# Patient Record
Sex: FEMALE | Race: ASIAN | NOT HISPANIC OR LATINO | Employment: UNEMPLOYED | URBAN - METROPOLITAN AREA
[De-identification: names, ages, dates, MRNs, and addresses within clinical notes are randomized per-mention and may not be internally consistent; named-entity substitution may affect disease eponyms.]

---

## 2018-03-04 ENCOUNTER — TRANSFERRED RECORDS (OUTPATIENT)
Dept: HEALTH INFORMATION MANAGEMENT | Facility: CLINIC | Age: 53
End: 2018-03-04

## 2021-11-28 ENCOUNTER — TRANSFERRED RECORDS (OUTPATIENT)
Dept: HEALTH INFORMATION MANAGEMENT | Facility: CLINIC | Age: 56
End: 2021-11-28

## 2021-11-28 LAB — HEP C HIM: NORMAL

## 2021-12-06 ENCOUNTER — TRANSFERRED RECORDS (OUTPATIENT)
Dept: HEALTH INFORMATION MANAGEMENT | Facility: CLINIC | Age: 56
End: 2021-12-06

## 2022-07-01 ENCOUNTER — TRANSFERRED RECORDS (OUTPATIENT)
Dept: MULTI SPECIALTY CLINIC | Facility: CLINIC | Age: 57
End: 2022-07-01

## 2022-07-01 LAB — PAP SMEAR - HIM PATIENT REPORTED: NORMAL

## 2022-12-02 ENCOUNTER — TRANSFERRED RECORDS (OUTPATIENT)
Dept: HEALTH INFORMATION MANAGEMENT | Facility: CLINIC | Age: 57
End: 2022-12-02

## 2023-01-19 ENCOUNTER — TRANSFERRED RECORDS (OUTPATIENT)
Dept: HEALTH INFORMATION MANAGEMENT | Facility: CLINIC | Age: 58
End: 2023-01-19

## 2023-02-03 ENCOUNTER — TRANSFERRED RECORDS (OUTPATIENT)
Dept: HEALTH INFORMATION MANAGEMENT | Facility: CLINIC | Age: 58
End: 2023-02-03

## 2023-02-09 ENCOUNTER — TRANSFERRED RECORDS (OUTPATIENT)
Dept: HEALTH INFORMATION MANAGEMENT | Facility: CLINIC | Age: 58
End: 2023-02-09

## 2023-02-09 ENCOUNTER — MEDICAL CORRESPONDENCE (OUTPATIENT)
Dept: HEALTH INFORMATION MANAGEMENT | Facility: CLINIC | Age: 58
End: 2023-02-09

## 2023-02-10 ENCOUNTER — TRANSFERRED RECORDS (OUTPATIENT)
Dept: HEALTH INFORMATION MANAGEMENT | Facility: CLINIC | Age: 58
End: 2023-02-10

## 2023-02-24 ENCOUNTER — MEDICAL CORRESPONDENCE (OUTPATIENT)
Dept: HEALTH INFORMATION MANAGEMENT | Facility: CLINIC | Age: 58
End: 2023-02-24

## 2023-05-03 ENCOUNTER — REFERRAL (OUTPATIENT)
Dept: TRANSPLANT | Facility: CLINIC | Age: 58
End: 2023-05-03

## 2023-05-03 DIAGNOSIS — N18.6 END STAGE RENAL DISEASE (H): ICD-10-CM

## 2023-05-03 DIAGNOSIS — Z76.82 ORGAN TRANSPLANT CANDIDATE: ICD-10-CM

## 2023-05-03 DIAGNOSIS — N18.6 ESRD (END STAGE RENAL DISEASE) (H): Primary | ICD-10-CM

## 2023-05-03 DIAGNOSIS — Z01.818 PRE-TRANSPLANT EVALUATION FOR KIDNEY TRANSPLANT: ICD-10-CM

## 2023-05-03 DIAGNOSIS — I10 ESSENTIAL HYPERTENSION: ICD-10-CM

## 2023-05-03 NOTE — LETTER
[facility]  [address]  [place] [address]                May 3, 2023                                                                                        MEDICAL RECORDS REQUEST    [facility]                      [facility]     Thank you for referring your patient to the [facility] Kidney, Kidney Pancreas Program, in order to process the referral we will need the following information;    Demographics  2728 form   Immunizations records  Providers Progress notes, (last 3 note)  Up to date lab work if available       Please call our office at [phone] if you have any questions or concerns.                Please fax all paper records to [phone] within 1-3 business days.      Thank you,   The SOT Referral Intake Team     [facility]  Solid Organ Transplant Office  [address]  [place] [address]

## 2023-05-03 NOTE — LETTER
Lyndsey [facility]  [address]  [place] [address]                July 6, 2023      Gregorio Lyndsey,     It was a pleasure to speak with your daughter, Getachew, over the phone today in preparation of your pre-kidney transplant evaluation. I am sending this letter to review the pre-transplant information we covered. I will also send this same content in an email for your convenience.     Your schedule will be available in your My Chart for your pre-kidney transplant evaluation on 09/14/2023 starting at 7:30 AM. All your appointments will be at the:     [facility]  [address] [place] [address]    For parking options, please park in the open lot across the street from the front door of our Clinic.  Otherwise, enter the [facility] / john phan from [facility] and attendants can assist you based on your needs.  parking is available for those with limited mobility M-F from 7:00 am to 5:00 pm.     Please bring your designated care person(s) to your appointment day to help listen to the patient education and ask questions that are important to you. You can eat/drink normally on this day. Please do not fast, as the appointment day will most likely go until 3 PM. There is a coffee shop on street level for you to purchase food and you can also bring food from home. Please be aware there are no microwaves or refrigerators for patient use at the clinic. Also, take all your prescribed medications as ordered on this day. There are no medication lab tests ordered during your appointments.    Upon completion of your appointments, I will compile the outcomes and have your results reviewed at the Transplant Team Selection Committee on Wednesday 09/20/2023 of the following week. This is a medical review meeting only, you will not be asked to attend. I will call you within 10 business  days after this meeting to inform you of the outcomes and to assist in planning  for the completion of your evaluation. You will also receive a Transplant Evaluation Summary Letter after my phone call.     You will receive an email from Ashleigh in our Transplant Office approximately 10 days prior to your evaluation which contains a Receipt of Information consent and patient educational materials. Please read and electronically sign the Receipt of Information consent as well as read the educational materials prior to your evaluation appointments on 09/14/2023.     Please complete your pre-kidney transplant education on [facility]plant Place. This is an online website that our Transplant Program uses for patient education, specifically for our Program. To find [facility] Transplant Place you can google search for it or click on this link: [email or web address].  Your first step is to register as a new user, then pick your settings: adult/kidney/ English. Next notice the education is divided into 3 sections: pre-transplant/ transplant/ post-transplant. In the pre-transplant sections please view pre-kidney evaluation parts 1 and 2 video sets. Please complete viewing of these videos prior to your evaluation appointments.  This will give you a good knowledge base to then to ask questions with the providers.     Additional transplant resources are as follows:   www.unos.org. UNOS, or United Network of Organ Sharing, is the national organization in our country that maintains all the organ wait lists and is responsible for the rules and regulations for organ allocation. I recommend looking at the Transplant Living section as this area has content created for patients.   www.srtr.org SRTR, or the Scientific Registry for Transplant Recipients is a national data base that all Transplant Centers report their success and failure rate for all organ types twice per year. The results are public knowledge and do provide a good perspective of organ transplant.     If the transplant providers tell you at your  appointments that you should start to have live donors register with our Program to initiate their evaluations, please provide this registration website: [email or web address]   The donor will receive a detailed email response back with information and next steps specific to their situation. It is important that the donor responds to the email in order to  forward with their evaluation. Donors can also call our Office and ask to speak with a live donor coordinator in the event of questions at [phone].     Please let me know of any questions or concerns.     Thank you,  Dolly Garcia, RN, BSN  Pre Kidney Pancreas Transplant Coordinator   [facility]  Solid Organ Transplant Care   [address] [place] [address] [address]  [place] [address]  [email or web address]   Office Number: [phone] Direct Number: [phone]   Fax Number: [phone]  Employed by [facility] [employer] [facility]     CC's: Dr. Faviola Jang, [facility]

## 2023-05-03 NOTE — LETTER
[facility]  [address]  [place] [address]          Dear Lyndsey,    Thank you for your interest in the Transplant Center at [facility]. We look forward to being a part of your care team and assisting you through the transplant process.    As we discussed, your transplant coordinator is Dolly Garcia (Kidney).  You may call your coordinator at any time with questions or concerns.  Your first scheduled call will be on 5/26/2023 between 8am and 12pm.  If this needs to change, call [phone].    Please complete the following.    Fill out and return the enclosed forms  Authorization for Electronic Communication  Authorization to Discuss Protected Health Information  Authorization for Release of Protected Health Information    Sign up for:  [employer], access to your electronic medical record (see enclosed pamphlet)  [email or web address], a transplant education website    You can use these tools to learn more about your transplant, communicate with your care team, and track your medical details      Sincerely,      Solid Organ Transplant  [facility]    cc: Referring Physician

## 2023-05-05 ENCOUNTER — TRANSFERRED RECORDS (OUTPATIENT)
Dept: HEALTH INFORMATION MANAGEMENT | Facility: CLINIC | Age: 58
End: 2023-05-05
Payer: COMMERCIAL

## 2023-05-08 ENCOUNTER — TELEPHONE (OUTPATIENT)
Dept: TRANSPLANT | Facility: CLINIC | Age: 58
End: 2023-05-08

## 2023-05-08 NOTE — TELEPHONE ENCOUNTER
Patient Call: General  Route to LPN    Reason for call: called in regards of returing missed call from last Friday. Call back number [phone].    Call back needed? Yes    Return Call Needed  Same as documented in contacts section  When to return call?: Same day: Route High Priority

## 2023-05-09 ENCOUNTER — OFFICE VISIT (OUTPATIENT)
Dept: FAMILY MEDICINE | Facility: CLINIC | Age: 58
End: 2023-05-09
Payer: COMMERCIAL

## 2023-05-09 VITALS
BODY MASS INDEX: 19.25 KG/M2 | DIASTOLIC BLOOD PRESSURE: 65 MMHG | OXYGEN SATURATION: 100 % | HEART RATE: 68 BPM | RESPIRATION RATE: 16 BRPM | WEIGHT: 89.25 LBS | SYSTOLIC BLOOD PRESSURE: 124 MMHG | HEIGHT: 57 IN | TEMPERATURE: 98.4 F

## 2023-05-09 DIAGNOSIS — Z11.59 NEED FOR HEPATITIS C SCREENING TEST: ICD-10-CM

## 2023-05-09 DIAGNOSIS — Z11.4 SCREENING FOR HIV (HUMAN IMMUNODEFICIENCY VIRUS): ICD-10-CM

## 2023-05-09 DIAGNOSIS — Z99.2 ESRD (END STAGE RENAL DISEASE) ON DIALYSIS (H): Primary | ICD-10-CM

## 2023-05-09 DIAGNOSIS — Z01.84 IMMUNITY STATUS TESTING: ICD-10-CM

## 2023-05-09 DIAGNOSIS — Z12.4 CERVICAL CANCER SCREENING: ICD-10-CM

## 2023-05-09 DIAGNOSIS — N18.6 ESRD (END STAGE RENAL DISEASE) ON DIALYSIS (H): Primary | ICD-10-CM

## 2023-05-09 DIAGNOSIS — Z12.31 VISIT FOR SCREENING MAMMOGRAM: ICD-10-CM

## 2023-05-09 DIAGNOSIS — I10 HYPERTENSION, UNSPECIFIED TYPE: ICD-10-CM

## 2023-05-09 DIAGNOSIS — Z12.11 SCREEN FOR COLON CANCER: ICD-10-CM

## 2023-05-09 LAB
ALBUMIN SERPL BCG-MCNC: 4.8 G/DL (ref 3.5–5.2)
ALP SERPL-CCNC: 116 U/L (ref 35–104)
ALT SERPL W P-5'-P-CCNC: 24 U/L (ref 10–35)
ANION GAP SERPL CALCULATED.3IONS-SCNC: 16 MMOL/L (ref 7–15)
AST SERPL W P-5'-P-CCNC: 23 U/L (ref 10–35)
BASOPHILS # BLD AUTO: 0 10E3/UL (ref 0–0.2)
BASOPHILS NFR BLD AUTO: 0 %
BILIRUB DIRECT SERPL-MCNC: <0.2 MG/DL (ref 0–0.3)
BILIRUB SERPL-MCNC: 0.5 MG/DL
BUN SERPL-MCNC: 20.6 MG/DL (ref 6–20)
CALCIUM SERPL-MCNC: 9.3 MG/DL (ref 8.6–10)
CHLORIDE SERPL-SCNC: 94 MMOL/L (ref 98–107)
CHOLEST SERPL-MCNC: 146 MG/DL
CREAT SERPL-MCNC: 6.64 MG/DL (ref 0.51–0.95)
DEPRECATED HCO3 PLAS-SCNC: 24 MMOL/L (ref 22–29)
EOSINOPHIL # BLD AUTO: 0.2 10E3/UL (ref 0–0.7)
EOSINOPHIL NFR BLD AUTO: 3 %
ERYTHROCYTE [DISTWIDTH] IN BLOOD BY AUTOMATED COUNT: 14.7 % (ref 10–15)
GFR SERPL CREATININE-BSD FRML MDRD: 7 ML/MIN/1.73M2
GLUCOSE SERPL-MCNC: 89 MG/DL (ref 70–99)
HBV SURFACE AB SERPL IA-ACNC: 888.53 M[IU]/ML
HBV SURFACE AB SERPL IA-ACNC: REACTIVE M[IU]/ML
HCT VFR BLD AUTO: 38.6 % (ref 35–47)
HCV AB SERPL QL IA: NONREACTIVE
HDLC SERPL-MCNC: 36 MG/DL
HGB BLD-MCNC: 12.1 G/DL (ref 11.7–15.7)
HIV 1+2 AB+HIV1 P24 AG SERPL QL IA: NONREACTIVE
IMM GRANULOCYTES # BLD: 0 10E3/UL
IMM GRANULOCYTES NFR BLD: 0 %
LDLC SERPL CALC-MCNC: 79 MG/DL
LYMPHOCYTES # BLD AUTO: 1.9 10E3/UL (ref 0.8–5.3)
LYMPHOCYTES NFR BLD AUTO: 28 %
MCH RBC QN AUTO: 27.9 PG (ref 26.5–33)
MCHC RBC AUTO-ENTMCNC: 31.3 G/DL (ref 31.5–36.5)
MCV RBC AUTO: 89 FL (ref 78–100)
MONOCYTES # BLD AUTO: 0.5 10E3/UL (ref 0–1.3)
MONOCYTES NFR BLD AUTO: 8 %
NEUTROPHILS # BLD AUTO: 4.2 10E3/UL (ref 1.6–8.3)
NEUTROPHILS NFR BLD AUTO: 61 %
NONHDLC SERPL-MCNC: 110 MG/DL
PLATELET # BLD AUTO: 311 10E3/UL (ref 150–450)
POTASSIUM SERPL-SCNC: 5.7 MMOL/L (ref 3.4–5.3)
PROT SERPL-MCNC: 8.4 G/DL (ref 6.4–8.3)
RBC # BLD AUTO: 4.33 10E6/UL (ref 3.8–5.2)
SODIUM SERPL-SCNC: 134 MMOL/L (ref 136–145)
TRIGL SERPL-MCNC: 155 MG/DL
TSH SERPL DL<=0.005 MIU/L-ACNC: 0.36 UIU/ML (ref 0.3–4.2)
WBC # BLD AUTO: 6.8 10E3/UL (ref 4–11)

## 2023-05-09 PROCEDURE — 80061 LIPID PANEL: CPT | Performed by: FAMILY MEDICINE

## 2023-05-09 PROCEDURE — 87389 HIV-1 AG W/HIV-1&-2 AB AG IA: CPT | Performed by: FAMILY MEDICINE

## 2023-05-09 PROCEDURE — 36415 COLL VENOUS BLD VENIPUNCTURE: CPT | Performed by: FAMILY MEDICINE

## 2023-05-09 PROCEDURE — 86803 HEPATITIS C AB TEST: CPT | Performed by: FAMILY MEDICINE

## 2023-05-09 PROCEDURE — 99204 OFFICE O/P NEW MOD 45 MIN: CPT | Performed by: FAMILY MEDICINE

## 2023-05-09 PROCEDURE — 86706 HEP B SURFACE ANTIBODY: CPT | Performed by: FAMILY MEDICINE

## 2023-05-09 PROCEDURE — 80050 GENERAL HEALTH PANEL: CPT | Performed by: FAMILY MEDICINE

## 2023-05-09 PROCEDURE — 82248 BILIRUBIN DIRECT: CPT | Performed by: FAMILY MEDICINE

## 2023-05-09 RX ORDER — ATORVASTATIN CALCIUM 40 MG/1
40 TABLET, FILM COATED ORAL DAILY
COMMUNITY
Start: 2023-03-27 | End: 2023-12-07

## 2023-05-09 RX ORDER — FAMOTIDINE 20 MG/1
1 TABLET, FILM COATED ORAL
COMMUNITY
Start: 2023-04-16 | End: 2023-12-07

## 2023-05-09 RX ORDER — METOPROLOL TARTRATE 50 MG
TABLET ORAL
COMMUNITY
Start: 2023-03-29 | End: 2023-11-09

## 2023-05-09 RX ORDER — FERROUS SULFATE 325(65) MG
TABLET ORAL
COMMUNITY
Start: 2023-04-21 | End: 2023-12-07

## 2023-05-09 RX ORDER — AMLODIPINE BESYLATE 10 MG/1
1 TABLET ORAL
COMMUNITY
Start: 2023-04-16 | End: 2023-06-09

## 2023-05-09 RX ORDER — CALCIUM ACETATE 667 MG/1
CAPSULE ORAL 2 TIMES DAILY
COMMUNITY
Start: 2023-03-25 | End: 2023-12-07

## 2023-05-09 NOTE — PROGRESS NOTES
OFFICE VISIT    Assessment/Plan:     Patient Instructions:    -Continue to follow with the kidney team.  -Continue to do dialysis.  -We will complete vaccinations at the next visit.    Please seek immediate medical attention (go to the emergency room or urgent care) for the following reasons: worsening symptoms, or any concerning changes.    Please return to clinic in 1-2 months for annual physical with a female doctor, or sooner as needed.      Lyndsey was seen today for establish care.  Diagnoses and all orders for this visit:    ESRD (end stage renal disease) on dialysis (H)  Hypertension, unspecified type: Patient connected to nephrology already and is undergoing dialysis.  She was recommended to continue to follow with them.  Plan to check labs as below.  No previous records available otherwise from patient's clinic from [place].  KEILY signed.  -     Lipid panel reflex to direct LDL Fasting; Future  -     Basic metabolic panel; Future  -     CBC with Platelets & Differential; Future  -     Hepatic function panel; Future  -     Lipid panel reflex to direct LDL Fasting; Future  -     TSH with free T4 reflex; Future    Visit for screening mammogram  -     *MA Screening Digital Bilateral; Future    Screen for colon cancer  -     Colonoscopy Screening  Referral; Future    Screening for HIV (human immunodeficiency virus)  -     HIV Antigen Antibody Combo; Future    Need for hepatitis C screening test  -     Hepatitis C Screen Reflex to HCV RNA Quant and Genotype; Future    Cervical cancer screening: Patient declined today.  She will schedule an appointment with a female provider when ready.    Immunity status testing  -     Hepatitis B Surface Antibody; Future          The diagnoses, treatment options, risk, benefits, and recommendations were reviewed with patient/guardian.  Questions were answered to patient's/guardian satisfaction.  Red flag signs were reviewed.  Patient/guardian is in agreement with above  plan.      Subjective: 58 year old female with history of end-stage renal disease on dialysis, HTN, GERD who presents to clinic for the following complaints:   Patient presents with:  Establish Care    Answers for HPI/ROS submitted by the patient on 5/9/2023  What is the reason for your visit today? : primary care  How many servings of fruits and vegetables do you eat daily?: 2-3  On average, how many sweetened beverages do you drink each day (Examples: soda, juice, sweet tea, etc.  Do NOT count diet or artificially sweetened beverages)?: 1  How many minutes a day do you exercise enough to make your heart beat faster?: 9 or less  How many days a week do you exercise enough to make your heart beat faster?: 3 or less  How many days per week do you miss taking your medication?: 0    No records available from outside facility.  Patient has some encounters from [facility] regarding end-stage renal disease on dialysis and some labs.  No other history noted.  KEILY signed.     End-stage renal disease on dialysis: Patient follows with nephrology.  She has been seen by Dr. Faviola Jang (part of transplant team) with [facility].  Reviewed recommendations to be due to go to dialysis.    Epigastric pain and lump: noted yesterday. Comes and goes. Noted just once in a while.  Patient does not sure if food intake affects her symptoms. This has happened four times since Feb 2023. Patient is on famotidine 20mg and has been taking it once daily. The dose was changed to 20mg BID on 04/16/2023.  Patient was not aware of this.  Reviewed recommendations and patient will complete a trial on the higher dose of the famotidine.    HTN: on amlodipine and metoprolol.  Blood pressures well controlled.      Patient denies any issues or side effects on any of her medications.    Family history:  CAD, hypertension, diabetes, stroke: None  Breast cancer: cousin    HM due was reviewed with patient/parent.  Recommendations, risk, benefits  "were reviewed.  Accepted recommendations were ordered.  Otherwise, patient/parent declined.    Health Maintenance Due   Topic Date Due     YEARLY PREVENTIVE VISIT  Never done     ANNUAL REVIEW OF HM ORDERS  Never done     ADVANCE CARE PLANNING  Never done     MAMMO SCREENING  Never done     HEPATITIS B IMMUNIZATION (1 of 3 - 3-dose series) Never done     COVID-19 Vaccine (1) Never done     COLORECTAL CANCER SCREENING  Never done     HIV SCREENING  Never done     HEPATITIS C SCREENING  Never done     PAP  Never done     DTAP/TDAP/TD IMMUNIZATION (1 - Tdap) Never done     LIPID  Never done     ZOSTER IMMUNIZATION (1 of 2) Never done     INFLUENZA VACCINE (1) Never done     Never had a colonoscopy or pap smear in the past.   Discussed pap smear and showed diagram. Patient would prefer a female provider for this.   Plan to hold off on vaccinations at this time due to no records being available.      Patient presents for her significant other and daugther.  Unable to obtain a EndoInSight , so daughter was willing and comfortable to provide interpretive support.    The 10 point review of system is negative except as stated in the HPI.    Allergies were reviewed and updated.    Objective:   /65   Pulse 68   Temp 98.4  F (36.9  C) (Oral)   Resp 16   Ht 1.448 m (4' 9\")   Wt 40.5 kg (89 lb 4 oz)   LMP  (LMP Unknown)   SpO2 100%   BMI 19.31 kg/m    General: Active, alert, nontoxic-appearing.  No acute distress.  HEENT: Normocephalic, atraumatic.  Pupils are equal and round.  Sclera is clear.  Normal external ears. Nares patent.  Moist mucous membranes.    Cardiac: RRR.  S1, S2 present.  No murmurs, rubs, or gallops.  Respiratory/chest: Clear to auscultation bilaterally.  No wheezes, rales, rhonchi.  Breathing is not labored.  No accessory muscle usage.  Abdomen: Patient endorsing mild epigastric discomfort to palpation.  Soft, nondistended, nontender.  No masses or organomegaly noted.  No guarding or " rebound tenderness appreciated.  Extremities: Left upper extremity: A fistula is noted on the medial upper arm.  Thrill present.  Nontender.  Left upper extremity is otherwise within normal limits.  Voluntary movements intact.  Integumentary: No concerning rash or skin changes appreciated.        Ren Kearney MD  [facility] [place] [address]  Phone: [phone]  Fax: [phone]    5/9/2023  5:49 PM            Current Outpatient Medications   Medication     amLODIPine (NORVASC) 10 MG tablet     atorvastatin (LIPITOR) 40 MG tablet     calcium acetate (PHOSLO) 667 MG CAPS capsule     famotidine (PEPCID) 20 MG tablet     FEROSUL 325 (65 Fe) MG tablet     metoprolol tartrate (LOPRESSOR) 50 MG tablet     No current facility-administered medications for this visit.       No Known Allergies    There are no problems to display for this patient.      No family history on file.    No past surgical history on file.     Social History     Socioeconomic History     Marital status: Single     Spouse name: Not on file     Number of children: Not on file     Years of education: Not on file     Highest education level: Not on file   Occupational History     Not on file   Tobacco Use     Smoking status: Never     Passive exposure: Never     Smokeless tobacco: Former     Types: Chew   Vaping Use     Vaping status: Never Used   Substance and Sexual Activity     Alcohol use: Not on file     Drug use: Not on file     Sexual activity: Not on file   Other Topics Concern     Not on file   Social History Narrative     Not on file     Social Determinants of Health     Financial Resource Strain: Not on file   Food Insecurity: Not on file   Transportation Needs: Not on file   Physical Activity: Not on file   Stress: Not on file   Social Connections: Not on file   Intimate Partner Violence: Not on file   Housing Stability: Not on file

## 2023-05-09 NOTE — PATIENT INSTRUCTIONS
-Thank you for choosing the [facility].  -It was a pleasure to see you today.  -Please take a look at the information below for more specific details regarding the treatment plan and recommendations.  -In this after visit summary is a list of your medications and specific instructions.  Please review this carefully as there may be changes made to your medication list.  -If there are any particular questions or concerns, please feel free to reach out to Dr. Kearney.  -If any labs have been completed, we will reach out to you about results.  If the results are normal or not concerning, a letter or Gekkohart message will be sent to you.  If any follow-up is needed, either Dr. Kearney or the nurse will give you a call.  If you have not heard regarding results after 2 weeks, please reach out to the clinic.    Patient Instructions:    -Continue to follow with the kidney team.  -Continue to do dialysis.  -We will complete vaccinations at the next visit.    Please seek immediate medical attention (go to the emergency room or urgent care) for the following reasons: worsening symptoms, or any concerning changes.    Please return to clinic in 1-2 months for annual physical with a female doctor, or sooner as needed.      --------------------------------------------------------------------------------------------------------------------    -We are always looking for ways to improve.  You may be selected to receive a survey regarding your visit today.  We encourage you to complete the survey and provide specific, constructive feedback to help us improve our processes.  Thank you for your time!  -Please review the contact information listed on the after visit summary and in the electronic chart.  Below is the phone number that we have on file.  If there are any changes that are needed to be made, please reach out to the clinic.  [phone] (home)

## 2023-05-11 ENCOUNTER — TELEPHONE (OUTPATIENT)
Dept: FAMILY MEDICINE | Facility: CLINIC | Age: 58
End: 2023-05-11
Payer: COMMERCIAL

## 2023-05-11 NOTE — TELEPHONE ENCOUNTER
----- Message from Ren Kearney MD sent at 5/11/2023  1:38 PM CDT -----  Team - please call patient with results.    Gregorio Solorio Melo,    I hope you have been well since our last visit. Below are the results from the testing completed at the visit.     The kidney function test and electrolytes are abnormal, though expected as you are on dialysis.  Please continue to see the kidney specialist and complete the dialysis as recommended.    The rest of your blood tests are normal or not concerning.    Dr. Kearney recommends that you continue on the plan as discussed in clinic.    If there are any questions or concerns, please call the clinic or schedule an appointment for follow up.     Best wishes,           Ren Kearney MD  [facility]  5/11/2023  1:37 PM    The 10-year ASCVD risk score (Ana Luisa PRYOR, et al., 2019) is: 3%    Values used to calculate the score:      Age: 58 years      Sex: Female      Is Non- : No      Diabetic: No      Tobacco smoker: No      Systolic Blood Pressure: 124 mmHg      Is BP treated: Yes      HDL Cholesterol: 36 mg/dL      Total Cholesterol: 146 mg/dL

## 2023-05-11 NOTE — LETTER
May 17, 2023      [facility]  [address]  [place] [address]        Dear ,    We are writing to inform you of your test results.    The kidney function test and electrolytes are abnormal, though expected as you are on dialysis.  Please continue to see the kidney specialist and complete the dialysis as recommended.     The rest of your blood tests are normal or not concerning.    Resulted Orders   HIV Antigen Antibody Combo   Result Value Ref Range    HIV Antigen Antibody Combo Nonreactive Nonreactive      Comment:      HIV-1 p24 Ag & HIV-1/HIV-2 Ab Not Detected   Hepatitis C Screen Reflex to HCV RNA Quant and Genotype   Result Value Ref Range    Hepatitis C Antibody Nonreactive Nonreactive    Narrative    Assay performance characteristics have not been established for newborns, infants, and children.   Lipid panel reflex to direct LDL Fasting   Result Value Ref Range    Cholesterol 146 <200 mg/dL    Triglycerides 155 (H) <150 mg/dL    Direct Measure HDL 36 (L) >=50 mg/dL    LDL Cholesterol Calculated 79 <=100 mg/dL    Non HDL Cholesterol 110 <130 mg/dL    Narrative    Cholesterol  Desirable:  <200 mg/dL    Triglycerides  Normal:  Less than 150 mg/dL  Borderline High:  150-199 mg/dL  High:  200-499 mg/dL  Very High:  Greater than or equal to 500 mg/dL    Direct Measure HDL  Female:  Greater than or equal to 50 mg/dL   Male:  Greater than or equal to 40 mg/dL    LDL Cholesterol  Desirable:  <100mg/dL  Above Desirable:  100-129 mg/dL   Borderline High:  130-159 mg/dL   High:  160-189 mg/dL   Very High:  >= 190 mg/dL    Non HDL Cholesterol  Desirable:  130 mg/dL  Above Desirable:  130-159 mg/dL  Borderline High:  160-189 mg/dL  High:  190-219 mg/dL  Very High:  Greater than or equal to 220 mg/dL   Basic metabolic panel   Result Value Ref Range    Sodium 134 (L) 136 - 145 mmol/L    Potassium 5.7 (H) 3.4 - 5.3 mmol/L    Chloride 94 (L) 98 - 107 mmol/L    Carbon Dioxide (CO2) 24 22 - 29 mmol/L    Anion  Gap 16 (H) 7 - 15 mmol/L    Urea Nitrogen 20.6 (H) 6.0 - 20.0 mg/dL    Creatinine 6.64 (H) 0.51 - 0.95 mg/dL    Calcium 9.3 8.6 - 10.0 mg/dL    Glucose 89 70 - 99 mg/dL    GFR Estimate 7 (L) >60 mL/min/1.73m2      Comment:      eGFR calculated using 2021 CKD-EPI equation.   Hepatic function panel   Result Value Ref Range    Protein Total 8.4 (H) 6.4 - 8.3 g/dL    Albumin 4.8 3.5 - 5.2 g/dL    Bilirubin Total 0.5 <=1.2 mg/dL    Alkaline Phosphatase 116 (H) 35 - 104 U/L    AST 23 10 - 35 U/L    ALT 24 10 - 35 U/L    Bilirubin Direct <0.20 0.00 - 0.30 mg/dL   TSH with free T4 reflex   Result Value Ref Range    TSH 0.36 0.30 - 4.20 uIU/mL   Hepatitis B Surface Antibody   Result Value Ref Range    Hepatitis B Surface Antibody Instrument Value 888.53 <8.00 m[IU]/mL    Hepatitis B Surface Antibody Reactive       Comment:      Patient is considered to be immune to infection with hepatitis B when the value is greater than or equal to 12.00 mIU/mL.   CBC with platelets and differential   Result Value Ref Range    WBC Count 6.8 4.0 - 11.0 10e3/uL    RBC Count 4.33 3.80 - 5.20 10e6/uL    Hemoglobin 12.1 11.7 - 15.7 g/dL    Hematocrit 38.6 35.0 - 47.0 %    MCV 89 78 - 100 fL    MCH 27.9 26.5 - 33.0 pg    MCHC 31.3 (L) 31.5 - 36.5 g/dL    RDW 14.7 10.0 - 15.0 %    Platelet Count 311 150 - 450 10e3/uL    % Neutrophils 61 %    % Lymphocytes 28 %    % Monocytes 8 %    % Eosinophils 3 %    % Basophils 0 %    % Immature Granulocytes 0 %    Absolute Neutrophils 4.2 1.6 - 8.3 10e3/uL    Absolute Lymphocytes 1.9 0.8 - 5.3 10e3/uL    Absolute Monocytes 0.5 0.0 - 1.3 10e3/uL    Absolute Eosinophils 0.2 0.0 - 0.7 10e3/uL    Absolute Basophils 0.0 0.0 - 0.2 10e3/uL    Absolute Immature Granulocytes 0.0 <=0.4 10e3/uL       If you have any questions or concerns, please call the clinic at the number listed above.       Sincerely,

## 2023-05-15 ENCOUNTER — NURSE TRIAGE (OUTPATIENT)
Dept: NURSING | Facility: CLINIC | Age: 58
End: 2023-05-15
Payer: COMMERCIAL

## 2023-05-15 VITALS — BODY MASS INDEX: 19.2 KG/M2 | HEIGHT: 57 IN | WEIGHT: 89 LBS

## 2023-05-15 DIAGNOSIS — Z12.31 SCREENING MAMMOGRAM FOR BREAST CANCER: ICD-10-CM

## 2023-05-15 DIAGNOSIS — N64.4 NIPPLE PAIN: Primary | ICD-10-CM

## 2023-05-15 NOTE — TELEPHONE ENCOUNTER
Writer will route Nurse Triage note below to Dr. Kearney to review and provide guidance.    EKATERINA Arana, RN   [facility]

## 2023-05-15 NOTE — TELEPHONE ENCOUNTER
CMT left message 2 1. Please review message thread below and advise the patient as indicated. Please schedule if necessary or indicated in message thread.

## 2023-05-15 NOTE — TELEPHONE ENCOUNTER
Writer called patient with the help of a [facility]  regarding provider's message below. Provider message relayed to patient's daughter, Getachew Alejo (C2C on file).    Imaging scheduling number given to Getachew Alejo: [phone]    Getachew Alejo verbalizes understanding, agrees with plan and has no further questions.    Closing encounter.    PHIL AranaN, RN   [facility]

## 2023-05-15 NOTE — TELEPHONE ENCOUNTER
PCP:  Dr Ren Kearney MD  Referring Provider: Dr Faviola Jang MD  Referring Diagnosis: ESRD    GFR/Date: 7 (5/9/2023)    Is patient under the age of 65? Yes  Is patient diabetic? No  Is patient on insulin? No  Was patient offered a pancreas transplant referral? No    Is patient in a group home/assisted living? No  Does patient have a guardian? No    Referral intake process completed.  Patient is aware that after financial approval is received, medical records will be requested.   Patient confirmed for a callback from transplant coordinator on 5/26/2023. (within 2 weeks)  Tentative evaluation date 7/6/2023, Slot 5 (within 4 weeks) if appointment is virtual, does patient have capabilities of setting this up? Patient request for in person appointment with transplant team.    Confirmed coordinator will discuss evaluation process in more detail at the time of their call.   Patient is aware of the need to arrange age appropriate cancer screening, vaccinations, and dental care.  Reminded patient to complete questionnaire, complete medical records release, and review packet prior to evaluation visit .    Assessed patient for special needs (ie-wheelchair, assistance, guardian, and ):    Patient speaks CAROL, [facility] or Getachew interpret.  Patient having first cataract surgery on 5/19/2023  Patient uses a cane    Patient instructed to call [phone] with questions.     Patient gave verbal consent during intake call to obtain medical records and documents outside of [facility]:  Yes

## 2023-05-15 NOTE — TELEPHONE ENCOUNTER
Orders placed as requested.  Please assist patient in scheduling an appointment if able to complete the mammogram and ultrasound at same time.  If symptoms worsening, patient is to be seen in urgent care/ER or follow-up in clinic.    Ren Kearney MD  [facility]  5/15/2023  2:01 PM    Bu was seen today for breast pain.    Diagnoses and all orders for this visit:    Nipple pain  -     MA Diagnostic Digital Bilateral; Future  -     US Breast Right Complete 4 Quadrants; Future    Screening mammogram for breast cancer  -     MA Diagnostic Digital Bilateral; Future  -     US Breast Right Complete 4 Quadrants; Future

## 2023-05-15 NOTE — TELEPHONE ENCOUNTER
"Nurse Triage SBAR    Situation:   mammogram follow up    Background:   -Patient calling, It is okay to leave a detailed message at this number.   -consent to communicate on file    Assessment:   -they were calling to schedule the ordered breast mammogram  -when asked by scheduling they said that she dose have pain on the tip of her right nipple  -scheduling recommended them reach out to PCP before scheduling  -she has had the pain for years, but has not been evaluated for it  -nipple sensitive to touch   -no fever  -no redness    -no swelling     FYI - they would also like to update Dr. Kearney that at dialysis ([facility]) she dos get \"Mircera\" injection    Recommendation:   See in Office Within 2 Weeks    Care team: being that she was just seen on 5/9/23 dose she need to be seen again or can the more forward with mammogram scheduling?     CHRIS ODELL RN on 5/15/2023 at 10:28 AM      Reason for Disposition    Breast pain and cause is not known    Additional Information    Negative: Chest pain    Negative: Breastfeeding questions about baby    Negative: Breastfeeding questions about mother (breast symptoms or feeling sick)    Negative: Breastfeeding questions about mother's medicines and diet    Negative: Postpartum breast pain and swelling, not breastfeeding    Negative: Small spot, skin growth or mole    Negative: SEVERE breast pain and fever > 103 F  (39.4 C)    Negative: Patient sounds very sick or weak to the triager    Negative: Breast looks infected (spreading redness, feels hot or painful to touch) and fever    Negative: Breast looks infected (spreading redness, feels hot or painful to touch) and no fever    Negative: Painful rash and multiple small blisters grouped together (i.e., dermatomal distribution or \"band\" or \"stripe\")    Negative: Cuts, burns, or bruises of breasts and suspicious history for the injury    Negative: Breast lump    Negative: Nipple discharge and bloody    Negative: Patient wants to " be seen    Negative: Nipple is inverted (i.e., points inward)  (Exception: long-term physical characteristic, present for many years)    Negative: Dimpling of skin of breast (i.e., skin looks like the outside of an orange peel)    Negative: Nipple discharge and not bloody (e.g., clear, white, yellow, brown, green)    Negative: Breast tenderness and fullness and pregnant    Negative: Change in shape or appearance of breast    Negative: Dry flaking-peeling skin of nipple    Negative: Breast pain or tenderness that occurs monthly before menstrual period, and has NOT been evaluated by a doctor (or NP/PA)    Protocols used: BREAST SYMPTOMS-A-OH

## 2023-05-17 NOTE — TELEPHONE ENCOUNTER
CMT left message x 3. Please review message thread below and advise the patient as indicated. Please schedule if necessary or indicated in message thread.

## 2023-05-18 NOTE — TELEPHONE ENCOUNTER
Patient daughter Getachew Alejo returns call. Writer relay RN/provider's message below. Caller verbalizes understanding and has no further questions.     Love Steele,   [facility]  May 18, 2023 9:02 AM

## 2023-05-26 NOTE — TELEPHONE ENCOUNTER
Called pt today with the assistance of a [facility] , [place] ID # [id number]. Reached  stating I will call her again next week.

## 2023-06-01 ENCOUNTER — TRANSFERRED RECORDS (OUTPATIENT)
Dept: HEALTH INFORMATION MANAGEMENT | Facility: CLINIC | Age: 58
End: 2023-06-01
Payer: COMMERCIAL

## 2023-06-03 ENCOUNTER — TRANSFERRED RECORDS (OUTPATIENT)
Dept: HEALTH INFORMATION MANAGEMENT | Facility: CLINIC | Age: 58
End: 2023-06-03

## 2023-06-08 ENCOUNTER — ANCILLARY PROCEDURE (OUTPATIENT)
Dept: MAMMOGRAPHY | Facility: CLINIC | Age: 58
End: 2023-06-08
Attending: FAMILY MEDICINE
Payer: COMMERCIAL

## 2023-06-08 DIAGNOSIS — N64.4 NIPPLE PAIN: ICD-10-CM

## 2023-06-08 DIAGNOSIS — Z12.31 SCREENING MAMMOGRAM FOR BREAST CANCER: ICD-10-CM

## 2023-06-08 PROCEDURE — 76642 ULTRASOUND BREAST LIMITED: CPT | Mod: RT

## 2023-06-08 PROCEDURE — 77062 BREAST TOMOSYNTHESIS BI: CPT

## 2023-06-09 DIAGNOSIS — I10 BENIGN ESSENTIAL HYPERTENSION: Primary | ICD-10-CM

## 2023-06-09 DIAGNOSIS — Z76.0 ENCOUNTER FOR MEDICATION REFILL: ICD-10-CM

## 2023-06-09 NOTE — TELEPHONE ENCOUNTER
"Routing refill request to provider for review/approval because:  Medication is reported/historical    Last office visit provider:  5/9/23    Requested Prescriptions   Pending Prescriptions Disp Refills     amLODIPine (NORVASC) 10 MG tablet 90 tablet 3     Sig: Take 1 tablet (10 mg) by mouth daily at 2 pm       Calcium Channel Blockers Protocol  Failed - 6/9/2023 12:02 PM        Failed - Normal serum creatinine on file in past 12 months     Recent Labs   Lab Test 05/09/23  1107   CR 6.64*       Ok to refill medication if creatinine is low          Passed - Blood pressure under 140/90 in past 12 months     BP Readings from Last 3 Encounters:   05/09/23 124/65                 Passed - Recent (12 mo) or future (30 days) visit within the authorizing provider's specialty     Patient has had an office visit with the authorizing provider or a provider within the authorizing providers department within the previous 12 mos or has a future within next 30 days. See \"Patient Info\" tab in inbasket, or \"Choose Columns\" in Meds & Orders section of the refill encounter.              Passed - Medication is active on med list        Passed - Patient is age 18 or older        Passed - No active pregnancy on record        Passed - No positive pregnancy test in past 12 months             CHRIS ODELL RN 06/09/23 12:02 PM  "

## 2023-06-09 NOTE — TELEPHONE ENCOUNTER
Medication Question or Refill    Contacts       Type Contact Phone/Fax    06/09/2023 11:54 AM CDT Phone (Incoming) Getachew Alejo (Emergency Contact) [phone]          What medication are you calling about (include dose and sig)?: amilodipine     Preferred Pharmacy:   [id number]   [address] [place] [address]  Phone: [phone] Fax: [phone]    Who prescribed the medication?: historical provider    Do you need a refill? Yes    When did you use the medication last? unknown    Patient offered an appointment? No just seen 5/9/23    Do you have any questions or concerns?  No    Okay to leave a detailed message?: Yes at Home number on file [phone] (home)    Call taken on 6/9/23 at 1158 am by CLARI Emery

## 2023-06-12 RX ORDER — AMLODIPINE BESYLATE 10 MG/1
10 TABLET ORAL
Qty: 90 TABLET | Refills: 3 | Status: SHIPPED | OUTPATIENT
Start: 2023-06-12 | End: 2023-12-07

## 2023-06-12 NOTE — TELEPHONE ENCOUNTER
Orders placed as requested.  Please call patient/emergency contact inform.    Ren Kearney MD  [facility]  6/12/2023  9:20 AM    Bu was seen today for refill request.    Diagnoses and all orders for this visit:    Benign essential hypertension  -     amLODIPine (NORVASC) 10 MG tablet; Take 1 tablet (10 mg) by mouth daily at 2 pm    Encounter for medication refill  -     amLODIPine (NORVASC) 10 MG tablet; Take 1 tablet (10 mg) by mouth daily at 2 pm

## 2023-06-21 ENCOUNTER — TRANSFERRED RECORDS (OUTPATIENT)
Dept: HEALTH INFORMATION MANAGEMENT | Facility: CLINIC | Age: 58
End: 2023-06-21

## 2023-07-03 ENCOUNTER — TELEPHONE (OUTPATIENT)
Dept: TRANSPLANT | Facility: CLINIC | Age: 58
End: 2023-07-03

## 2023-07-03 NOTE — TELEPHONE ENCOUNTER
Have not heard back from pt. Called pt again today with the assistance of a [employer] , [facility] ID # [id number].

## 2023-07-05 ENCOUNTER — DOCUMENTATION ONLY (OUTPATIENT)
Dept: TRANSPLANT | Facility: CLINIC | Age: 58
End: 2023-07-05

## 2023-07-25 ENCOUNTER — TELEPHONE (OUTPATIENT)
Dept: SCHEDULING | Facility: CLINIC | Age: 58
End: 2023-07-25
Payer: COMMERCIAL

## 2023-07-25 NOTE — TELEPHONE ENCOUNTER
Reason for Call:  Appointment Request    Patient requesting this type of appt:  Preventive     Requested provider:  Any FEMALE provider please    Reason patient unable to be scheduled:  No females available     When does patient want to be seen/preferred time: 1-2 weeks    Comments: n/a    Could we send this information to you in [facility] or would you prefer to receive a phone call?:   Patient would prefer a phone call   Okay to leave a detailed message?: Yes at Cell number on file:    Telephone Information:   Mobile [phone]       Call taken on 7/25/2023 at 8:04 AM by Micheline Lea

## 2023-08-13 ENCOUNTER — HEALTH MAINTENANCE LETTER (OUTPATIENT)
Age: 58
End: 2023-08-13

## 2023-09-11 ENCOUNTER — TELEPHONE (OUTPATIENT)
Dept: TRANSPLANT | Facility: CLINIC | Age: 58
End: 2023-09-11
Payer: COMMERCIAL

## 2023-09-11 NOTE — TELEPHONE ENCOUNTER
Spoke with patient's daughter Confirmed upcoming PKE appointments at [facility] [place] on 9/14/23 starting at 0730. Instructed patient may eat breakfast and take regularly scheduled medications.

## 2023-09-12 NOTE — PROGRESS NOTES
"[facility] Solid Organ Transplant  Outpatient MNT: Kidney Transplant Evaluation    Current BMI: 19.8 (HT 57.5 in, WT 93 lbs/42 kg)  BMI guideline for kidney transplant up to a BMI of 40 / per surgeon discretion     Frailty Assessment -- Frail (3/5 points)- reported exhaustion, reduced , low activity level    *Diet improvements may help with some frailty components     Time Spent: 30 minutes  Visit Type: Initial   Referring Physician: Mable   Pt accompanied by: Scott , her dtr, and     History of previous txp: none   Dialysis: yes    Dialysis Info: HD 11/2021 6 am   Protein supplement: no    Nutrition Assessment  - Appetite: good  - Food allergies/intolerances: no  - Meal prep & grocery shopping: pt does currently (previously someone else would cook for her d/t low vision)- pt reports she feels safe cooking now w/ impaired vision   - Previous RD education: yes   - Issues chewing or swallowing: no  - N/V/D/C: some constipation  - Food access concerns: not asked     Vitamins, Supplements, Pertinent Meds: iron, Phoslo  Herbal Medicines/Supplements: none    Edema: + PARAS    Weight hx: wt stable for many years   Wt Readings from Last 10 Encounters:   09/13/23 41.2 kg (90 lb 12 oz)   05/09/23 40.5 kg (89 lb 4 oz)   05/15/23 40.4 kg (89 lb)     Diet Recall  Protein- chicken, fish (not daily)  Breakfast 10 am- only on non HD days- white rice + veggies    Lunch    Dinner 6 pm- rice    Snacks HS- rice    Beverages Coffee (Pre-made coffee mix- not sweet), very minimal water   Alcohol None    Dining out None      Physical Activity  \"Not too often\"- walking - 1x/week -unsure how to tell time so is unsure how long she walks for   Can walk ok as long as there are no hills  ADLs- reports taking a sponge bath and not showering; needs help washing hair- since dialysis  Gets tired out sometime- with dialysis    Falls- no    Labs  No recent K/Phos on file    Nutrition Diagnosis  No nutrition " diagnosis identified at this time     Nutrition Intervention  Nutrition education provided:  Reviewed significant lack of protein in her diet. Explained rationale for why she needs more protein than the average person. Suggested she aim for protein at least daily (ideally at all meals), as she is currently not eating protein every day.     Reviewed post txp diet guidelines in brief (will review in further detail post txp):  (1) Review of proper food safety measures d/t immunosuppressant therapy post-op and increased risk for food-borne illness    (2) Avoid the following post txp d/t risk for rejection, unknown effects on the organs, and/or potential interactions with immunosuppressants:  - Herbal, Chinese, holistic, chiropractic, natural, alternative medicines and supplements  - Detoxes and cleanses  - Weight loss pills  - Protein powders or other products with extracts or herbs (ie green tea extract)    (3) Med regimen and possible side effects    Patient Understanding: Pt verbalized understanding of education provided.  Expected Engagement: Fair   Follow-Up Plans: PRN     Nutrition Goals  No nutrition goals identified at this time     Kenyetta Jacobs, RD, LD, CCTD

## 2023-09-13 ENCOUNTER — OFFICE VISIT (OUTPATIENT)
Dept: FAMILY MEDICINE | Facility: CLINIC | Age: 58
End: 2023-09-13
Payer: COMMERCIAL

## 2023-09-13 ENCOUNTER — DOCUMENTATION ONLY (OUTPATIENT)
Dept: TRANSPLANT | Facility: CLINIC | Age: 58
End: 2023-09-13

## 2023-09-13 VITALS
OXYGEN SATURATION: 99 % | HEART RATE: 71 BPM | BODY MASS INDEX: 19.58 KG/M2 | WEIGHT: 90.75 LBS | SYSTOLIC BLOOD PRESSURE: 119 MMHG | HEIGHT: 57 IN | RESPIRATION RATE: 16 BRPM | DIASTOLIC BLOOD PRESSURE: 71 MMHG | TEMPERATURE: 98.3 F

## 2023-09-13 DIAGNOSIS — Z12.4 CERVICAL CANCER SCREENING: ICD-10-CM

## 2023-09-13 DIAGNOSIS — Z12.11 SCREEN FOR COLON CANCER: ICD-10-CM

## 2023-09-13 DIAGNOSIS — N18.6 ESRD (END STAGE RENAL DISEASE) ON DIALYSIS (H): ICD-10-CM

## 2023-09-13 DIAGNOSIS — Z00.00 ROUTINE GENERAL MEDICAL EXAMINATION AT A HEALTH CARE FACILITY: Primary | ICD-10-CM

## 2023-09-13 DIAGNOSIS — Z99.2 ESRD (END STAGE RENAL DISEASE) ON DIALYSIS (H): ICD-10-CM

## 2023-09-13 DIAGNOSIS — Z23 NEED FOR IMMUNIZATION AGAINST INFLUENZA: ICD-10-CM

## 2023-09-13 DIAGNOSIS — I10 BENIGN ESSENTIAL HYPERTENSION: ICD-10-CM

## 2023-09-13 DIAGNOSIS — N25.81 SECONDARY RENAL HYPERPARATHYROIDISM (H): ICD-10-CM

## 2023-09-13 DIAGNOSIS — Z76.89 ENCOUNTER TO ESTABLISH CARE: ICD-10-CM

## 2023-09-13 LAB
ABO/RH(D): NORMAL
ANTIBODY SCREEN: NEGATIVE
SPECIMEN EXPIRATION DATE: NORMAL

## 2023-09-13 PROCEDURE — 99214 OFFICE O/P EST MOD 30 MIN: CPT | Mod: 25 | Performed by: FAMILY MEDICINE

## 2023-09-13 PROCEDURE — 99396 PREV VISIT EST AGE 40-64: CPT | Mod: 25 | Performed by: FAMILY MEDICINE

## 2023-09-13 PROCEDURE — 90682 RIV4 VACC RECOMBINANT DNA IM: CPT | Performed by: FAMILY MEDICINE

## 2023-09-13 PROCEDURE — G0145 SCR C/V CYTO,THINLAYER,RESCR: HCPCS | Performed by: FAMILY MEDICINE

## 2023-09-13 PROCEDURE — 90471 IMMUNIZATION ADMIN: CPT | Performed by: FAMILY MEDICINE

## 2023-09-13 PROCEDURE — 87624 HPV HI-RISK TYP POOLED RSLT: CPT | Performed by: FAMILY MEDICINE

## 2023-09-13 ASSESSMENT — ENCOUNTER SYMPTOMS
PALPITATIONS: 0
HEARTBURN: 0
DYSURIA: 0
DIARRHEA: 0
JOINT SWELLING: 0
SHORTNESS OF BREATH: 0
NERVOUS/ANXIOUS: 0
HEMATOCHEZIA: 0
COUGH: 0
FEVER: 0
DIZZINESS: 0
BREAST MASS: 0
SORE THROAT: 0
NAUSEA: 0
HEADACHES: 0
MYALGIAS: 0
CONSTIPATION: 0
CHILLS: 0
FREQUENCY: 0
ARTHRALGIAS: 0
WEAKNESS: 0
PARESTHESIAS: 0
ABDOMINAL PAIN: 1
HEMATURIA: 0
EYE PAIN: 0

## 2023-09-13 NOTE — PROGRESS NOTES
SUBJECTIVE:   CC: Lyndsey is an 58 year old who presents for preventive health visit.       Healthy Habits:     Getting at least 3 servings of Calcium per day:  Yes    Bi-annual eye exam:  Yes    Dental care twice a year:  NO    Sleep apnea or symptoms of sleep apnea:  Excessive snoring    Diet:  Low salt    Frequency of exercise:  1 day/week    Duration of exercise:  Less than 15 minutes    Taking medications regularly:  Yes    Medication side effects:  None    Additional concerns today:  No    Post menopausal. .     Patient has ESRD on HD.   She was in [place] prior to this. Arrived in [place] 2023  She has her dialysis done at [facility] [address].   They are not sure who her nephrologist is.   She has HP listed in her chart but she is     KEILY for [facility]  [address]  [place] [address]   [phone]  Fax [phone]    She does not think she has ever had a pap smear before.   She has had 4 babies, born outside of the .     She does not work, she lives with her family.   No source of income.   Waiting for her cataract results.     Daughter has been helping.  has been helping. But they actually aren't sure if it's a .   She wants to apply for citizenship. She had someone helping her already.   Dad already had it.       Have you ever done Advance Care Planning? (For example, a Health Directive, POLST, or a discussion with a medical provider or your loved ones about your wishes): No    Social History     Tobacco Use    Smoking status: Never     Passive exposure: Never    Smokeless tobacco: Former     Types: Chew     Quit date: 2023    Tobacco comments:     Chewing tobacco    Substance Use Topics    Alcohol use: Never             2023     2:40 PM   Alcohol Use   Prescreen: >3 drinks/day or >7 drinks/week? Not Applicable     Reviewed orders with patient.  Reviewed health maintenance and updated orders accordingly - Yes  Lab work is  in process  Labs reviewed in EPIC    Breast Cancer Screening:    FHS-7:       9/13/2023     2:42 PM   Breast CA Risk Assessment (FHS-7)   Did any of your first-degree relatives have breast or ovarian cancer? Unknown   Did any of your relatives have bilateral breast cancer? Unknown   Did any man in your family have breast cancer? No   Did any woman in your family have breast and ovarian cancer? Unknown   Did any woman in your family have breast cancer before age 50 y? Unknown   Do you have 2 or more relatives with breast and/or ovarian cancer? Unknown   Do you have 2 or more relatives with breast and/or bowel cancer? Unknown         Pertinent mammograms are reviewed under the imaging tab.    History of abnormal Pap smear: NO - age 30-65 PAP every 5 years with negative HPV co-testing recommended      Latest Ref Rng & Units 9/13/2023     3:12 PM   PAP / HPV   PAP  Negative for Intraepithelial Lesion or Malignancy (NILM)    HPV 16 DNA Negative Negative    HPV 18 DNA Negative Negative    Other HR HPV Negative Negative      Reviewed and updated as needed this visit by clinical staff   Tobacco  Allergies  Meds  Problems  Med Hx  Surg Hx  Fam Hx          Reviewed and updated as needed this visit by Provider   Tobacco  Allergies  Meds  Problems  Med Hx  Surg Hx  Fam Hx             Review of Systems   Constitutional:  Negative for chills and fever.   HENT:  Negative for congestion, ear pain, hearing loss and sore throat.    Eyes:  Negative for pain and visual disturbance.   Respiratory:  Negative for cough and shortness of breath.    Cardiovascular:  Negative for chest pain, palpitations and peripheral edema.   Gastrointestinal:  Positive for abdominal pain. Negative for constipation, diarrhea, heartburn, hematochezia and nausea.   Breasts:  Positive for tenderness. Negative for breast mass and discharge.   Genitourinary:  Negative for dysuria, frequency, genital sores, hematuria, pelvic pain, urgency, vaginal  "bleeding and vaginal discharge.   Musculoskeletal:  Negative for arthralgias, joint swelling and myalgias.   Skin:  Negative for rash.   Neurological:  Negative for dizziness, weakness, headaches and paresthesias.   Psychiatric/Behavioral:  Negative for mood changes. The patient is not nervous/anxious.           OBJECTIVE:   /71   Pulse 71   Temp 98.3  F (36.8  C) (Oral)   Resp 16   Ht 1.448 m (4' 9\")   Wt 41.2 kg (90 lb 12 oz)   LMP  (LMP Unknown)   SpO2 99%   Breastfeeding No   BMI 19.64 kg/m    Physical Exam  GENERAL: healthy, alert and no distress  NECK: no adenopathy, no asymmetry, masses, or scars and thyroid normal to palpation  RESP: lungs clear to auscultation - no rales, rhonchi or wheezes  CV: regular rate and rhythm, normal S1 S2, no S3 or S4, no murmur, click or rub, no peripheral edema and peripheral pulses strong  ABDOMEN: soft, nontender, no hepatosplenomegaly, no masses and bowel sounds normal  MS: fistula in left arm, palpable thrill    Diagnostic Test Results:  Labs reviewed in Epic  No results found for this or any previous visit (from the past 24 hour(s)).  Results for orders placed or performed in visit on 09/13/23   HPV High Risk Types DNA Cervical     Status: None   Result Value Ref Range    Other HR HPV Negative Negative    HPV16 DNA Negative Negative    HPV18 DNA Negative Negative    FINAL DIAGNOSIS       This patient's sample is negative for HPV DNA.        This test was developed and its performance characteristics determined by the [facility]. It has not been cleared or approved by the FDA. The laboratory is regulated under CLIA as qualified to perform high-complexity testing. This test is used for clinical purposes. It should not be regarded as investigational or for research.    METHODOLOGY: The Roche Amada 4800 system uses automated extraction, simultaneous amplification of HPV (L1 region) and beta-globin, " followed by real time detection of fluorescent labeled HPV and beta globin using specific oligonucleotide probes. The test specifically identifies types HPV 16 DNA and HPV 18 DNA while concurrently detecting the rest of the high risk types (31, 33, 35, 39, 45, 51, 52, 56, 58, 59, 66 or 68).    COMMENTS: This test is not intended for use as a screening device for woman under age 30 with normal cervical cytology. Results should be correlated with cytologic and histologic findings. Close clinical followup is recommended.       Pap Screen with HPV - recommended age 30 - 65 years     Status: None   Result Value Ref Range    Interpretation        Negative for Intraepithelial Lesion or Malignancy (NILM)    Comment         Papanicolaou Test Limitations:  Cervical cytology is a screening test with limited sensitivity, and regular screening is critical for cancer prevention.  Pap tests are primarily effective for the diagnosis/prevention of squamous cell carcinoma, not adenocarcinoma or other cancers.        Specimen Adequacy       Satisfactory for evaluation, endocervical/transformation zone component absent    Clinical Information       none      Reflex Testing Yes regardless of result     Previous Abnormal?       No      Performing Labs       The technical component of this testing was completed at [facility]         ASSESSMENT/PLAN:   Bu was seen today for physical.    Diagnoses and all orders for this visit:    Routine general medical examination at a health care facility    Encounter to establish care  Moved to [place] from [place] about 6 months ago. Daughter is helping her with everything, she might have an [employer] worker?  got citizenship already, she has not. Someone is helping - maybe the [employer] worker? 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 Daughter speaks english and her number is listed in the chart. She is 22 years old  and seems overwhelmed by all of this. Transplant clinic did excellent chart review. Still waiting for records. History of back surgery, history of DM but not on meds with good A1C. Needs to see dental. Multiple imaging orders in for transplant eval. KEILY requested for previous clinics and her current nephrology team.   - would benefit from care coordination - extremely complex and multiple moving pieces.   - will send referral, phone in chart is daughter's and she dose speak english.     Benign essential hypertension  Secondary renal hyperparathyroidism (H)  ESRD (end stage renal disease) on dialysis (H)  Well controlled. Continue amlodipine 10mg, metoprolol 50mg BID (consider changing to succinate?)    Screen for colon cancer  Deferred. Waiting for notes from previous clinic.     Cervical cancer screening  If she gets a transplant, will need to continue cotest every 3 years.   -     Pap Screen with HPV - recommended age 30 - 65 years  -     HPV Hold (Lab Only)  -     HPV High Risk Types DNA Cervical      Need for immunization against influenza  -     INFLUENZA VACCINE 18-64Y (FLUBLOK)      Other orders  -     REVIEW OF HEALTH MAINTENANCE PROTOCOL ORDERS  -     PRIMARY CARE FOLLOW-UP SCHEDULING; Future      Chart reviewed.   Notes from transplant phone encounter - notes that patient had a hysterectomy, but on exam today she does have a cervix.  KEILY signed for all her previous clinics to determine more over her medical history.     COUNSELING:  Reviewed preventive health counseling, as reflected in patient instructions        She reports that she has never smoked. She has never been exposed to tobacco smoke. She quit smokeless tobacco use about 5 months ago.  Her smokeless tobacco use included chew.          Abhilash Cobb MD  [facility]

## 2023-09-14 ENCOUNTER — OFFICE VISIT (OUTPATIENT)
Dept: TRANSPLANT | Facility: CLINIC | Age: 58
End: 2023-09-14
Attending: PHYSICIAN ASSISTANT
Payer: COMMERCIAL

## 2023-09-14 ENCOUNTER — ANCILLARY PROCEDURE (OUTPATIENT)
Dept: CARDIOLOGY | Facility: CLINIC | Age: 58
End: 2023-09-14
Attending: PHYSICIAN ASSISTANT
Payer: COMMERCIAL

## 2023-09-14 ENCOUNTER — ANCILLARY PROCEDURE (OUTPATIENT)
Dept: GENERAL RADIOLOGY | Facility: CLINIC | Age: 58
End: 2023-09-14
Attending: PHYSICIAN ASSISTANT
Payer: COMMERCIAL

## 2023-09-14 ENCOUNTER — LAB (OUTPATIENT)
Dept: LAB | Facility: CLINIC | Age: 58
End: 2023-09-14
Attending: PHYSICIAN ASSISTANT
Payer: COMMERCIAL

## 2023-09-14 ENCOUNTER — DOCUMENTATION ONLY (OUTPATIENT)
Dept: TRANSPLANT | Facility: CLINIC | Age: 58
End: 2023-09-14

## 2023-09-14 VITALS
SYSTOLIC BLOOD PRESSURE: 125 MMHG | HEART RATE: 67 BPM | OXYGEN SATURATION: 98 % | WEIGHT: 93.1 LBS | DIASTOLIC BLOOD PRESSURE: 72 MMHG | BODY MASS INDEX: 20.09 KG/M2 | HEIGHT: 57 IN

## 2023-09-14 DIAGNOSIS — N18.6 END STAGE RENAL DISEASE (H): ICD-10-CM

## 2023-09-14 DIAGNOSIS — Z01.818 PRE-TRANSPLANT EVALUATION FOR KIDNEY TRANSPLANT: Primary | ICD-10-CM

## 2023-09-14 DIAGNOSIS — I10 ESSENTIAL HYPERTENSION: ICD-10-CM

## 2023-09-14 DIAGNOSIS — N18.6 ESRD (END STAGE RENAL DISEASE) (H): ICD-10-CM

## 2023-09-14 DIAGNOSIS — Z76.82 ORGAN TRANSPLANT CANDIDATE: ICD-10-CM

## 2023-09-14 DIAGNOSIS — K08.9 POOR DENTITION: ICD-10-CM

## 2023-09-14 DIAGNOSIS — E11.69 TYPE 2 DIABETES MELLITUS WITH OTHER SPECIFIED COMPLICATION, WITHOUT LONG-TERM CURRENT USE OF INSULIN (H): ICD-10-CM

## 2023-09-14 DIAGNOSIS — Z01.818 PRE-TRANSPLANT EVALUATION FOR KIDNEY TRANSPLANT: ICD-10-CM

## 2023-09-14 DIAGNOSIS — N25.81 SECONDARY RENAL HYPERPARATHYROIDISM (H): ICD-10-CM

## 2023-09-14 DIAGNOSIS — E11.69 TYPE 2 DIABETES MELLITUS WITH OTHER SPECIFIED COMPLICATION, WITHOUT LONG-TERM CURRENT USE OF INSULIN (H): Primary | ICD-10-CM

## 2023-09-14 DIAGNOSIS — Z72.0 CHEWING TOBACCO USE: ICD-10-CM

## 2023-09-14 LAB
A1 AG RBC QL: NEGATIVE
ABO/RH(D): NORMAL
ALBUMIN SERPL BCG-MCNC: 4.9 G/DL (ref 3.5–5.2)
ALP SERPL-CCNC: 160 U/L (ref 35–104)
ALT SERPL W P-5'-P-CCNC: 7 U/L (ref 0–50)
ANION GAP SERPL CALCULATED.3IONS-SCNC: 15 MMOL/L (ref 7–15)
ANTIBODY TITER IGM SCREEN: NEGATIVE
APTT PPP: 33 SECONDS (ref 22–38)
AST SERPL W P-5'-P-CCNC: 14 U/L (ref 0–45)
B IGG TITR SERPL: 64 {TITER}
B IGM TITR SERPL: 32 {TITER}
BASOPHILS # BLD AUTO: 0 10E3/UL (ref 0–0.2)
BASOPHILS NFR BLD AUTO: 0 %
BILIRUB SERPL-MCNC: 0.6 MG/DL
BUN SERPL-MCNC: 33.2 MG/DL (ref 6–20)
CALCIUM SERPL-MCNC: 9.6 MG/DL (ref 8.6–10)
CHLORIDE SERPL-SCNC: 92 MMOL/L (ref 98–107)
CREAT SERPL-MCNC: 8.47 MG/DL (ref 0.51–0.95)
DEPRECATED HCO3 PLAS-SCNC: 25 MMOL/L (ref 22–29)
EGFRCR SERPLBLD CKD-EPI 2021: 5 ML/MIN/1.73M2
EOSINOPHIL # BLD AUTO: 0.2 10E3/UL (ref 0–0.7)
EOSINOPHIL NFR BLD AUTO: 3 %
ERYTHROCYTE [DISTWIDTH] IN BLOOD BY AUTOMATED COUNT: 16.2 % (ref 10–15)
FACTOR 2 INTERPRETATION: NORMAL
FACTOR V INTERPRETATION: NORMAL
GLUCOSE SERPL-MCNC: 104 MG/DL (ref 70–99)
HBA1C MFR BLD: 5.4 %
HBV SURFACE AB SERPL IA-ACNC: 947.57 M[IU]/ML
HBV SURFACE AB SERPL IA-ACNC: REACTIVE M[IU]/ML
HBV SURFACE AG SERPL QL IA: NONREACTIVE
HCT VFR BLD AUTO: 32.1 % (ref 35–47)
HCV AB SERPL QL IA: NONREACTIVE
HGB BLD-MCNC: 10.8 G/DL (ref 11.7–15.7)
IMM GRANULOCYTES # BLD: 0 10E3/UL
IMM GRANULOCYTES NFR BLD: 0 %
INR PPP: 1 (ref 0.85–1.15)
LAB DIRECTOR COMMENTS: NORMAL
LAB DIRECTOR DISCLAIMER: NORMAL
LAB DIRECTOR INTERPRETATION: NORMAL
LAB DIRECTOR METHODOLOGY: NORMAL
LAB DIRECTOR RESULTS: NORMAL
LVEF ECHO: NORMAL
LYMPHOCYTES # BLD AUTO: 1.7 10E3/UL (ref 0.8–5.3)
LYMPHOCYTES NFR BLD AUTO: 24 %
MCH RBC QN AUTO: 28.8 PG (ref 26.5–33)
MCHC RBC AUTO-ENTMCNC: 33.6 G/DL (ref 31.5–36.5)
MCV RBC AUTO: 86 FL (ref 78–100)
MONOCYTES # BLD AUTO: 0.5 10E3/UL (ref 0–1.3)
MONOCYTES NFR BLD AUTO: 7 %
NEUTROPHILS # BLD AUTO: 4.7 10E3/UL (ref 1.6–8.3)
NEUTROPHILS NFR BLD AUTO: 66 %
NRBC # BLD AUTO: 0 10E3/UL
NRBC BLD AUTO-RTO: 0 /100
PLATELET # BLD AUTO: 266 10E3/UL (ref 150–450)
POTASSIUM SERPL-SCNC: 4.6 MMOL/L (ref 3.4–5.3)
PROT SERPL-MCNC: 8.6 G/DL (ref 6.4–8.3)
RBC # BLD AUTO: 3.75 10E6/UL (ref 3.8–5.2)
SODIUM SERPL-SCNC: 132 MMOL/L (ref 136–145)
SPECIMEN DESCRIPTION: NORMAL
SPECIMEN EXPIRATION DATE: NORMAL
T PALLIDUM AB SER QL: NONREACTIVE
WBC # BLD AUTO: 7.2 10E3/UL (ref 4–11)

## 2023-09-14 PROCEDURE — 80053 COMPREHEN METABOLIC PANEL: CPT | Performed by: PATHOLOGY

## 2023-09-14 PROCEDURE — 86147 CARDIOLIPIN ANTIBODY EA IG: CPT | Performed by: PHYSICIAN ASSISTANT

## 2023-09-14 PROCEDURE — 86803 HEPATITIS C AB TEST: CPT | Performed by: PHYSICIAN ASSISTANT

## 2023-09-14 PROCEDURE — 86886 COOMBS TEST INDIRECT TITER: CPT | Performed by: PHYSICIAN ASSISTANT

## 2023-09-14 PROCEDURE — 99207 PR NO CHARGE COORDINATED CARE PS: CPT

## 2023-09-14 PROCEDURE — G0463 HOSPITAL OUTPT CLINIC VISIT: HCPCS | Performed by: PHYSICIAN ASSISTANT

## 2023-09-14 PROCEDURE — 86905 BLOOD TYPING RBC ANTIGENS: CPT | Performed by: PHYSICIAN ASSISTANT

## 2023-09-14 PROCEDURE — 36415 COLL VENOUS BLD VENIPUNCTURE: CPT | Performed by: PATHOLOGY

## 2023-09-14 PROCEDURE — 86481 TB AG RESPONSE T-CELL SUSP: CPT | Performed by: PHYSICIAN ASSISTANT

## 2023-09-14 PROCEDURE — 86780 TREPONEMA PALLIDUM: CPT | Performed by: PHYSICIAN ASSISTANT

## 2023-09-14 PROCEDURE — 99417 PROLNG OP E/M EACH 15 MIN: CPT | Performed by: PHYSICIAN ASSISTANT

## 2023-09-14 PROCEDURE — 83036 HEMOGLOBIN GLYCOSYLATED A1C: CPT | Performed by: PHYSICIAN ASSISTANT

## 2023-09-14 PROCEDURE — 86850 RBC ANTIBODY SCREEN: CPT | Performed by: PHYSICIAN ASSISTANT

## 2023-09-14 PROCEDURE — 85730 THROMBOPLASTIN TIME PARTIAL: CPT | Performed by: PATHOLOGY

## 2023-09-14 PROCEDURE — 86704 HEP B CORE ANTIBODY TOTAL: CPT | Performed by: PHYSICIAN ASSISTANT

## 2023-09-14 PROCEDURE — 99204 OFFICE O/P NEW MOD 45 MIN: CPT | Performed by: SURGERY

## 2023-09-14 PROCEDURE — 86706 HEP B SURFACE ANTIBODY: CPT | Performed by: PHYSICIAN ASSISTANT

## 2023-09-14 PROCEDURE — 85730 THROMBOPLASTIN TIME PARTIAL: CPT | Performed by: PHYSICIAN ASSISTANT

## 2023-09-14 PROCEDURE — 85025 COMPLETE CBC W/AUTO DIFF WBC: CPT | Performed by: PATHOLOGY

## 2023-09-14 PROCEDURE — 86787 VARICELLA-ZOSTER ANTIBODY: CPT | Performed by: PHYSICIAN ASSISTANT

## 2023-09-14 PROCEDURE — 99205 OFFICE O/P NEW HI 60 MIN: CPT | Performed by: PHYSICIAN ASSISTANT

## 2023-09-14 PROCEDURE — 85390 FIBRINOLYSINS SCREEN I&R: CPT | Mod: 26 | Performed by: PATHOLOGY

## 2023-09-14 PROCEDURE — 86644 CMV ANTIBODY: CPT | Performed by: PHYSICIAN ASSISTANT

## 2023-09-14 PROCEDURE — 93306 TTE W/DOPPLER COMPLETE: CPT | Performed by: INTERNAL MEDICINE

## 2023-09-14 PROCEDURE — 86833 HLA CLASS II HIGH DEFIN QUAL: CPT | Performed by: PHYSICIAN ASSISTANT

## 2023-09-14 PROCEDURE — 85613 RUSSELL VIPER VENOM DILUTED: CPT | Performed by: PHYSICIAN ASSISTANT

## 2023-09-14 PROCEDURE — 86832 HLA CLASS I HIGH DEFIN QUAL: CPT | Performed by: PHYSICIAN ASSISTANT

## 2023-09-14 PROCEDURE — 81382 HLA II TYPING 1 LOC HR: CPT | Mod: XU | Performed by: PHYSICIAN ASSISTANT

## 2023-09-14 PROCEDURE — 86665 EPSTEIN-BARR CAPSID VCA: CPT | Performed by: PHYSICIAN ASSISTANT

## 2023-09-14 PROCEDURE — 93000 ELECTROCARDIOGRAM COMPLETE: CPT | Performed by: INTERNAL MEDICINE

## 2023-09-14 PROCEDURE — 85610 PROTHROMBIN TIME: CPT | Performed by: PATHOLOGY

## 2023-09-14 PROCEDURE — G0452 MOLECULAR PATHOLOGY INTERPR: HCPCS | Mod: 26 | Performed by: PATHOLOGY

## 2023-09-14 PROCEDURE — 81240 F2 GENE: CPT | Performed by: PHYSICIAN ASSISTANT

## 2023-09-14 PROCEDURE — 84681 ASSAY OF C-PEPTIDE: CPT | Performed by: PHYSICIAN ASSISTANT

## 2023-09-14 PROCEDURE — 86901 BLOOD TYPING SEROLOGIC RH(D): CPT | Performed by: PHYSICIAN ASSISTANT

## 2023-09-14 PROCEDURE — 71046 X-RAY EXAM CHEST 2 VIEWS: CPT | Mod: GC | Performed by: RADIOLOGY

## 2023-09-14 PROCEDURE — 99000 SPECIMEN HANDLING OFFICE-LAB: CPT | Performed by: PATHOLOGY

## 2023-09-14 PROCEDURE — 87340 HEPATITIS B SURFACE AG IA: CPT | Performed by: PHYSICIAN ASSISTANT

## 2023-09-14 PROCEDURE — 85670 THROMBIN TIME PLASMA: CPT | Performed by: PHYSICIAN ASSISTANT

## 2023-09-14 NOTE — LETTER
9/14/2023         RE: Lyndsey Alejo  [address]  [place] [address]        Dear Colleague,    Thank you for referring your patient, Lyndsey Alejo, to the [facility]. Please see a copy of my visit note below.    Transplant Surgery Consult Note     Medical record number: [id number]  YOB: 1965,   Consult requested  for evaluation of kidney transplant candidacy.    Assessment and Recommendations:Ms. Alejo appears to be a good candidate for kidney transplantation and has a good understanding of the risks and benefits of this approach to the management of renal failure. The following issues should be addressed prior to finalizing her transplant candidacy:     Transplant order: Ms. Alejo has End stage renal failure due to unknown etiology (no kidney biopsy) whose condition is not expected to resolve, is expected to progress, and is expected to continue to develop related comorbid conditions.  Recommend he be considered as a candidate for kidney transplant.  Cardiology consult for cardiac risk stratification to be ordered: Yes  CT abdomen and pelvis without contrast to be ordered for assessment of vascular targets: Yes  Transplant listing labs ordered to include HLA, ABOx2, Cr, etc.  Dietician consult ordered: Yes  Social work consult ordered: Yes  Imaging reports reviewed:  none available  Radiology images reviewed: none available  Recipient suitable to move forward with work up of living donors:  Yes  Dental update. Poor dentition  Cardiology risk stratification likely to include at least a stress test  CT scan for vascular targets  Health maintenance: colonoscopy needed. Pap done yesterday. Results pending  Surgically look suitable so far    The majority of our visit was spent in counselling, discussing the medical and surgical risks of kidney transplantation. We discussed approximate wait time and how that is influenced by issues such as blood type and sensitization (PRA) and  access to a living donor. I contrasted potential waiting time for living vs  donor kidneys from  normal (0-85%) or higher (%) kidney donor profile index (KDPI) donors and their associated outcomes. I would recommend this individual to consider kidneys from high KDPI donors. The reason for this decision is best summarized as: decreased dialysis related morbidity/mortality, accepting lower kidney graft survival rates. Potential surgical complications of kidney transplantation include bleeding, superficial or deep wound complications (infection, hernia, lymphocele), ureteral anastomotic failure (leak or stenosis), graft thrombosis, need for reoperation and other issues such as cardiac complications, pneumonia, deep venous thrombosis, pulmonary embolism, post transplant diabetes and death. The potential for recurrent disease or need for retransplantation was also addressed. We discussed the possible need for ureteral stent (and subsequent removal), and the utility of protocol biopsy and laboratory studies to evaluate for rejection or recurrent disease. We discussed the risk of graft rejection, our center's average graft and patient survival rates, immunosuppression protocols, as well as the potential opportunity to participate in clinical trials.  We also discussed the average length of stay, recovery process, and posttransplant lab and monitoring protocol.  I emphasized the need for strict immunosuppression medication adherence and the potential for complications of immunosuppression such as skin cancer or lymphoma, as well as a very low but not zero risk of donor-derived disease transmission risks (infection, cancer). Ms. Alejo asked good questions and her candidacy will be reviewed at our Multidisciplinary Selection Committee. Thank you for the opportunity to participate in Ms. Alejo's care.      Total time: 60 minutes        Aylin Akins MD FACS  Associate Professor of Surgery  Director, Living  Kidney Donor Program.    ---------------------------------------------------------------------------------------------------    HPI: Ms. Alejo has End stage renal failure due to unknown etiology (no kidney biopsy). The patient is non-diabetic.       The patient is on dialysis.    Has potential kidney donors:  No.  Interested in participation in paired exchange if a donor is willing: Doesn't know     The patient has the following pertinent history:       No    Yes  Dialysis:    []      [x] via:       Blood Transfusion                  [x]      []  Number of units:   Most recently:  Pregnancy:    []      [x] Number:     5  Previous Transplant:  [x]      [] Details:    Cancer    [x]      [] Comment:   Kidney stones   [x]      [] Comment:      Recurrent infections  [x]      []  Type:                  Bladder dysfunction  [x]      [] Cause:    Claudication   [x]      [] Distance:    Previous Amputation  [x]      [] Cause:     Chronic anticoagulation  [x]      [] Indication:   Restorationist  [x]      []      Past Medical History:   Diagnosis Date    ESRD (end stage renal disease) on dialysis (H)     dialysis start date 11/28/2021 per 2728 form    Hypertension      Past Surgical History:   Procedure Laterality Date    BACK SURGERY       Family History   Problem Relation Age of Onset    Breast Cancer Cousin     Coronary Artery Disease No family hx of     Diabetes No family hx of     Hypertension No family hx of      Social History     Socioeconomic History    Marital status: Single     Spouse name: Not on file    Number of children: Not on file    Years of education: Not on file    Highest education level: Not on file   Occupational History    Not on file   Tobacco Use    Smoking status: Never     Passive exposure: Never    Smokeless tobacco: Former     Types: Chew     Quit date: 04/2023    Tobacco comments:     Chewing tobacco    Vaping Use    Vaping Use: Never used   Substance and Sexual Activity    Alcohol use: Never     Drug use: Never    Sexual activity: Not on file   Other Topics Concern    Not on file   Social History Narrative    Not on file     Social Determinants of Health     Financial Resource Strain: Not on file   Food Insecurity: Not on file   Transportation Needs: Not on file   Physical Activity: Not on file   Stress: Not on file   Social Connections: Not on file   Intimate Partner Violence: Not on file   Housing Stability: Not on file       ROS:   CONSTITUTIONAL:  No fevers or chills  EYES: negative for icterus  ENT:  negative for hearing loss, tinnitus and sore throat  RESPIRATORY:  negative for cough, sputum, dyspnea  CARDIOVASCULAR:  negative for chest pain Fatigue  GASTROINTESTINAL:  negative for nausea, vomiting, diarrhea or constipation  GENITOURINARY:  negative for incontinence, dysuria, bladder emptying problems  HEME:  No easy bruising  INTEGUMENT:  negative for rash and pruritus  NEURO:  Negative for headache, seizure disorder  Allergies:   No Known Allergies  Medications:  Prescription Medications as of 9/14/2023         Rx Number Disp Refills Start End Last Dispensed Date Next Fill Date Owning Pharmacy    amLODIPine (NORVASC) 10 MG tablet  90 tablet 3 6/12/2023    [id number] - [place], [place] - [address] AT [facility] [address]    Sig: Take 1 tablet (10 mg) by mouth daily at 2 pm    Class: E-Prescribe    Route: Oral    atorvastatin (LIPITOR) 40 MG tablet    3/27/2023 3/26/2024       Sig: Take 40 mg by mouth daily    Class: Historical    Route: Oral    calcium acetate (PHOSLO) 667 MG CAPS capsule    3/25/2023        Sig: TAKE 1 CAPSULE BY MOUTH THREE TIMES DAILY WITH FOOD    Class: Historical    famotidine (PEPCID) 20 MG tablet    4/16/2023        Sig: Take 1 tablet by mouth 2 times daily    Class: Historical    Route: Oral    FEROSUL 325 (65 Fe) MG tablet    4/21/2023        Sig: TAKE 1 TABLET BY MOUTH ONE TIME EACH DAY WITH BREAKFAST    Class: Historical    metoprolol tartrate  (LOPRESSOR) 50 MG tablet    3/29/2023        Class: Historical          Exam:     LMP  (LMP Unknown)   Appearance: in no apparent distress.   Skin: normal  Eyes:  no redness or discharge.  Sclera anicteric  Head and Neck: Normal, no rashes or jaundice  Respiratory: easy respirations, no audible wheezing.  Abdomen: flat, No distention and No surgical scars   Extremities: femoral 4+/4+, Edema, none  Neuro: without deficit   Psychiatric: Normal mood and affect    Diagnostics:   No results found for this or any previous visit (from the past 672 hour(s)).  No results found for: CPRA       Again, thank you for allowing me to participate in the care of your patient.        Sincerely,        Aylin Akins MD, MD

## 2023-09-14 NOTE — PROGRESS NOTES
Kidney Transplant Evaluation - 9/14/2023  Patient attended appointments accompanied by daughter and .  Patient signed  Waiver and requested daughter interpret today  Patient completed AM appointments with all [facility] providers.  Time and location of PM appointments reviewed with patient.  Patient instructed next contact from Transplant Coordinator will be following Selection Committee  Patient stated understandin  Daughter states she received email with Educational materials.  Receipt of Information for Organ Transplant Recipient form signed via [place] form signed and faxed to HIM  Patient has not watched [facility] Transplant videos, but daughter stated she has and will interpret for mother.       Summary    Team s concerns/comments:   1) Cardiac risk assessment  2) PAD assessment  3) Chewing tobacco use  4) Health literacy/Support system  5) Frailty  6) Meningioma back  7) Poor Dentition  8) Health maintenance    Candidacy category: Yellow    Action/Plan:   1) EKG, Echocardiogram today  2) A/P CT without contrast, Iliac US  3) Cessation of tobacco use. Dental check for oral CA  4) Daughter is PCA. Daughter needs to be at all appointments.  5) Tested not FRAIL  6) Transplant Coordinator will request records  7) Dental clearance as above  8) Colonoscopy due, Mammogram 6/8/23, PAP 9/13/23, Dental due as above      Expected Selection Meeting Discussion: 9/20/13

## 2023-09-14 NOTE — LETTER
9/14/2023         RE: Lyndsey Alejo  [address]  [place] [address]        Dear Colleague,    Thank you for referring your patient, Lyndsey Alejo, to the [facility]. Please see a copy of my visit note below.    TRANSPLANT NEPHROLOGY RECIPIENT EVALUATION NOTE    Assessment and Plan:  # Kidney Transplant Evaluation: Patient is a good candidate overall. Benefits of a living donor transplant were discussed.    # ESKD from Unknown Etiology: doing OK on dialysis since November 2021, but may benefit from a kidney transplant.    # Type 2 Diabetes: [place], previously on oral agents, but none now. A1c < 6%. We discussed potential need for increased insulin requirements post-kidney transplant.     # Cardiac Risk: no known history of cardiac disease or events, but no recent testing. Patient reports SOB with exertion and had an episode of chest pain recently, although is asymptomatic today. EKG and ECHO pending. Needs risk assessment.     # History of Back Surgery: with patient's daughter reporting history of spinal tumor, records unavailable, but they believe it was benign. Working on obtaining records.     # Limited Physical Function: limited to walking 5 minutes due to fatigue and SOB. If cardiac work up negative, recommend PT to improve physical function.     # Poor Dentition: multiple missing and/or loose teeth. Will need to see a dentist (has never been).     # Elevated Total Protein: should have paraproteinemia work up if not previously done.     # Chewing Tobacco Use: needs dental as above, to assess for oral cancers.     # PAD Screening: CT abd/pelv and iliac US.     # Health Maintenance: Colonoscopy: due, Mammogram: Up to date, PAP: done yesterday and awaiting results, and Dental: due.    Recommendations:  - Cardiac risk assessment  - CT abd/pelv and iliac US  - RN coordinator is working on getting nephrology and back surgery records from [place]  - Consider PT  if cardiac work up neg/symptoms do not improve with any intervention  - Needs dentist due to very poor dentition and chewing tobacco use  - Paraproteinemia work up if not previously done  - Update health maintenance     - Discussed the risks and benefits of a transplant, including the risk of surgery and immunosuppression medications.  Patient's overall evaluation will be discussed in the Transplant Program's regular meeting with a final recommendation on the patients suitability for transplant to be made at that time.    Evaluation:  Lyndsey Alejo was seen in consultation at the request of Dr. Aylin Akins for evaluation as a potential kidney transplant recipient.    Reason for Visit:  Lyndsey Alejo is a 58 year old female with ESKD from unknown etiology, who presents for kidney transplant evaluation.    History of Present Illness:  [employer]  present over the phone. Patient is originally from [place] [facility]. Her  and daughter accompany her today.         Kidney Disease Hx:        Previously lived in [place], but moved to [place] in Feb 2023. Started dialysis November 2021. Pre-HD records are not yet currently available but being obtained. Not yet evaluated for transplant. Patient reports presenting to the clinic with SOB, admitted to the hospital and initiated HD via chest catheter, but had seen a nephrologist prior. Does recall a previous diagnosis of diabetes, unknown duration, but was treated with oral agents after [place] the [place]. Also reports being diagnosed with HTN while in refugee [place] in [place] prior to [place] in [place], but was not prescribed medications until coming here.     Had back surgery ~2016, no medical care prior, then started following with providers for DM and HTN. Unsure if she ever had a native kidney biopsy. LUE AVF. Rare urine output. No family history of CKD.          Primary Nephrologist: Dr. Jang       H/o Kidney Stones: No       H/o Recurrent/Frequent UTI: No         Diabetic Hx:  Type 2        Diagnosis Date: 2009       Medication History: oral agents only. No insulin.       Diabetic Control: Controlled (HbA1c <7%)   Last HbA1c: 5.4%       Hypoglycemic Unawareness: No       End-Organ Damage due to DM: unknown           Cardiac/Vascular Disease Risk Factors:        Cardiac Risk Factors: Diabetes, Hypertension, and CKD       Patient denies history of cardiac disease, thinks her heart is ok, but no available records to review. Does not recall studies such as ECHO or stress testing.          Viral Serology Status       CMV IgG Antibody: Positive       EBV IgG Antibody: Positive         Volume Status/Weight:        Volume status: Euvolemic       Weight:  Acceptable BMI       BMI: Body mass index is 19.81 kg/m .         Functional Capacity/Frailty:        Can walk for about 5 minutes before needing to rest due to SOB and fatigue. Does report history of chest pain with exertion x 1 episode, none today, experienced it August 18, substernal/epigastric, non-radiating, no associated symptoms, lasted 30 minutes, and resolved with rest. Denies nausea or vomiting.     Fatigue/Decreased Energy: [] No [x] Yes    Chest Pain or SOB with Exertion: [] No [x] Yes    Significant Weight Change: [x] No [] Yes    Nausea, Vomiting or Diarrhea: [x] No [] Yes    Fever, Sweats or Chills:  [x] No [] Yes    Leg Swelling [x] No [] Yes      Allergy Testing Questions:   Medication that caused a reaction Patient doesn't remember   Antibiotics used that didn't give an allergic reaction?  Patient doesn't know    COVID Vaccination Up To Date: Not asked    Potential Living Kidney Donors: Not sure    Review of Systems:  A comprehensive review of systems was obtained and negative, except as noted in the HPI or PMH.    Past Medical History:   Medical record was reviewed and PMH was discussed with patient and noted below.  Past Medical History:   Diagnosis Date     Anemia in chronic kidney disease      Chewing tobacco use      ESRD  (end stage renal disease) on dialysis (H)     dialysis start date 11/28/2021 per 2728 form     Hypertension      Poor dentition      Secondary renal hyperparathyroidism (H)      Type 2 diabetes mellitus (H)        Past Social History:   Past Surgical History:   Procedure Laterality Date     BACK SURGERY       Personal history of bleeding or anesthesia problems: No    Family History:  Family History   Problem Relation Age of Onset     Breast Cancer Cousin      Coronary Artery Disease No family hx of      Diabetes No family hx of      Hypertension No family hx of        Personal History:   Social History     Socioeconomic History     Marital status: Single     Spouse name: Not on file     Number of children: Not on file     Years of education: Not on file     Highest education level: Not on file   Occupational History     Not on file   Tobacco Use     Smoking status: Never     Passive exposure: Never     Smokeless tobacco: Former     Types: Chew     Quit date: 04/2023     Tobacco comments:     Chewing tobacco    Vaping Use     Vaping Use: Never used   Substance and Sexual Activity     Alcohol use: Never     Drug use: Never     Sexual activity: Not on file   Other Topics Concern     Not on file   Social History Narrative     Not on file     Social Determinants of Health     Financial Resource Strain: Not on file   Food Insecurity: Not on file   Transportation Needs: Not on file   Physical Activity: Not on file   Stress: Not on file   Social Connections: Not on file   Intimate Partner Violence: Not on file   Housing Stability: Not on file       Allergies:  No Known Allergies    Medications:  Current Outpatient Medications   Medication Sig     amLODIPine (NORVASC) 10 MG tablet Take 1 tablet (10 mg) by mouth daily at 2 pm     atorvastatin (LIPITOR) 40 MG tablet Take 40 mg by mouth daily     calcium acetate (PHOSLO) 667 MG CAPS capsule TAKE 1 CAPSULE BY MOUTH THREE TIMES DAILY WITH FOOD     famotidine (PEPCID) 20 MG tablet  "Take 1 tablet by mouth 2 times daily     FEROSUL 325 (65 Fe) MG tablet TAKE 1 TABLET BY MOUTH ONE TIME EACH DAY WITH BREAKFAST     metoprolol tartrate (LOPRESSOR) 50 MG tablet      No current facility-administered medications for this visit.       Vitals:  /72   Pulse 67   Ht 1.46 m (4' 9.48\")   Wt 42.2 kg (93 lb 1.6 oz)   LMP  (LMP Unknown)   SpO2 98%   BMI 19.81 kg/m      Exam:  GENERAL APPEARANCE: alert and no distress  HENT: mouth without ulcers or lesions, poor dentition  RESP: lungs clear to auscultation - no rales, rhonchi or wheezes  CV: regular rhythm, normal rate, no rub, no murmur  EDEMA: no LE edema bilaterally  ABDOMEN: soft, nondistended, nontender  MS: extremities normal - no gross deformities noted, no evidence of inflammation in joints, no muscle tenderness  SKIN: no rash    Results:   Recent Results (from the past 336 hour(s))   Pap Screen with HPV - recommended age 30 - 65 years    Collection Time: 09/13/23  3:12 PM   Result Value Ref Range    Interpretation        Negative for Intraepithelial Lesion or Malignancy (NILM)    Comment         Papanicolaou Test Limitations:  Cervical cytology is a screening test with limited sensitivity, and regular screening is critical for cancer prevention.  Pap tests are primarily effective for the diagnosis/prevention of squamous cell carcinoma, not adenocarcinoma or other cancers.        Specimen Adequacy       Satisfactory for evaluation, endocervical/transformation zone component absent    Clinical Information       none      Reflex Testing Yes regardless of result     Previous Abnormal?       No      Performing Labs       The technical component of this testing was completed at [facility]     EKG 12-lead, tracing only [EKG1]    Collection Time: 09/14/23 11:56 AM   Result Value Ref Range    Systolic Blood Pressure  mmHg    Diastolic Blood Pressure  mmHg    Ventricular Rate 67 BPM    " Atrial Rate 67 BPM    NY Interval 158 ms    QRS Duration 82 ms     ms    QTc 464 ms    P Axis 51 degrees    R AXIS 72 degrees    T Axis 69 degrees    Interpretation ECG       Sinus rhythm with Premature atrial complexes  Minimal voltage criteria for LVH, may be normal variant ( Sokolow-Arnold )  Nonspecific ST abnormality  Abnormal ECG  No previous ECGs available  Confirmed by MD EISENBERG DAVID ([id number]) on 9/15/2023 4:40:34 PM     Antibody titer red cell [IOW9564]    Collection Time: 09/14/23 12:25 PM   Result Value Ref Range    Anti-B IgG Titer 64     Anti-B IgM Titer 32     SPECIMEN EXPIRATION DATE [id number]     ANTIBODY TITER IGM SCREEN Negative    Blood Group A Subtype    Collection Time: 09/14/23 12:25 PM   Result Value Ref Range    A1 Antigen Type Negative     SPECIMEN EXPIRATION DATE [id number]    Comprehensive metabolic panel [LAB17]    Collection Time: 09/14/23 12:25 PM   Result Value Ref Range    Sodium 132 (L) 136 - 145 mmol/L    Potassium 4.6 3.4 - 5.3 mmol/L    Chloride 92 (L) 98 - 107 mmol/L    Carbon Dioxide (CO2) 25 22 - 29 mmol/L    Anion Gap 15 7 - 15 mmol/L    Urea Nitrogen 33.2 (H) 6.0 - 20.0 mg/dL    Creatinine 8.47 (H) 0.51 - 0.95 mg/dL    Calcium 9.6 8.6 - 10.0 mg/dL    Glucose 104 (H) 70 - 99 mg/dL    Alkaline Phosphatase 160 (H) 35 - 104 U/L    AST 14 0 - 45 U/L    ALT 7 0 - 50 U/L    Protein Total 8.6 (H) 6.4 - 8.3 g/dL    Albumin 4.9 3.5 - 5.2 g/dL    Bilirubin Total 0.6 <=1.2 mg/dL    GFR Estimate 5 (L) >60 mL/min/1.73m2   Cardiolipin Maria R IgG and IgM [LAB 6836]    Collection Time: 09/14/23 12:25 PM   Result Value Ref Range    Cardiolipin Maria R IgG Instrument Value <2.0 <10.0 GPL-U/mL    Cardiolipin Antibody IgG Negative Negative    Cardiolipin Maria R IgM Instrument Value <2.0 <10.0 MPL-U/mL    Cardiolipin Antibody IgM Negative Negative   Factor 2 and 5 mutation analysis    Collection Time: 09/14/23 12:25 PM   Result Value Ref Range    METHODOLOGY       The regions of genomic DNA  containing the F5 gene mutation R506Q(1691G>A) and the Factor 2 (Prothrombin U13562V) gene mutation were simultaneously amplified using the polymerase chain reaction. The amplified products were digested with restriction endonuclease TaqI and products were analyzed by gel electrophoresis.        RESULTS         Factor V 1691G>A (Leiden)  RESULTS:  Mutation analyzed: 1691G>A  Factor V 1691G>A (Leiden)  Interpretation:  ABSENT  Factor V 1691G>A (Leiden) mutation  genotype:  G/G    FACTOR 2/PROTHROMBIN RESULTS:  Mutation analyzed: 53638Q>A  Factor 2 Mutation Interpretation:  ABSENT  Factor 2 Mutation genotype:  G/G        INTERPRETATION       The patient is negative for the Factor V 1691G>A (Leiden) and negative for the Factor 2 mutation.  (Electronically signed by: Elton Harris MD September 18, 2023 7:31 PM)      COMMENTS       If a patient is the recipient of an allogeneic bone marrow transplant, this test must be done on a pre-transplant sample or buccal swab.  A previous allogeneic bone marrow transplant will interfere with test results.  Call the [facility] ([phone]) for instructions on sample collection for these patients.      DISCLAIMER       This test was developed and its performance characteristics determined by [facility]. It has not been cleared or approved by the FDA. The laboratory is regulated under CLIA as qualified to perform high-complexity testing. This test is used for clinical purposes. It should not be regarded as investigational or for research.    A resident/fellow in an accredited training program was involved in the selection of testing, review of laboratory data, and/or interpretation of this case.  I, as the senior physician, attest that I: (i) confirmed appropriate testing, (ii) examined the relevant raw data for the specimen(s); and (iii) rendered or confirmed the interpretation(s).        FACTOR 2 INTERPRETATION         Factor 2  Mutation Interpretation:  ABSENT      FACTOR V INTERPRETATION       Factor V 1691G>A (Leiden)  Interpretation:  ABSENT      Specimen Description       Blood: ACD     INR [[id number]]    Collection Time: 09/14/23 12:25 PM   Result Value Ref Range    INR 1.00 0.85 - 1.15   Lupus Anticoagulant Panel [LQY1104]    Collection Time: 09/14/23 12:25 PM   Result Value Ref Range    PTT Ratio 1.18 <1.21    DRVVT Screen Ratio 1.11 (H) <1.08    DRVVT Confirm Normalized Ratio 1.17 <1.21    DRVVT Screen Mix Ratio 0.99 <1.08    Lupus Result Negative Negative    Lupus Interpretation       INR is normal.  APTT ratio is normal.    DRVVT Screen ratio is elevated.  DRVVT Confirm Normalized ratio is normal.  DRVVT Screen Mix ratio is normal.  Thrombin time is normal.  NEGATIVE TEST; A LUPUS ANTICOAGULANT WAS NOT DETECTED IN THIS SPECIMEN WITHIN THE LIMITS OF THE TESTING REPERTOIRE.  If the clinical picture is strongly suggestive of an antiphospholipid syndrome, recommend anticardiolipin and beta-2-glycoprotein (IgG and IgM) antibody tests.  DRVVT Confirm Normalized ratio and DRVVT Screen Mix ratio  are suggestive of factor deficiency  Recommend factors 2, 5 and 10 (factors II, V, and X) levels if clinically indicated.    Oneyda Velez MD, PhD  [facility]           Partial thromboplastin time [LAB56]    Collection Time: 09/14/23 12:25 PM   Result Value Ref Range    aPTT 33 22 - 38 Seconds   Thrombin time [GOD633]    Collection Time: 09/14/23 12:25 PM   Result Value Ref Range    Thrombin Time 18.3 13.0 - 19.0 Seconds   CMV Antibody IgG [[id number]]    Collection Time: 09/14/23 12:25 PM   Result Value Ref Range    CMV Maria R IgG Instrument Value >10.00 (H) <0.60 U/mL    CMV Antibody IgG Positive, suggests recent or past exposure. (A) No detectable antibody.    EBV Capsid Antibody IgG [[id number]]    Collection Time: 09/14/23 12:25 PM   Result Value Ref Range    EBV Capsid Maria R IgG Instrument Value 127.0 (H) <18.0 U/mL    EBV Capsid Antibody IgG  Positive (A) No detectable antibody.   Hepatitis B core antibody [[id number]]    Collection Time: 09/14/23 12:25 PM   Result Value Ref Range    Hepatitis B Core Antibody Total Reactive (A) Nonreactive   Hepatitis B Surface Antibody [[id number]]    Collection Time: 09/14/23 12:25 PM   Result Value Ref Range    Hepatitis B Surface Antibody Instrument Value 947.57 <8.00 m[IU]/mL    Hepatitis B Surface Antibody Reactive    Hepatitis B surface antigen [CVF499]    Collection Time: 09/14/23 12:25 PM   Result Value Ref Range    Hepatitis B Surface Antigen Nonreactive Nonreactive   Hepatitis C antibody [ONU808]    Collection Time: 09/14/23 12:25 PM   Result Value Ref Range    Hepatitis C Antibody Nonreactive Nonreactive   HIV Antigen Antibody Combo Pretransplant Cascade    Collection Time: 09/14/23 12:25 PM   Result Value Ref Range    HIV Antigen Antibody Combo Pretransplant Nonreactive Nonreactive   Treponema Abs w Reflex to RPR and Titer [[id number]]    Collection Time: 09/14/23 12:25 PM   Result Value Ref Range    Treponema Antibody Total Nonreactive Nonreactive   Varicella Zoster Virus Antibody IgG [[id number]]    Collection Time: 09/14/23 12:25 PM   Result Value Ref Range    VZV Maria R IgG Instrument Value 2,426.0 <135.0 Index    Varicella Zoster Antibody IgG Positive    C-peptide [YEA266]    Collection Time: 09/14/23 12:25 PM   Result Value Ref Range    C Peptide 6.2 0.9 - 6.9 ng/mL   Hemoglobin A1c [LAB90]    Collection Time: 09/14/23 12:25 PM   Result Value Ref Range    Hemoglobin A1C 5.4 <5.7 %   Adult Type and Screen    Collection Time: 09/14/23 12:25 PM   Result Value Ref Range    ABO/RH(D) A POS     Antibody Screen Negative Negative    SPECIMEN EXPIRATION DATE [id number]    CBC with platelets and differential    Collection Time: 09/14/23 12:25 PM   Result Value Ref Range    WBC Count 7.2 4.0 - 11.0 10e3/uL    RBC Count 3.75 (L) 3.80 - 5.20 10e6/uL    Hemoglobin 10.8 (L) 11.7 - 15.7 g/dL    Hematocrit 32.1 (L) 35.0 - 47.0 %     MCV 86 78 - 100 fL    MCH 28.8 26.5 - 33.0 pg    MCHC 33.6 31.5 - 36.5 g/dL    RDW 16.2 (H) 10.0 - 15.0 %    Platelet Count 266 150 - 450 10e3/uL    % Neutrophils 66 %    % Lymphocytes 24 %    % Monocytes 7 %    % Eosinophils 3 %    % Basophils 0 %    % Immature Granulocytes 0 %    NRBCs per 100 WBC 0 <1 /100    Absolute Neutrophils 4.7 1.6 - 8.3 10e3/uL    Absolute Lymphocytes 1.7 0.8 - 5.3 10e3/uL    Absolute Monocytes 0.5 0.0 - 1.3 10e3/uL    Absolute Eosinophils 0.2 0.0 - 0.7 10e3/uL    Absolute Basophils 0.0 0.0 - 0.2 10e3/uL    Absolute Immature Granulocytes 0.0 <=0.4 10e3/uL    Absolute NRBCs 0.0 10e3/uL   Quantiferon TB Gold Plus Grey Tube    Collection Time: 09/14/23 12:25 PM    Specimen: Arm, Right; Blood   Result Value Ref Range    Quantiferon Nil Tube 0.24 IU/mL   Quantiferon TB Gold Plus Green Tube    Collection Time: 09/14/23 12:25 PM    Specimen: Arm, Right; Blood   Result Value Ref Range    Quantiferon TB1 Tube 0.19 IU/mL   Quantiferon TB Gold Plus Yellow Tube    Collection Time: 09/14/23 12:25 PM    Specimen: Arm, Right; Blood   Result Value Ref Range    Quantiferon TB2 Tube 0.15    Quantiferon TB Gold Plus Purple Tube    Collection Time: 09/14/23 12:25 PM    Specimen: Arm, Right; Blood   Result Value Ref Range    Quantiferon Mitogen 10.00 IU/mL   Quantiferon TB Gold Plus    Collection Time: 09/14/23 12:25 PM    Specimen: Arm, Right; Blood   Result Value Ref Range    Quantiferon-TB Gold Plus Negative Negative    TB1 Ag minus Nil Value -0.05 IU/mL    TB2 Ag minus Nil Value -0.09 IU/mL    Mitogen minus Nil Result 9.76 IU/mL    Nil Result 0.24 IU/mL   ABO and Rh    Collection Time: 09/14/23 12:28 PM   Result Value Ref Range    ABO/RH(D) A POS     SPECIMEN EXPIRATION DATE [id number]    Echocardiogram Complete    Collection Time: 09/14/23  1:17 PM   Result Value Ref Range    LVEF  60-65%        Review of prior external note(s) from - [facility]Everywhere information from [facility]  reviewed  I  spent a total of 91 minutes on the day of the visit.   Time spent by me doing chart review, history and exam, documentation and further activities per the note            Again, thank you for allowing me to participate in the care of your patient.        Sincerely,        Saira Delaney PA-C

## 2023-09-14 NOTE — PROGRESS NOTES
Transplant Surgery Consult Note     Medical record number: [id number]  YOB: 1965,   Consult requested  for evaluation of kidney transplant candidacy.    Assessment and Recommendations:Ms. Alejo appears to be a good candidate for kidney transplantation and has a good understanding of the risks and benefits of this approach to the management of renal failure. The following issues should be addressed prior to finalizing her transplant candidacy:     Transplant order: Ms. Alejo has End stage renal failure due to unknown etiology (no kidney biopsy) whose condition is not expected to resolve, is expected to progress, and is expected to continue to develop related comorbid conditions.  Recommend he be considered as a candidate for kidney transplant.  Cardiology consult for cardiac risk stratification to be ordered: Yes  CT abdomen and pelvis without contrast to be ordered for assessment of vascular targets: Yes  Transplant listing labs ordered to include HLA, ABOx2, Cr, etc.  Dietician consult ordered: Yes  Social work consult ordered: Yes  Imaging reports reviewed:  none available  Radiology images reviewed: none available  Recipient suitable to move forward with work up of living donors:  Yes  Dental update. Poor dentition  Cardiology risk stratification likely to include at least a stress test  CT scan for vascular targets  Health maintenance: colonoscopy needed. Pap done yesterday. Results pending  Surgically look suitable so far    The majority of our visit was spent in counselling, discussing the medical and surgical risks of kidney transplantation. We discussed approximate wait time and how that is influenced by issues such as blood type and sensitization (PRA) and access to a living donor. I contrasted potential waiting time for living vs  donor kidneys from  normal (0-85%) or higher (%) kidney donor profile index (KDPI) donors and their associated outcomes. I would recommend this individual  to consider kidneys from high KDPI donors. The reason for this decision is best summarized as: decreased dialysis related morbidity/mortality, accepting lower kidney graft survival rates. Potential surgical complications of kidney transplantation include bleeding, superficial or deep wound complications (infection, hernia, lymphocele), ureteral anastomotic failure (leak or stenosis), graft thrombosis, need for reoperation and other issues such as cardiac complications, pneumonia, deep venous thrombosis, pulmonary embolism, post transplant diabetes and death. The potential for recurrent disease or need for retransplantation was also addressed. We discussed the possible need for ureteral stent (and subsequent removal), and the utility of protocol biopsy and laboratory studies to evaluate for rejection or recurrent disease. We discussed the risk of graft rejection, our center's average graft and patient survival rates, immunosuppression protocols, as well as the potential opportunity to participate in clinical trials.  We also discussed the average length of stay, recovery process, and posttransplant lab and monitoring protocol.  I emphasized the need for strict immunosuppression medication adherence and the potential for complications of immunosuppression such as skin cancer or lymphoma, as well as a very low but not zero risk of donor-derived disease transmission risks (infection, cancer). Ms. Alejo asked good questions and her candidacy will be reviewed at our Multidisciplinary Selection Committee. Thank you for the opportunity to participate in Ms. Alejo's care.      Total time: 60 minutes        Aylin Akins MD FACS  Associate Professor of Surgery  Director, Living Kidney Donor Program.    ---------------------------------------------------------------------------------------------------    HPI: Ms. Alejo has End stage renal failure due to unknown etiology (no kidney biopsy). The patient is non-diabetic.        The patient is on dialysis.    Has potential kidney donors:  No.  Interested in participation in paired exchange if a donor is willing: Doesn't know     The patient has the following pertinent history:       No    Yes  Dialysis:    []      [x] via:       Blood Transfusion                  [x]      []  Number of units:   Most recently:  Pregnancy:    []      [x] Number:     5  Previous Transplant:  [x]      [] Details:    Cancer    [x]      [] Comment:   Kidney stones   [x]      [] Comment:      Recurrent infections  [x]      []  Type:                  Bladder dysfunction  [x]      [] Cause:    Claudication   [x]      [] Distance:    Previous Amputation  [x]      [] Cause:     Chronic anticoagulation  [x]      [] Indication:   Temple  [x]      []      Past Medical History:   Diagnosis Date    ESRD (end stage renal disease) on dialysis (H)     dialysis start date 11/28/2021 per 2728 form    Hypertension      Past Surgical History:   Procedure Laterality Date    BACK SURGERY       Family History   Problem Relation Age of Onset    Breast Cancer Cousin     Coronary Artery Disease No family hx of     Diabetes No family hx of     Hypertension No family hx of      Social History     Socioeconomic History    Marital status: Single     Spouse name: Not on file    Number of children: Not on file    Years of education: Not on file    Highest education level: Not on file   Occupational History    Not on file   Tobacco Use    Smoking status: Never     Passive exposure: Never    Smokeless tobacco: Former     Types: Chew     Quit date: 04/2023    Tobacco comments:     Chewing tobacco    Vaping Use    Vaping Use: Never used   Substance and Sexual Activity    Alcohol use: Never    Drug use: Never    Sexual activity: Not on file   Other Topics Concern    Not on file   Social History Narrative    Not on file     Social Determinants of Health     Financial Resource Strain: Not on file   Food Insecurity: Not on file    Transportation Needs: Not on file   Physical Activity: Not on file   Stress: Not on file   Social Connections: Not on file   Intimate Partner Violence: Not on file   Housing Stability: Not on file       ROS:   CONSTITUTIONAL:  No fevers or chills  EYES: negative for icterus  ENT:  negative for hearing loss, tinnitus and sore throat  RESPIRATORY:  negative for cough, sputum, dyspnea  CARDIOVASCULAR:  negative for chest pain Fatigue  GASTROINTESTINAL:  negative for nausea, vomiting, diarrhea or constipation  GENITOURINARY:  negative for incontinence, dysuria, bladder emptying problems  HEME:  No easy bruising  INTEGUMENT:  negative for rash and pruritus  NEURO:  Negative for headache, seizure disorder  Allergies:   No Known Allergies  Medications:  Prescription Medications as of 9/14/2023         Rx Number Disp Refills Start End Last Dispensed Date Next Fill Date Owning Pharmacy    amLODIPine (NORVASC) 10 MG tablet  90 tablet 3 6/12/2023    [id number] - [place], [place] - [address] AT [facility] [address]    Sig: Take 1 tablet (10 mg) by mouth daily at 2 pm    Class: E-Prescribe    Route: Oral    atorvastatin (LIPITOR) 40 MG tablet    3/27/2023 3/26/2024       Sig: Take 40 mg by mouth daily    Class: Historical    Route: Oral    calcium acetate (PHOSLO) 667 MG CAPS capsule    3/25/2023        Sig: TAKE 1 CAPSULE BY MOUTH THREE TIMES DAILY WITH FOOD    Class: Historical    famotidine (PEPCID) 20 MG tablet    4/16/2023        Sig: Take 1 tablet by mouth 2 times daily    Class: Historical    Route: Oral    FEROSUL 325 (65 Fe) MG tablet    4/21/2023        Sig: TAKE 1 TABLET BY MOUTH ONE TIME EACH DAY WITH BREAKFAST    Class: Historical    metoprolol tartrate (LOPRESSOR) 50 MG tablet    3/29/2023        Class: Historical          Exam:     LMP  (LMP Unknown)   Appearance: in no apparent distress.   Skin: normal  Eyes:  no redness or discharge.  Sclera anicteric  Head and Neck: Normal, no rashes or  jaundice  Respiratory: easy respirations, no audible wheezing.  Abdomen: flat, No distention and No surgical scars   Extremities: femoral 4+/4+, Edema, none  Neuro: without deficit   Psychiatric: Normal mood and affect    Diagnostics:   No results found for this or any previous visit (from the past 672 hour(s)).  No results found for: CPRA

## 2023-09-14 NOTE — PROGRESS NOTES
Psychosocial Assessment For Kidney Transplantation  Patient Name/ Age: Lyndsey Alejo 58 year old   Medical Record #: [id number]  Duration of Interview:     30 min  Process:   Face-to-Face Interview                (counseling < 50%)   Present at Appointment: Lyndsey, Angelica (spouse) and daughter Mellissa Alejo.     *Karenni speaking patient         : ANGELLA De Anda  Date:  2023        Type of transplant: Kidney     Donor type:      Cadaver   Prior Transplants:    No Status of Transplant:           Current Living Situation    Location:   [address]  [place] [address]  With Whom: lives with their family       Family/ Social Support:    Lyndsey is originally from [place] ([place]5 years ago. Lyndsey originally lived in [place] for 12 years. She moved to [place] with her family about six months ago.     Lyndsey resides with her spouse, Angelica, daughter Mellissa Alejo, her partner Sirr Tyesha and their infant daughter Marely. Lyndsey's parents are . She has siblings, all whom are out of state. She only has one adult child.     Her daughter Angelica is her primary support system. She will assist her post transplant. available, helpful   Committed Relationship:     to Angelica    Stable/Supportive   Other Supports:     Lyndsey has very limited supports outside her immediate family network.    available with reservation       Activities/ Functional Ability    Current Level:    Lyndsey does not have any formal education. Her baseline remains unclear. She relies on her daughter to assist with several basic ADLS and transportation. Her daughter is her full time PCA through [facility].     Lyndsey obtains about 10 hours/day of PCA services. Her daughter works with her about 8 hours/day with her daughter's partner assisting with 2 hours/daily.    cane/walker, dependent with ADL's, and independent with ADL's     Transportation other:daughter        Vocational/Employment/Financial     Employment    Disabled  *has applied and appealed. Waiting on a determination.      Job Description    Lyndsey has never had any formal employment in the [place]. It is unclear if she ever had a job in her home country of [place].    Her spouse works full time for [employer]. Her daughter and daughter's partner also have employment to assist with bills.      Income   Savings and Spouse's Income   Insurance    *Likely will not obtain ESRD Medicare due to lack of work quarters.       At this time, patient can afford medication costs:  Yes  MA       Medical Status    Current Mode of Treatment for ESRD Dialysis   Complications Neuropathy       Behavioral    Tobacco Use Yes  Chemical Dependency no    Lyndsey notes that she does utilize chewing tobacco. She uses about 2 pouches a day.    No current use or history of concerns    Psychiatric Impairment No    Denied mental health concerns    Reading Ability: Poor  Education Level: no formal education  Recent Legal History No      Coping Style/Strategies: talks to family        Ability to Adhere to Complex Medical Regime: Yes     Adherence History:  Lyndsey reports that she is compliant with cares. It is unclear what her cognitive baseline is. She relies on her daughter for full support.           Education  _X_ Medicare  _X_ Rehabilitation  _X_ Donor issues  _X_ Community resources  _X_ Post discharge housing  _X_ Financial resources  _X_ Medical insurance options  _X_ Psych adjustment  _X_ Family adjustment  _X_ Health Care Directive Provided Education   Psychosocial Risks of Transplant Reviewed and Discussed:  _X_ Increased stress related to emotional,            family, social, employment or financial           situation  _X_ Effect on work and/or disability benefits  _X_ Effect on future health and life           insurance  _X_ Transplant outcome expectations may           not be met  _X_ Mental Health Risks: anxiety,           depression, PTSD, guilt, grief and           chronic  fatigue     Notable Items:   None noted.       Final Evaluation/Assessment   Patient seemed to process information well. Appeared well informed, motivated and able to follow post transplant requirements. Behavior was appropriate during interview. Has adequate income and insurance coverage. Adequate social support. No major contraindications noted for transplant.  At this time patient appears to understand the risks and benefits of transplant.      Recommendation  Acceptable     *Bu is appropriate for transplant with the support of her daughter. Her daughter is english speaking, full time PCA and assists with all cares. Her daughter will be post transplant support. Her daughter is aware of the expectation to be at all visits and provide needed cares given Bu's needed level of assistance.      Selection Criteria Met:  Plan for support Yes   Chemical Dependence Yes   Smoking Yes   Mental Health Yes   Adequate Finances Yes    Signature: ANGELLA De AndaSW   Title: Clinical

## 2023-09-14 NOTE — PROGRESS NOTES
TRANSPLANT NEPHROLOGY RECIPIENT EVALUATION NOTE    Assessment and Plan:  # Kidney Transplant Evaluation: Patient is a good candidate overall. Benefits of a living donor transplant were discussed.    # ESKD from Unknown Etiology: doing OK on dialysis since November 2021, but may benefit from a kidney transplant.    # Type 2 Diabetes: [place], previously on oral agents, but none now. A1c < 6%. We discussed potential need for increased insulin requirements post-kidney transplant.     # Cardiac Risk: no known history of cardiac disease or events, but no recent testing. Patient reports SOB with exertion and had an episode of chest pain recently, although is asymptomatic today. EKG and ECHO pending. Needs risk assessment.     # History of Back Surgery: with patient's daughter reporting history of spinal tumor, records unavailable, but they believe it was benign. Working on obtaining records.     # Limited Physical Function: limited to walking 5 minutes due to fatigue and SOB. If cardiac work up negative, recommend PT to improve physical function.     # Poor Dentition: multiple missing and/or loose teeth. Will need to see a dentist (has never been).     # Elevated Total Protein: should have paraproteinemia work up if not previously done.     # Chewing Tobacco Use: needs dental as above, to assess for oral cancers.     # PAD Screening: CT abd/pelv and iliac US.     # Health Maintenance: Colonoscopy: due, Mammogram: Up to date, PAP: done yesterday and awaiting results, and Dental: due.    Recommendations:  - Cardiac risk assessment  - CT abd/pelv and iliac US  - RN coordinator is working on getting nephrology and back surgery records from [place]  - Consider PT if cardiac work up neg/symptoms do not improve with any intervention  - Needs dentist due to very poor dentition and chewing tobacco use  - Paraproteinemia work up if not previously done  - Update health maintenance     - Discussed the risks  and benefits of a transplant, including the risk of surgery and immunosuppression medications.  Patient's overall evaluation will be discussed in the Transplant Program's regular meeting with a final recommendation on the patients suitability for transplant to be made at that time.    Evaluation:  Lyndsey Alejo was seen in consultation at the request of Dr. Aylin Akins for evaluation as a potential kidney transplant recipient.    Reason for Visit:  Lyndsey Alejo is a 58 year old female with ESKD from unknown etiology, who presents for kidney transplant evaluation.    History of Present Illness[facility]. Patient is originally from [facility]. Her  and daughter accompany her today.         Kidney Disease Hx:        Previously lived in [place], but moved to [place] in Feb 2023. Started dialysis November 2021. Pre-HD records are not yet currently available but being obtained. Not yet evaluated for transplant. Patient reports presenting to the clinic with SOB, admitted to the hospital and initiated HD via chest catheter, but had seen a nephrologist prior. Does recall a previous diagnosis of diabetes, unknown duration, but was treated with oral agents [place]. Also reports being diagnosed with HTN while in refugee camp in [place] prior to coming to  in 2009, but was not prescribed medications until coming here.     Had back surgery ~2016, no medical care prior, then started following with providers for DM and HTN. Unsure if she ever had a native kidney biopsy. LUE AVF. Rare urine output. No family history of CKD.          Primary Nephrologist: Dr. Jang       H/o Kidney Stones: No       H/o Recurrent/Frequent UTI: No         Diabetic Hx: Type 2        Diagnosis Date: 2009       Medication History: oral agents only. No insulin.       Diabetic Control: Controlled (HbA1c <7%)   Last HbA1c: 5.4%       Hypoglycemic Unawareness: No       End-Organ Damage due to DM: unknown            Cardiac/Vascular Disease Risk Factors:        Cardiac Risk Factors: Diabetes, Hypertension, and CKD       Patient denies history of cardiac disease, thinks her heart is ok, but no available records to review. Does not recall studies such as ECHO or stress testing.          Viral Serology Status       CMV IgG Antibody: Positive       EBV IgG Antibody: Positive         Volume Status/Weight:        Volume status: Euvolemic       Weight:  Acceptable BMI       BMI: Body mass index is 19.81 kg/m .         Functional Capacity/Frailty:        Can walk for about 5 minutes before needing to rest due to SOB and fatigue. Does report history of chest pain with exertion x 1 episode, none today, experienced it August 18, substernal/epigastric, non-radiating, no associated symptoms, lasted 30 minutes, and resolved with rest. Denies nausea or vomiting.     Fatigue/Decreased Energy: [] No [x] Yes    Chest Pain or SOB with Exertion: [] No [x] Yes    Significant Weight Change: [x] No [] Yes    Nausea, Vomiting or Diarrhea: [x] No [] Yes    Fever, Sweats or Chills:  [x] No [] Yes    Leg Swelling [x] No [] Yes      Allergy Testing Questions:   Medication that caused a reaction Patient doesn't remember   Antibiotics used that didn't give an allergic reaction?  Patient doesn't know    COVID Vaccination Up To Date: Not asked    Potential Living Kidney Donors: Not sure    Review of Systems:  A comprehensive review of systems was obtained and negative, except as noted in the HPI or PMH.    Past Medical History:   Medical record was reviewed and PMH was discussed with patient and noted below.  Past Medical History:   Diagnosis Date    Anemia in chronic kidney disease     Chewing tobacco use     ESRD (end stage renal disease) on dialysis (H)     dialysis start date 11/28/2021 per 2728 form    Hypertension     Poor dentition     Secondary renal hyperparathyroidism (H)     Type 2 diabetes mellitus (H)        Past Social History:   Past Surgical  History:   Procedure Laterality Date    BACK SURGERY       Personal history of bleeding or anesthesia problems: No    Family History:  Family History   Problem Relation Age of Onset    Breast Cancer Cousin     Coronary Artery Disease No family hx of     Diabetes No family hx of     Hypertension No family hx of        Personal History:   Social History     Socioeconomic History    Marital status: Single     Spouse name: Not on file    Number of children: Not on file    Years of education: Not on file    Highest education level: Not on file   Occupational History    Not on file   Tobacco Use    Smoking status: Never     Passive exposure: Never    Smokeless tobacco: Former     Types: Chew     Quit date: 04/2023    Tobacco comments:     Chewing tobacco    Vaping Use    Vaping Use: Never used   Substance and Sexual Activity    Alcohol use: Never    Drug use: Never    Sexual activity: Not on file   Other Topics Concern    Not on file   Social History Narrative    Not on file     Social Determinants of Health     Financial Resource Strain: Not on file   Food Insecurity: Not on file   Transportation Needs: Not on file   Physical Activity: Not on file   Stress: Not on file   Social Connections: Not on file   Intimate Partner Violence: Not on file   Housing Stability: Not on file       Allergies:  No Known Allergies    Medications:  Current Outpatient Medications   Medication Sig    amLODIPine (NORVASC) 10 MG tablet Take 1 tablet (10 mg) by mouth daily at 2 pm    atorvastatin (LIPITOR) 40 MG tablet Take 40 mg by mouth daily    calcium acetate (PHOSLO) 667 MG CAPS capsule TAKE 1 CAPSULE BY MOUTH THREE TIMES DAILY WITH FOOD    famotidine (PEPCID) 20 MG tablet Take 1 tablet by mouth 2 times daily    FEROSUL 325 (65 Fe) MG tablet TAKE 1 TABLET BY MOUTH ONE TIME EACH DAY WITH BREAKFAST    metoprolol tartrate (LOPRESSOR) 50 MG tablet      No current facility-administered medications for this visit.       Vitals:  /72   Pulse  "67   Ht 1.46 m (4' 9.48\")   Wt 42.2 kg (93 lb 1.6 oz)   LMP  (LMP Unknown)   SpO2 98%   BMI 19.81 kg/m      Exam:  GENERAL APPEARANCE: alert and no distress  HENT: mouth without ulcers or lesions, poor dentition  RESP: lungs clear to auscultation - no rales, rhonchi or wheezes  CV: regular rhythm, normal rate, no rub, no murmur  EDEMA: no LE edema bilaterally  ABDOMEN: soft, nondistended, nontender  MS: extremities normal - no gross deformities noted, no evidence of inflammation in joints, no muscle tenderness  SKIN: no rash    Results:   Recent Results (from the past 336 hour(s))   Pap Screen with HPV - recommended age 30 - 65 years    Collection Time: 09/13/23  3:12 PM   Result Value Ref Range    Interpretation        Negative for Intraepithelial Lesion or Malignancy (NILM)    Comment         Papanicolaou Test Limitations:  Cervical cytology is a screening test with limited sensitivity, and regular screening is critical for cancer prevention.  Pap tests are primarily effective for the diagnosis/prevention of squamous cell carcinoma, not adenocarcinoma or other cancers.        Specimen Adequacy       Satisfactory for evaluation, endocervical/transformation zone component absent    Clinical Information       none      Reflex Testing Yes regardless of result     Previous Abnormal?       No      Performing Labs       The technical component of this testing was completed at [facility]     EKG 12-lead, tracing only [EKG1]    Collection Time: 09/14/23 11:56 AM   Result Value Ref Range    Systolic Blood Pressure  mmHg    Diastolic Blood Pressure  mmHg    Ventricular Rate 67 BPM    Atrial Rate 67 BPM    ID Interval 158 ms    QRS Duration 82 ms     ms    QTc 464 ms    P Axis 51 degrees    R AXIS 72 degrees    T Axis 69 degrees    Interpretation ECG       Sinus rhythm with Premature atrial complexes  Minimal voltage criteria for LVH, may be normal " variant ( Sokolow-Arnold )  Nonspecific ST abnormality  Abnormal ECG  No previous ECGs available  Confirmed by MD EISENBERG DAVID ([id number]) on 9/15/2023 4:40:34 PM     Antibody titer red cell [[id number]]    Collection Time: 09/14/23 12:25 PM   Result Value Ref Range    Anti-B IgG Titer 64     Anti-B IgM Titer 32     SPECIMEN EXPIRATION DATE [id number]     ANTIBODY TITER IGM SCREEN Negative    Blood Group A Subtype    Collection Time: 09/14/23 12:25 PM   Result Value Ref Range    A1 Antigen Type Negative     SPECIMEN EXPIRATION DATE [id number]    Comprehensive metabolic panel [LAB17]    Collection Time: 09/14/23 12:25 PM   Result Value Ref Range    Sodium 132 (L) 136 - 145 mmol/L    Potassium 4.6 3.4 - 5.3 mmol/L    Chloride 92 (L) 98 - 107 mmol/L    Carbon Dioxide (CO2) 25 22 - 29 mmol/L    Anion Gap 15 7 - 15 mmol/L    Urea Nitrogen 33.2 (H) 6.0 - 20.0 mg/dL    Creatinine 8.47 (H) 0.51 - 0.95 mg/dL    Calcium 9.6 8.6 - 10.0 mg/dL    Glucose 104 (H) 70 - 99 mg/dL    Alkaline Phosphatase 160 (H) 35 - 104 U/L    AST 14 0 - 45 U/L    ALT 7 0 - 50 U/L    Protein Total 8.6 (H) 6.4 - 8.3 g/dL    Albumin 4.9 3.5 - 5.2 g/dL    Bilirubin Total 0.6 <=1.2 mg/dL    GFR Estimate 5 (L) >60 mL/min/1.73m2   Cardiolipin Maria R IgG and IgM [LAB 6836]    Collection Time: 09/14/23 12:25 PM   Result Value Ref Range    Cardiolipin Maria R IgG Instrument Value <2.0 <10.0 GPL-U/mL    Cardiolipin Antibody IgG Negative Negative    Cardiolipin Maria R IgM Instrument Value <2.0 <10.0 MPL-U/mL    Cardiolipin Antibody IgM Negative Negative   Factor 2 and 5 mutation analysis    Collection Time: 09/14/23 12:25 PM   Result Value Ref Range    METHODOLOGY       The regions of genomic DNA containing the F5 gene mutation R506Q(1691G>A) and the Factor 2 (Prothrombin [id number]) gene mutation were simultaneously amplified using the polymerase chain reaction. The amplified products were digested with restriction endonuclease TaqI and products were analyzed by gel  electrophoresis.        RESULTS         Factor V 1691G>A (Leiden)  RESULTS:  Mutation analyzed: 1691G>A  Factor V 1691G>A (Leiden)  Interpretation:  ABSENT  Factor V 1691G>A (Leiden) mutation  genotype:  G/G    FACTOR 2/PROTHROMBIN RESULTS:  Mutation analyzed: 02543P>A  Factor 2 Mutation Interpretation:  ABSENT  Factor 2 Mutation genotype:  G/G        INTERPRETATION       The patient is negative for the Factor V 1691G>A (Leiden) and negative for the Factor 2 mutation.  (Electronically signed by: Elton Harris MD September 18, 2023 7:31 PM)      COMMENTS       If a patient is the recipient of an allogeneic bone marrow transplant, this test must be done on a pre-transplant sample or buccal swab.  A previous allogeneic bone marrow transplant will interfere with test results.  Call the [facility] ([phone]) for instructions on sample collection for these patients.      DISCLAIMER       This test was developed and its performance characteristics determined by [facility]. It has not been cleared or approved by the FDA. The laboratory is regulated under CLIA as qualified to perform high-complexity testing. This test is used for clinical purposes. It should not be regarded as investigational or for research.    A resident/fellow in an accredited training program was involved in the selection of testing, review of laboratory data, and/or interpretation of this case.  I, as the senior physician, attest that I: (i) confirmed appropriate testing, (ii) examined the relevant raw data for the specimen(s); and (iii) rendered or confirmed the interpretation(s).        FACTOR 2 INTERPRETATION         Factor 2 Mutation Interpretation:  ABSENT      FACTOR V INTERPRETATION       Factor V 1691G>A (Leiden)  Interpretation:  ABSENT      Specimen Description       Blood: ACD     INR [TTM1405]    Collection Time: 09/14/23 12:25 PM   Result Value Ref Range    INR 1.00 0.85 - 1.15    Lupus Anticoagulant Panel [[id number]]    Collection Time: 09/14/23 12:25 PM   Result Value Ref Range    PTT Ratio 1.18 <1.21    DRVVT Screen Ratio 1.11 (H) <1.08    DRVVT Confirm Normalized Ratio 1.17 <1.21    DRVVT Screen Mix Ratio 0.99 <1.08    Lupus Result Negative Negative    Lupus Interpretation       INR is normal.  APTT ratio is normal.    DRVVT Screen ratio is elevated.  DRVVT Confirm Normalized ratio is normal.  DRVVT Screen Mix ratio is normal.  Thrombin time is normal.  NEGATIVE TEST; A LUPUS ANTICOAGULANT WAS NOT DETECTED IN THIS SPECIMEN WITHIN THE LIMITS OF THE TESTING REPERTOIRE.  If the clinical picture is strongly suggestive of an antiphospholipid syndrome, recommend anticardiolipin and beta-2-glycoprotein (IgG and IgM) antibody tests.  DRVVT Confirm Normalized ratio and DRVVT Screen Mix ratio  are suggestive of factor deficiency  Recommend factors 2, 5 and 10 (factors II, V, and X) levels if clinically indicated.    Oneyda Velez MD, PhD  [facility]           Partial thromboplastin time [LAB56]    Collection Time: 09/14/23 12:25 PM   Result Value Ref Range    aPTT 33 22 - 38 Seconds   Thrombin time [OND915]    Collection Time: 09/14/23 12:25 PM   Result Value Ref Range    Thrombin Time 18.3 13.0 - 19.0 Seconds   CMV Antibody IgG [[id number]]    Collection Time: 09/14/23 12:25 PM   Result Value Ref Range    CMV Maria R IgG Instrument Value >10.00 (H) <0.60 U/mL    CMV Antibody IgG Positive, suggests recent or past exposure. (A) No detectable antibody.    EBV Capsid Antibody IgG [[id number]]    Collection Time: 09/14/23 12:25 PM   Result Value Ref Range    EBV Capsid Maria R IgG Instrument Value 127.0 (H) <18.0 U/mL    EBV Capsid Antibody IgG Positive (A) No detectable antibody.   Hepatitis B core antibody [[id number]]    Collection Time: 09/14/23 12:25 PM   Result Value Ref Range    Hepatitis B Core Antibody Total Reactive (A) Nonreactive   Hepatitis B Surface Antibody [[id number]]    Collection Time: 09/14/23  12:25 PM   Result Value Ref Range    Hepatitis B Surface Antibody Instrument Value 947.57 <8.00 m[IU]/mL    Hepatitis B Surface Antibody Reactive    Hepatitis B surface antigen [VEU476]    Collection Time: 09/14/23 12:25 PM   Result Value Ref Range    Hepatitis B Surface Antigen Nonreactive Nonreactive   Hepatitis C antibody [[id number]]    Collection Time: 09/14/23 12:25 PM   Result Value Ref Range    Hepatitis C Antibody Nonreactive Nonreactive   HIV Antigen Antibody Combo Pretransplant Cascade    Collection Time: 09/14/23 12:25 PM   Result Value Ref Range    HIV Antigen Antibody Combo Pretransplant Nonreactive Nonreactive   Treponema Abs w Reflex to RPR and Titer [[id number]]    Collection Time: 09/14/23 12:25 PM   Result Value Ref Range    Treponema Antibody Total Nonreactive Nonreactive   Varicella Zoster Virus Antibody IgG [[id number]]    Collection Time: 09/14/23 12:25 PM   Result Value Ref Range    VZV Maria R IgG Instrument Value 2,426.0 <135.0 Index    Varicella Zoster Antibody IgG Positive    C-peptide [HLH007]    Collection Time: 09/14/23 12:25 PM   Result Value Ref Range    C Peptide 6.2 0.9 - 6.9 ng/mL   Hemoglobin A1c [LAB90]    Collection Time: 09/14/23 12:25 PM   Result Value Ref Range    Hemoglobin A1C 5.4 <5.7 %   Adult Type and Screen    Collection Time: 09/14/23 12:25 PM   Result Value Ref Range    ABO/RH(D) A POS     Antibody Screen Negative Negative    SPECIMEN EXPIRATION DATE [id number]    CBC with platelets and differential    Collection Time: 09/14/23 12:25 PM   Result Value Ref Range    WBC Count 7.2 4.0 - 11.0 10e3/uL    RBC Count 3.75 (L) 3.80 - 5.20 10e6/uL    Hemoglobin 10.8 (L) 11.7 - 15.7 g/dL    Hematocrit 32.1 (L) 35.0 - 47.0 %    MCV 86 78 - 100 fL    MCH 28.8 26.5 - 33.0 pg    MCHC 33.6 31.5 - 36.5 g/dL    RDW 16.2 (H) 10.0 - 15.0 %    Platelet Count 266 150 - 450 10e3/uL    % Neutrophils 66 %    % Lymphocytes 24 %    % Monocytes 7 %    % Eosinophils 3 %    % Basophils 0 %    % Immature  Granulocytes 0 %    NRBCs per 100 WBC 0 <1 /100    Absolute Neutrophils 4.7 1.6 - 8.3 10e3/uL    Absolute Lymphocytes 1.7 0.8 - 5.3 10e3/uL    Absolute Monocytes 0.5 0.0 - 1.3 10e3/uL    Absolute Eosinophils 0.2 0.0 - 0.7 10e3/uL    Absolute Basophils 0.0 0.0 - 0.2 10e3/uL    Absolute Immature Granulocytes 0.0 <=0.4 10e3/uL    Absolute NRBCs 0.0 10e3/uL   Quantiferon TB Gold Plus Grey Tube    Collection Time: 09/14/23 12:25 PM    Specimen: Arm, Right; Blood   Result Value Ref Range    Quantiferon Nil Tube 0.24 IU/mL   Quantiferon TB Gold Plus Green Tube    Collection Time: 09/14/23 12:25 PM    Specimen: Arm, Right; Blood   Result Value Ref Range    Quantiferon TB1 Tube 0.19 IU/mL   Quantiferon TB Gold Plus Yellow Tube    Collection Time: 09/14/23 12:25 PM    Specimen: Arm, Right; Blood   Result Value Ref Range    Quantiferon TB2 Tube 0.15    Quantiferon TB Gold Plus Purple Tube    Collection Time: 09/14/23 12:25 PM    Specimen: Arm, Right; Blood   Result Value Ref Range    Quantiferon Mitogen 10.00 IU/mL   Quantiferon TB Gold Plus    Collection Time: 09/14/23 12:25 PM    Specimen: Arm, Right; Blood   Result Value Ref Range    Quantiferon-TB Gold Plus Negative Negative    TB1 Ag minus Nil Value -0.05 IU/mL    TB2 Ag minus Nil Value -0.09 IU/mL    Mitogen minus Nil Result 9.76 IU/mL    Nil Result 0.24 IU/mL   ABO and Rh    Collection Time: 09/14/23 12:28 PM   Result Value Ref Range    ABO/RH(D) A POS     SPECIMEN EXPIRATION DATE [id number]    Echocardiogram Complete    Collection Time: 09/14/23  1:17 PM   Result Value Ref Range    LVEF  60-65%        Review of prior external note(s) from - [facility]Everywhere information from [facility]  reviewed  I spent a total of 91 minutes on the day of the visit.   Time spent by me doing chart review, history and exam, documentation and further activities per the note

## 2023-09-15 LAB
ATRIAL RATE - MUSE: 67 BPM
C PEPTIDE SERPL-MCNC: 6.2 NG/ML (ref 0.9–6.9)
CMV IGG SERPL IA-ACNC: >10 U/ML
CMV IGG SERPL IA-ACNC: ABNORMAL
DIASTOLIC BLOOD PRESSURE - MUSE: NORMAL MMHG
DRVVT CONFIRM NORMALIZED RATIO: 1.17
DRVVT SCREEN MIX RATIO: 0.99
DRVVT SCREEN RATIO: 1.11
EBV VCA IGG SER IA-ACNC: 127 U/ML
EBV VCA IGG SER IA-ACNC: POSITIVE
GAMMA INTERFERON BACKGROUND BLD IA-ACNC: 0.24 IU/ML
HBV CORE AB SERPL QL IA: REACTIVE
INTERPRETATION ECG - MUSE: NORMAL
LA PPP-IMP: NEGATIVE
LUPUS INTERPRETATION: ABNORMAL
M TB IFN-G BLD-IMP: NEGATIVE
M TB IFN-G CD4+ BCKGRND COR BLD-ACNC: 9.76 IU/ML
MITOGEN IGNF BCKGRD COR BLD-ACNC: -0.05 IU/ML
MITOGEN IGNF BCKGRD COR BLD-ACNC: -0.09 IU/ML
P AXIS - MUSE: 51 DEGREES
PR INTERVAL - MUSE: 158 MS
PTT RATIO: 1.18
QRS DURATION - MUSE: 82 MS
QT - MUSE: 440 MS
QTC - MUSE: 464 MS
QUANTIFERON MITOGEN: 10 IU/ML
QUANTIFERON NIL TUBE: 0.24 IU/ML
QUANTIFERON TB1 TUBE: 0.19 IU/ML
QUANTIFERON TB2 TUBE: 0.15
R AXIS - MUSE: 72 DEGREES
SYSTOLIC BLOOD PRESSURE - MUSE: NORMAL MMHG
T AXIS - MUSE: 69 DEGREES
THROMBIN TIME: 18.3 SECONDS (ref 13–19)
VENTRICULAR RATE- MUSE: 67 BPM
VZV IGG SER QL IA: 2426 INDEX
VZV IGG SER QL IA: POSITIVE

## 2023-09-16 LAB
CARDIOLIPIN IGG SER IA-ACNC: <2 GPL-U/ML
CARDIOLIPIN IGG SER IA-ACNC: NEGATIVE
CARDIOLIPIN IGM SER IA-ACNC: <2 MPL-U/ML
CARDIOLIPIN IGM SER IA-ACNC: NEGATIVE

## 2023-09-18 LAB — HIV 1+2 AB+HIV1 P24 AG SERPL QL IA: NONREACTIVE

## 2023-09-19 PROBLEM — D63.1 ANEMIA IN CHRONIC KIDNEY DISEASE: Status: ACTIVE | Noted: 2023-09-19

## 2023-09-19 PROBLEM — E11.9 TYPE 2 DIABETES MELLITUS (H): Status: ACTIVE | Noted: 2023-09-19

## 2023-09-19 PROBLEM — Z72.0 CHEWING TOBACCO USE: Status: ACTIVE | Noted: 2023-09-19

## 2023-09-19 PROBLEM — N25.81 SECONDARY RENAL HYPERPARATHYROIDISM (H): Status: ACTIVE | Noted: 2023-09-19

## 2023-09-19 PROBLEM — K08.9 POOR DENTITION: Status: ACTIVE | Noted: 2023-09-19

## 2023-09-19 PROBLEM — N18.9 ANEMIA IN CHRONIC KIDNEY DISEASE: Status: ACTIVE | Noted: 2023-09-19

## 2023-09-19 LAB
BKR LAB AP GYN ADEQUACY: NORMAL
BKR LAB AP GYN INTERPRETATION: NORMAL
BKR LAB AP HPV REFLEX: NORMAL
BKR LAB AP PREVIOUS ABNORMAL: NORMAL
PATH REPORT.COMMENTS IMP SPEC: NORMAL
PATH REPORT.COMMENTS IMP SPEC: NORMAL
PATH REPORT.RELEVANT HX SPEC: NORMAL

## 2023-09-20 LAB
HUMAN PAPILLOMA VIRUS 16 DNA: NEGATIVE
HUMAN PAPILLOMA VIRUS 18 DNA: NEGATIVE
HUMAN PAPILLOMA VIRUS FINAL DIAGNOSIS: NORMAL
HUMAN PAPILLOMA VIRUS OTHER HR: NEGATIVE

## 2023-09-21 PROBLEM — Z12.4 CERVICAL CANCER SCREENING: Status: ACTIVE | Noted: 2023-09-21

## 2023-09-21 LAB
A*: NORMAL
A*LOCUS SEROLOGIC EQUIVALENT: 11
A*LOCUS: NORMAL
A*SEROLOGIC EQUIVALENT: 33
ABTEST METHOD: NORMAL
B*: NORMAL
B*LOCUS SEROLOGIC EQUIVALENT: 62
B*LOCUS: NORMAL
B*SEROLOGIC EQUIVALENT: 44
BW-1: NORMAL
BW-2: NORMAL
C*: NORMAL
C*LOCUS SEROLOGIC EQUIVALENT: 4
C*LOCUS: NORMAL
C*SEROLOGIC EQUIVALENT: 7
DPA1*: NORMAL
DPB1*: NORMAL
DPB1*LOCUS NMDP: NORMAL
DPB1*LOCUS: NORMAL
DPB1*NMDP: NORMAL
DQA1*: NORMAL
DQA1*LOCUS: NORMAL
DQB1*: NORMAL
DQB1*LOCUS SEROLOGIC EQUIVALENT: 2
DQB1*LOCUS: NORMAL
DQB1*SEROLOGIC EQUIVALENT: 5
DRB1*: NORMAL
DRB1*LOCUS SEROLOGIC EQUIVALENT: 7
DRB1*LOCUS: NORMAL
DRB1*SEROLOGIC EQUIVALENT: 15
DRB4*LOCUS SEROLOGIC EQUIVALENT: 53
DRB4*LOCUS: NORMAL
DRB5*: NORMAL
DRB5*SEROLOGIC EQUIVALENT: 51
DRSSO TEST METHOD: NORMAL

## 2023-09-22 PROBLEM — R70.0 ELEVATED ERYTHROCYTE SEDIMENTATION RATE: Status: ACTIVE | Noted: 2017-12-06

## 2023-09-22 PROBLEM — G62.9 NEUROPATHY: Status: ACTIVE | Noted: 2018-08-02

## 2023-09-22 PROBLEM — I10 HYPERTENSION, UNSPECIFIED TYPE: Status: RESOLVED | Noted: 2023-05-09 | Resolved: 2023-09-22

## 2023-09-22 PROBLEM — R94.6 THYROID FUNCTION TEST ABNORMAL: Status: ACTIVE | Noted: 2017-12-06

## 2023-09-22 RX ORDER — KETOROLAC TROMETHAMINE 5 MG/ML
SOLUTION OPHTHALMIC
COMMUNITY
Start: 2023-06-01 | End: 2023-12-07

## 2023-09-22 RX ORDER — PREDNISOLONE ACETATE 10 MG/ML
SUSPENSION/ DROPS OPHTHALMIC
COMMUNITY
Start: 2023-06-13 | End: 2023-12-07

## 2023-09-22 RX ORDER — IRON/FOLIC AC/VIT BCOMP,C/MIN 106 MG-1MG
TABLET ORAL
COMMUNITY
End: 2024-05-03

## 2023-09-22 RX ORDER — OFLOXACIN 3 MG/ML
SOLUTION/ DROPS OPHTHALMIC
COMMUNITY
Start: 2023-06-01 | End: 2023-12-07

## 2023-09-22 RX ORDER — ASPIRIN 81 MG/1
1 TABLET ORAL DAILY
COMMUNITY
Start: 2023-03-27 | End: 2024-05-03

## 2023-09-22 RX ORDER — CHLORAL HYDRATE 500 MG
2000 CAPSULE ORAL
COMMUNITY
Start: 2023-03-27 | End: 2023-12-07

## 2023-09-24 LAB
PROTOCOL CUTOFF: NORMAL
SA 1 CELL: NORMAL
SA 1 TEST METHOD: NORMAL
SA 2 CELL: NORMAL
SA 2 TEST METHOD: NORMAL
SA1 HI RISK ABY: NORMAL
SA1 MOD RISK ABY: NORMAL
SA2 HI RISK ABY: NORMAL
SA2 MOD RISK ABY: NORMAL
UNACCEPTABLE ANTIGENS: NORMAL
UNOS CPRA: 0
ZZZSA 1  COMMENTS: NORMAL
ZZZSA 2 COMMENTS: NORMAL

## 2023-09-25 ENCOUNTER — PATIENT OUTREACH (OUTPATIENT)
Dept: CARE COORDINATION | Facility: CLINIC | Age: 58
End: 2023-09-25
Payer: COMMERCIAL

## 2023-09-25 NOTE — TELEPHONE ENCOUNTER
Thank you Akin.   I added patient on my schedule to follow up on a couple things from transplant evaluation to make sure daughter is aware of follow ups patient needed.

## 2023-09-25 NOTE — PROGRESS NOTES
"Clinic Care Coordination Contact  Community Health Worker Initial Outreach    CHW Initial Information Gathering:  Referral Source: PCP  Preferred Hospital: [facility]  [phone]  Preferred Urgent Care: [facility] [place], [phone]  Current living arrangement:: I live in a private home with family  Type of residence:: Apartment  Community Resources: PCA  Supplies Currently Used at Home: None  Equipment Currently Used at Home: none  Informal Support system:: Family  No PCP office visit in Past Year: No  Transportation means:: Family  CHW Additional Questions  If ED/Hospital discharge, follow-up appointment scheduled as recommended?: N/A  Medication changes made following ED/Hospital discharge?: N/A    Patient accepts CC: No, patient declined to enroll with CCC and does no need CCC services at this time. Patient will be sent Care Coordination introduction letter for future reference.     Patient was referred to CCC to assist with, \"See notes for more details. She might have an [facility] worker because someone is helping with some resources but I'm not sure? Can we sort out what resources she already has and how else we can help?   Patient moved to [place] from [place] about 6 months ago, established with nephrology, dialysis, and transplant team. Applied for citizenship but has not heard back,  already received, I'm not sure where she is in the process. Has no source of income but I think receiving PCA hours. 19 yo daughter is navigating everything and is overwhelmed, her number is in the chart, she does speak english. Still a lot of unknowns about her health - KEILY's signed for previous clinic. Transplant team did a really good note, too.\"    Patient is receiving 10 hours of PCA services per day, has good support from family members and relatives. Patient's has no financial barrier as spouse works full time and not qualify for [facility] SNAP benefits. Patient and spouse own a home in " [place] which already paid off and renting to a relative family for $650 per month, cash.     Patient missed interview with Social Security Office last Friday, 9/22/2023 for benefits determination and daughter already reached to Social Security Office and next interview is pending.     Citizenship status: Patient received letter from [facility] for citizenship interview date that patient have to go [place] [facility] [place] [place] everyone how lives in [place] has to go for citizenship interview [place] [facility] [place]. Patient's daughter already [facility] and requesting interview to be in [place] and pending.     Patient is receiving dialysis 3x per week and daughter or cousin are providing medical transportation. Per patient she's not comfortable riding medical transportation and rather to have family members or relatives to provide ride. CHW did provide daughter [facility] ride contact and walked through how to set up medical transportation if needed.     CCC will no further do outreach at this time, patient has no [facility] services, declined to be referred for [facility] services and PCP feel free to place new referral if need(s) identify.

## 2023-09-29 ENCOUNTER — TELEPHONE (OUTPATIENT)
Dept: TRANSPLANT | Facility: CLINIC | Age: 58
End: 2023-09-29
Payer: COMMERCIAL

## 2023-09-29 DIAGNOSIS — Z01.818 PRE-TRANSPLANT EVALUATION FOR KIDNEY TRANSPLANT: ICD-10-CM

## 2023-09-29 DIAGNOSIS — N18.9 ANEMIA IN CHRONIC KIDNEY DISEASE: ICD-10-CM

## 2023-09-29 DIAGNOSIS — N25.81 SECONDARY RENAL HYPERPARATHYROIDISM (H): ICD-10-CM

## 2023-09-29 DIAGNOSIS — Z76.82 ORGAN TRANSPLANT CANDIDATE: ICD-10-CM

## 2023-09-29 DIAGNOSIS — I10 ESSENTIAL HYPERTENSION: Primary | ICD-10-CM

## 2023-09-29 DIAGNOSIS — E11.69 TYPE 2 DIABETES MELLITUS WITH OTHER SPECIFIED COMPLICATION, WITHOUT LONG-TERM CURRENT USE OF INSULIN (H): ICD-10-CM

## 2023-09-29 DIAGNOSIS — D63.1 ANEMIA IN CHRONIC KIDNEY DISEASE: ICD-10-CM

## 2023-09-29 DIAGNOSIS — N18.6 END STAGE RENAL DISEASE (H): ICD-10-CM

## 2023-09-29 NOTE — LETTER
09/29/23        [facility]  [address]  [place] [address]        Dear ,    It was a pleasure to see you recently for consideration of kidney transplantation. Your pre-transplant evaluation results were reviewed at our Multidisciplinary Selection Committee on 09/20/2023. The Committee is requesting the following items are completed before determining your candidacy:    Cardiologist appointment for assessment of your heart function and for a recommendation to proceed with a kidney transplant surgery.   Abdominal pelvic CT without contrast to assess for arterial calcifications.   Aorto-iliac artery ultrasound to assess for any narrowed areas or blockages.   Physical therapy to improve your physical functioning. Please work with your primary care provider to arrange for this.   Vaccinations are recommended to be up to date for: COVID 19, hepatitis B, pneumovax and Shingrix.   Colonoscopy needs to be up to date. Please work with your primary care provider for this.   Mammogram and PAP smear need to remain up to date. Please work with your primary care provider for this.   Dental work needs to be up to date. Please make an appointment now for a check up with a dentist of your choice.   Review of pre kidney transplant patient education over the phone with myself. Please do let me know when you have about 1/2 hour to do this.     A  from our Office will call you to make appointments for items # 1, 2 and 3.     Please do call me to let me know as soon as you have completed all of the above appointments. I will then have the outcomes reviewed at the Multidisciplinary Selection Committee for determination of next steps and approval. When approved, you will then be eligible to be added onto the kidney transplant waitlist on ACTIVE status as well as to proceed with a live donor kidney transplant in the event you have an approved live donor.           Please have any potential live kidney donors register  online at [facility] to initiate their evaluations through our program's living donor screening tool at [email or web address]. Please note that potential donors will get an e-mail response once they submit their form within 1-2 business days. Potential donors may call our Main Office Number at [phone] and ask to speak with a donor coordinator if they have questions about the process. You will be notified by your transplant coordinator if a live donor has been approved for donation.     You have not been added onto the kidney waitlist at this time because you are already on dialysis. When you are added onto the waitlist, your wait time accrual start date will be the same as the start date of your chronic dialysis which is 11/28/2021.     For any questions, please contact myself at the Transplant Office Main Number at [phone] or at my Direct Number at [phone].      Sincerely,  Dolly Garcia RN, BSN  Pre Kidney Pancreas Transplant Coordinator   [facility]  Solid Organ Transplant Care   [address] [place] [address]  [email or web address]   Office Number: [phone] Direct Number: [phone]   Fax Number: [phone]  Employed by [facility] [employer]     CC's: Dr. bAhilash Cobb, Dr. Faviola Jang, [facility]

## 2023-09-29 NOTE — TELEPHONE ENCOUNTER
Contacted patient's daughter, Getachew, to review outcome of selection committee meeting (See selection committee encounter).  Patient was at Getachew's side for this entire conversation and Getachew translated to her my discussion.  Explained to patient that she needs to complete all components of the evaluation to be eligible for active status on the waiting list or to proceed with a live donor kidney transplant.   Reviewed next steps based on outcomes:  to call pt to make appts for: cardiologist, abd pelvic CT wo contrast, aorto iliac artery US. Pt to work with primary care provider to arrange for physical therapy, vaccination administration and colonoscopy. Daughter to make appt for dentist. Our Office will order back surgery records from [facility] in [facility]. Instructed to have live donors register now. Instructed to call me when all is completed so I can have results reviewed at the Selection Committee.   Patient will not be listed (patient is on dialysis and evaluation is not complete), patient will receive:    - An Evaluation Summary Letter indicating what is needed to complete evaluation-discussed with patient if they would like to have testing done with [employer] or locally  Confirmed with patient that on successful completion of outstanding components, patient is eligible for active status and they will receive a follow-up call.   Confirmed that patient has contact information for additional questions or concerns.  Getachew and patient expressed excellent understanding of all and were in good agreement with the plan.   Generated Transplant Evaluation Summary Letter today in EPIC - electronically sent to pt/ providers.

## 2023-10-04 ENCOUNTER — PATIENT OUTREACH (OUTPATIENT)
Dept: NURSING | Facility: CLINIC | Age: 58
End: 2023-10-04
Payer: COMMERCIAL

## 2023-10-04 ASSESSMENT — ACTIVITIES OF DAILY LIVING (ADL)
DEPENDENT_IADLS:: CLEANING;COOKING;LAUNDRY;SHOPPING;MEAL PREPARATION;MEDICATION MANAGEMENT;MONEY MANAGEMENT;TRANSPORTATION;INCONTINENCE

## 2023-10-04 NOTE — PROGRESS NOTES
Clinic Care Coordination Contact  Clinic Care Coordination Contact  OUTREACH    Referral Information:  Referral Source: PCP    Primary Diagnosis: CKD    Chief Complaint   Patient presents with    Clinic Care Coordination - Initial     Clinic Utilization  Difficulty keeping appointments:: No  Compliance Concerns: No  No-Show Concerns: No  No PCP office visit in Past Year: No  Utilization      Hospital Admissions  0             ED Visits  0             No Show Count (past year)  0                    Current as of: 10/3/2023 10:58 PM            Clinical Concerns:  CCRN spoke with patient and daughter today via phone. CCRN explained the reason for the call today and introduced care coordination to patient. Daughter declined care coordination stating that she's able to manage her mom's complex health issues such as assist with scheduling and follow up  appts. No further assist needed from CCC. PCP updated. Daughter speak English.     Pain  Pain (GOAL):: No  Health Maintenance Reviewed: Due/Overdue   Clinical Pathway: None    Medication Management:  Medication review status: Medications reviewed and no changes reported per patient.           Functional Status:  Dependent ADLs:: Bathing, Dressing, Grooming, Incontinence, Positioning, Transfers, Toileting  Dependent IADLs:: Cleaning, Cooking, Laundry, Shopping, Meal Preparation, Medication Management, Money Management, Transportation, Incontinence  Bed or wheelchair confined:: No  Mobility Status: Independent w/Device  Fallen 2 or more times in the past year?: No  Any fall with injury in the past year?: No    Living Situation:  Current living arrangement:: I live in a private home with family  Type of residence:: Apartment    Lifestyle & Psychosocial Needs:    Social Determinants of Health     Food Insecurity: Not on file   Depression: Not at risk (5/9/2023)    PHQ-2     PHQ-2 Score: 0   Housing Stability: Not on file   Tobacco Use: Medium Risk (9/19/2023)    Patient  History     Smoking Tobacco Use: Never     Smokeless Tobacco Use: Former     Passive Exposure: Never   Financial Resource Strain: Not on file   Alcohol Use: Not on file   Transportation Needs: Not on file   Physical Activity: Not on file   Interpersonal Safety: Not on file   Stress: Not on file   Social Connections: Not on file     Diet:: Other (DKA diet)  Inadequate nutrition (GOAL):: No  Tube Feeding: No  Inadequate activity/exercise (GOAL):: Yes  Significant changes in sleep pattern (GOAL): No  Transportation means:: Family, Regular car     Taoist or spiritual beliefs that impact treatment:: No  Mental health DX:: No  Mental health management concern (GOAL):: No  Chemical Dependency Status: No Current Concerns  Informal Support system:: Family, Children, [place] based, Friends      Resources and Interventions:  Current Resources:      Community Resources: PCA, Dialysis Services  Supplies Currently Used at Home: None  Equipment Currently Used at Home: none  Employment Status: disabled    Advance Care Plan/Directive  Advanced Care Plans/Directives on file:: No  Advanced Care Plan/Directive Status: Declined Further Information    Referrals Placed: None    Care Plan:  N/a    Outreach Frequency: 6 weeks  Future Appointments                Today PHONE, ; SPRO CCC RN [facility], JIMMY SPRO    In 1 month [facility] Imaging [facility]    In 1 month [facility]    In 1 month SYDNI Nicolas MD [facility] Heart [facility]    In 2 months Abhilash Cobb MD [facility] JIMMY SPRO            Plan:Patient will attend upcoming appts as scheduled.

## 2023-10-11 NOTE — TELEPHONE ENCOUNTER
RECORDS RECEIVED FROM:    DATE RECEIVED:    NOTES STATUS DETAILS   OFFICE NOTE from referring provider  Internal SOT   OFFICE NOTE from other cardiologists  N/A    RECORDS from hospital/ED N/A    MEDICATION LIST Internal    GENERAL CARDIO RECORDS   (ALL APPOINTMENT TYPES)     LABS (CBC,BMP,CMP, TSH) Internal    EKG (STRIPS & REPORTS) Internal 9-14-23   MONITORS (STRIPS & REPORTS) N/A    ECHOS (IMAGES AND REPORTS) Internal 9-14-23   STRESS TESTS (IMAGES AND REPORTS) N/A    cMRI (IMAGES AND REPORTS) N/A    CT/CTA (IMAGES AND REPORTS) N/A

## 2023-10-25 DIAGNOSIS — Z99.2 ESRD (END STAGE RENAL DISEASE) ON DIALYSIS (H): Primary | ICD-10-CM

## 2023-10-25 DIAGNOSIS — N18.6 ESRD (END STAGE RENAL DISEASE) ON DIALYSIS (H): Primary | ICD-10-CM

## 2023-10-25 DIAGNOSIS — R53.81 PHYSICAL DECONDITIONING: ICD-10-CM

## 2023-11-03 ENCOUNTER — THERAPY VISIT (OUTPATIENT)
Dept: PHYSICAL THERAPY | Facility: REHABILITATION | Age: 58
End: 2023-11-03
Payer: COMMERCIAL

## 2023-11-03 DIAGNOSIS — R53.81 PHYSICAL DECONDITIONING: Primary | ICD-10-CM

## 2023-11-03 PROCEDURE — 97110 THERAPEUTIC EXERCISES: CPT | Mod: GP

## 2023-11-03 PROCEDURE — 97161 PT EVAL LOW COMPLEX 20 MIN: CPT | Mod: GP

## 2023-11-03 NOTE — PROGRESS NOTES
PHYSICAL THERAPY EVALUATION  Type of Visit: Evaluation    See electronic medical record for Abuse and Falls Screening details.    Subjective         On kidney transplant list, care coordinator wanted her to go do some exercising to get stronger for transplant. Goes on walks sometimes otherwise mostly just sits during the day. Balance is pretty good, no falls. States she is dizzy (light headed) all the time- it isn't severe but it is always there. Does deal with HTN and takes medication for it. Dialysis is MWF every week.     Presenting condition or subjective complaint:  Deconditioning  Date of onset: 10/25/23 (order date)      Prior therapy history for the same diagnosis, illness or injury: No      Prior Level of Function  Transfers: Independent  Ambulation: Independent  ADL: Independent    Living Environment  Social support: With family members; with spouse, daughter, and boyfriend   Type of home: Apartment/condo   Stairs to enter the home: Yes 8 Is there a railing: Yes   Stairs inside the home: No   Is there a railing: No   Help at home: Self Cares (home health aide/personal care attendant, family, etc)  Equipment owned: Straight Cane     Employment: No    Hobbies/Interests:  gardening    Patient goals for therapy:  Get stronger for transplant     Pain assessment: Pain denied     Objective   Cognitive Status Examination  Orientation: Oriented to person, place and time   Level of Consciousness: Alert  Follows Commands and Answers Questions: 100% of the time  Personal Safety and Judgement: Intact  Memory: Intact    INTEGUMENTARY: Intact  POSTURE: WFL  RANGE OF MOTION: LE ROM WFL  STRENGTH:  Gross weakness in BLEs, hips 4-/5 B with increased strength more distally down into leg with motions 4 to 4+/5 B.     TRANSFERS: WFL    GAIT:   Level of [place]: Independent  Assistive Device(s): None  Gait Deviations: Stance time decreased  Stride length decreased  Sindy decreased  Gait Distance: 10'  Stairs: did not  assess    BALANCE: WFL  5x STS without UE use: 10.3 seconds   6mwt: 1117' with no rest breaks, signs of fatigue around 2 minutes. 5/10 fatigue at end.     Assessment & Plan   CLINICAL IMPRESSIONS  Medical Diagnosis: ESRD on dialysis, physical deconditioning    Treatment Diagnosis: Impaired activity tolerance, strength, and endurance   Impression/Assessment: Patient is a 58 year old female with physical deconditioning complaints.  The following significant findings have been identified: Decreased strength, Impaired muscle performance, and Decreased activity tolerance. These impairments interfere with their ability to perform recreational activities, household mobility, and community mobility as compared to previous level of function.     Clinical Decision Making (Complexity):  Clinical Presentation: Stable/Uncomplicated  Clinical Presentation Rationale: based on medical and personal factors listed in PT evaluation  Clinical Decision Making (Complexity): Low complexity    PLAN OF CARE  Treatment Interventions:  Interventions: Gait Training, Manual Therapy, Neuromuscular Re-education, Therapeutic Activity, Therapeutic Exercise    Long Term Goals     PT Goal 1  Goal Identifier: 6MWT  Goal Description: Bu will improve distance walked on 6MWT by 10% from initial value and reporting fatigue <5/10 following in order to improve activity tolerance and endurance.  Target Date: 01/31/24  PT Goal 2  Goal Identifier: Functional LE strength  Goal Description: Bu will decrease time taken to complete 5x STS by 2 or more seconds in order to improve functional LE strength.  Target Date: 01/31/24  PT Goal 3  Goal Identifier: HEP  Goal Description: Bu will maintain compliance with HEP in order to progress physical conditioning and strength at home,  Target Date: 01/31/24      Frequency of Treatment: 1x/week  Duration of Treatment: 90 days    Education Assessment:   Learner/Method: Patient;Family  Education Comments: results of  assessment, POC, HEP    Risks and benefits of evaluation/treatment have been explained.   Patient/Family/caregiver agrees with Plan of Care.     Evaluation Time:     PT Elias Low Complexity Minutes (53621): 25   Present: No: daughter declined  and interprets for Mom      Signing Clinician: MART MCCABE PT      [facility]                                                                                   OUTPATIENT PHYSICAL THERAPY      PLAN OF TREATMENT FOR OUTPATIENT REHABILITATION   Patient's Last Name, First Name, M.I.  Melo,Bu    YOB: 1965   Provider's Name   [facility]   Medical Record No.  [id number]     Onset Date: 10/25/23 (order date)  Start of Care Date: 11/03/23     Medical Diagnosis:  ESRD on dialysis, physical deconditioning      PT Treatment Diagnosis:  Impaired activity tolerance, strength, and endurance Plan of Treatment  Frequency/Duration: 1x/week/ 90 days    Certification date from 11/03/23 to 01/31/24         See note for plan of treatment details and functional goals     MART MCCABE PT                         I CERTIFY THE NEED FOR THESE SERVICES FURNISHED UNDER        THIS PLAN OF TREATMENT AND WHILE UNDER MY CARE     (Physician attestation of this document indicates review and certification of the therapy plan).                Referring Provider:  Abhilash Cobb      Initial Assessment  See Epic Evaluation- Start of Care Date: 11/03/23

## 2023-11-09 ENCOUNTER — NURSE TRIAGE (OUTPATIENT)
Dept: NURSING | Facility: CLINIC | Age: 58
End: 2023-11-09
Payer: COMMERCIAL

## 2023-11-09 DIAGNOSIS — Z99.2 ESRD (END STAGE RENAL DISEASE) ON DIALYSIS (H): Primary | ICD-10-CM

## 2023-11-09 DIAGNOSIS — N18.6 ESRD (END STAGE RENAL DISEASE) ON DIALYSIS (H): Primary | ICD-10-CM

## 2023-11-09 DIAGNOSIS — I10 PRIMARY HYPERTENSION: ICD-10-CM

## 2023-11-09 RX ORDER — METOPROLOL TARTRATE 50 MG
50 TABLET ORAL 2 TIMES DAILY
Qty: 180 TABLET | Refills: 2 | Status: SHIPPED | OUTPATIENT
Start: 2023-11-09 | End: 2023-12-07

## 2023-11-09 NOTE — TELEPHONE ENCOUNTER
Daughter Getachew is calling and states that Bu is needing a refill on one of her medications.  Mom is needing a refill for Metoprolol tartrate 50 mg Tablet and to take 1 tablet by mouth twice daily.  Medication is patient's chart is marked as Historical.  Last prescription was sent by [facility] on [address] in [place], [place].    Daughter states that mom is using a new pharmacy and is [facility] [address] in [place], [place].  Telephone number is [phone].  Patient is a transplant and a dialysis patient also.  Daughter is requesting a message be sent to her pcp/clinic.  Please phone cally Chilel regarding this medication [phone].        Reason for Disposition   Prescription prescribed recently is not at pharmacy and triager has access to patient's EMR and prescription is recorded in the EMR    Additional Information   Negative: Intentional drug overdose and suicidal thoughts or ideas   Negative: MORE THAN A DOUBLE DOSE of a prescription or over-the-counter (OTC) drug   Negative: DOUBLE DOSE (an extra dose or lesser amount) of prescription drug and any symptoms (e.g., dizziness, nausea, pain, sleepiness)   Negative: DOUBLE DOSE (an extra dose or lesser amount) of over-the-counter (OTC) drug and any symptoms (e.g., dizziness, nausea, pain, sleepiness)   Negative: Took another person's prescription drug   Negative: DOUBLE DOSE (an extra dose or lesser amount) of prescription drug and NO symptoms  (Exception: A double dose of antibiotics.)   Negative: Diabetes drug error or overdose (e.g., took wrong type of insulin or took extra dose)   Negative: Caller has medication question about med NOT prescribed by PCP and triager unable to answer question (e.g., compatibility with other med, storage)   Negative: Prescription not at pharmacy and was prescribed by PCP recently  (Exception: triager has access to EMR and prescription is recorded there. Go to Home Care and confirm for pharmacy.)   Negative: Pharmacy  calling with prescription question and triager unable to answer question   Negative: Caller has URGENT medicine question about med that PCP or specialist prescribed and triager unable to answer question   Negative: Caller has NON-URGENT medicine question about med that PCP or specialist prescribed and triager unable to answer question   Negative: Caller wants to use a complementary or alternative medicine   Negative: Medicine patch causing local rash or itching   Negative: Prescription request for new medicine (not a refill)    Protocols used: Medication Question Call-A-OH

## 2023-11-09 NOTE — TELEPHONE ENCOUNTER
Clinic RN place a call back to daughter Melo Chilel to relay provider message below.  Pharmacy medication sent to provided.      Abhilash Cobb MD  [place] Nurse Pool7 minutes ago (1:54 PM)     TH  Chart reviewed - med refilled. Please let patient know.       EKATERINA Mccord, RN  [facility]

## 2023-11-14 ENCOUNTER — THERAPY VISIT (OUTPATIENT)
Dept: PHYSICAL THERAPY | Facility: REHABILITATION | Age: 58
End: 2023-11-14
Attending: FAMILY MEDICINE
Payer: COMMERCIAL

## 2023-11-14 DIAGNOSIS — N18.6 ESRD (END STAGE RENAL DISEASE) ON DIALYSIS (H): ICD-10-CM

## 2023-11-14 DIAGNOSIS — R53.81 PHYSICAL DECONDITIONING: ICD-10-CM

## 2023-11-14 DIAGNOSIS — Z99.2 ESRD (END STAGE RENAL DISEASE) ON DIALYSIS (H): ICD-10-CM

## 2023-11-14 PROCEDURE — 97110 THERAPEUTIC EXERCISES: CPT | Mod: GP

## 2023-11-28 ENCOUNTER — ANCILLARY PROCEDURE (OUTPATIENT)
Dept: ULTRASOUND IMAGING | Facility: CLINIC | Age: 58
End: 2023-11-28
Attending: PHYSICIAN ASSISTANT
Payer: COMMERCIAL

## 2023-11-28 ENCOUNTER — PRE VISIT (OUTPATIENT)
Dept: CARDIOLOGY | Facility: CLINIC | Age: 58
End: 2023-11-28

## 2023-11-28 ENCOUNTER — ANCILLARY PROCEDURE (OUTPATIENT)
Dept: CT IMAGING | Facility: CLINIC | Age: 58
End: 2023-11-28
Attending: PHYSICIAN ASSISTANT
Payer: COMMERCIAL

## 2023-11-28 ENCOUNTER — OFFICE VISIT (OUTPATIENT)
Dept: CARDIOLOGY | Facility: CLINIC | Age: 58
End: 2023-11-28
Attending: PHYSICIAN ASSISTANT
Payer: COMMERCIAL

## 2023-11-28 VITALS
OXYGEN SATURATION: 99 % | SYSTOLIC BLOOD PRESSURE: 145 MMHG | WEIGHT: 95.4 LBS | BODY MASS INDEX: 20.02 KG/M2 | DIASTOLIC BLOOD PRESSURE: 85 MMHG | HEIGHT: 58 IN | HEART RATE: 74 BPM

## 2023-11-28 DIAGNOSIS — Z76.82 ORGAN TRANSPLANT CANDIDATE: ICD-10-CM

## 2023-11-28 DIAGNOSIS — N18.6 END STAGE RENAL DISEASE (H): ICD-10-CM

## 2023-11-28 DIAGNOSIS — E11.69 TYPE 2 DIABETES MELLITUS WITH OTHER SPECIFIED COMPLICATION, WITHOUT LONG-TERM CURRENT USE OF INSULIN (H): ICD-10-CM

## 2023-11-28 DIAGNOSIS — I10 ESSENTIAL HYPERTENSION: ICD-10-CM

## 2023-11-28 DIAGNOSIS — N25.81 SECONDARY RENAL HYPERPARATHYROIDISM (H): ICD-10-CM

## 2023-11-28 DIAGNOSIS — N18.9 ANEMIA IN CHRONIC KIDNEY DISEASE: ICD-10-CM

## 2023-11-28 DIAGNOSIS — Z01.818 PRE-TRANSPLANT EVALUATION FOR KIDNEY TRANSPLANT: ICD-10-CM

## 2023-11-28 DIAGNOSIS — D63.1 ANEMIA IN CHRONIC KIDNEY DISEASE: ICD-10-CM

## 2023-11-28 PROCEDURE — G0463 HOSPITAL OUTPT CLINIC VISIT: HCPCS | Performed by: INTERNAL MEDICINE

## 2023-11-28 PROCEDURE — 74176 CT ABD & PELVIS W/O CONTRAST: CPT | Performed by: RADIOLOGY

## 2023-11-28 PROCEDURE — 93978 VASCULAR STUDY: CPT | Mod: GC | Performed by: RADIOLOGY

## 2023-11-28 PROCEDURE — 99204 OFFICE O/P NEW MOD 45 MIN: CPT | Performed by: INTERNAL MEDICINE

## 2023-11-28 ASSESSMENT — PAIN SCALES - GENERAL: PAINLEVEL: NO PAIN (0)

## 2023-11-28 NOTE — PATIENT INSTRUCTIONS
Complete a Dobutamine Stress Echocardiogram (ultrasound of heart)    You are taking metoprolol twice daily.  For this test, hold the metoprolol the night before and morning of test.  Resume taking afterwards.     As soon as results are compiled and reviewed, you will be notified.    Dobutamine Stress Echocardiography (Echo)    This type of echocardiogram involves using dobutamine, a medicine that stimulates your heart similar to the way exercise does. It increases your heart rate and the squeezing function of your heart. Your healthcare provider gives you dobutamine through an IV. They then take ultrasound pictures of your heart, using sound waves through a device placed on your chest wall (echocardiography). The pictures are taken before and after you get dobutamine. This lets your healthcare provider see if blood is flowing properly through the heart and if your heart is pumping normally.    Before your test :  No alcohol, smoking, or caffeine for 12 hours before the test.   Don't eat for at least 3 hours before the test.  Make sure to wear a two-piece outfit. You may need to undress from the waist up and put on a short hospital gown.    During your test :  Your healthcare provider places small pads (electrodes) on your chest to record the electrical activity of your heart.  An intravenous (IV) line will be placed in your arm.  A painless device (transducer) coated with gel is moved firmly over your chest. This device creates ultrasonic, inaudible sound waves that make images of your heart on a screen.  You will slowly be given dobutamine through the IV, in small doses about every 3 minutes. It is normal to feel your heart pound for a few minutes, while the medicine is being given.  Images will be obtained before the infusion of the drug, during the infusion, and after your pulse returns to normal.  Your pulse and blood pressure  will be monitored during and after the test.    After your test :  When the test is  over, you may return to your normal routine.

## 2023-11-28 NOTE — NURSING NOTE
Chief Complaint   Patient presents with    New Patient     New cardiology - pre kidney transplant        Vitals were taken, medications reconciled.    Denise Alexander CMA  9:26 AM

## 2023-11-28 NOTE — PROGRESS NOTES
SUBJECTIVE:  Lyndsey Alejo is a 58 year old female who presents for renal transplant evaluation.  End-stage renal disease due to unclear etiology.  Patient is on hemodialysis since November 2021.  Currently patient is not very active but able to do household chores.  No symptoms during normal household activity.  Denied chest pain.  No prior cardiac history.  Patient's cardiac risk factors are type 2 diabetes since 2009 not on any treatment currently with a hemoglobin A1c of 5.4, hypertension and hyperlipidemia.  Current cardiac medications are Lopressor 50 mg twice a day, amlodipine 10 mg daily and atorvastatin 40 mg daily.    Patient Active Problem List    Diagnosis Date Noted    Physical deconditioning 11/14/2023     Priority: Medium    Cervical cancer screening 09/21/2023     Priority: Medium     Transplant listed for pt  09/13/23 NIL Pap, Neg HR HPV Plan cotest in 3 years per provider       Type 2 diabetes mellitus (H) 09/19/2023     Priority: Medium    Poor dentition 09/19/2023     Priority: Medium    Chewing tobacco use 09/19/2023     Priority: Medium    Anemia in chronic kidney disease 09/19/2023     Priority: Medium    Secondary renal hyperparathyroidism (H24) 09/19/2023     Priority: Medium    ESRD (end stage renal disease) on dialysis (H) 05/09/2023     Priority: Medium    Primary hypertension 05/09/2023     Priority: Medium    Neuropathy 08/02/2018     Priority: Medium    Elevated erythrocyte sedimentation rate 12/06/2017     Priority: Medium    Thyroid function test abnormal 12/06/2017     Priority: Medium    .  Current Outpatient Medications   Medication Sig    amLODIPine (NORVASC) 10 MG tablet Take 1 tablet (10 mg) by mouth daily at 2 pm    aspirin 81 MG EC tablet Take 1 tablet by mouth daily    atorvastatin (LIPITOR) 40 MG tablet Take 40 mg by mouth daily    calcium acetate (PHOSLO) 667 MG CAPS capsule 2 times daily    famotidine (PEPCID) 20 MG tablet Take 1 tablet by mouth 2 times daily    Fe Fum-FA-B  Quy-W-Jh-Mg-Mn-Cu (FERROCITE PLUS) 106-1 MG TABS Ferrocite Plus 106 mg iron-1 mg tablet    FEROSUL 325 (65 Fe) MG tablet TAKE 1 TABLET BY MOUTH ONE TIME EACH DAY WITH BREAKFAST    metoprolol tartrate (LOPRESSOR) 50 MG tablet Take 1 tablet (50 mg) by mouth 2 times daily    fish oil-omega-3 fatty acids 1000 MG capsule Take 2,000 mg by mouth (Patient not taking: Reported on 11/28/2023)    ketorolac (ACULAR) 0.5 % ophthalmic solution  (Patient not taking: Reported on 11/28/2023)    ofloxacin (OCUFLOX) 0.3 % ophthalmic solution  (Patient not taking: Reported on 11/28/2023)    prednisoLONE acetate (PRED FORTE) 1 % ophthalmic suspension  (Patient not taking: Reported on 11/28/2023)     No current facility-administered medications for this visit.     Past Medical History:   Diagnosis Date    Anemia in chronic kidney disease     Chewing tobacco use     ESRD (end stage renal disease) on dialysis (H)     dialysis start date 11/28/2021 per 2728 form    Hypertension     Poor dentition     Secondary renal hyperparathyroidism (H24)     Type 2 diabetes mellitus (H)      Past Surgical History:   Procedure Laterality Date    BACK SURGERY       No Known Allergies  Social History     Socioeconomic History    Marital status: Single     Spouse name: Not on file    Number of children: Not on file    Years of education: Not on file    Highest education level: Not on file   Occupational History    Not on file   Tobacco Use    Smoking status: Never     Passive exposure: Never    Smokeless tobacco: Current     Types: Chew     Last attempt to quit: 04/2023    Tobacco comments:     Chewing tobacco    Vaping Use    Vaping Use: Never used   Substance and Sexual Activity    Alcohol use: Never    Drug use: Never    Sexual activity: Not on file   Other Topics Concern    Not on file   Social History Narrative    Not on file     Social Determinants of Health     Financial Resource Strain: Not on file   Food Insecurity: Not on file   Transportation  "Needs: Not on file   Physical Activity: Not on file   Stress: Not on file   Social Connections: Not on file   Interpersonal Safety: Not on file   Housing Stability: Not on file     Family History   Problem Relation Age of Onset    Breast Cancer Cousin     Coronary Artery Disease No family hx of     Diabetes No family hx of     Hypertension No family hx of           REVIEW OF SYSTEMS:  General: negative, fever, chills, night sweats  Skin: negative, acne, rash, and scaling  Eyes: negative, double vision, eye pain, and photophobia  Ears/Nose/Throat: negative, nasal congestion, and purulent rhinorrhea  Respiratory: No dyspnea on exertion, No cough, No hemoptysis, and negative  Cardiovascular: negative, palpitations, tachycardia, irregular heart beat, chest pain, exertional chest pain or pressure, paroxysmal nocturnal dyspnea, dyspnea on exertion, and orthopnea       OBJECTIVE:  Blood pressure (!) 145/85, pulse 74, height 1.465 m (4' 9.68\"), weight 43.3 kg (95 lb 6.4 oz), SpO2 99%, not currently breastfeeding.  General Appearance: alert, active, and no distress  Head: Normocephalic. No masses, lesions, tenderness or abnormalities  Eyes: conjuctiva clear, PERRL, EOM intact  Ears: External ears normal. Canals clear. TM's normal.  Nose: Nares normal  Mouth: normal  Neck: Supple, no cervical adenopathy, no thyromegaly  Lungs: clear to auscultation  Cardiac: regular rate and rhythm, normal S1 and S2, murmur or fistula.     ASSESSMENT/PLAN:  Patient here for renal transplant evaluation.  End-stage renal disease of unclear etiology.  Patient is on hemodialysis since November 2021.  She do not have any potential donors at this time.  Fairly active with no cardiac symptoms.  No prior cardiac history  Cardiac risk factors type 2 diabetes since 2009, currently not on any medication and A1c of 5.4, hypertension and hyperlipidemia.  Non-smoker.  No premature coronary artery disease in family.  Patient's recent EKG reviewed normal " sinus rhythm occasional premature atrial contraction otherwise unremarkable.  Echocardiogram reviewed normal biventricular function.  No significant valvular abnormalities.  We will plan for a dobutamine stress echo.  If this is normal patient will be able to proceed with the transplant.  She will be contacted with the test result.  Total visit duration 45 minutes.  This include face-to-face interview, physical exam, chart review, review of EKG echocardiogram and documentation.  Normal dobutamine stress echo.  Patient cleared for transplant.

## 2023-11-28 NOTE — LETTER
11/28/2023      RE: Lyndsey Alejo  [address]  [place] [address]       Dear Colleague,    Thank you for the opportunity to participate in the care of your patient, Lyndsey Alejo, at the [facility]. Please see a copy of my visit note below.       SUBJECTIVE:  Lyndsey Alejo is a 58 year old female who presents for renal transplant evaluation.  End-stage renal disease due to unclear etiology.  Patient is on hemodialysis since November 2021.  Currently patient is not very active but able to do household chores.  No symptoms during normal household activity.  Denied chest pain.  No prior cardiac history.  Patient's cardiac risk factors are type 2 diabetes since 2009 not on any treatment currently with a hemoglobin A1c of 5.4, hypertension and hyperlipidemia.  Current cardiac medications are Lopressor 50 mg twice a day, amlodipine 10 mg daily and atorvastatin 40 mg daily.    Patient Active Problem List    Diagnosis Date Noted    Physical deconditioning 11/14/2023     Priority: Medium    Cervical cancer screening 09/21/2023     Priority: Medium     Transplant listed for pt  09/13/23 NIL Pap, Neg HR HPV Plan cotest in 3 years per provider       Type 2 diabetes mellitus (H) 09/19/2023     Priority: Medium    Poor dentition 09/19/2023     Priority: Medium    Chewing tobacco use 09/19/2023     Priority: Medium    Anemia in chronic kidney disease 09/19/2023     Priority: Medium    Secondary renal hyperparathyroidism (H24) 09/19/2023     Priority: Medium    ESRD (end stage renal disease) on dialysis (H) 05/09/2023     Priority: Medium    Primary hypertension 05/09/2023     Priority: Medium    Neuropathy 08/02/2018     Priority: Medium    Elevated erythrocyte sedimentation rate 12/06/2017     Priority: Medium    Thyroid function test abnormal 12/06/2017     Priority: Medium    .  Current Outpatient Medications   Medication Sig    amLODIPine  (NORVASC) 10 MG tablet Take 1 tablet (10 mg) by mouth daily at 2 pm    aspirin 81 MG EC tablet Take 1 tablet by mouth daily    atorvastatin (LIPITOR) 40 MG tablet Take 40 mg by mouth daily    calcium acetate (PHOSLO) 667 MG CAPS capsule 2 times daily    famotidine (PEPCID) 20 MG tablet Take 1 tablet by mouth 2 times daily    Fe Fum-FA-B Ydu-R-Ok-Mg-Mn-Cu (FERROCITE PLUS) 106-1 MG TABS Ferrocite Plus 106 mg iron-1 mg tablet    FEROSUL 325 (65 Fe) MG tablet TAKE 1 TABLET BY MOUTH ONE TIME EACH DAY WITH BREAKFAST    metoprolol tartrate (LOPRESSOR) 50 MG tablet Take 1 tablet (50 mg) by mouth 2 times daily    fish oil-omega-3 fatty acids 1000 MG capsule Take 2,000 mg by mouth (Patient not taking: Reported on 11/28/2023)    ketorolac (ACULAR) 0.5 % ophthalmic solution  (Patient not taking: Reported on 11/28/2023)    ofloxacin (OCUFLOX) 0.3 % ophthalmic solution  (Patient not taking: Reported on 11/28/2023)    prednisoLONE acetate (PRED FORTE) 1 % ophthalmic suspension  (Patient not taking: Reported on 11/28/2023)     No current facility-administered medications for this visit.     Past Medical History:   Diagnosis Date    Anemia in chronic kidney disease     Chewing tobacco use     ESRD (end stage renal disease) on dialysis (H)     dialysis start date 11/28/2021 per 2728 form    Hypertension     Poor dentition     Secondary renal hyperparathyroidism (H24)     Type 2 diabetes mellitus (H)      Past Surgical History:   Procedure Laterality Date    BACK SURGERY       No Known Allergies  Social History     Socioeconomic History    Marital status: Single     Spouse name: Not on file    Number of children: Not on file    Years of education: Not on file    Highest education level: Not on file   Occupational History    Not on file   Tobacco Use    Smoking status: Never     Passive exposure: Never    Smokeless tobacco: Current     Types: Chew     Last attempt to quit: 04/2023    Tobacco comments:     Chewing tobacco    Vaping Use  "   Vaping Use: Never used   Substance and Sexual Activity    Alcohol use: Never    Drug use: Never    Sexual activity: Not on file   Other Topics Concern    Not on file   Social History Narrative    Not on file     Social Determinants of Health     Financial Resource Strain: Not on file   Food Insecurity: Not on file   Transportation Needs: Not on file   Physical Activity: Not on file   Stress: Not on file   Social Connections: Not on file   Interpersonal Safety: Not on file   Housing Stability: Not on file     Family History   Problem Relation Age of Onset    Breast Cancer Cousin     Coronary Artery Disease No family hx of     Diabetes No family hx of     Hypertension No family hx of           REVIEW OF SYSTEMS:  General: negative, fever, chills, night sweats  Skin: negative, acne, rash, and scaling  Eyes: negative, double vision, eye pain, and photophobia  Ears/Nose/Throat: negative, nasal congestion, and purulent rhinorrhea  Respiratory: No dyspnea on exertion, No cough, No hemoptysis, and negative  Cardiovascular: negative, palpitations, tachycardia, irregular heart beat, chest pain, exertional chest pain or pressure, paroxysmal nocturnal dyspnea, dyspnea on exertion, and orthopnea       OBJECTIVE:  Blood pressure (!) 145/85, pulse 74, height 1.465 m (4' 9.68\"), weight 43.3 kg (95 lb 6.4 oz), SpO2 99%, not currently breastfeeding.  General Appearance: alert, active, and no distress  Head: Normocephalic. No masses, lesions, tenderness or abnormalities  Eyes: conjuctiva clear, PERRL, EOM intact  Ears: External ears normal. Canals clear. TM's normal.  Nose: Nares normal  Mouth: normal  Neck: Supple, no cervical adenopathy, no thyromegaly  Lungs: clear to auscultation  Cardiac: regular rate and rhythm, normal S1 and S2, murmur or fistula.     ASSESSMENT/PLAN:  Patient here for renal transplant evaluation.  End-stage renal disease of unclear etiology.  Patient is on hemodialysis since November 2021.  She do not have " any potential donors at this time.  Fairly active with no cardiac symptoms.  No prior cardiac history  Cardiac risk factors type 2 diabetes since 2009, currently not on any medication and A1c of 5.4, hypertension and hyperlipidemia.  Non-smoker.  No premature coronary artery disease in family.  Patient's recent EKG reviewed normal sinus rhythm occasional premature atrial contraction otherwise unremarkable.  Echocardiogram reviewed normal biventricular function.  No significant valvular abnormalities.  We will plan for a dobutamine stress echo.  If this is normal patient will be able to proceed with the transplant.  She will be contacted with the test result.  Total visit duration 45 minutes.  This include face-to-face interview, physical exam, chart review, review of EKG echocardiogram and documentation.      Please do not hesitate to contact me if you have any questions/concerns.     Sincerely,     SYDNI Nicolas MD

## 2023-12-05 ENCOUNTER — THERAPY VISIT (OUTPATIENT)
Dept: PHYSICAL THERAPY | Facility: REHABILITATION | Age: 58
End: 2023-12-05
Payer: COMMERCIAL

## 2023-12-05 DIAGNOSIS — Z99.2 ESRD (END STAGE RENAL DISEASE) ON DIALYSIS (H): Primary | ICD-10-CM

## 2023-12-05 DIAGNOSIS — R53.81 PHYSICAL DECONDITIONING: ICD-10-CM

## 2023-12-05 DIAGNOSIS — N18.6 ESRD (END STAGE RENAL DISEASE) ON DIALYSIS (H): Primary | ICD-10-CM

## 2023-12-05 PROCEDURE — 97112 NEUROMUSCULAR REEDUCATION: CPT | Mod: GP

## 2023-12-05 PROCEDURE — 97110 THERAPEUTIC EXERCISES: CPT | Mod: GP

## 2023-12-07 ENCOUNTER — OFFICE VISIT (OUTPATIENT)
Dept: FAMILY MEDICINE | Facility: CLINIC | Age: 58
End: 2023-12-07
Attending: FAMILY MEDICINE
Payer: COMMERCIAL

## 2023-12-07 ENCOUNTER — HOSPITAL ENCOUNTER (OUTPATIENT)
Dept: CARDIOLOGY | Facility: CLINIC | Age: 58
Discharge: HOME | End: 2023-12-07
Attending: INTERNAL MEDICINE | Admitting: INTERNAL MEDICINE
Payer: COMMERCIAL

## 2023-12-07 ENCOUNTER — LAB (OUTPATIENT)
Dept: FAMILY MEDICINE | Facility: CLINIC | Age: 58
End: 2023-12-07

## 2023-12-07 VITALS
OXYGEN SATURATION: 98 % | RESPIRATION RATE: 16 BRPM | SYSTOLIC BLOOD PRESSURE: 110 MMHG | HEART RATE: 68 BPM | HEIGHT: 58 IN | WEIGHT: 95 LBS | DIASTOLIC BLOOD PRESSURE: 64 MMHG | TEMPERATURE: 98.4 F | BODY MASS INDEX: 19.94 KG/M2

## 2023-12-07 DIAGNOSIS — I10 BENIGN ESSENTIAL HYPERTENSION: ICD-10-CM

## 2023-12-07 DIAGNOSIS — E78.5 HYPERLIPIDEMIA LDL GOAL <100: ICD-10-CM

## 2023-12-07 DIAGNOSIS — N18.6 END STAGE RENAL DISEASE (H): ICD-10-CM

## 2023-12-07 DIAGNOSIS — R22.30 SHOULDER MASS: ICD-10-CM

## 2023-12-07 DIAGNOSIS — N25.81 SECONDARY RENAL HYPERPARATHYROIDISM (H): ICD-10-CM

## 2023-12-07 DIAGNOSIS — N18.6 TYPE 2 DIABETES MELLITUS WITH CHRONIC KIDNEY DISEASE ON CHRONIC DIALYSIS, WITHOUT LONG-TERM CURRENT USE OF INSULIN (H): Primary | ICD-10-CM

## 2023-12-07 DIAGNOSIS — Z01.818 PRE-TRANSPLANT EVALUATION FOR KIDNEY TRANSPLANT: ICD-10-CM

## 2023-12-07 DIAGNOSIS — I10 ESSENTIAL HYPERTENSION: ICD-10-CM

## 2023-12-07 DIAGNOSIS — Z12.11 SCREEN FOR COLON CANCER: ICD-10-CM

## 2023-12-07 DIAGNOSIS — Z99.2 TYPE 2 DIABETES MELLITUS WITH CHRONIC KIDNEY DISEASE ON CHRONIC DIALYSIS, WITHOUT LONG-TERM CURRENT USE OF INSULIN (H): Primary | ICD-10-CM

## 2023-12-07 DIAGNOSIS — Z76.82 ORGAN TRANSPLANT CANDIDATE: ICD-10-CM

## 2023-12-07 DIAGNOSIS — I10 PRIMARY HYPERTENSION: ICD-10-CM

## 2023-12-07 DIAGNOSIS — N18.6 ESRD (END STAGE RENAL DISEASE) ON DIALYSIS (H): ICD-10-CM

## 2023-12-07 DIAGNOSIS — Z99.2 ESRD (END STAGE RENAL DISEASE) ON DIALYSIS (H): ICD-10-CM

## 2023-12-07 DIAGNOSIS — E11.69 TYPE 2 DIABETES MELLITUS WITH OTHER SPECIFIED COMPLICATION, WITHOUT LONG-TERM CURRENT USE OF INSULIN (H): ICD-10-CM

## 2023-12-07 DIAGNOSIS — E11.22 TYPE 2 DIABETES MELLITUS WITH CHRONIC KIDNEY DISEASE ON CHRONIC DIALYSIS, WITHOUT LONG-TERM CURRENT USE OF INSULIN (H): Primary | ICD-10-CM

## 2023-12-07 PROCEDURE — 99214 OFFICE O/P EST MOD 30 MIN: CPT | Performed by: FAMILY MEDICINE

## 2023-12-07 PROCEDURE — 999N000208 ECHO STRESS ECHOCARDIOGRAM

## 2023-12-07 PROCEDURE — 255N000002 HC RX 255 OP 636: Performed by: INTERNAL MEDICINE

## 2023-12-07 PROCEDURE — 93350 STRESS TTE ONLY: CPT | Mod: 26 | Performed by: INTERNAL MEDICINE

## 2023-12-07 PROCEDURE — 93018 CV STRESS TEST I&R ONLY: CPT | Performed by: INTERNAL MEDICINE

## 2023-12-07 PROCEDURE — 93016 CV STRESS TEST SUPVJ ONLY: CPT | Performed by: INTERNAL MEDICINE

## 2023-12-07 PROCEDURE — 250N000011 HC RX IP 250 OP 636: Mod: JZ | Performed by: INTERNAL MEDICINE

## 2023-12-07 PROCEDURE — 93321 DOPPLER ECHO F-UP/LMTD STD: CPT | Mod: 26 | Performed by: INTERNAL MEDICINE

## 2023-12-07 PROCEDURE — 250N000009 HC RX 250: Performed by: INTERNAL MEDICINE

## 2023-12-07 PROCEDURE — 258N000003 HC RX IP 258 OP 636: Performed by: INTERNAL MEDICINE

## 2023-12-07 PROCEDURE — 93325 DOPPLER ECHO COLOR FLOW MAPG: CPT | Mod: 26 | Performed by: INTERNAL MEDICINE

## 2023-12-07 PROCEDURE — C8928 TTE W OR W/O FOL W/CON,STRES: HCPCS

## 2023-12-07 PROCEDURE — 99207 PR FOOT EXAM NO CHARGE: CPT | Performed by: FAMILY MEDICINE

## 2023-12-07 RX ORDER — SODIUM CHLORIDE 9 MG/ML
INJECTION, SOLUTION INTRAVENOUS CONTINUOUS
Status: ACTIVE | OUTPATIENT
Start: 2023-12-07 | End: 2023-12-07

## 2023-12-07 RX ORDER — FERROUS SULFATE 325(65) MG
TABLET ORAL
Qty: 90 TABLET | Refills: 3 | Status: SHIPPED | OUTPATIENT
Start: 2023-12-07

## 2023-12-07 RX ORDER — AMLODIPINE BESYLATE 10 MG/1
10 TABLET ORAL
Qty: 90 TABLET | Refills: 3 | Status: SHIPPED | OUTPATIENT
Start: 2023-12-07

## 2023-12-07 RX ORDER — FAMOTIDINE 20 MG/1
20 TABLET, FILM COATED ORAL 2 TIMES DAILY
Qty: 180 TABLET | Refills: 3 | Status: ON HOLD | OUTPATIENT
Start: 2023-12-07 | End: 2024-05-06

## 2023-12-07 RX ORDER — DOBUTAMINE HYDROCHLORIDE 200 MG/100ML
10-50 INJECTION INTRAVENOUS CONTINUOUS
Status: ACTIVE | OUTPATIENT
Start: 2023-12-07 | End: 2023-12-07

## 2023-12-07 RX ORDER — ATROPINE SULFATE 0.4 MG/ML
.2-2 AMPUL (ML) INJECTION
Status: COMPLETED | OUTPATIENT
Start: 2023-12-07 | End: 2023-12-07

## 2023-12-07 RX ORDER — METOPROLOL TARTRATE 1 MG/ML
1-20 INJECTION, SOLUTION INTRAVENOUS
Status: ACTIVE | OUTPATIENT
Start: 2023-12-07 | End: 2023-12-07

## 2023-12-07 RX ORDER — ATORVASTATIN CALCIUM 40 MG/1
40 TABLET, FILM COATED ORAL DAILY
Qty: 90 TABLET | Refills: 3 | Status: SHIPPED | OUTPATIENT
Start: 2023-12-07

## 2023-12-07 RX ORDER — METOPROLOL TARTRATE 50 MG
50 TABLET ORAL 2 TIMES DAILY
Qty: 180 TABLET | Refills: 3 | Status: SHIPPED | OUTPATIENT
Start: 2023-12-07

## 2023-12-07 RX ORDER — CALCIUM ACETATE 667 MG/1
667 CAPSULE ORAL
Qty: 270 CAPSULE | Refills: 3 | Status: SHIPPED | OUTPATIENT
Start: 2023-12-07

## 2023-12-07 RX ADMIN — PERFLUTREN 5 ML: 6.52 INJECTION, SUSPENSION INTRAVENOUS at 13:51

## 2023-12-07 RX ADMIN — DOBUTAMINE 10 MCG/KG/MIN: 250 INJECTION INTRAVENOUS at 13:30

## 2023-12-07 RX ADMIN — METOPROLOL TARTRATE 10 MG: 5 INJECTION INTRAVENOUS at 13:49

## 2023-12-07 RX ADMIN — ATROPINE SULFATE 1.6 MG: 0.4 INJECTION, SOLUTION INTRAVENOUS at 13:49

## 2023-12-07 NOTE — PROGRESS NOTES
Assessment & Plan     Type 2 diabetes mellitus with chronic kidney disease on chronic dialysis, without long-term current use of insulin (H)  A1C well controlled, will place labs so they can check with other lab draws but transplant team last checked and gets drawn with dialysis.   - FOOT EXAM    Screen for colon cancer  - MELANIE([employer])    Benign essential hypertension  Well controlled, medications refilled.   - amLODIPine (NORVASC) 10 MG tablet  Dispense: 90 tablet; Refill: 3  - metoprolol tartrate (LOPRESSOR) 50 MG tablet  Dispense: 180 tablet; Refill: 3    ESRD (end stage renal disease) on dialysis (H)  Following with nephrology. Medications refilled.   - calcium acetate (PHOSLO) 667 MG CAPS capsule  Dispense: 270 capsule; Refill: 3  - famotidine (PEPCID) 20 MG tablet  Dispense: 180 tablet; Refill: 3  - FEROSUL 325 (65 Fe) MG tablet  Dispense: 90 tablet; Refill: 3    Hyperlipidemia LDL goal <100  - atorvastatin (LIPITOR) 40 MG tablet  Dispense: 90 tablet; Refill: 3    Shoulder mass  3cm soft, nontender, well circumscribed, round mass on back of right shoulder. Likely a lipoma but patient says it causes arm pain and some tingling. No issues on her right side. Will start with US and then discuss options with patient.   - US Soft Tissue Chest Wall or Upper Back    Financial concerns  No intervention requested at this time, but daughter applied for social security and yet to hear back. If nothing by January, recommend calling us and I can refer back to [facility] to help get connected to disability specialists.       Abhilash Cobb MD  [facility] LISBET Solorio is a 58 year old, presenting for the following health issues:  RECHECK (kidneys)      History of Present Illness       CKD: She is not using over the counter pain medicine.     She eats 0-1 servings of fruits and vegetables daily.She consumes 0 sweetened beverage(s) daily.She exercises with enough effort to increase her heart  rate 10 to 19 minutes per day.  She exercises with enough effort to increase her heart rate 3 or less days per week.   She is taking medications regularly.       Today's follow up is to make sure they have everything they need. A couple new concerns:   - daughter applied for social security and has not heard back in like 2 months, they provided pay stubs to someone and are still waiting. It's been lik 2 melly months  - wants to travel to [place] when her other daughter gives birth.       From cardiology:   End-stage renal disease of unclear etiology.  Patient is on hemodialysis since November 2021.  She do not have any potential donors at this time.  Fairly active with no cardiac symptoms.  No prior cardiac history  Cardiac risk factors type 2 diabetes since 2009, currently not on any medication and A1c of 5.4, hypertension and hyperlipidemia.  Non-smoker.  No premature coronary artery disease in family.  Patient's recent EKG reviewed normal sinus rhythm occasional premature atrial contraction otherwise unremarkable.  Echocardiogram reviewed normal biventricular function.  No significant valvular abnormalities.  We will plan for a dobutamine stress echo.  If this is normal patient will be able to proceed with the transplant.  She will be contacted with the test result.    From transplant team:   Recommendations:  - Cardiac risk assessment  - CT abd/pelv and iliac US  - RN coordinator is working on getting nephrology and back surgery records from [place]  - Consider PT if cardiac work up neg/symptoms do not improve with any intervention  - Needs dentist due to very poor dentition and chewing tobacco use  - Paraproteinemia work up if not previously done  - Update health maintenance      Review of Systems   Constitutional, HEENT, cardiovascular, pulmonary, gi and gu systems are negative, except as otherwise noted.      Objective    /64 (BP Location: Right arm, Patient Position: Sitting, Cuff Size: Adult Regular)    "Pulse 68   Temp 98.4  F (36.9  C) (Oral)   Resp 16   Ht 1.473 m (4' 10\")   Wt 43.1 kg (95 lb)   LMP  (LMP Unknown)   SpO2 98%   BMI 19.86 kg/m    Body mass index is 19.86 kg/m .  Physical Exam   GENERAL: healthy, alert and no distress  NECK: no adenopathy, no asymmetry, masses, or scars and thyroid normal to palpation  RESP: lungs clear to auscultation - no rales, rhonchi or wheezes  CV: regular rate and rhythm, normal S1 S2, no S3 or S4, no murmur, click or rub, no peripheral edema and peripheral pulses strong  ABDOMEN: soft, nontender, no hepatosplenomegaly, no masses and bowel sounds normal  MS: no gross musculoskeletal defects noted, no edema. 3cm soft, nontender, well circumscribed, round mass on back of right shoulder.  Diabetic foot exam: normal DP and PT pulses, no trophic changes or ulcerative lesions, and normal sensory exam      No results found for any visits on 12/07/23.  No results found for this or any previous visit (from the past 24 hour(s)).                  "

## 2023-12-07 NOTE — PROGRESS NOTES
Pt here for dobutamine stress test. Test, meds and side effects reviewed with patient and daughter Po who serves as her [email or web address]  today.   Waiver form has been signed. Achieved target HR at 50 mcg Dobutamine and a total of 1.6 mg IV atropine. Gave a total of 10 mg IV Metoprolol to bring HR back to baseline. Post monitoring complete and VSS. Pt escorted out to the gold waiting room.

## 2023-12-12 ENCOUNTER — TELEPHONE (OUTPATIENT)
Dept: TRANSPLANT | Facility: CLINIC | Age: 58
End: 2023-12-12

## 2023-12-12 NOTE — TELEPHONE ENCOUNTER
Patient Call: General  Route to LPN    Reason for call: daughter called on behalf of patient to inform of completing all required appointments and tests to be placed on transplant list.    Call back needed? Yes    Return Call Needed  Same as documented in contacts section  When to return call?: Same day: Route High Priority

## 2023-12-14 ENCOUNTER — TELEPHONE (OUTPATIENT)
Dept: TRANSPLANT | Facility: CLINIC | Age: 58
End: 2023-12-14
Payer: COMMERCIAL

## 2023-12-14 NOTE — TELEPHONE ENCOUNTER
Returned a call today to pt's daughter, Getachew. Reached a recording that mailbox was full and could not accept any messages.  Sent a My Chart message instead today.

## 2023-12-14 NOTE — TELEPHONE ENCOUNTER
Spoke to daughter Getachew and her mom saw the dentist on 10/31/2023 but needs additional work to be done February 2024. She is attending PT at [facility] but records not showing up in [phone]. She still needs colonoscopy and daughter will discuss with her PCP.  Abdominal/pelvic CT and iliacs done 11/28/2023 and will need review by surgeon.

## 2023-12-18 ENCOUNTER — TELEPHONE (OUTPATIENT)
Dept: FAMILY MEDICINE | Facility: CLINIC | Age: 58
End: 2023-12-18

## 2023-12-18 NOTE — TELEPHONE ENCOUNTER
Pt's daughter called regarding colonoscopy. She stated pt's kidney transplant coordinator terry told her that they do not take the one that the provider ordered as it is not accurate. They stated to have PCP order the original colonoscopy test.     Per chart review, Cologuard was ordered on 12/7/23. Daughter stated it is not the Cologuard. It is a colonoscopy.    Call back at [phone]. No [facility]  needed (daughter speaks English). Okay to leave a detailed message.      Baldemar Colbert Cem Say, BSN RN  [facility]

## 2023-12-19 ENCOUNTER — THERAPY VISIT (OUTPATIENT)
Dept: PHYSICAL THERAPY | Facility: REHABILITATION | Age: 58
End: 2023-12-19
Payer: COMMERCIAL

## 2023-12-19 DIAGNOSIS — R53.81 PHYSICAL DECONDITIONING: ICD-10-CM

## 2023-12-19 DIAGNOSIS — N18.6 ESRD (END STAGE RENAL DISEASE) ON DIALYSIS (H): Primary | ICD-10-CM

## 2023-12-19 DIAGNOSIS — Z99.2 ESRD (END STAGE RENAL DISEASE) ON DIALYSIS (H): Primary | ICD-10-CM

## 2023-12-19 PROCEDURE — 97110 THERAPEUTIC EXERCISES: CPT | Mod: GP

## 2023-12-29 ENCOUNTER — TELEPHONE (OUTPATIENT)
Dept: TRANSPLANT | Facility: CLINIC | Age: 58
End: 2023-12-29
Payer: COMMERCIAL

## 2023-12-29 NOTE — TELEPHONE ENCOUNTER
Returned a call today to Trish SCRUGGS at [facility] and spoke with her. Explained pt has to complete dental work in Feb 2024 and also still needs to schedule colonoscopy.  Trish expressed very good understanding of all.

## 2023-12-31 ENCOUNTER — HEALTH MAINTENANCE LETTER (OUTPATIENT)
Age: 58
End: 2023-12-31

## 2024-01-02 ENCOUNTER — THERAPY VISIT (OUTPATIENT)
Dept: PHYSICAL THERAPY | Facility: REHABILITATION | Age: 59
End: 2024-01-02
Payer: COMMERCIAL

## 2024-01-02 DIAGNOSIS — N18.6 ESRD (END STAGE RENAL DISEASE) ON DIALYSIS (H): Primary | ICD-10-CM

## 2024-01-02 DIAGNOSIS — R53.81 PHYSICAL DECONDITIONING: ICD-10-CM

## 2024-01-02 DIAGNOSIS — Z99.2 ESRD (END STAGE RENAL DISEASE) ON DIALYSIS (H): Primary | ICD-10-CM

## 2024-01-02 PROCEDURE — 97112 NEUROMUSCULAR REEDUCATION: CPT | Mod: GP

## 2024-01-02 PROCEDURE — 97110 THERAPEUTIC EXERCISES: CPT | Mod: GP

## 2024-01-04 ENCOUNTER — TELEPHONE (OUTPATIENT)
Dept: GASTROENTEROLOGY | Facility: CLINIC | Age: 59
End: 2024-01-04
Payer: COMMERCIAL

## 2024-01-04 DIAGNOSIS — Z12.11 SPECIAL SCREENING FOR MALIGNANT NEOPLASMS, COLON: Primary | ICD-10-CM

## 2024-01-04 LAB — NONINV COLON CA DNA+OCC BLD SCRN STL QL: NEGATIVE

## 2024-01-04 NOTE — TELEPHONE ENCOUNTER
"Endoscopy Scheduling Screen    Have you had a positive Covid test in the last 14 days?  No    Are you active on MyChart?   Yes    What insurance is in the chart?  Other:  [facility]    Ordering/Referring Provider:     BOGDAN VARGAS      (If ordering provider performs procedure, schedule with ordering provider unless otherwise instructed. )    BMI: Estimated body mass index is 19.86 kg/m  as calculated from the following:    Height as of 12/7/23: 1.473 m (4' 10\").    Weight as of 12/7/23: 43.1 kg (95 lb).     Sedation Ordered  moderate sedation.   If patient BMI > 50 do not schedule in [facility].    If patient BMI > 45 do not schedule at [facility].    Are you taking methadone or Suboxone?  No    Are you taking any prescription medications for pain 3 or more times per week?   NO - No RN review required.    Do you have a history of malignant hyperthermia or adverse reaction to anesthesia?  No    (Females) Are you currently pregnant?   No     Have you been diagnosed or told you have pulmonary hypertension?   No    Do you have an LVAD?  No    Have you been told you have moderate to severe sleep apnea?  No    Have you been told you have COPD, asthma, or any other lung disease?  No    Do you have any heart conditions?  No     Have you ever had an organ transplant?   No    Have you ever had or are you awaiting a heart or lung transplant?   No    Have you had a stroke or transient ischemic attack (TIA aka \"mini stroke\" in the last 6 months?   No    Have you been diagnosed with or been told you have cirrhosis of the liver?   No    Are you currently on dialysis?   Yes (Hospital Only)    Do you need assistance transferring?   No    BMI: Estimated body mass index is 19.86 kg/m  as calculated from the following:    Height as of 12/7/23: 1.473 m (4' 10\").    Weight as of 12/7/23: 43.1 kg (95 lb).     Is patients BMI > 40 and scheduling location [facility]?  No    Do you take an injectable medication for weight loss or diabetes (excluding " insulin)?  No    Do you take the medication Naltrexone?  No    Do you take blood thinners?  No       Prep   Are you currently on dialysis or do you have chronic kidney disease?  Yes (Golytely Prep)    Do you have a diagnosis of diabetes?  Yes (Golytely Prep)    Do you have a diagnosis of cystic fibrosis (CF)?  No    On a regular basis do you go 3 -5 days between bowel movements?  No    BMI > 40?  No    Preferred Pharmacy:    [facility] [address] [address]  Phone: [phone] Fax: [phone]      Final Scheduling Details   Colonoscopy prep sent?  Standard Golytely    Procedure scheduled  Colonoscopy    Surgeon:  aniya     Date of procedure:  05/09/2024     Pre-OP / PAC:   No - Not required for this site.    Location  [facility] - Per order.    Sedation   MAC/Deep Sedation - Patient preference.      Patient Reminders:   You will receive a call from a Nurse to review instructions and health history.  This assessment must be completed prior to your procedure.  Failure to complete the Nurse assessment may result in the procedure being cancelled.      On the day of your procedure, please designate an adult(s) who can drive you home stay with you for the next 24 hours. The medicines used in the exam will make you sleepy. You will not be able to drive.      You cannot take public transportation, ride share services, or non-medical taxi service without a responsible caregiver.  Medical transport services are allowed with the requirement that a responsible caregiver will receive you at your destination.  We require that drivers and caregivers are confirmed prior to your procedure.

## 2024-01-09 ENCOUNTER — OFFICE VISIT (OUTPATIENT)
Dept: FAMILY MEDICINE | Facility: CLINIC | Age: 59
End: 2024-01-09
Payer: COMMERCIAL

## 2024-01-09 VITALS
WEIGHT: 94.8 LBS | TEMPERATURE: 99.5 F | BODY MASS INDEX: 19.81 KG/M2 | HEART RATE: 89 BPM | OXYGEN SATURATION: 97 % | SYSTOLIC BLOOD PRESSURE: 160 MMHG | DIASTOLIC BLOOD PRESSURE: 85 MMHG | RESPIRATION RATE: 18 BRPM

## 2024-01-09 DIAGNOSIS — R52 BODY ACHES: ICD-10-CM

## 2024-01-09 DIAGNOSIS — R05.1 ACUTE COUGH: Primary | ICD-10-CM

## 2024-01-09 LAB
FLUAV AG SPEC QL IA: NEGATIVE
FLUBV AG SPEC QL IA: NEGATIVE

## 2024-01-09 PROCEDURE — 87804 INFLUENZA ASSAY W/OPTIC: CPT | Performed by: STUDENT IN AN ORGANIZED HEALTH CARE EDUCATION/TRAINING PROGRAM

## 2024-01-09 PROCEDURE — 99213 OFFICE O/P EST LOW 20 MIN: CPT | Performed by: STUDENT IN AN ORGANIZED HEALTH CARE EDUCATION/TRAINING PROGRAM

## 2024-01-09 PROCEDURE — 87635 SARS-COV-2 COVID-19 AMP PRB: CPT | Performed by: STUDENT IN AN ORGANIZED HEALTH CARE EDUCATION/TRAINING PROGRAM

## 2024-01-09 RX ORDER — ACETAMINOPHEN 500 MG
1000 TABLET ORAL ONCE
Status: DISCONTINUED | OUTPATIENT
Start: 2024-01-09 | End: 2024-01-09

## 2024-01-09 RX ORDER — ACETAMINOPHEN 325 MG/1
975 TABLET ORAL ONCE
Status: COMPLETED | OUTPATIENT
Start: 2024-01-09 | End: 2024-01-09

## 2024-01-09 RX ADMIN — ACETAMINOPHEN 975 MG: 325 TABLET ORAL at 14:16

## 2024-01-09 NOTE — PROGRESS NOTES
Assessment & Plan     Acute cough  Body aches  Influenza negative, COVID pending. Lungs without wheezing or consolidations, no CXR indicated. Vitally stable. Discussed symptom management, Coricidin as decongestant for patients with hypertension, no NSAIDs d/t ESRD. Tylenol given in clinic for body aches and ED precautions given. Family and patient understanding of the plan at the time of discharge  - Symptomatic COVID-19 Virus (Coronavirus) by PCR Nose  - Influenza A & B Antigen - Clinic Collect  - Chlorphen-Phenyleph-APAP (CORICIDIN D COLD/FLU/SINUS) 2-5-325 MG TABS  Dispense: 10 tablet; Refill: 0  - acetaminophen (TYLENOL) tablet 975 mg    Return for In clinic with your primary care provider.    Ness Blanc, DO  she/her  [facility]    Subjective     [facility] is a 59 year old female who presents to clinic today for the following health issues:    HPI    ESRD d/t unknown etiology, on dialysis  Undergoing workup for transplant     Sneezing started yesterday  This morning, coughing, shivering and chills  Headaches, pain in head going down the back of her neck when coughing  + runny nose, muscle aches  No chest pain, no SOB or wheezing, ear pain, nausea, vomiting  Was very thirsty this morning   Treatment measures tried include None tried.  Predisposing factors include tobacco use + chewing    Past Medical History:   Diagnosis Date    Anemia in chronic kidney disease     Chewing tobacco use     ESRD (end stage renal disease) on dialysis (H)     dialysis start date 11/28/2021 per 2728 form    Hypertension     Poor dentition     Secondary renal hyperparathyroidism (H24)     Type 2 diabetes mellitus (H)      No Known Allergies  Current Outpatient Medications   Medication    amLODIPine (NORVASC) 10 MG tablet    aspirin 81 MG EC tablet    atorvastatin (LIPITOR) 40 MG tablet    calcium acetate (PHOSLO) 667 MG CAPS capsule    Chlorphen-Phenyleph-APAP (CORICIDIN D COLD/FLU/SINUS) 2-5-325 MG TABS     famotidine (PEPCID) 20 MG tablet    Fe Fum-FA-B Kou-A-Po-Mg-Mn-Cu (FERROCITE PLUS) 106-1 MG TABS    FEROSUL 325 (65 Fe) MG tablet    metoprolol tartrate (LOPRESSOR) 50 MG tablet     No current facility-administered medications for this visit.      Review of Systems  Constitutional, HEENT, cardiovascular, pulmonary, gi and gu systems are negative, except as otherwise noted.      Objective    BP (!) 160/85   Pulse 89   Temp 99.5  F (37.5  C) (Oral)   Resp 18   Wt 43 kg (94 lb 12.8 oz)   LMP  (LMP Unknown)   SpO2 97%   BMI 19.81 kg/m      Physical Exam  Constitutional:       Appearance: She is underweight.      Comments: Present with family member   HENT:      Right Ear: Tympanic membrane and ear canal normal.      Left Ear: Tympanic membrane and ear canal normal.      Mouth/Throat:      Mouth: Mucous membranes are moist.      Dentition: Abnormal dentition (poor).      Pharynx: Oropharynx is clear. Uvula midline.      Tonsils: No tonsillar exudate.   Cardiovascular:      Rate and Rhythm: Normal rate and regular rhythm.   Pulmonary:      Effort: Pulmonary effort is normal. Tachypnea (RR 24) present. No accessory muscle usage.      Breath sounds: Normal breath sounds and air entry.   Abdominal:      General: Abdomen is flat.      Tenderness: There is no abdominal tenderness.   Neurological:      Mental Status: She is alert.        Results for orders placed or performed in visit on 01/09/24   Influenza A & B Antigen - Clinic Collect     Status: Normal    Specimen: Nose; Swab   Result Value Ref Range    Influenza A antigen Negative Negative    Influenza B antigen Negative Negative    Narrative    Test results must be correlated with clinical data. If necessary, results should be confirmed by a molecular assay or viral culture.           The use of [employer] services may have been used to construct the content in this note; any grammatical or spelling errors are non-intentional. Please contact the  author of this note directly if you are in need of any clarification.

## 2024-01-09 NOTE — PATIENT INSTRUCTIONS
Get some extra rest.    Take an over-the-counter pain medicine, such as acetaminophen (Tylenol) to reduce fever and relieve body aches. Read and follow all instructions on the label.    Take an over-the-counter cough medicine to help quiet a dry, hacking cough so that you can sleep. Avoid cough medicines that have more than one active ingredient. Read and follow all instructions on the label.    Do not smoke. Smoking can make bronchitis worse. If you need help quitting, talk to your doctor about stop-smoking programs and medicines. These can increase your chances of quitting for good.      Coricidin is a decongestant medicine for people with high blood pressure    Non-pharmacologic interventions   Throat lozenges (oral) As needed (per labelling instructions) Lozenges (typically nonmedicated or with menthol) may relieve sore throat and reduce cough frequency and severity   Honey (oral) As needed Often taken in hot water or tea  May reduce cough frequency and severity   Smoking cessation (and avoidance of second-hand smoke) -- Smoke is an airway irritant

## 2024-01-10 LAB — SARS-COV-2 RNA RESP QL NAA+PROBE: POSITIVE

## 2024-02-13 ENCOUNTER — TELEPHONE (OUTPATIENT)
Dept: FAMILY MEDICINE | Facility: CLINIC | Age: 59
End: 2024-02-13
Payer: COMMERCIAL

## 2024-02-13 NOTE — TELEPHONE ENCOUNTER
Forms/Letter Request    Type of form/letter: OTHER: Medical consult dental procedure        Do we have the form/letter: Yes:     Who is the form from? Patient    Where did/will the form come from? Patient or family brought in       When is form/letter needed by: asap    How would you like the form/letter returned:  and Fax : [phone]    Patient Notified form requests are processed in 5-7 business days:Yes    Could we send this information to you in [facility] or would you prefer to receive a phone call?:   Patient would prefer a phone call   Okay to leave a detailed message?: Yes at Home number on file [phone] (home)

## 2024-02-15 NOTE — TELEPHONE ENCOUNTER
Patient is at [facility] and the med consult has not been faxed. Please fax asap. See message below

## 2024-02-19 NOTE — TELEPHONE ENCOUNTER
Form reviewed, completed, and signed by provider.     Returned via fax at [phone] as requested.     Copied to EHR scanning.

## 2024-03-08 NOTE — PROGRESS NOTES
DISCHARGE  Reason for Discharge: Patient has met all goals.  Patient has failed to schedule further appointments.    Equipment Issued: none    Discharge Plan: Patient to continue home program.    Referring Provider:  Abhilash Cobb     01/02/24 0500   Appointment Info   Signing clinician's name / credentials Reyna Saavedra, PT, DPT   Total/Authorized Visits 10   Visits Used 5   Medical Diagnosis ESRD on dialysis, physical deconditioning   PT Tx Diagnosis Impaired activity tolerance, strength, and endurance   Quick Adds Certification   Progress Note/Certification   Start of Care Date 11/03/23   Onset of illness/injury or Date of Surgery 10/25/23  (order date)   Therapy Frequency 1x/week   Predicted Duration 90 days   Certification date from 11/03/23   Certification date to 01/31/24   Progress Note Due Date 01/31/24       Present Yes  (dtr declined and interpreted for her)    [email or web address]     ID or First/Last Name [id number]   Preferred Language   ([facility]\\\\\\\\\\\\\\\\\\\\\\\\\\\\\\\\\)   GOALS   PT Goals 2;3   PT Goal 1   Goal Identifier 6MWT   Goal Description Bu will improve distance walked on 6MWT by 10% from initial value and reporting fatigue <5/10 following in order to improve activity tolerance and endurance.   Target Date 01/31/24   PT Goal 2   Goal Identifier Functional LE strength   Goal Description Bu will decrease time taken to complete 5x STS by 2 or more seconds in order to improve functional LE strength.   Target Date 01/31/24   PT Goal 3   Goal Identifier HEP   Goal Description Bu will maintain compliance with HEP in order to progress physical conditioning and strength at home,   Target Date 01/31/24   Subjective Report   Subjective Report Was fatigued from last session with higher intensity exercises. Has been able to continue with activity here an there. Feels like has been making progress and returning back to routine life activities.   Objective  Measures   Objective Measures Objective Measure 1   Objective Measure 1   Objective Measure 6 MWT   Details (11/14/23) 1015ft   Treatment Interventions (PT)   Interventions Therapeutic Procedure/Exercise;Neuromuscular Re-education   Therapeutic Procedure/Exercise   Therapeutic Procedures: strength, endurance, ROM, flexibility minutes (18198) 17   Ther Proc 1 - Details Nustep level 5, 8 minutes, total steps 490 with subjective measures taken to promote cardiovascular fitness. For improved functional strengthening and mobility - squat to high knee 2 x 45 seconds. Child pose with various reaching 30'' x 2, half kneeling lunges 30'' B. Quadruped alternating leg lift x 10 B, difficulty maintaining balance - trial alternating arms.   Skilled Intervention Facilitation of HEP, cues, demo, assist, education   Patient Response/Progress demonstrated understanding, fatigued and small rest breaks between exercises.   Neuromuscular Re-education   Neuromuscular re-ed of mvmt, balance, coord, kinesthetic sense, posture, proprioception minutes (79165) 23   Neuro Re-ed 1 - Details To promote high intensity interval training and dynamic balance - blazepods 3 sets: color catch in line of 4: 13 taps, 12 taps, 11 taps. Color catch in square pattern 3 sets: 9 taps, 10 taps, 12 taps. Shuffle taps 2 targets: 22 taps. 16 taps.   Skilled Intervention Functional strength and dynamic balance   Patient Response/Progress Good fatigue, required rest breaks   Education   Learner/Method Patient;Family   Education Comments results of assessment, POC, HEP   Plan   Home program 2 5 minute walks per day, standing marches, HS curls, squats, pulldowns, trunk rotations, piriformis, quad stretches, FIS   Plan for next session functional LE strengthening, endurance, activity tolerance. Balance as able/needed - blazepods   Comments   Comments Pt returns for physical deconditioning. Has been able to preogress activity and use of blazepods for dynamic balance  and interval training. Pt tolerated this well and remains appropriate for PT.   Total Session Time   Timed Code Treatment Minutes 40   Total Treatment Time (sum of timed and untimed services) 40     MARY MEDINA, PT

## 2024-03-28 ENCOUNTER — HOSPITAL ENCOUNTER (OUTPATIENT)
Dept: ULTRASOUND IMAGING | Facility: HOSPITAL | Age: 59
Discharge: HOME | End: 2024-03-28
Attending: FAMILY MEDICINE | Admitting: FAMILY MEDICINE
Payer: COMMERCIAL

## 2024-03-28 DIAGNOSIS — R22.30 SHOULDER MASS: ICD-10-CM

## 2024-03-28 PROCEDURE — 76604 US EXAM CHEST: CPT | Mod: 52

## 2024-04-01 ENCOUNTER — TELEPHONE (OUTPATIENT)
Dept: FAMILY MEDICINE | Facility: CLINIC | Age: 59
End: 2024-04-01
Payer: COMMERCIAL

## 2024-04-01 DIAGNOSIS — N18.6 ESRD (END STAGE RENAL DISEASE) ON DIALYSIS (H): Primary | ICD-10-CM

## 2024-04-01 DIAGNOSIS — Z99.2 ANEMIA IN CHRONIC KIDNEY DISEASE, ON CHRONIC DIALYSIS (H): ICD-10-CM

## 2024-04-01 DIAGNOSIS — R53.81 PHYSICAL DECONDITIONING: ICD-10-CM

## 2024-04-01 DIAGNOSIS — Z99.2 ESRD (END STAGE RENAL DISEASE) ON DIALYSIS (H): Primary | ICD-10-CM

## 2024-04-01 DIAGNOSIS — D63.1 ANEMIA IN CHRONIC KIDNEY DISEASE, ON CHRONIC DIALYSIS (H): ICD-10-CM

## 2024-04-01 DIAGNOSIS — N18.6 ANEMIA IN CHRONIC KIDNEY DISEASE, ON CHRONIC DIALYSIS (H): ICD-10-CM

## 2024-04-01 NOTE — TELEPHONE ENCOUNTER
Order/Referral Request    Who is requesting: daughter    Orders being requested: Continued PT orders for strengthening for her kidney transplant    Reason service is needed/diagnosis:     When are orders needed by: ASAP    Has this been discussed with Provider: No    Does patient have a preference on a Group/Provider/Facility? [facility] rehab at [place]    Does patient have an appointment scheduled?: No    Where to send orders: Place orders within Epic    Could we send this information to you in [facility] or would you prefer to receive a phone call?:   Patient would prefer a phone call   Okay to leave a [phone]

## 2024-04-04 ENCOUNTER — THERAPY VISIT (OUTPATIENT)
Dept: PHYSICAL THERAPY | Facility: REHABILITATION | Age: 59
End: 2024-04-04
Attending: FAMILY MEDICINE
Payer: COMMERCIAL

## 2024-04-04 DIAGNOSIS — R53.81 PHYSICAL DECONDITIONING: ICD-10-CM

## 2024-04-04 DIAGNOSIS — Z99.2 ESRD (END STAGE RENAL DISEASE) ON DIALYSIS (H): ICD-10-CM

## 2024-04-04 DIAGNOSIS — N18.6 ANEMIA IN CHRONIC KIDNEY DISEASE, ON CHRONIC DIALYSIS (H): ICD-10-CM

## 2024-04-04 DIAGNOSIS — D63.1 ANEMIA IN CHRONIC KIDNEY DISEASE, ON CHRONIC DIALYSIS (H): ICD-10-CM

## 2024-04-04 DIAGNOSIS — N18.6 ESRD (END STAGE RENAL DISEASE) ON DIALYSIS (H): ICD-10-CM

## 2024-04-04 DIAGNOSIS — Z99.2 ANEMIA IN CHRONIC KIDNEY DISEASE, ON CHRONIC DIALYSIS (H): ICD-10-CM

## 2024-04-04 PROCEDURE — 97161 PT EVAL LOW COMPLEX 20 MIN: CPT | Mod: GP | Performed by: PHYSICAL THERAPIST

## 2024-04-04 PROCEDURE — 97110 THERAPEUTIC EXERCISES: CPT | Mod: GP | Performed by: PHYSICAL THERAPIST

## 2024-04-04 ASSESSMENT — 6 MINUTE WALK TEST (6MWT)
OXYGEN DEVICE: NO
TOTAL DISTANCE WALKED (METERS): 374.6

## 2024-04-04 NOTE — PROGRESS NOTES
PHYSICAL THERAPY EVALUATION  Type of Visit: Evaluation    See electronic medical record for Abuse and Falls Screening details.    Patient has ESRD and is waiting for placement on the kidney transplant list. She had been coming to therapy over the winter for strengthening exercises and endurance exercises. She has kept up with her exercises from last session but does not feel like it has been helpful.   She has to finish pre-trasplant items - colonoscopy before being placed on the list this will be done on May. Currently doing dialysis 3x/week MWF and this makes her fatigued.     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    Prior diagnostic imaging/testing results:       Prior therapy history for the same diagnosis, illness or injury: Yes Dec 2023    Prior Level of Function  Transfers: Independent  Ambulation: Independent, Assistive equipment  ADL: Independent  IADL: Driving, Housekeeping, Laundry, Meal preparation    Living Environment  Social support: With family members   Type of home: Apartment/condo   Stairs to enter the home: Yes 20 Is there a railing: Yes   Ramp: No   Stairs inside the home: No       Help at home: Self Cares (home health aide/personal care attendant, family, etc)  Equipment owned: Straight Cane; Four-point cane     Employment: No    Hobbies/Interests: ooking, gardening, going on walks    Patient goals for therapy: garden, work and eat food I want to eat    Pain assessment: Pain denied     Objective      Cognitive Status Examination  Orientation: Oriented to person, place and time   Level of Consciousness: Alert  Follows Commands and Answers Questions: 100% of the time  Personal Safety and Judgement: Intact  Memory: Intact    OBSERVATION:    INTEGUMENTARY: Intact  POSTURE: Sitting Posture: Forward head    TRANSFERS: Independent    WHEELCHAIR MOBILITY: NA    GAIT:   Level of [place]: Independent  Assistive Device(s): None, Cane (quad)  Gait Deviations: Stance time decreased  Stride length decreased  Sindy decreased  Gait Distance: see 6 minute walk test   Stairs: reciprocal with UE assist on railing     BALANCE:     SPECIAL TESTS  Functional Gait Assessment (FGA)      10 Meter Walk Test (Comfortable)     10 Meter Walk Test (Fast)     6 Minute Walk Test (6MWT)   374.6 meters, 1229 ft   No A.D used in clinic  Age norms - >538 meters      Fatigue/OMNI Effort Scale: not too bad   Martinez Balance Scale (BBS)     5 Times Sit-to-Stand (5TSTS) 5 times Sit to Stand (sec): 11.03 (last score was 10.3 seconds at [facility] in Novemeber 2023)  Age norms 7.7 +/-2.6 seconds      SENSATION: UE Sensation WNL, LE Sensation WNL    Assessment & Plan   CLINICAL IMPRESSIONS  Medical Diagnosis: ESRD (end stage renal disease) on dialysis; Physical deconditioning  Anemia in chronic kidney disease, on chronic dialysis    Treatment Diagnosis: ESRD (end stage renal disease) on dialysis; Physical deconditioning Anemia in chronic kidney disease, on chronic dialysis   Impression/Assessment: Patient is a 59 year old female with complaints of decreased functional mobility, endurance and strength in the presence of end stage kidney disease on Dialysis 3x/week and is planning on a transplant surgery.  The following significant findings have been identified: Decreased strength, Impaired gait, Impaired muscle performance, and Decreased activity tolerance. These impairments interfere with their ability to perform self care tasks, recreational activities, household mobility, and community mobility as compared to previous level of function.     Clinical Decision Making (Complexity):  Clinical Presentation: Stable/Uncomplicated  Clinical Presentation Rationale: based on medical and personal  factors listed in PT evaluation  Clinical Decision Making (Complexity): Low complexity    PLAN OF CARE  Treatment Interventions:  Interventions: Gait Training, Manual Therapy, Neuromuscular Re-education, Therapeutic Activity, Therapeutic Exercise, Self-Care/Home Management    Long Term Goals     PT Goal 1  Goal Identifier: HEP  Goal Description: Patient will be independent in a HEP for on going symptom management in 90 days  Target Date: 07/03/24  PT Goal 2  Goal Identifier: 6MWT  Goal Description: Patient will improve distance walked on 6MWT by 10% from initial value and reporting fatigue <5/10 following in order to improve activity tolerance and endurance in 90 days  Target Date: 07/03/24  PT Goal 3  Goal Identifier: 5x STS  Goal Description: Patient will decrease time taken to complete 5x STS by 2 or more seconds in order to improve functional LE strength in 90 days  Target Date: 07/03/24      Frequency of Treatment: 1 time per week to every 2-4 weeks  Duration of Treatment: up to 12 weeks    Recommended Referrals to Other Professionals:  none  Education Assessment:   Learner/Method: Patient;Family;Listening;Demonstration;Pictures/Video    Risks and benefits of evaluation/treatment have been explained.   Patient/Family/caregiver agrees with Plan of Care.     Evaluation Time:     PT Eval, Low Complexity Minutes (61845): 25   Present: Yes: Language: Scott, ID Number/Identifier:       Signing Clinician: Tresa Guan, PT      [facility]                                                                                   OUTPATIENT PHYSICAL THERAPY      PLAN OF TREATMENT FOR OUTPATIENT REHABILITATION   Patient's Last Name, First Name, M.Lnydsey Carver    YOB: 1965   Provider's Name   [facility]   Medical Record No.  [id number]     Onset Date: 04/02/24  Start of Care Date: 04/04/24     Medical Diagnosis:  ESRD (end stage renal disease)  on dialysis; Physical deconditioning  Anemia in chronic kidney disease, on chronic dialysis      PT Treatment Diagnosis:  ESRD (end stage renal disease) on dialysis; Physical deconditioning Anemia in chronic kidney disease, on chronic dialysis Plan of Treatment  Frequency/Duration: 1 time per week to every 2-4 weeks/ up to 12 weeks    Certification date from 04/04/24 to 07/03/24         See note for plan of treatment details and functional goals     Tresa Guan, PT                         I CERTIFY THE NEED FOR THESE SERVICES FURNISHED UNDER        THIS PLAN OF TREATMENT AND WHILE UNDER MY CARE     (Physician attestation of this document indicates review and certification of the therapy plan).              Referring Provider:  Abhilash Cobb    Initial Assessment  See Epic Evaluation- Start of Care Date: 04/04/24

## 2024-04-11 RX ORDER — BISACODYL 5 MG/1
TABLET, DELAYED RELEASE ORAL
Qty: 4 TABLET | Refills: 0 | Status: SHIPPED | OUTPATIENT
Start: 2024-04-11 | End: 2024-09-24

## 2024-04-22 ENCOUNTER — HOSPITAL ENCOUNTER (EMERGENCY)
Facility: HOSPITAL | Age: 59
Discharge: HOME | End: 2024-04-22
Attending: EMERGENCY MEDICINE | Admitting: EMERGENCY MEDICINE
Payer: COMMERCIAL

## 2024-04-22 VITALS
HEIGHT: 59 IN | RESPIRATION RATE: 29 BRPM | TEMPERATURE: 96.7 F | WEIGHT: 110 LBS | OXYGEN SATURATION: 95 % | HEART RATE: 65 BPM | DIASTOLIC BLOOD PRESSURE: 108 MMHG | BODY MASS INDEX: 22.18 KG/M2 | SYSTOLIC BLOOD PRESSURE: 167 MMHG

## 2024-04-22 DIAGNOSIS — Z13.9 ENCOUNTER FOR MEDICAL SCREENING EXAMINATION: ICD-10-CM

## 2024-04-22 DIAGNOSIS — K21.00 GASTROESOPHAGEAL REFLUX DISEASE WITH ESOPHAGITIS WITHOUT HEMORRHAGE: ICD-10-CM

## 2024-04-22 LAB
ALBUMIN SERPL BCG-MCNC: 4.2 G/DL (ref 3.5–5.2)
ALP SERPL-CCNC: 99 U/L (ref 40–150)
ALT SERPL W P-5'-P-CCNC: 28 U/L (ref 0–50)
ANION GAP SERPL CALCULATED.3IONS-SCNC: 10 MMOL/L (ref 7–15)
AST SERPL W P-5'-P-CCNC: 35 U/L (ref 0–45)
BASOPHILS # BLD AUTO: 0 10E3/UL (ref 0–0.2)
BASOPHILS NFR BLD AUTO: 0 %
BILIRUB DIRECT SERPL-MCNC: <0.2 MG/DL (ref 0–0.3)
BILIRUB SERPL-MCNC: 0.4 MG/DL
BUN SERPL-MCNC: 5.6 MG/DL (ref 6–20)
CALCIUM SERPL-MCNC: 9.3 MG/DL (ref 8.6–10)
CHLORIDE SERPL-SCNC: 104 MMOL/L (ref 98–107)
CREAT SERPL-MCNC: 0.61 MG/DL (ref 0.51–0.95)
DEPRECATED HCO3 PLAS-SCNC: 26 MMOL/L (ref 22–29)
EGFRCR SERPLBLD CKD-EPI 2021: >90 ML/MIN/1.73M2
EOSINOPHIL # BLD AUTO: 0.3 10E3/UL (ref 0–0.7)
EOSINOPHIL NFR BLD AUTO: 6 %
ERYTHROCYTE [DISTWIDTH] IN BLOOD BY AUTOMATED COUNT: 12 % (ref 10–15)
FLUAV RNA SPEC QL NAA+PROBE: NEGATIVE
FLUBV RNA RESP QL NAA+PROBE: NEGATIVE
GLUCOSE SERPL-MCNC: 112 MG/DL (ref 70–99)
GROUP A STREP BY PCR: NOT DETECTED
HCT VFR BLD AUTO: 43.7 % (ref 35–47)
HGB BLD-MCNC: 14.6 G/DL (ref 11.7–15.7)
IMM GRANULOCYTES # BLD: 0 10E3/UL
IMM GRANULOCYTES NFR BLD: 0 %
LIPASE SERPL-CCNC: 17 U/L (ref 13–60)
LYMPHOCYTES # BLD AUTO: 1.7 10E3/UL (ref 0.8–5.3)
LYMPHOCYTES NFR BLD AUTO: 37 %
MCH RBC QN AUTO: 30.2 PG (ref 26.5–33)
MCHC RBC AUTO-ENTMCNC: 33.4 G/DL (ref 31.5–36.5)
MCV RBC AUTO: 91 FL (ref 78–100)
MONOCYTES # BLD AUTO: 0.3 10E3/UL (ref 0–1.3)
MONOCYTES NFR BLD AUTO: 7 %
NEUTROPHILS # BLD AUTO: 2.3 10E3/UL (ref 1.6–8.3)
NEUTROPHILS NFR BLD AUTO: 50 %
NRBC # BLD AUTO: 0 10E3/UL
NRBC BLD AUTO-RTO: 0 /100
PLATELET # BLD AUTO: 161 10E3/UL (ref 150–450)
POTASSIUM SERPL-SCNC: 3.6 MMOL/L (ref 3.4–5.3)
PROT SERPL-MCNC: 8.1 G/DL (ref 6.4–8.3)
RBC # BLD AUTO: 4.83 10E6/UL (ref 3.8–5.2)
RSV RNA SPEC NAA+PROBE: NEGATIVE
SARS-COV-2 RNA RESP QL NAA+PROBE: NEGATIVE
SODIUM SERPL-SCNC: 140 MMOL/L (ref 135–145)
WBC # BLD AUTO: 4.6 10E3/UL (ref 4–11)

## 2024-04-22 PROCEDURE — 36415 COLL VENOUS BLD VENIPUNCTURE: CPT | Performed by: EMERGENCY MEDICINE

## 2024-04-22 PROCEDURE — 99284 EMERGENCY DEPT VISIT MOD MDM: CPT | Mod: 25

## 2024-04-22 PROCEDURE — 250N000011 HC RX IP 250 OP 636: Performed by: EMERGENCY MEDICINE

## 2024-04-22 PROCEDURE — 83690 ASSAY OF LIPASE: CPT | Performed by: EMERGENCY MEDICINE

## 2024-04-22 PROCEDURE — 87651 STREP A DNA AMP PROBE: CPT | Performed by: EMERGENCY MEDICINE

## 2024-04-22 PROCEDURE — 96374 THER/PROPH/DIAG INJ IV PUSH: CPT

## 2024-04-22 PROCEDURE — 82248 BILIRUBIN DIRECT: CPT | Performed by: EMERGENCY MEDICINE

## 2024-04-22 PROCEDURE — 85025 COMPLETE CBC W/AUTO DIFF WBC: CPT | Performed by: EMERGENCY MEDICINE

## 2024-04-22 PROCEDURE — 80053 COMPREHEN METABOLIC PANEL: CPT | Performed by: EMERGENCY MEDICINE

## 2024-04-22 PROCEDURE — 93005 ELECTROCARDIOGRAM TRACING: CPT | Performed by: EMERGENCY MEDICINE

## 2024-04-22 PROCEDURE — 250N000013 HC RX MED GY IP 250 OP 250 PS 637: Performed by: EMERGENCY MEDICINE

## 2024-04-22 PROCEDURE — 87637 SARSCOV2&INF A&B&RSV AMP PRB: CPT | Performed by: EMERGENCY MEDICINE

## 2024-04-22 RX ORDER — MAGNESIUM HYDROXIDE/ALUMINUM HYDROXICE/SIMETHICONE 120; 1200; 1200 MG/30ML; MG/30ML; MG/30ML
15 SUSPENSION ORAL ONCE
Status: COMPLETED | OUTPATIENT
Start: 2024-04-22 | End: 2024-04-22

## 2024-04-22 RX ORDER — LISINOPRIL 10 MG/1
10 TABLET ORAL DAILY
Qty: 30 TABLET | Refills: 0 | Status: SHIPPED | OUTPATIENT
Start: 2024-04-22 | End: 2024-08-29

## 2024-04-22 RX ORDER — SUCRALFATE ORAL 1 G/10ML
1 SUSPENSION ORAL 4 TIMES DAILY PRN
Qty: 400 ML | Refills: 0 | Status: SHIPPED | OUTPATIENT
Start: 2024-04-22 | End: 2024-07-26

## 2024-04-22 RX ORDER — SUCRALFATE ORAL 1 G/10ML
1 SUSPENSION ORAL 4 TIMES DAILY PRN
Qty: 400 ML | Refills: 0 | Status: SHIPPED | OUTPATIENT
Start: 2024-04-22 | End: 2024-04-22

## 2024-04-22 RX ADMIN — ALUMINUM HYDROXIDE, MAGNESIUM HYDROXIDE, AND DIMETHICONE 15 ML: 200; 20; 200 SUSPENSION ORAL at 08:50

## 2024-04-22 RX ADMIN — FAMOTIDINE 20 MG: 10 INJECTION, SOLUTION INTRAVENOUS at 09:00

## 2024-04-22 ASSESSMENT — ACTIVITIES OF DAILY LIVING (ADL)
ADLS_ACUITY_SCORE: 35

## 2024-04-22 ASSESSMENT — COLUMBIA-SUICIDE SEVERITY RATING SCALE - C-SSRS
6. HAVE YOU EVER DONE ANYTHING, STARTED TO DO ANYTHING, OR PREPARED TO DO ANYTHING TO END YOUR LIFE?: NO
2. HAVE YOU ACTUALLY HAD ANY THOUGHTS OF KILLING YOURSELF IN THE PAST MONTH?: NO
1. IN THE PAST MONTH, HAVE YOU WISHED YOU WERE DEAD OR WISHED YOU COULD GO TO SLEEP AND NOT WAKE UP?: NO

## 2024-04-22 NOTE — ED TRIAGE NOTES
Pt recently came to live her her niece 3 weeks ago. Prior to that she was living in [place].  She asked niece to bring her to the dr today as she has pain in her throat and her right side of the body is painful. Interpretor not available in triage so its hard to fully understand why pt is here. Pt did come with some records and she has a hx of GERD and is on protonix, carafate, atorvastin,lisinopril. She has not taken any pills since coming to [place].

## 2024-04-22 NOTE — ED PROVIDER NOTES
EMERGENCY DEPARTMENT ENCOUNTER      NAME: Duarte Lieberman  AGE: 59 year old female  YOB: 1965  MRN: [id number]  EVALUATION DATE & TIME: 4/22/2024  7:54 AM    PCP: No primary care provider on file.    ED PROVIDER: Pascale Frye DO      Chief Complaint   Patient presents with    Pharyngitis         FINAL IMPRESSION:  1. Encounter for medical screening examination    2. Gastroesophageal reflux disease with esophagitis without hemorrhage          ED COURSE & MEDICAL DECISION MAKING:    Pertinent Labs & Imaging studies reviewed. (See chart for details)  8:57AM I met the patient and performed my initial interview and exam.  not available until 9:30 AM, will return at this time to gather additional history.   9:35 AM I met with the patient to gather additional history. Instaclustr  not yet available.   9:56 AM I went to gather additional history from the patient.   10:27 AM I rechecked on patient, who says she is feeling better after receiving medications in the ED today.    59 year old female presents to the Emergency Department for evaluation of sore throat, abdominal pain, right-sided paresthesias.  History difficult as patient speaks Karen[place] and difficulty obtaining an .  She does come with a family member who speaks some English and paperwork.  Sounds like the patient has a history of GERD, hypertension.  She has got chronic epigastric pain and chronic reported right-sided paresthesias.  She tells me they have done numerous studies but do not know the cause.  Endoscopy results from 12/27/2023 reviewed.  Chronic gastritis noted.  H. pylori stain was negative.  She was instructed to take pantoprazole twice daily.  She has not been out of all of her medications.  She has not yet established care here.  She has intact strength to her upper and lower extremities.  Sounds like her symptoms have been ongoing for at least a year.  Not consistent with stroke, mass, bleed.  No report of  trauma.  I cannot reproduce any abdominal pain.  Given reassuring exam and labs and review of multiple prior imaging, CT imaging of the abdomen pelvis was deferred.  Labs obtained here are reassuring.  Her oropharynx is clear.  She is eating and drinking without difficulty.  Not consistent with any deep space infection in the head or neck.  Screening EKG without evidence of arrhythmia or ischemia.  Not consistent with ACS.  She feels improved after Pepcid and famotidine.  Will plan on refilling lisinopril, omeprazole and sucralfate through her pharmacy here.  I did place a referral for primary care for follow-up.  Encouraged close follow-up and if any acute worsening of symptoms    At the conclusion of the encounter I discussed the results of all of the tests and the disposition. The questions were answered. The patient or family acknowledged understanding and was agreeable with the care plan.     Medical Decision Making  Obtained supplemental history:Supplemental history obtained?: Documented in chart and Family Member/Significant Other  Reviewed external records: External records reviewed?: Documented in chart and Outpatient Record: PCP visits 02/09/2024 and 03/06/2024  Care impacted by chronic illness:Hypertension and Other: GERD  Care significantly affected by social determinants of health:Access to Medical Care and Low Income  Did you consider but not order tests?: Work up considered but not performed and documented in chart, if applicable  Did you interpret images independently?: Independent interpretation of ECG and images noted in documentation, when applicable.  Consultation discussion with other provider:Did you involve another provider (consultant, , pharmacy, etc.)?: No  Discharge. I prescribed additional prescription strength medication(s) as charted. See documentation for any additional details.    MEDICATIONS GIVEN IN THE EMERGENCY:  Medications   famotidine (PEPCID) injection 20 mg (20 mg  Intravenous $Given 4/22/24 0900)   alum & mag hydroxide-simethicone (MAALOX) suspension 15 mL (15 mLs Oral $Given 4/22/24 0850)       NEW PRESCRIPTIONS STARTED AT TODAY'S ER VISIT  Discharge Medication List as of 4/22/2024 10:21 AM        START taking these medications    Details   omeprazole (PRILOSEC) 20 MG DR capsule Take 1 capsule (20 mg) by mouth daily, Disp-30 capsule, R-0, Local Print      sucralfate (CARAFATE) 1 GM/10ML suspension Take 10 mLs (1 g) by mouth 4 times daily as needed for nausea (abdominal pain), Disp-400 mL, R-0, Local Print                =================================================================    HPI    Patient information was obtained from: Patient and family     Use of : Yes (Phone) - Language: Fernando Lieberman is a 59 year old female with a pertinent history of hypertension, hyperlipidemia, who presents to this ED via walk-in for evaluation of pharyngitis.     The patient reports that she moved to [place] from [place] a couple weeks ago. She has been having throat pain for over a year, and was most recently evaluated in February (2 months ago) in [place]. Patient is not currently taking any of her medications, and has not been taking them since she moved to [place]. Today, she endorses more throat pain, noting that she is intermittently unable to eat or drink due to the pain, which is worse on the right than on the left side of her throat. She notes that the pain radiates back into her neck, and endorses associated weakness and fatigue. Patient's family also notes some voice changes, saying that her voice sounds more muffled lately.     Patient endorses tingling to the entire right side of her body, which she has had for over a year, and her providers have not told her what it is. She notes that when she was taking her medications, it helped her tingling.     Denies vomiting or any other concerns at this time.     Per chart review, the patient was seen at [facility] ([place], [place] on 03/06/2024. Patient presented to clinic for follow-up after EGD. Patient reported that her symptoms were the same, complaining of sore throat pain and sometimes difficulty swallowing. Patient's medication list at this time read atorvastatin 20 mg daily, Carafate 1 g four times daily before meals + bedtime, lisinopril 10 mg daily, omeprazole 20 mg daily, Protonix 40 mg daily. Patient was discharged with various recommendations regarding her concerns, most notable for peppermint oil as antispasmodic, sitting upright with eating, take time chewing and swallowing, and further testing including esophageal pH monitoring and esophageal manometry.     Per chart review, the patient was seen at [facility] in [place], [place] on 02/09/2024 for evaluation of 3 days of sore throat, body aches, fever, numbness in bones after starting atorvastatin. At this time, provider indicated the patient was a poor historian. Patient was discharged with instructions to return to clinic for scheduled follow-up appointment on 04/16/2024.       REVIEW OF SYSTEMS   Per HPI    PAST MEDICAL HISTORY:  No past medical history on file.    PAST SURGICAL HISTORY:  No past surgical history on file.        CURRENT MEDICATIONS:    lisinopril (ZESTRIL) 10 MG tablet  omeprazole (PRILOSEC) 20 MG DR capsule  sucralfate (CARAFATE) 1 GM/10ML suspension         ALLERGIES:  No Known Allergies    FAMILY HISTORY:  No family history on file.    SOCIAL HISTORY:   Social History     Socioeconomic History    Marital status: Single       VITALS:  BP (!) 167/108   Pulse 65   Temp (!) 96.7  F (35.9  C) (Tympanic)   Resp 29   Ht 1.499 m (4' 11\")   Wt 49.9 kg (110 lb)   SpO2 95%   BMI 22.22 kg/m      PHYSICAL EXAM    Physical Exam  Constitutional:       General: She is not in acute distress.  HENT:      Head: Normocephalic and atraumatic.      Mouth/Throat:      Palate: No lesions.      Pharynx: Oropharynx is clear. No " posterior oropharyngeal erythema or uvula swelling.      Tonsils: No tonsillar abscesses.   Eyes:      Pupils: Pupils are equal, round, and reactive to light.   Cardiovascular:      Rate and Rhythm: Normal rate and regular rhythm.      Pulses: Normal pulses.      Heart sounds: Normal heart sounds.   Pulmonary:      Effort: Pulmonary effort is normal.      Breath sounds: Normal breath sounds.   Abdominal:      General: Abdomen is flat. Bowel sounds are normal.      Palpations: Abdomen is soft.      Tenderness: There is no abdominal tenderness.   Musculoskeletal:         General: Normal range of motion.   Skin:     General: Skin is warm and dry.      Capillary Refill: Capillary refill takes less than 2 seconds.   Neurological:      General: No focal deficit present.      Mental Status: She is alert and oriented to person, place, and time.      Cranial Nerves: Cranial nerves 2-12 are intact.      Sensory: Sensation is intact.      Motor: Motor function is intact.      Coordination: Coordination is intact.      Gait: Gait is intact.           LAB:  All pertinent labs reviewed and interpreted.  Labs Ordered and Resulted from Time of ED Arrival to Time of ED Departure   BASIC METABOLIC PANEL - Abnormal       Result Value    Sodium 140      Potassium 3.6      Chloride 104      Carbon Dioxide (CO2) 26      Anion Gap 10      Urea Nitrogen 5.6 (*)     Creatinine 0.61      GFR Estimate >90      Calcium 9.3      Glucose 112 (*)    INFLUENZA A/B, RSV, & SARS-COV2 PCR - Normal    Influenza A PCR Negative      Influenza B PCR Negative      RSV PCR Negative      SARS CoV2 PCR Negative     HEPATIC FUNCTION PANEL - Normal    Protein Total 8.1      Albumin 4.2      Bilirubin Total 0.4      Alkaline Phosphatase 99      AST 35      ALT 28      Bilirubin Direct <0.20     LIPASE - Normal    Lipase 17     GROUP A STREPTOCOCCUS PCR THROAT SWAB - Normal    Group A strep by PCR Not Detected     CBC WITH PLATELETS AND DIFFERENTIAL    WBC Count  4.6      RBC Count 4.83      Hemoglobin 14.6      Hematocrit 43.7      MCV 91      MCH 30.2      MCHC 33.4      RDW 12.0      Platelet Count 161      % Neutrophils 50      % Lymphocytes 37      % Monocytes 7      % Eosinophils 6      % Basophils 0      % Immature Granulocytes 0      NRBCs per 100 WBC 0      Absolute Neutrophils 2.3      Absolute Lymphocytes 1.7      Absolute Monocytes 0.3      Absolute Eosinophils 0.3      Absolute Basophils 0.0      Absolute Immature Granulocytes 0.0      Absolute NRBCs 0.0         RADIOLOGY:  Reviewed all pertinent imaging. Please see official radiology report.  No orders to display       EKG:    Performed at: 09:07 22-APR-2024    Impression: Sinus bradycardia. Otherwise normal ECG. No previous ECGs available.    Rate: 58 bpm  Rhythm: Sinus   Axis: 49 21 36  [place] Interval: 172  ms  QRS Interval: 66 ms  QTc Interval: 398/390 ms  ST Changes: None  Comparison: No previous ECGs available.    I have independently reviewed and interpreted the EKG(s) documented above.    I, Adela Arechiga, am serving as a scribe to document services personally performed by Dr. Pascale Frye based on my observation and the provider's statements to me. IPascale, DO attest that Adela Arechiga is acting in a scribe capacity, has observed my performance of the services and has documented them in accordance with my direction.    Pascale Frye DO  Emergency Medicine  [facility] EMERGENCY DEPARTMENT  [address] [place] [id number]  [phone]  Dept: [phone]       Pascale Frye DO  04/22/24 1061

## 2024-04-25 ENCOUNTER — THERAPY VISIT (OUTPATIENT)
Dept: PHYSICAL THERAPY | Facility: REHABILITATION | Age: 59
End: 2024-04-25
Attending: FAMILY MEDICINE
Payer: COMMERCIAL

## 2024-04-25 DIAGNOSIS — N18.6 ESRD (END STAGE RENAL DISEASE) ON DIALYSIS (H): ICD-10-CM

## 2024-04-25 DIAGNOSIS — R53.81 PHYSICAL DECONDITIONING: Primary | ICD-10-CM

## 2024-04-25 DIAGNOSIS — Z99.2 ESRD (END STAGE RENAL DISEASE) ON DIALYSIS (H): ICD-10-CM

## 2024-04-25 PROCEDURE — 97110 THERAPEUTIC EXERCISES: CPT | Mod: GP | Performed by: PHYSICAL THERAPIST

## 2024-04-26 ENCOUNTER — ORDERS ONLY (AUTO-RELEASED) (OUTPATIENT)
Dept: FAMILY MEDICINE | Facility: CLINIC | Age: 59
End: 2024-04-26

## 2024-04-26 ENCOUNTER — OFFICE VISIT (OUTPATIENT)
Dept: FAMILY MEDICINE | Facility: CLINIC | Age: 59
End: 2024-04-26
Attending: EMERGENCY MEDICINE
Payer: COMMERCIAL

## 2024-04-26 ENCOUNTER — TELEPHONE (OUTPATIENT)
Dept: GASTROENTEROLOGY | Facility: CLINIC | Age: 59
End: 2024-04-26
Payer: COMMERCIAL

## 2024-04-26 ENCOUNTER — TELEPHONE (OUTPATIENT)
Dept: FAMILY MEDICINE | Facility: CLINIC | Age: 59
End: 2024-04-26

## 2024-04-26 VITALS
HEIGHT: 59 IN | HEART RATE: 68 BPM | TEMPERATURE: 97.2 F | BODY MASS INDEX: 21.89 KG/M2 | DIASTOLIC BLOOD PRESSURE: 84 MMHG | SYSTOLIC BLOOD PRESSURE: 160 MMHG | RESPIRATION RATE: 14 BRPM | WEIGHT: 108.6 LBS | OXYGEN SATURATION: 97 %

## 2024-04-26 DIAGNOSIS — R13.10 DYSPHAGIA, UNSPECIFIED TYPE: Primary | ICD-10-CM

## 2024-04-26 DIAGNOSIS — Z13.29 SCREENING FOR THYROID DISORDER: ICD-10-CM

## 2024-04-26 DIAGNOSIS — K21.00 GASTROESOPHAGEAL REFLUX DISEASE WITH ESOPHAGITIS WITHOUT HEMORRHAGE: ICD-10-CM

## 2024-04-26 DIAGNOSIS — Z13.1 SCREENING FOR DIABETES MELLITUS (DM): ICD-10-CM

## 2024-04-26 DIAGNOSIS — I10 BENIGN ESSENTIAL HYPERTENSION: ICD-10-CM

## 2024-04-26 DIAGNOSIS — Z12.11 SCREEN FOR COLON CANCER: ICD-10-CM

## 2024-04-26 DIAGNOSIS — Z12.31 VISIT FOR SCREENING MAMMOGRAM: ICD-10-CM

## 2024-04-26 DIAGNOSIS — Z13.220 LIPID SCREENING: ICD-10-CM

## 2024-04-26 LAB
ALBUMIN SERPL BCG-MCNC: 4.1 G/DL (ref 3.5–5.2)
ALP SERPL-CCNC: 97 U/L (ref 40–150)
ALT SERPL W P-5'-P-CCNC: 37 U/L (ref 0–50)
ANION GAP SERPL CALCULATED.3IONS-SCNC: 9 MMOL/L (ref 7–15)
AST SERPL W P-5'-P-CCNC: 43 U/L (ref 0–45)
BILIRUB SERPL-MCNC: 0.4 MG/DL
BUN SERPL-MCNC: 6.6 MG/DL (ref 6–20)
CALCIUM SERPL-MCNC: 9.2 MG/DL (ref 8.6–10)
CHLORIDE SERPL-SCNC: 104 MMOL/L (ref 98–107)
CHOLEST SERPL-MCNC: 206 MG/DL
CREAT SERPL-MCNC: 0.67 MG/DL (ref 0.51–0.95)
DEPRECATED HCO3 PLAS-SCNC: 27 MMOL/L (ref 22–29)
EGFRCR SERPLBLD CKD-EPI 2021: >90 ML/MIN/1.73M2
FASTING STATUS PATIENT QL REPORTED: YES
GLUCOSE SERPL-MCNC: 101 MG/DL (ref 70–99)
HDLC SERPL-MCNC: 45 MG/DL
LDLC SERPL CALC-MCNC: 136 MG/DL
NONHDLC SERPL-MCNC: 161 MG/DL
POTASSIUM SERPL-SCNC: 3.6 MMOL/L (ref 3.4–5.3)
PROT SERPL-MCNC: 7.6 G/DL (ref 6.4–8.3)
SODIUM SERPL-SCNC: 140 MMOL/L (ref 135–145)
TRIGL SERPL-MCNC: 124 MG/DL
TSH SERPL DL<=0.005 MIU/L-ACNC: 2.81 UIU/ML (ref 0.3–4.2)

## 2024-04-26 PROCEDURE — 80061 LIPID PANEL: CPT | Performed by: FAMILY MEDICINE

## 2024-04-26 PROCEDURE — 99204 OFFICE O/P NEW MOD 45 MIN: CPT | Performed by: FAMILY MEDICINE

## 2024-04-26 PROCEDURE — 84443 ASSAY THYROID STIM HORMONE: CPT | Performed by: FAMILY MEDICINE

## 2024-04-26 PROCEDURE — 36415 COLL VENOUS BLD VENIPUNCTURE: CPT | Performed by: FAMILY MEDICINE

## 2024-04-26 PROCEDURE — 80053 COMPREHEN METABOLIC PANEL: CPT | Performed by: FAMILY MEDICINE

## 2024-04-26 RX ORDER — LISINOPRIL 10 MG/1
10 TABLET ORAL DAILY
Qty: 90 TABLET | Refills: 3 | Status: SHIPPED | OUTPATIENT
Start: 2024-04-26 | End: 2024-07-26

## 2024-04-26 RX ORDER — PANTOPRAZOLE SODIUM 40 MG/1
40 TABLET, DELAYED RELEASE ORAL DAILY
Qty: 90 TABLET | Refills: 3 | Status: SHIPPED | OUTPATIENT
Start: 2024-04-26 | End: 2024-07-26

## 2024-04-26 ASSESSMENT — ENCOUNTER SYMPTOMS
WEAKNESS: 0
APPETITE CHANGE: 0
HEADACHES: 0
NERVOUS/ANXIOUS: 0
HEMATOLOGIC/LYMPHATIC NEGATIVE: 1
COUGH: 0
TROUBLE SWALLOWING: 1
CHILLS: 0
COLOR CHANGE: 0
ALLERGIC/IMMUNOLOGIC NEGATIVE: 1
DIZZINESS: 0
WHEEZING: 0
ENDOCRINE NEGATIVE: 1
FATIGUE: 0
AGITATION: 0
SORE THROAT: 1
EYES NEGATIVE: 1
SLEEP DISTURBANCE: 0
ACTIVITY CHANGE: 0
GASTROINTESTINAL NEGATIVE: 1
PALPITATIONS: 0
SHORTNESS OF BREATH: 0

## 2024-04-26 ASSESSMENT — PAIN SCALES - GENERAL: PAINLEVEL: NO PAIN (0)

## 2024-04-26 NOTE — PATIENT INSTRUCTIONS
Dean Ramachandran,    Thank you for allowing [facility] to manage your care.    I ordered some blood work, please go to the laboratory to get your laboratory studies.    I sent a Cologuard to your house to screen for colon cancer.     I sent your prescriptions to your pharmacy.    For your high blood pressure, please restart lisinopril 10 mg dialy.     For your reflux, I am starting protonix 40 mg daily.    Please avoid foods or drinks which contain citrus (tomato, pineapple, lime, lemon, etc), caffeine, chocolate, and spicy foods.  Avoid eating late at night.     I ordered a swallow study and mammogram, please call diagnostic imaging [phone] to schedule your test.    For your convenience, test results are released as soon as they are available  Please allow 1-2 business days for me to send you a comment about your results.  If not done so, I encourage you to login into [employer] ([email or web address] to review your results in real time.     If you have any questions or concerns, please feel free to call us at [phone].    Sincerely,    Dr. Tsai    Did you know?      You can schedule a video visit for follow-up appointments as well as future appointments for certain conditions.  Please see the below link.     [email or web address]    If you have not already done so,  I encourage you to sign up for [employer] ([email or web address]).  This will allow you to review your results, securely communicate with a provider, and schedule virtual visits as well.

## 2024-04-26 NOTE — TELEPHONE ENCOUNTER
Orders placed.  Please provide patient number to schedule.     1. Dysphagia, unspecified type  - Speech Therapy  Referral; Future  - XR Video Swallow with SLP or OT - Order with Speech Therapy Referral; Future

## 2024-04-26 NOTE — TELEPHONE ENCOUNTER
RN called with a [facility]  on the line. RN reached patients cousin, no consent to communicate on file. Patients cousin expressed she does not know patients phone number and states she is unsure why patient has this number listed. Patients cousin states she does not have a phone number to reach patient.     Patient's cousin stated she could take a message and let patient know. RN stated to just have patient call [facility] back and ask to speak with a triage nurse as patient stated she may be seeing her soon.     If patient calls back please let her know swallow study order has been placed and to call imaging at [phone].       Julianna Voss RN on 4/26/2024 at 3:32 PM

## 2024-04-26 NOTE — TELEPHONE ENCOUNTER
Pre assessment completed for upcoming procedure.   (Please see previous telephone encounter notes for complete details)      Procedure details:    Arrival time and facility location reviewed.    Pre op exam needed? N/A    Designated  policy reviewed. Instructed to have someone stay 24  hours post procedure.       Medication review:    Medications reviewed. Please see supporting documentation below. Holding recommendations discussed (if applicable).       Prep for procedure:     Procedure prep instructions reviewed.        Any additional information needed:  N/A      Patient  verbalized understanding and had no questions or concerns at this time.      Farida Shields RN  Endoscopy Procedure Pre Assessment RN  [phone] option 4

## 2024-04-26 NOTE — PROGRESS NOTES
1. Dysphagia, unspecified type  Due to GERD?  Would like to rule out mechanical vs anatomical pathology.   - Speech Therapy  Referral; Future    2. Gastroesophageal reflux disease with esophagitis without hemorrhage  Will discontinue omeprazole and restart on protonix which helped her symptoms in the past.  Stressed the importance of medication compliance.   - Primary Care Referral  - pantoprazole (PROTONIX) 40 MG EC tablet; Take 1 tablet (40 mg) by mouth daily  Dispense: 90 tablet; Refill: 3    3. Visit for screening mammogram  - MA SCREENING DIGITAL BILAT - Future  (s+30); Future    4. Screen for colon cancer  - COLOGUARD(EXACT SCIENCES); Future    5. Benign essential hypertension  Poorly controlled. Stressed the importance of medication compliance.   - lisinopril (ZESTRIL) 10 MG tablet; Take 1 tablet (10 mg) by mouth daily  Dispense: 90 tablet; Refill: 3    6. Screening for thyroid disorder  - TSH with free T4 reflex; Future  - TSH with free T4 reflex    7. Lipid screening  - Lipid panel reflex to direct LDL Fasting; Future  - Lipid panel reflex to direct LDL Fasting    8. Screening for diabetes mellitus (DM)  - Comprehensive metabolic panel (BMP + Alb, Alk Phos, ALT, AST, Total. Bili, TP); Future  - Comprehensive metabolic panel (BMP + Alb, Alk Phos, ALT, AST, Total. Bili, TP)      Past Medical History:   Diagnosis Date    Benign essential hypertension     Gastroesophageal reflux disease without esophagitis        Past Surgical History:   Procedure Laterality Date    TUBAL LIGATION         No family history on file.    Social History     Tobacco Use    Smoking status: Never     Passive exposure: Never    Smokeless tobacco: Never   Substance Use Topics    Alcohol use: Not Currently     Comment: patient stopped in 2023.  Weekends.           Zahra Ramachandran is a 59 year old, presenting for the following health issues:  Hospital F/U and [facility]        4/26/2024     8:39 AM   Additional Questions    Roomed by Elda   Accompanied by AunTashia sparks     [facility] Follow-up Visit:    Hospital/Nursing Home/IP Rehab Facility: [facility]  Date of Admission: 4/22/24  Date of Discharge: 4/22/24  Reason(s) for Admission: Pharyngitis   Was the patient in the ICU or did the patient experience delirium during hospitalization?  No  Do you have any other stressors you would like to discuss with your provider? No    Problems taking medications regularly:  None  Medication changes since discharge: None  Problems adhering to non-medication therapy:  None    Summary of hospitalization:  [facility] discharge summary reviewed  Diagnostic Tests/Treatments reviewed.  Follow up needed: none  Other Healthcare Providers Involved in Patient s Care:         None  Update since discharge: fluctuating course.     Plan of care communicated with patient         ER follow-up: Patient was seen ER for sore throat and epigastric pain which has been chronic for one year.  Strep and influenza was negative.  EKG was negative.  Endoscopy on 12/27/23 which was consistent with chronic gastritis.  She was prescribed pantoprazole twice a day.which helped her symptoms but ran out in February.  She was prescribed omeprazole in the ER but states that states that this caused weakness.  As of today, she states of burning in sensation.  Symptoms get worse when patient drinking water.  Feels like it get stuck.    Review of Systems   Constitutional:  Negative for activity change, appetite change, chills and fatigue.   HENT:  Positive for sore throat and trouble swallowing.    Eyes: Negative.    Respiratory:  Negative for cough, shortness of breath and wheezing.    Cardiovascular:  Negative for chest pain and palpitations.   Gastrointestinal: Negative.    Endocrine: Negative.    Skin:  Negative for color change and rash.   Allergic/Immunologic: Negative.    Neurological:  Negative for dizziness, weakness and  "headaches.   Hematological: Negative.    Psychiatric/Behavioral:  Negative for agitation and sleep disturbance. The patient is not nervous/anxious.            Objective    BP (!) 162/82   Pulse 68   Temp 97.2  F (36.2  C) (Temporal)   Resp 14   Ht 1.486 m (4' 10.5\")   Wt 49.3 kg (108 lb 9.6 oz)   LMP  (LMP Unknown)   SpO2 97%   BMI 22.31 kg/m    Body mass index is 22.31 kg/m .  Physical Exam  Constitutional:       General: She is not in acute distress.  HENT:      Head: Normocephalic and atraumatic.      Mouth/Throat:      Pharynx: No oropharyngeal exudate.   Eyes:      Conjunctiva/sclera: Conjunctivae normal.   Neck:      Trachea: No tracheal deviation.   Cardiovascular:      Rate and Rhythm: Normal rate and regular rhythm.      Heart sounds: Normal heart sounds.   Pulmonary:      Effort: Pulmonary effort is normal. No respiratory distress.      Breath sounds: Normal breath sounds. No wheezing or rales.   Musculoskeletal:      Cervical back: Normal range of motion.   Skin:     Findings: No rash.   Neurological:      Mental Status: She is alert.        Signed Electronically by: Jet Tsai DO    "

## 2024-04-26 NOTE — TELEPHONE ENCOUNTER
Patient is needing to schedule swallow study. Provider needs to enter order for swallow study before patient can be scheduled.

## 2024-04-26 NOTE — TELEPHONE ENCOUNTER
Pt's granddaughter returning call (no consent to communicate on file), pt was present at time of call, RN connected with  services. One time verbal consent to communicate obtained from pt to speak with granddaughter. RN relayed provider's message below, [phone] given for mammogram and Swallow study by PT only, getting to speech therapy, number is [phone]    Pt, granddaughter, and  warmed transferred to PT scheduling for swallow study and scheduled for 5/10/24.     Adela Bob RN on 4/26/2024 at 4:49 PM

## 2024-04-26 NOTE — TELEPHONE ENCOUNTER
RN does not see order for swallow study placed.     Please advise.       Julianna Voss RN on 4/26/2024 at 1:44 PM

## 2024-04-26 NOTE — LETTER
April 30, 2024      [facility]  [address] [place] [address]        Dear ,    We are writing to inform you of your test results.    -Cholesterol levels (LDL,HDL, Triglycerides) are borderline elevated.  Encourage diet and exercise, would like to avoid prescription medications if motivated to make lifestyle changes.  ADVISE: rechecking  in 1 year.   -Liver and gallbladder tests are normal (ALT,AST, Alk phos, bilirubin), kidney function is normal (Cr, GFR), sodium is normal, potassium is normal, calcium is normal, glucose is borderline elevated.  Please decrease carbohydrate intake (bread, potato, pasta, and sweets).   -TSH (thyroid stimulating hormone) level is normal which indicates normal thyroid function.   For additional lab test information, labtestsonline.org is an excellent reference.     Resulted Orders   Lipid panel reflex to direct LDL Fasting   Result Value Ref Range    Cholesterol 206 (H) <200 mg/dL    Triglycerides 124 <150 mg/dL    Direct Measure HDL 45 (L) >=50 mg/dL    LDL Cholesterol Calculated 136 (H) <=100 mg/dL    Non HDL Cholesterol 161 (H) <130 mg/dL    Patient Fasting > 8hrs? Yes     Narrative    Cholesterol  Desirable:  <200 mg/dL    Triglycerides  Normal:  Less than 150 mg/dL  Borderline High:  150-199 mg/dL  High:  200-499 mg/dL  Very High:  Greater than or equal to 500 mg/dL    Direct Measure HDL  Female:  Greater than or equal to 50 mg/dL   Male:  Greater than or equal to 40 mg/dL    LDL Cholesterol  Desirable:  <100mg/dL  Above Desirable:  100-129 mg/dL   Borderline High:  130-159 mg/dL   High:  160-189 mg/dL   Very High:  >= 190 mg/dL    Non HDL Cholesterol  Desirable:  130 mg/dL  Above Desirable:  130-159 mg/dL  Borderline High:  160-189 mg/dL  High:  190-219 mg/dL  Very High:  Greater than or equal to 220 mg/dL   TSH with free T4 reflex   Result Value Ref Range    TSH 2.81 0.30 - 4.20 uIU/mL   Comprehensive metabolic panel (BMP + Alb, Alk Phos, ALT, AST, Total. Bili, TP)    Result Value Ref Range    Sodium 140 135 - 145 mmol/L      Comment:      Reference intervals for this test were updated on 09/26/2023 to more accurately reflect our healthy population. There may be differences in the flagging of prior results with similar values performed with this method. Interpretation of those prior results can be made in the context of the updated reference intervals.     Potassium 3.6 3.4 - 5.3 mmol/L    Carbon Dioxide (CO2) 27 22 - 29 mmol/L    Anion Gap 9 7 - 15 mmol/L    Urea Nitrogen 6.6 6.0 - 20.0 mg/dL    Creatinine 0.67 0.51 - 0.95 mg/dL    GFR Estimate >90 >60 mL/min/1.73m2    Calcium 9.2 8.6 - 10.0 mg/dL    Chloride 104 98 - 107 mmol/L    Glucose 101 (H) 70 - 99 mg/dL    Alkaline Phosphatase 97 40 - 150 U/L      Comment:      Reference intervals for this test were updated on 11/14/2023 to more accurately reflect our healthy population. There may be differences in the flagging of prior results with similar values performed with this method. Interpretation of those prior results can be made in the context of the updated reference intervals.    AST 43 0 - 45 U/L      Comment:      Reference intervals for this test were updated on 6/12/2023 to more accurately reflect our healthy population. There may be differences in the flagging of prior results with similar values performed with this method. Interpretation of those prior results can be made in the context of the updated reference intervals.    ALT 37 0 - 50 U/L      Comment:      Reference intervals for this test were updated on 6/12/2023 to more accurately reflect our healthy population. There may be differences in the flagging of prior results with similar values performed with this method. Interpretation of those prior results can be made in the context of the updated reference intervals.      Protein Total 7.6 6.4 - 8.3 g/dL    Albumin 4.1 3.5 - 5.2 g/dL    Bilirubin Total 0.4 <=1.2 mg/dL       If you have any questions or  concerns, please call the clinic at the number listed above.       Sincerely,      Jet Tsai DO/selwyn

## 2024-04-26 NOTE — TELEPHONE ENCOUNTER
Pre visit planning completed.      Procedure details:    Patient scheduled for Colonoscopy  on 5/9/2024.     Arrival time: 0630. Procedure time 0800    Facility location: [facility]; [address], [address], [place] [address]. Check in location: Main entrance at registration desk.    Sedation type: MAC    Pre op exam needed? N/A    Indication for procedure: Screen for colon cancer [Z12.11]       Chart review:     Electronic implanted devices? No    Recent diagnosis of diverticulitis within the last 6 weeks? No    Diabetic? Yes. Not on diabetic medication      Medication review:    Anticoagulants? No    NSAIDS? No    Other medication HOLDING recommendations:  Ferrous sulfate (iron supplements): HOLD 7 days before procedure.      Prep for procedure:     Bowel prep recommendation: Standard Golytely. Bowel prep prescription sent to [address]  Due to: CKD noted. , diabetes. , and ESRD.    Prep instructions sent via [employer]         Farida Shields RN  Endoscopy Procedure Pre Assessment RN  [phone] option 4

## 2024-04-28 LAB
ATRIAL RATE - MUSE: 58 BPM
DIASTOLIC BLOOD PRESSURE - MUSE: 118 MMHG
INTERPRETATION ECG - MUSE: NORMAL
P AXIS - MUSE: 49 DEGREES
PR INTERVAL - MUSE: 172 MS
QRS DURATION - MUSE: 66 MS
QT - MUSE: 398 MS
QTC - MUSE: 390 MS
R AXIS - MUSE: 21 DEGREES
SYSTOLIC BLOOD PRESSURE - MUSE: 187 MMHG
T AXIS - MUSE: 36 DEGREES
VENTRICULAR RATE- MUSE: 58 BPM

## 2024-05-02 ENCOUNTER — THERAPY VISIT (OUTPATIENT)
Dept: PHYSICAL THERAPY | Facility: REHABILITATION | Age: 59
End: 2024-05-02
Attending: FAMILY MEDICINE
Payer: COMMERCIAL

## 2024-05-02 DIAGNOSIS — Z99.2 ANEMIA IN CHRONIC KIDNEY DISEASE, ON CHRONIC DIALYSIS (H): ICD-10-CM

## 2024-05-02 DIAGNOSIS — Z99.2 ESRD (END STAGE RENAL DISEASE) ON DIALYSIS (H): ICD-10-CM

## 2024-05-02 DIAGNOSIS — N18.6 ESRD (END STAGE RENAL DISEASE) ON DIALYSIS (H): ICD-10-CM

## 2024-05-02 DIAGNOSIS — D63.1 ANEMIA IN CHRONIC KIDNEY DISEASE, ON CHRONIC DIALYSIS (H): ICD-10-CM

## 2024-05-02 DIAGNOSIS — R53.81 PHYSICAL DECONDITIONING: Primary | ICD-10-CM

## 2024-05-02 DIAGNOSIS — N18.6 ANEMIA IN CHRONIC KIDNEY DISEASE, ON CHRONIC DIALYSIS (H): ICD-10-CM

## 2024-05-02 PROCEDURE — 97112 NEUROMUSCULAR REEDUCATION: CPT | Mod: GP

## 2024-05-02 PROCEDURE — 97110 THERAPEUTIC EXERCISES: CPT | Mod: GP

## 2024-05-02 PROCEDURE — 97140 MANUAL THERAPY 1/> REGIONS: CPT | Mod: GP

## 2024-05-03 ENCOUNTER — OFFICE VISIT (OUTPATIENT)
Dept: FAMILY MEDICINE | Facility: CLINIC | Age: 59
End: 2024-05-03
Payer: COMMERCIAL

## 2024-05-03 ENCOUNTER — HOSPITAL ENCOUNTER (INPATIENT)
Facility: HOSPITAL | Age: 59
LOS: 3 days | Discharge: HOME | End: 2024-05-06
Attending: EMERGENCY MEDICINE | Admitting: INTERNAL MEDICINE
Payer: COMMERCIAL

## 2024-05-03 ENCOUNTER — APPOINTMENT (OUTPATIENT)
Dept: CT IMAGING | Facility: HOSPITAL | Age: 59
End: 2024-05-03
Attending: EMERGENCY MEDICINE
Payer: COMMERCIAL

## 2024-05-03 VITALS
RESPIRATION RATE: 18 BRPM | TEMPERATURE: 102.2 F | DIASTOLIC BLOOD PRESSURE: 70 MMHG | HEART RATE: 106 BPM | SYSTOLIC BLOOD PRESSURE: 113 MMHG | OXYGEN SATURATION: 98 %

## 2024-05-03 DIAGNOSIS — N18.6 ESRD (END STAGE RENAL DISEASE) ON DIALYSIS (H): Primary | ICD-10-CM

## 2024-05-03 DIAGNOSIS — R19.7 DIARRHEA OF PRESUMED INFECTIOUS ORIGIN: ICD-10-CM

## 2024-05-03 DIAGNOSIS — R50.9 FEVER, UNSPECIFIED FEVER CAUSE: ICD-10-CM

## 2024-05-03 DIAGNOSIS — Z99.2 ESRD (END STAGE RENAL DISEASE) ON DIALYSIS (H): Primary | ICD-10-CM

## 2024-05-03 DIAGNOSIS — K52.9 COLITIS: ICD-10-CM

## 2024-05-03 DIAGNOSIS — R19.7 DIARRHEA, UNSPECIFIED TYPE: ICD-10-CM

## 2024-05-03 DIAGNOSIS — R68.83 CHILLS: Primary | ICD-10-CM

## 2024-05-03 DIAGNOSIS — N18.6 ESRD ON HEMODIALYSIS (H): ICD-10-CM

## 2024-05-03 DIAGNOSIS — K21.9 GASTROESOPHAGEAL REFLUX DISEASE WITHOUT ESOPHAGITIS: ICD-10-CM

## 2024-05-03 DIAGNOSIS — Z99.2 ESRD ON HEMODIALYSIS (H): ICD-10-CM

## 2024-05-03 DIAGNOSIS — N18.6 ESRD (END STAGE RENAL DISEASE) ON DIALYSIS (H): ICD-10-CM

## 2024-05-03 DIAGNOSIS — Z99.2 ESRD (END STAGE RENAL DISEASE) ON DIALYSIS (H): ICD-10-CM

## 2024-05-03 LAB
ALBUMIN SERPL BCG-MCNC: 4.1 G/DL (ref 3.5–5.2)
ALP SERPL-CCNC: 137 U/L (ref 40–150)
ALT SERPL W P-5'-P-CCNC: 26 U/L (ref 0–50)
ANION GAP SERPL CALCULATED.3IONS-SCNC: 15 MMOL/L (ref 7–15)
AST SERPL W P-5'-P-CCNC: 63 U/L (ref 0–45)
BASE EXCESS BLDV CALC-SCNC: 2.3 MMOL/L (ref -3–3)
BASOPHILS # BLD AUTO: 0 10E3/UL (ref 0–0.2)
BASOPHILS NFR BLD AUTO: 0 %
BILIRUB DIRECT SERPL-MCNC: <0.2 MG/DL (ref 0–0.3)
BILIRUB SERPL-MCNC: 0.4 MG/DL
BUN SERPL-MCNC: 15.5 MG/DL (ref 6–20)
CALCIUM SERPL-MCNC: 9.5 MG/DL (ref 8.6–10)
CHLORIDE SERPL-SCNC: 93 MMOL/L (ref 98–107)
CREAT SERPL-MCNC: 5.45 MG/DL (ref 0.51–0.95)
DEPRECATED HCO3 PLAS-SCNC: 22 MMOL/L (ref 22–29)
EGFRCR SERPLBLD CKD-EPI 2021: 9 ML/MIN/1.73M2
EOSINOPHIL # BLD AUTO: 0 10E3/UL (ref 0–0.7)
EOSINOPHIL NFR BLD AUTO: 0 %
ERYTHROCYTE [DISTWIDTH] IN BLOOD BY AUTOMATED COUNT: 13.2 % (ref 10–15)
FLUAV AG SPEC QL IA: NEGATIVE
FLUBV AG SPEC QL IA: NEGATIVE
GLUCOSE SERPL-MCNC: 128 MG/DL (ref 70–99)
HCO3 BLDV-SCNC: 26 MMOL/L (ref 21–28)
HCT VFR BLD AUTO: 33.3 % (ref 35–47)
HGB BLD-MCNC: 11.3 G/DL (ref 11.7–15.7)
IMM GRANULOCYTES # BLD: 0.1 10E3/UL
IMM GRANULOCYTES NFR BLD: 1 %
LACTATE SERPL-SCNC: 1.8 MMOL/L (ref 0.7–2)
LIPASE SERPL-CCNC: 32 U/L (ref 13–60)
LYMPHOCYTES # BLD AUTO: 0.7 10E3/UL (ref 0.8–5.3)
LYMPHOCYTES NFR BLD AUTO: 7 %
MCH RBC QN AUTO: 28.8 PG (ref 26.5–33)
MCHC RBC AUTO-ENTMCNC: 33.9 G/DL (ref 31.5–36.5)
MCV RBC AUTO: 85 FL (ref 78–100)
MONOCYTES # BLD AUTO: 0.6 10E3/UL (ref 0–1.3)
MONOCYTES NFR BLD AUTO: 5 %
NEUTROPHILS # BLD AUTO: 9.2 10E3/UL (ref 1.6–8.3)
NEUTROPHILS NFR BLD AUTO: 87 %
NRBC # BLD AUTO: 0 10E3/UL
NRBC BLD AUTO-RTO: 0 /100
O2/TOTAL GAS SETTING VFR VENT: 21 %
OXYHGB MFR BLDV: 73 % (ref 70–75)
PCO2 BLDV: 38 MM HG (ref 40–50)
PH BLDV: 7.45 [PH] (ref 7.32–7.43)
PLATELET # BLD AUTO: 159 10E3/UL (ref 150–450)
PO2 BLDV: 34 MM HG (ref 25–47)
POTASSIUM SERPL-SCNC: 3.3 MMOL/L (ref 3.4–5.3)
PROCALCITONIN SERPL IA-MCNC: 3.31 NG/ML
PROT SERPL-MCNC: 8 G/DL (ref 6.4–8.3)
RBC # BLD AUTO: 3.93 10E6/UL (ref 3.8–5.2)
SAO2 % BLDV: 74.3 % (ref 70–75)
SODIUM SERPL-SCNC: 130 MMOL/L (ref 135–145)
WBC # BLD AUTO: 10.6 10E3/UL (ref 4–11)

## 2024-05-03 PROCEDURE — 87804 INFLUENZA ASSAY W/OPTIC: CPT | Performed by: STUDENT IN AN ORGANIZED HEALTH CARE EDUCATION/TRAINING PROGRAM

## 2024-05-03 PROCEDURE — 83605 ASSAY OF LACTIC ACID: CPT | Performed by: STUDENT IN AN ORGANIZED HEALTH CARE EDUCATION/TRAINING PROGRAM

## 2024-05-03 PROCEDURE — 250N000013 HC RX MED GY IP 250 OP 250 PS 637

## 2024-05-03 PROCEDURE — 83690 ASSAY OF LIPASE: CPT | Performed by: EMERGENCY MEDICINE

## 2024-05-03 PROCEDURE — 82565 ASSAY OF CREATININE: CPT | Performed by: EMERGENCY MEDICINE

## 2024-05-03 PROCEDURE — 87040 BLOOD CULTURE FOR BACTERIA: CPT | Performed by: EMERGENCY MEDICINE

## 2024-05-03 PROCEDURE — 250N000013 HC RX MED GY IP 250 OP 250 PS 637: Performed by: INTERNAL MEDICINE

## 2024-05-03 PROCEDURE — 120N000001 HC R&B MED SURG/OB

## 2024-05-03 PROCEDURE — 250N000011 HC RX IP 250 OP 636: Performed by: EMERGENCY MEDICINE

## 2024-05-03 PROCEDURE — 82805 BLOOD GASES W/O2 SATURATION: CPT | Performed by: EMERGENCY MEDICINE

## 2024-05-03 PROCEDURE — 87635 SARS-COV-2 COVID-19 AMP PRB: CPT | Performed by: STUDENT IN AN ORGANIZED HEALTH CARE EDUCATION/TRAINING PROGRAM

## 2024-05-03 PROCEDURE — 87507 IADNA-DNA/RNA PROBE TQ 12-25: CPT

## 2024-05-03 PROCEDURE — 99207 PR APP CREDIT; MD BILLING SHARED VISIT: CPT | Mod: FS | Performed by: INTERNAL MEDICINE

## 2024-05-03 PROCEDURE — 84145 PROCALCITONIN (PCT): CPT | Performed by: EMERGENCY MEDICINE

## 2024-05-03 PROCEDURE — 71260 CT THORAX DX C+: CPT

## 2024-05-03 PROCEDURE — 82374 ASSAY BLOOD CARBON DIOXIDE: CPT | Performed by: EMERGENCY MEDICINE

## 2024-05-03 PROCEDURE — 250N000011 HC RX IP 250 OP 636

## 2024-05-03 PROCEDURE — 99285 EMERGENCY DEPT VISIT HI MDM: CPT | Mod: 25

## 2024-05-03 PROCEDURE — 84075 ASSAY ALKALINE PHOSPHATASE: CPT | Performed by: EMERGENCY MEDICINE

## 2024-05-03 PROCEDURE — 99214 OFFICE O/P EST MOD 30 MIN: CPT | Performed by: STUDENT IN AN ORGANIZED HEALTH CARE EDUCATION/TRAINING PROGRAM

## 2024-05-03 PROCEDURE — 96365 THER/PROPH/DIAG IV INF INIT: CPT | Mod: 59

## 2024-05-03 PROCEDURE — 93005 ELECTROCARDIOGRAM TRACING: CPT | Performed by: EMERGENCY MEDICINE

## 2024-05-03 PROCEDURE — 36415 COLL VENOUS BLD VENIPUNCTURE: CPT | Performed by: STUDENT IN AN ORGANIZED HEALTH CARE EDUCATION/TRAINING PROGRAM

## 2024-05-03 PROCEDURE — 250N000013 HC RX MED GY IP 250 OP 250 PS 637: Performed by: EMERGENCY MEDICINE

## 2024-05-03 PROCEDURE — 85025 COMPLETE CBC W/AUTO DIFF WBC: CPT | Performed by: STUDENT IN AN ORGANIZED HEALTH CARE EDUCATION/TRAINING PROGRAM

## 2024-05-03 PROCEDURE — 99222 1ST HOSP IP/OBS MODERATE 55: CPT | Mod: AI

## 2024-05-03 RX ORDER — OXYCODONE HYDROCHLORIDE 5 MG/1
5 TABLET ORAL ONCE
Status: COMPLETED | OUTPATIENT
Start: 2024-05-03 | End: 2024-05-03

## 2024-05-03 RX ORDER — CALCIUM ACETATE 667 MG/1
667 CAPSULE ORAL
Status: DISCONTINUED | OUTPATIENT
Start: 2024-05-03 | End: 2024-05-06 | Stop reason: HOSPADM

## 2024-05-03 RX ORDER — LIDOCAINE 40 MG/G
CREAM TOPICAL
Status: DISCONTINUED | OUTPATIENT
Start: 2024-05-03 | End: 2024-05-06 | Stop reason: HOSPADM

## 2024-05-03 RX ORDER — ACETAMINOPHEN 325 MG/1
650 TABLET ORAL EVERY 6 HOURS PRN
COMMUNITY

## 2024-05-03 RX ORDER — PIPERACILLIN SODIUM, TAZOBACTAM SODIUM 3; .375 G/15ML; G/15ML
3.38 INJECTION, POWDER, LYOPHILIZED, FOR SOLUTION INTRAVENOUS EVERY 12 HOURS
Status: DISCONTINUED | OUTPATIENT
Start: 2024-05-04 | End: 2024-05-06

## 2024-05-03 RX ORDER — PIPERACILLIN SODIUM, TAZOBACTAM SODIUM 3; .375 G/15ML; G/15ML
3.38 INJECTION, POWDER, LYOPHILIZED, FOR SOLUTION INTRAVENOUS ONCE
Status: COMPLETED | OUTPATIENT
Start: 2024-05-03 | End: 2024-05-03

## 2024-05-03 RX ORDER — HEPARIN SODIUM 5000 [USP'U]/.5ML
5000 INJECTION, SOLUTION INTRAVENOUS; SUBCUTANEOUS EVERY 8 HOURS
Status: DISCONTINUED | OUTPATIENT
Start: 2024-05-03 | End: 2024-05-06 | Stop reason: HOSPADM

## 2024-05-03 RX ORDER — ASPIRIN 81 MG/1
81 TABLET ORAL DAILY
Status: DISCONTINUED | OUTPATIENT
Start: 2024-05-03 | End: 2024-05-03

## 2024-05-03 RX ORDER — METOPROLOL TARTRATE 25 MG/1
50 TABLET, FILM COATED ORAL 2 TIMES DAILY
Status: DISCONTINUED | OUTPATIENT
Start: 2024-05-03 | End: 2024-05-06 | Stop reason: HOSPADM

## 2024-05-03 RX ORDER — ACETAMINOPHEN 325 MG/1
325 TABLET ORAL EVERY 4 HOURS PRN
Status: DISCONTINUED | OUTPATIENT
Start: 2024-05-03 | End: 2024-05-03

## 2024-05-03 RX ORDER — AMOXICILLIN 250 MG
1 CAPSULE ORAL 2 TIMES DAILY PRN
Status: DISCONTINUED | OUTPATIENT
Start: 2024-05-03 | End: 2024-05-03

## 2024-05-03 RX ORDER — IOPAMIDOL 755 MG/ML
46 INJECTION, SOLUTION INTRAVASCULAR ONCE
Status: COMPLETED | OUTPATIENT
Start: 2024-05-03 | End: 2024-05-03

## 2024-05-03 RX ORDER — AMLODIPINE BESYLATE 5 MG/1
10 TABLET ORAL DAILY
Status: DISCONTINUED | OUTPATIENT
Start: 2024-05-03 | End: 2024-05-06 | Stop reason: HOSPADM

## 2024-05-03 RX ORDER — FAMOTIDINE 20 MG/1
20 TABLET, FILM COATED ORAL 2 TIMES DAILY
Status: DISCONTINUED | OUTPATIENT
Start: 2024-05-03 | End: 2024-05-03

## 2024-05-03 RX ORDER — AMOXICILLIN 250 MG
2 CAPSULE ORAL 2 TIMES DAILY PRN
Status: DISCONTINUED | OUTPATIENT
Start: 2024-05-03 | End: 2024-05-03

## 2024-05-03 RX ORDER — ONDANSETRON 2 MG/ML
4 INJECTION INTRAMUSCULAR; INTRAVENOUS EVERY 6 HOURS PRN
Status: DISCONTINUED | OUTPATIENT
Start: 2024-05-03 | End: 2024-05-06 | Stop reason: HOSPADM

## 2024-05-03 RX ORDER — FAMOTIDINE 20 MG/1
20 TABLET, FILM COATED ORAL EVERY OTHER DAY
Status: DISCONTINUED | OUTPATIENT
Start: 2024-05-05 | End: 2024-05-04

## 2024-05-03 RX ORDER — POTASSIUM CHLORIDE 1500 MG/1
40 TABLET, EXTENDED RELEASE ORAL ONCE
Status: COMPLETED | OUTPATIENT
Start: 2024-05-03 | End: 2024-05-03

## 2024-05-03 RX ORDER — ATORVASTATIN CALCIUM 40 MG/1
40 TABLET, FILM COATED ORAL EVERY EVENING
Status: DISCONTINUED | OUTPATIENT
Start: 2024-05-04 | End: 2024-05-06 | Stop reason: HOSPADM

## 2024-05-03 RX ORDER — ACETAMINOPHEN 325 MG/1
650 TABLET ORAL ONCE
Status: DISCONTINUED | OUTPATIENT
Start: 2024-05-03 | End: 2024-05-03

## 2024-05-03 RX ORDER — ACETAMINOPHEN 325 MG/1
650 TABLET ORAL EVERY 4 HOURS PRN
Status: DISCONTINUED | OUTPATIENT
Start: 2024-05-03 | End: 2024-05-06 | Stop reason: HOSPADM

## 2024-05-03 RX ORDER — CALCIUM CARBONATE 500 MG/1
1000 TABLET, CHEWABLE ORAL 4 TIMES DAILY PRN
Status: DISCONTINUED | OUTPATIENT
Start: 2024-05-03 | End: 2024-05-06 | Stop reason: HOSPADM

## 2024-05-03 RX ADMIN — POTASSIUM CHLORIDE 40 MEQ: 1500 TABLET, EXTENDED RELEASE ORAL at 21:28

## 2024-05-03 RX ADMIN — METOPROLOL TARTRATE 50 MG: 25 TABLET, FILM COATED ORAL at 19:23

## 2024-05-03 RX ADMIN — AMLODIPINE BESYLATE 10 MG: 5 TABLET ORAL at 19:23

## 2024-05-03 RX ADMIN — OXYCODONE HYDROCHLORIDE 5 MG: 5 TABLET ORAL at 15:55

## 2024-05-03 RX ADMIN — ACETAMINOPHEN 650 MG: 325 TABLET ORAL at 12:40

## 2024-05-03 RX ADMIN — IOPAMIDOL 46 ML: 755 INJECTION, SOLUTION INTRAVENOUS at 15:13

## 2024-05-03 RX ADMIN — PIPERACILLIN AND TAZOBACTAM 3.38 G: 3; .375 INJECTION, POWDER, FOR SOLUTION INTRAVENOUS at 16:53

## 2024-05-03 RX ADMIN — CALCIUM ACETATE 667 MG: 667 CAPSULE ORAL at 19:23

## 2024-05-03 RX ADMIN — FAMOTIDINE 20 MG: 20 TABLET ORAL at 19:23

## 2024-05-03 RX ADMIN — HEPARIN SODIUM 5000 UNITS: 10000 INJECTION, SOLUTION INTRAVENOUS; SUBCUTANEOUS at 21:29

## 2024-05-03 ASSESSMENT — ACTIVITIES OF DAILY LIVING (ADL)
ADLS_ACUITY_SCORE: 32
ADLS_ACUITY_SCORE: 35
ADLS_ACUITY_SCORE: 35
ADLS_ACUITY_SCORE: 32
ADLS_ACUITY_SCORE: 33
ADLS_ACUITY_SCORE: 35
ADLS_ACUITY_SCORE: 32
ADLS_ACUITY_SCORE: 35
ADLS_ACUITY_SCORE: 35

## 2024-05-03 ASSESSMENT — COLUMBIA-SUICIDE SEVERITY RATING SCALE - C-SSRS
1. IN THE PAST MONTH, HAVE YOU WISHED YOU WERE DEAD OR WISHED YOU COULD GO TO SLEEP AND NOT WAKE UP?: NO
6. HAVE YOU EVER DONE ANYTHING, STARTED TO DO ANYTHING, OR PREPARED TO DO ANYTHING TO END YOUR LIFE?: NO
2. HAVE YOU ACTUALLY HAD ANY THOUGHTS OF KILLING YOURSELF IN THE PAST MONTH?: NO

## 2024-05-03 NOTE — H&P
[facility]    History and Physical - Hospitalist Service       Date of Admission:  5/3/2024    Assessment & Plan      Lyndsey Alejo is a 59 year old female with PMH ESRD on HD MWF (on transplant list), HTN, type 2 diabetes, anemia in chronic kidney disease, & HLD was admitted on 5/3/2024 for acute colitis.  Patient presented to the ED with fever, diarrhea, and abdominal pain x 3 days.  CT scan reveals colitis extending from the cecum to the mid descending colon.    Colitis  -CT reveals colitis extending from the cecum to the mid descending colon  -Zosyn every 8 hours  -Acetaminophen as needed fever, pain  -GI consult    ESRD on HD MWF  -Had full run today  -PTA calcium acetate  -Cr-5.45  -Potassium-3.3-no replacement at this time    HTN  -PTA amlodipine, metoprolol    HLD  -PTA atorvastatin          Diet: Combination Diet Regular Diet Adult    DVT Prophylaxis: Heparin SQ  Moffett Catheter: Not present  Lines: None     Cardiac Monitoring: None  Code Status: Full Code      Clinically Significant Risk Factors Present on Admission        # Hypokalemia: Lowest K = 3.3 mmol/L in last 2 days, will replace as needed         # Drug Induced Platelet Defect: home medication list includes an antiplatelet medication   # Hypertension: Noted on problem list                 Disposition Plan     Medically Ready for Discharge: Anticipated in 2-4 Days         The patient's care was discussed with the Attending Physician, Dr. Dela Cruz, Patient, and Patient's Family.    Linda Gallardo NP  Hospitalist Service  [facility]  Securely message with [employer] (more info)  Text page via [employer] Paging/Directory     ______________________________________________________________________    Chief Complaint       History of Present Illness   Lyndsey Alejo is a 59 year old female who presented to the ED with fever, fatigue, diarrhea, and abdominal pain x 3 days.  Reports having 3-4 stools per day.  She reports  they are dark in nature but takes a daily iron supplement.  Denies hematochezia.  Patient was febrile at 102 in clinic.  She was given acetaminophen and is now afebrile.  Patient denies any recent travel, no dietary changes, no changes in her medication regimen.  She reports the pain was more LLQ and sharp in nature.  Nothing made the pain worse or better.  On exam she denies abdominal pain at this time. Testing for influenza done at clinic is negative.  COVID testing pending but not concerning with her presentation. CT confirms colitis.  Negative lactic and WBC.  With procalcitonin-3.31 & fevers, will start Zosyn.  GI to see in a.m. Patient denies chills, sweats, nausea, vomiting, chest pain, shortness of breath, or dizziness/lightheadedness.      Past Medical History    Past Medical History:   Diagnosis Date    Anemia in chronic kidney disease     Chewing tobacco use     ESRD (end stage renal disease) on dialysis (H)     dialysis start date 11/28/2021 per 2728 form    Hypertension     Poor dentition     Secondary renal hyperparathyroidism (H24)     Type 2 diabetes mellitus (H)        Past Surgical History   Past Surgical History:   Procedure Laterality Date    BACK SURGERY         Prior to Admission Medications   Prior to Admission Medications   Prescriptions Last Dose Informant Patient Reported? Taking?   Chlorphen-Phenyleph-APAP (CORICIDIN D COLD/FLU/SINUS) 2-5-325 MG TABS   No No   Sig: Take 1 daily as needed for congestion   FEROSUL 325 (65 Fe) MG tablet   No No   Sig: TAKE 1 TABLET BY MOUTH ONE TIME EACH DAY WITH BREAKFAST   Fe Fum-FA-B Lff-Q-Ze-Mg-Mn-Cu (FERROCITE PLUS) 106-1 MG TABS   Yes No   Sig: Ferrocite Plus 106 mg iron-1 mg tablet   amLODIPine (NORVASC) 10 MG tablet   No No   Sig: Take 1 tablet (10 mg) by mouth daily at 2 pm   aspirin 81 MG EC tablet   Yes No   Sig: Take 1 tablet by mouth daily   atorvastatin (LIPITOR) 40 MG tablet   No No   Sig: Take 1 tablet (40 mg) by mouth daily   bisacodyl  (DULCOLAX) 5 MG EC tablet   No No   Sig: Take 2 tablets at 3 pm the day before your procedure. If your procedure is before 11 am, take 2 additional tablets at 11 pm. If your procedure is after 11 am, take 2 additional tablets at 6 am. For additional instructions refer to your colonoscopy prep instructions.   calcium acetate (PHOSLO) 667 MG CAPS capsule   No No   Sig: Take 1 capsule (667 mg) by mouth 3 times daily (with meals)   famotidine (PEPCID) 20 MG tablet   No No   Sig: Take 1 tablet (20 mg) by mouth 2 times daily   metoprolol tartrate (LOPRESSOR) 50 MG tablet   No No   Sig: Take 1 tablet (50 mg) by mouth 2 times daily   polyethylene glycol (GOLYTELY) 236 g suspension   No No   Sig: The night before the exam at 6 pm drink an 8-ounce glass every 15 minutes until the jug is half empty. If you arrive before 11 AM: Drink the other half of the Golytely jug at 11 PM night before procedure. If you arrive after 11 AM: Drink the other half of the Golytely jug at 6 AM day of procedure. For additional instructions refer to your colonoscopy prep instructions.      Facility-Administered Medications Last Administration Doses Remaining   acetaminophen (TYLENOL) tablet 650 mg 5/3/2024 12:40 PM 0           Review of Systems    The 10 point Review of Systems is negative other than noted in the HPI or here.     Physical Exam   Vital Signs: Temp: 99.6  F (37.6  C) Temp src: Oral BP: 105/58 Pulse: 78   Resp: 18 SpO2: 94 % O2 Device: None (Room air)    Weight: 95 lbs 0 oz    Constitutional: awake, alert, cooperative, no apparent distress, and appears stated age  Respiratory: No increased work of breathing, good air exchange, clear to auscultation bilaterally, no crackles or wheezing  Cardiovascular: Normal apical impulse, regular rate and rhythm, normal S1 and S2, no S3 or S4, and no murmur noted  GI: No scars, normal bowel sounds, soft, non-distended, non-tender, no masses palpated, no hepatosplenomegally    Medical Decision Making        50 MINUTES SPENT BY ME on the date of service doing chart review, history, exam, documentation & further activities per the note.  MANAGEMENT DISCUSSED with the following over the past 24 hours: Dr. Dela Cruz   NOTE(S)/MEDICAL RECORDS REVIEWED over the past 24 hours: Jose De Jesus       Data     I have personally reviewed the following data over the past 24 hrs:    10.6  \   11.3 (L)   / 159     130 (L) 93 (L) 15.5 /  128 (H)   3.3 (L) 22 5.45 (H) \     ALT: 26 AST: 63 (H) AP: 137 TBILI: 0.4   ALB: 4.1 TOT PROTEIN: 8.0 LIPASE: 32     Procal: 3.31 (H) CRP: N/A Lactic Acid: 1.8         Imaging results reviewed over the past 24 hrs:   Recent Results (from the past 24 hour(s))   CT Chest/Abdomen/Pelvis w Contrast    Narrative    EXAM: CT CHEST/ABDOMEN/PELVIS W CONTRAST  LOCATION: [facility]  DATE: 5/3/2024    INDICATION: Sepsis. Fever, diarrhea and abdominal pain abd pain.  COMPARISON: CT AP 11/28/2023, chest x-ray 9/14/2023  TECHNIQUE: CT scan of the chest, abdomen, and pelvis was performed following injection of IV contrast. Multiplanar reformats were obtained. Dose reduction techniques were used.   CONTRAST: isovue 370  46ml    FINDINGS: Respiratory motion artifact through most of the study.    LUNGS AND PLEURA: No evidence of a pneumonia or failure. No effusions.    MEDIASTINUM/AXILLAE: No adenopathy. Aorta and branches are normal. No central pulmonary emboli.      CORONARY ARTERY CALCIFICATION: None.    HEPATOBILIARY: Normal.    PANCREAS: Normal.    SPLEEN: Normal.    ADRENAL GLANDS: Normal.    KIDNEYS/BLADDER: Native kidneys are diminutive. No calculi or obstruction. There is a tiny cyst posteriorly in the left kidney that needs no follow-up.    BOWEL: The entire right, transverse and descending colon to the midportion is abnormal. There is submucosal edema throughout with again predominantly collapsed. No pneumatosis, pericolonic stranding or mesenteric venous air. There are  liquid fecal   contents in the rectosigmoid. No ascites. Small bowel and appendix are normal.    LYMPH NODES: Normal.    VASCULATURE: Mild arterial calcifications. No aneurysm. Vessels are patent.    PELVIC ORGANS: Normal.    MUSCULOSKELETAL: Prior laminectomies T12-T11 with epidural metallic closure at this level. Bone island along the left-sided T11.     Left subclavian venous stent with partial visualization of markedly enlarged venous collaterals in the left arm.      Impression    IMPRESSION:  1.  Patient has a long segment of uncomplicated colitis extending from the cecum to the mid descending colon.  2.  Most of the  colon is collapsed except for the rectosigmoid which has liquid fecal contents.  3.  Diminutive native kidneys.  4.  Other noncritical findings as noted above.

## 2024-05-03 NOTE — MEDICATION SCRIBE - ADMISSION MEDICATION HISTORY
Medication Scribe Admission Medication History    Admission medication history is complete. The information provided in this note is only as accurate as the sources available at the time of the update.    Information Source(s): Patient, Family member, and Prescription bottles via in-person    Pertinent Information: patient's medications are being managed by self - with some assistance from daughter, Getachew. Getachew ([phone]), was present at bedside and also had with her the patient's medication RX bottles.     Patient reports no longer taking: Aspirin, Coriciidin, Ferrocite.     Patient reports not taking recent RX fills of: Lisinopril, Omeprazole, Pantoprazole, Sucralfate    Changes made to PTA medication list:  Added: Tylenol,   Deleted: Aspirin, Coriciidin, Ferrocite.   Changed: None    Allergies reviewed with patient and updates made in EHR: yes    Medication History Completed By: Myo Mayberry 5/3/2024 6:08 PM    Current Facility-Administered Medications for the 5/3/24 encounter (Hospital Encounter)   Medication    [COMPLETED] acetaminophen (TYLENOL) tablet 650 mg     PTA Med List   Medication Sig Last Dose    acetaminophen (TYLENOL) 325 MG tablet Take 650 mg by mouth every 6 hours as needed for mild pain Past Month at prn    amLODIPine (NORVASC) 10 MG tablet Take 1 tablet (10 mg) by mouth daily at 2 pm 5/2/2024 at 1400    atorvastatin (LIPITOR) 40 MG tablet Take 1 tablet (40 mg) by mouth daily 5/3/2024 at am    calcium acetate (PHOSLO) 667 MG CAPS capsule Take 1 capsule (667 mg) by mouth 3 times daily (with meals) 5/3/2024 at am    famotidine (PEPCID) 20 MG tablet Take 1 tablet (20 mg) by mouth 2 times daily 5/3/2024    FEROSUL 325 (65 Fe) MG tablet TAKE 1 TABLET BY MOUTH ONE TIME EACH DAY WITH BREAKFAST 5/3/2024 at am    metoprolol tartrate (LOPRESSOR) 50 MG tablet Take 1 tablet (50 mg) by mouth 2 times daily 5/3/2024 at am

## 2024-05-03 NOTE — PROGRESS NOTES
Assessment & Plan     Chills  Diarrhea, unspecified type  ESRD on dialysis    - Influenza A & B Antigen - Clinic Collect  - Symptomatic COVID-19 Virus (Coronavirus) by PCR Nose  - acetaminophen (TYLENOL) tablet 650 mg    Lyndsey is a 59yr old female with a h/o ESRD mostly anuric undergoing workup for transplant presenting with 3d history of diarrhea, fever and chills. Influenza negative and given 650mg Tylenol.  On exam, she is ill-appearing, tachycardic with temperature of 102.2.  She has acute tenderness to palpation in the left lower quadrant with no rebound tenderness or signs of acute abdomen. No prior colonoscopy but most likely acute diverticulitis versus colitis. Unable to provide UA (mostly anuric) after drinking water and we cannot straight cath in urgent care but cannot rule out pyelonephritis. Given her high risk status and ESRD, will transfer to emergency department for further workup prior to abdominal imaging since IV contrast would have to be given with care given her renal status. Discussed with granddaughter and patient and they were in agreement.    No follow-ups on file.    Ness Blanc, DO  she/her  [facility]    Subjective     [facility] is a 59 year old female who presents to clinic today for the following health issues:    HPI    ESRD d/t unknown etiology, on dialysis  Undergoing workup for transplant     Almost completely anuric- urinates a small drop very infrequently  Diarrhea for 3 days. No blood in stools  Stooling about 3-4x/day for the last 3 days  Chills started 2 days ago but today started having uncontrollable shaking  Has felt warm but didn't check temperature at home  No nausea, vomiting, dysuria, cough, sore throat  No recent antibiotics, hospitalizations, sick contacts, change in diet, travel in the last month    Took Tylenol at 6am this morning  Dialysis days M, W, F. Went this morning and had a temperature    No known history of diverticulosis but she has also not  had a colonoscopy before    Past Medical History:   Diagnosis Date    Anemia in chronic kidney disease     Chewing tobacco use     ESRD (end stage renal disease) on dialysis (H)     dialysis start date 11/28/2021 per 2728 form    Hypertension     Poor dentition     Secondary renal hyperparathyroidism (H24)     Type 2 diabetes mellitus (H)      No Known Allergies  Current Outpatient Medications   Medication Sig Dispense Refill    amLODIPine (NORVASC) 10 MG tablet Take 1 tablet (10 mg) by mouth daily at 2 pm 90 tablet 3    aspirin 81 MG EC tablet Take 1 tablet by mouth daily      atorvastatin (LIPITOR) 40 MG tablet Take 1 tablet (40 mg) by mouth daily 90 tablet 3    bisacodyl (DULCOLAX) 5 MG EC tablet Take 2 tablets at 3 pm the day before your procedure. If your procedure is before 11 am, take 2 additional tablets at 11 pm. If your procedure is after 11 am, take 2 additional tablets at 6 am. For additional instructions refer to your colonoscopy prep instructions. 4 tablet 0    calcium acetate (PHOSLO) 667 MG CAPS capsule Take 1 capsule (667 mg) by mouth 3 times daily (with meals) 270 capsule 3    Chlorphen-Phenyleph-APAP (CORICIDIN D COLD/FLU/SINUS) 2-5-325 MG TABS Take 1 daily as needed for congestion 10 tablet 0    famotidine (PEPCID) 20 MG tablet Take 1 tablet (20 mg) by mouth 2 times daily 180 tablet 3    Fe Fum-FA-B Myh-R-Cn-Mg-Mn-Cu (FERROCITE PLUS) 106-1 MG TABS Ferrocite Plus 106 mg iron-1 mg tablet      FEROSUL 325 (65 Fe) MG tablet TAKE 1 TABLET BY MOUTH ONE TIME EACH DAY WITH BREAKFAST 90 tablet 3    metoprolol tartrate (LOPRESSOR) 50 MG tablet Take 1 tablet (50 mg) by mouth 2 times daily 180 tablet 3    polyethylene glycol (GOLYTELY) 236 g suspension The night before the exam at 6 pm drink an 8-ounce glass every 15 minutes until the jug is half empty. If you arrive before 11 AM: Drink the other half of the [employer] jug at 11 PM night before procedure. If you arrive after 11 AM: Drink the other half of the  Lynn jug at 6 AM day of procedure. For additional instructions refer to your colonoscopy prep instructions. 4000 mL 0     No current facility-administered medications for this visit.          Review of Systems  Constitutional, HEENT, cardiovascular, pulmonary, gi and gu systems are negative, except as otherwise noted.      Objective    /70 (BP Location: Right arm, Patient Position: Sitting, Cuff Size: Adult Regular)   Pulse 106   Temp (!) 102.2  F (39  C) (Oral)   Resp 18   LMP  (LMP Unknown)   SpO2 98%   Physical Exam  Constitutional:       Appearance: She is ill-appearing. She is not toxic-appearing.      Comments: Present with granddaughter, who declined  and chose to translate   HENT:      Mouth/Throat:      Mouth: Mucous membranes are moist.   Eyes:      Pupils: Pupils are equal, round, and reactive to light.   Cardiovascular:      Rate and Rhythm: Regular rhythm. Tachycardia present.   Pulmonary:      Effort: Pulmonary effort is normal.      Breath sounds: No wheezing, rhonchi or rales.   Abdominal:      Tenderness: There is abdominal tenderness in the suprapubic area and left lower quadrant. There is no right CVA tenderness, left CVA tenderness or rebound.   Neurological:      Mental Status: She is alert.         Results for orders placed or performed in visit on 05/03/24   Influenza A & B Antigen - Clinic Collect     Status: Normal    Specimen: Nose; Swab   Result Value Ref Range    Influenza A antigen Negative Negative    Influenza B antigen Negative Negative    Narrative    Test results must be correlated with clinical data. If necessary, results should be confirmed by a molecular assay or viral culture.           The use of Dragon[employer] services may have been used to construct the content in this note; any grammatical or spelling errors are non-intentional. Please contact the author of this note directly if you are in need of any clarification.

## 2024-05-03 NOTE — ED PROVIDER NOTES
EMERGENCY DEPARTMENT ENCOUNTER      NAME: Lyndsey Alejo  AGE: 59 year old female  YOB: 1965  MRN: [id number]  EVALUATION DATE & TIME: No admission date for patient encounter.    PCP: Abhilash Cobb    ED PROVIDER: Raine Soares MD    Chief Complaint   Patient presents with    Flu Symptoms         FINAL IMPRESSION:  1. Colitis    2. Diarrhea of presumed infectious origin    3. Fever, unspecified fever cause    4. ESRD on hemodialysis (H)          ED COURSE & MEDICAL DECISION MAKING:    Pertinent Labs & Imaging studies reviewed. (See chart for details)  59 year old female with history of ESRD on HD M/W/F, DM who presents to the Emergency Department for evaluation of 3 days of fever, abdominal pain and blackish diarrhea.  Febrile 102 in clinic prior to arrival now afebrile here after Tylenol.  Some lower abdominal tenderness.  She is on iron supplementation however with dark stool concern for occult GI bleed or anemia.  With the fever however concern for peritoneal etiology infection.  She did have COVID and flu testing at clinic but really does not have any infectious symptomatology and my concern that this is COVID is quite low.  The flu test was negative.  Concern for colitis, diverticulitis, appendicitis or other peritoneal etiology.    Patient initially seen evaluated by myself in triage area due to boarding crisis, IV established and blood obtained.  Twelve-lead EKG was obtained showing sinus rhythm with LVH with repolarization abnormality.  Given oxycodone for pain with improvement of her symptoms.  CBC, VBG, lactate, LFTs, lipase unremarkable.  BMP notable for potassium of 3.3 but with her ESRD and will not replace this.  Pro-Yobani elevated at 3.31.  CT abdomen pelvis shows evidence of colitis.  I strongly suspect that this is infectious.  Stool cultures ordered and will cover with Zosyn and admit given her medical fragility.    2:04 PM I met with the patient, obtained history, performed an initial exam,  and discussed options and plan for diagnostics and treatment here in the ED.     ED Course as of 05/03/24 1758   Fri May 03, 2024   1403 Temp: 99.6  F (37.6  C)  102 at clinic pta, given apap   1527 Potassium(!): 3.3  On HD, last today   1527 Creatinine(!): 5.45  ESRD on HD   1527 Procalcitonin(!): 3.31   1618 Admitted to medicine       Medical Decision Making    History:  Supplemental history from: Family Member/Significant Other  External Record(s) reviewed: Outpatient Record: Clinic visit today    Work Up:  Chart documentation includes differential considered and any EKGs or imaging independently interpreted by provider, see MDM  In additional to work up documented, I considered the following work up: see MDM    External consultation:  Discussion of management with another provider: Hospitalist    Complicating factors:  Care impacted by chronic illness: Chronic Kidney Disease  Care affected by social determinants of health: Access to Medical Care referred to ED    Disposition considerations: Admit.        At the conclusion of the encounter I discussed the results of all of the tests and the disposition. The questions were answered. The patient or family acknowledged understanding and was agreeable with the care plan.    MEDICATIONS GIVEN IN THE EMERGENCY:  Medications   sodium chloride (PF) 0.9% PF flush 3 mL (has no administration in time range)   sodium chloride (PF) 0.9% PF flush 3 mL (3 mLs Intracatheter $Given 5/3/24 1652)   lidocaine 1 % 0.1-1 mL (has no administration in time range)   lidocaine (LMX4) cream (has no administration in time range)   sodium chloride (PF) 0.9% PF flush 3 mL (has no administration in time range)   sodium chloride (PF) 0.9% PF flush 3 mL (has no administration in time range)   calcium carbonate (TUMS) chewable tablet 1,000 mg (has no administration in time range)   heparin ANTICOAGULANT injection 5,000 Units (has no administration in time range)   amLODIPine (NORVASC) tablet 10  mg (has no administration in time range)   aspirin EC tablet 81 mg (has no administration in time range)   atorvastatin (LIPITOR) tablet 40 mg (has no administration in time range)   calcium acetate (PHOSLO) capsule 667 mg (has no administration in time range)   famotidine (PEPCID) tablet 20 mg (has no administration in time range)   metoprolol tartrate (LOPRESSOR) tablet 50 mg (has no administration in time range)   piperacillin-tazobactam (ZOSYN) 3.375 g vial to attach to  mL bag (has no administration in time range)   acetaminophen (TYLENOL) tablet 325 mg (has no administration in time range)   oxyCODONE (ROXICODONE) tablet 5 mg (5 mg Oral $Given 5/3/24 5205)   iopamidol (ISOVUE-370) solution 46 mL (46 mLs Intravenous $Given 5/3/24 1513)   piperacillin-tazobactam (ZOSYN) 3.375 g vial to attach to  mL bag (0 g Intravenous Stopped 5/3/24 1726)       NEW PRESCRIPTIONS STARTED AT TODAY'S ER VISIT  New Prescriptions    No medications on file          =================================================================    HPI    Patient information was obtained from: patient and her family member    Use of Intrepreter: Patient's family member       Lyndsey lAejo is a 59 year old female with pertinent medical history of ESRD, hypertension, diabetes, chewing tobacco use, and anemia who presents a fever and diarrhea.     Per patient and her family member, she has been feeling unwell for 3 days with diarrhea, abdominal pain, and fevers. She says the stool is black but not bloody. Patient gets dialysis at [facility] and has not missed any runs. She occasionally urinates just drops. Patient was referred here from urgent care where she was negative for flu. She had tylenol without relief at 6 AM.  Patient denies any nausea, vomiting, cough, congestion, or sore throat.       PAST MEDICAL HISTORY:  Past Medical History:   Diagnosis Date    Anemia in chronic kidney disease     Chewing tobacco use     ESRD (end stage  renal disease) on dialysis (H)     dialysis start date 11/28/2021 per 2728 form    Hypertension     Poor dentition     Secondary renal hyperparathyroidism (H24)     Type 2 diabetes mellitus (H)        PAST SURGICAL HISTORY:  Past Surgical History:   Procedure Laterality Date    BACK SURGERY         CURRENT MEDICATIONS:    Prior to Admission Medications   Prescriptions Last Dose Informant Patient Reported? Taking?   Chlorphen-Phenyleph-APAP (CORICIDIN D COLD/FLU/SINUS) 2-5-325 MG TABS   No No   Sig: Take 1 daily as needed for congestion   FEROSUL 325 (65 Fe) MG tablet   No No   Sig: TAKE 1 TABLET BY MOUTH ONE TIME EACH DAY WITH BREAKFAST   Fe Fum-FA-B Zqn-G-Mj-Mg-Mn-Cu (FERROCITE PLUS) 106-1 MG TABS   Yes No   Sig: Ferrocite Plus 106 mg iron-1 mg tablet   amLODIPine (NORVASC) 10 MG tablet   No No   Sig: Take 1 tablet (10 mg) by mouth daily at 2 pm   aspirin 81 MG EC tablet   Yes No   Sig: Take 1 tablet by mouth daily   atorvastatin (LIPITOR) 40 MG tablet   No No   Sig: Take 1 tablet (40 mg) by mouth daily   bisacodyl (DULCOLAX) 5 MG EC tablet   No No   Sig: Take 2 tablets at 3 pm the day before your procedure. If your procedure is before 11 am, take 2 additional tablets at 11 pm. If your procedure is after 11 am, take 2 additional tablets at 6 am. For additional instructions refer to your colonoscopy prep instructions.   calcium acetate (PHOSLO) 667 MG CAPS capsule   No No   Sig: Take 1 capsule (667 mg) by mouth 3 times daily (with meals)   famotidine (PEPCID) 20 MG tablet   No No   Sig: Take 1 tablet (20 mg) by mouth 2 times daily   metoprolol tartrate (LOPRESSOR) 50 MG tablet   No No   Sig: Take 1 tablet (50 mg) by mouth 2 times daily   polyethylene glycol (GOLYTELY) 236 g suspension   No No   Sig: The night before the exam at 6 pm drink an 8-ounce glass every 15 minutes until the jug is half empty. If you arrive before 11 AM: Drink the other half of the Pheed jug at 11 PM night before procedure. If you arrive  after 11 AM: Drink the other half of the Optovue jug at 6 AM day of procedure. For additional instructions refer to your colonoscopy prep instructions.      Facility-Administered Medications Last Administration Doses Remaining   acetaminophen (TYLENOL) tablet 650 mg 5/3/2024 12:40 PM 0          ALLERGIES:  No Known Allergies    FAMILY HISTORY:  Family History   Problem Relation Age of Onset    Breast Cancer Cousin     Coronary Artery Disease No family hx of     Diabetes No family hx of     Hypertension No family hx of        SOCIAL HISTORY:  Social History     Tobacco Use    Smoking status: Never     Passive exposure: Never    Smokeless tobacco: Current     Types: Chew     Last attempt to quit: 04/2023    Tobacco comments:     Chewing tobacco    Vaping Use    Vaping status: Never Used   Substance Use Topics    Alcohol use: Never    Drug use: Never        VITALS:  Patient Vitals for the past 24 hrs:   BP Temp Temp src Pulse Resp SpO2 Weight   05/03/24 1700 105/58 -- -- 78 -- 94 % --   05/03/24 1555 118/63 -- -- 82 -- 96 % --   05/03/24 1520 117/63 -- -- 86 -- 97 % --   05/03/24 1349 117/61 99.6  F (37.6  C) Oral 84 18 96 % 43.1 kg (95 lb)       PHYSICAL EXAM    General Appearance: Well-appearing, well-nourished, no acute distress  Head:  Normocephalic  Eyes:  conjunctiva/corneas clear  ENT:  membranes are moist without pallor  Neck:  Supple  Cardio:  Regular rate and rhythm, no murmur/gallop/rub  Pulm:  No respiratory distress, clear to auscultation bilaterally  Back:  No CVA tenderness, normal ROM  Abdomen:  Soft, non distended,no rebound or guarding. Diffuse lower abdominal tenderness.  Extremities:  Left AV fistula with palpable thrill.  Skin:  Skin warm, dry, no rashes. Warm to touch.  Neuro:  Alert and oriented ×3, moving all extremities, no gross sensory defects     RADIOLOGY/LABS:  Reviewed all pertinent imaging. Please see official radiology report. All pertinent labs reviewed and interpreted.    Results for  orders placed or performed during the hospital encounter of 05/03/24   CT Chest/Abdomen/Pelvis w Contrast    Impression    IMPRESSION:  1.  Patient has a long segment of uncomplicated colitis extending from the cecum to the mid descending colon.  2.  Most of the  colon is collapsed except for the rectosigmoid which has liquid fecal contents.  3.  Diminutive native kidneys.  4.  Other noncritical findings as noted above.   Result Value Ref Range    Lactic Acid 1.8 0.7 - 2.0 mmol/L   Blood gas venous   Result Value Ref Range    pH Venous 7.45 (H) 7.32 - 7.43    pCO2 Venous 38 (L) 40 - 50 mm Hg    pO2 Venous 34 25 - 47 mm Hg    Bicarbonate Venous 26 21 - 28 mmol/L    Base Excess/Deficit Venous 2.3 -3.0 - 3.0 mmol/L    FIO2 21     Oxyhemoglobin Venous 73 70 - 75 %    O2 Sat, Venous 74.3 70.0 - 75.0 %   Basic metabolic panel   Result Value Ref Range    Sodium 130 (L) 135 - 145 mmol/L    Potassium 3.3 (L) 3.4 - 5.3 mmol/L    Chloride 93 (L) 98 - 107 mmol/L    Carbon Dioxide (CO2) 22 22 - 29 mmol/L    Anion Gap 15 7 - 15 mmol/L    Urea Nitrogen 15.5 6.0 - 20.0 mg/dL    Creatinine 5.45 (H) 0.51 - 0.95 mg/dL    GFR Estimate 9 (L) >60 mL/min/1.73m2    Calcium 9.5 8.6 - 10.0 mg/dL    Glucose 128 (H) 70 - 99 mg/dL   Hepatic function panel   Result Value Ref Range    Protein Total 8.0 6.4 - 8.3 g/dL    Albumin 4.1 3.5 - 5.2 g/dL    Bilirubin Total 0.4 <=1.2 mg/dL    Alkaline Phosphatase 137 40 - 150 U/L    AST 63 (H) 0 - 45 U/L    ALT 26 0 - 50 U/L    Bilirubin Direct <0.20 0.00 - 0.30 mg/dL   Result Value Ref Range    Lipase 32 13 - 60 U/L   Result Value Ref Range    Procalcitonin 3.31 (H) <0.50 ng/mL   CBC with platelets and differential   Result Value Ref Range    WBC Count 10.6 4.0 - 11.0 10e3/uL    RBC Count 3.93 3.80 - 5.20 10e6/uL    Hemoglobin 11.3 (L) 11.7 - 15.7 g/dL    Hematocrit 33.3 (L) 35.0 - 47.0 %    MCV 85 78 - 100 fL    MCH 28.8 26.5 - 33.0 pg    MCHC 33.9 31.5 - 36.5 g/dL    RDW 13.2 10.0 - 15.0 %    Platelet  Count 159 150 - 450 10e3/uL    % Neutrophils 87 %    % Lymphocytes 7 %    % Monocytes 5 %    % Eosinophils 0 %    % Basophils 0 %    % Immature Granulocytes 1 %    NRBCs per 100 WBC 0 <1 /100    Absolute Neutrophils 9.2 (H) 1.6 - 8.3 10e3/uL    Absolute Lymphocytes 0.7 (L) 0.8 - 5.3 10e3/uL    Absolute Monocytes 0.6 0.0 - 1.3 10e3/uL    Absolute Eosinophils 0.0 0.0 - 0.7 10e3/uL    Absolute Basophils 0.0 0.0 - 0.2 10e3/uL    Absolute Immature Granulocytes 0.1 <=0.4 10e3/uL    Absolute NRBCs 0.0 10e3/uL       EKG:  Performed at: 2:31 PM    Impression: Normal sinus rhythm. Possible left atrial enlargement. Left ventricular hypertrophy with repolarization abnormality. Prolonged QT.    Rate: 83 bpm  Rhythm: sinus  Axis: 74  VT Interval: 154 ms  QRS Interval: 86 ms  QTc Interval: 495 ms  Comparison: 9/1/23, Repolarization abnormality is more prominent.  I have independently reviewed and interpreted the EKG(s) documented above.        The creation of this record is based on the scribe s observations of the work being performed by Raine Soares MD and the provider s statements to them. It was created on her behalf by Barbara Hanley, a trained medical scribe. This document has been checked and approved by the attending provider.    Raine Soares MD  Emergency Medicine  [facility] EMERGENCY DEPARTMENT  [facility] [facility] [id number] [phone]  Dept: [phone]       Raine Soares MD  05/03/24 1800

## 2024-05-03 NOTE — ED NOTES
[facility] ED Handoff Report    ED Chief Complaint: Diarrhea    ED Diagnosis:  (K52.9) Colitis  Comment:   Plan:     (R19.7) Diarrhea of presumed infectious origin  Comment:   Plan:     (R50.9) Fever, unspecified fever cause  Comment:   Plan:     (N18.6,  Z99.2) ESRD on hemodialysis (H)  Comment:   Plan:        PMH:    Past Medical History:   Diagnosis Date    Anemia in chronic kidney disease     Chewing tobacco use     ESRD (end stage renal disease) on dialysis (H)     dialysis start date 11/28/2021 per 2728 form    Hypertension     Poor dentition     Secondary renal hyperparathyroidism (H24)     Type 2 diabetes mellitus (H)         Code Status:  Full Code     Falls Risk: No Band: Not applicable    Current Living Situation/Residence: lives with their son or daughter     Elimination Status: Continent: Yes     Activity Level: SBA    Patients Preferred Language:  Other: Scott     Needed: Yes    Vital Signs:  /60   Pulse 77   Temp 99.6  F (37.6  C) (Oral)   Resp 18   Wt 43.1 kg (95 lb)   LMP  (LMP Unknown)   SpO2 95%   BMI 19.86 kg/m       Cardiac Rhythm: NSR    Pain Score: 0/10    Is the Patient Confused:  No    Last Food or Drink: 05/03/24 at family bringing food now    Focused Assessment:  Pt is Karenni speaking, daughter has been present and translating. Pt is a dialysis pt with left upper arm fistula. Pt has been having diarrhea and abd pain for 3 days. Colitis seen on CT scan. 1st dose Zosyn given. Pt ambulates to bathroom with SBA.     Tests Performed: Done: Labs and Imaging    Treatments Provided:  Abx    Family Dynamics/Concerns: No    Family Updated On Visitor Policy: Yes    Plan of Care Communicated to Family: Yes    Who Was Updated about Plan of Care: daughter    Belongings Checklist Done and Signed by Patient: Yes    Belongings Sent with Patient: clothing    Medications sent with patient: None    Covid: asymptomatic , not tested due to being tested at clinic, double  checked with ED provider    Additional Information:     RN: Roberta Qiu RN   5/3/2024 6:29 PM

## 2024-05-03 NOTE — ED TRIAGE NOTES
The pt arrives from from urgent care with fever, diarrhea and abd pain. Per the pts daughter she has had a fever and diarrhea for the last three days. Was sent here from the clinic due to being on dialysis and needing more complex care. Was given tylenol at  for a fever of 102.      Triage Assessment (Adult)       Row Name 05/03/24 9278          Triage Assessment    Airway WDL WDL        Cognitive/Neuro/Behavioral WDL    Cognitive/Neuro/Behavioral WDL WDL

## 2024-05-04 ENCOUNTER — TRANSFERRED RECORDS (OUTPATIENT)
Dept: HEALTH INFORMATION MANAGEMENT | Facility: CLINIC | Age: 59
End: 2024-05-04

## 2024-05-04 LAB
ANION GAP SERPL CALCULATED.3IONS-SCNC: 13 MMOL/L (ref 7–15)
BUN SERPL-MCNC: 27 MG/DL (ref 6–20)
C DIFF TOX B STL QL: NEGATIVE
CALCIUM SERPL-MCNC: 8.4 MG/DL (ref 8.6–10)
CHLORIDE SERPL-SCNC: 96 MMOL/L (ref 98–107)
CREAT SERPL-MCNC: 8.3 MG/DL (ref 0.51–0.95)
CRP SERPL-MCNC: 148.7 MG/L
DEPRECATED HCO3 PLAS-SCNC: 22 MMOL/L (ref 22–29)
EGFRCR SERPLBLD CKD-EPI 2021: 5 ML/MIN/1.73M2
ERYTHROCYTE [DISTWIDTH] IN BLOOD BY AUTOMATED COUNT: 13.6 % (ref 10–15)
GLUCOSE SERPL-MCNC: 109 MG/DL (ref 70–99)
HBA1C MFR BLD: 5 %
HCT VFR BLD AUTO: 32.7 % (ref 35–47)
HGB BLD-MCNC: 10.8 G/DL (ref 11.7–15.7)
MCH RBC QN AUTO: 28.3 PG (ref 26.5–33)
MCHC RBC AUTO-ENTMCNC: 33 G/DL (ref 31.5–36.5)
MCV RBC AUTO: 86 FL (ref 78–100)
PLATELET # BLD AUTO: 137 10E3/UL (ref 150–450)
POTASSIUM SERPL-SCNC: 4.2 MMOL/L (ref 3.4–5.3)
RBC # BLD AUTO: 3.82 10E6/UL (ref 3.8–5.2)
SARS-COV-2 RNA RESP QL NAA+PROBE: NEGATIVE
SODIUM SERPL-SCNC: 131 MMOL/L (ref 135–145)
WBC # BLD AUTO: 8.9 10E3/UL (ref 4–11)

## 2024-05-04 PROCEDURE — 36415 COLL VENOUS BLD VENIPUNCTURE: CPT

## 2024-05-04 PROCEDURE — 120N000001 HC R&B MED SURG/OB

## 2024-05-04 PROCEDURE — 85027 COMPLETE CBC AUTOMATED: CPT

## 2024-05-04 PROCEDURE — C9113 INJ PANTOPRAZOLE SODIUM, VIA: HCPCS | Performed by: INTERNAL MEDICINE

## 2024-05-04 PROCEDURE — 99233 SBSQ HOSP IP/OBS HIGH 50: CPT | Performed by: INTERNAL MEDICINE

## 2024-05-04 PROCEDURE — 86140 C-REACTIVE PROTEIN: CPT | Performed by: INTERNAL MEDICINE

## 2024-05-04 PROCEDURE — 99254 IP/OBS CNSLTJ NEW/EST MOD 60: CPT | Performed by: INTERNAL MEDICINE

## 2024-05-04 PROCEDURE — 83036 HEMOGLOBIN GLYCOSYLATED A1C: CPT | Performed by: INTERNAL MEDICINE

## 2024-05-04 PROCEDURE — 87493 C DIFF AMPLIFIED PROBE: CPT | Performed by: INTERNAL MEDICINE

## 2024-05-04 PROCEDURE — 80048 BASIC METABOLIC PNL TOTAL CA: CPT

## 2024-05-04 PROCEDURE — 250N000013 HC RX MED GY IP 250 OP 250 PS 637

## 2024-05-04 PROCEDURE — 250N000011 HC RX IP 250 OP 636

## 2024-05-04 PROCEDURE — 250N000011 HC RX IP 250 OP 636: Performed by: INTERNAL MEDICINE

## 2024-05-04 RX ADMIN — PIPERACILLIN AND TAZOBACTAM 3.38 G: 3; .375 INJECTION, POWDER, FOR SOLUTION INTRAVENOUS at 22:55

## 2024-05-04 RX ADMIN — ONDANSETRON 4 MG: 2 INJECTION INTRAMUSCULAR; INTRAVENOUS at 00:45

## 2024-05-04 RX ADMIN — METOPROLOL TARTRATE 50 MG: 25 TABLET, FILM COATED ORAL at 08:12

## 2024-05-04 RX ADMIN — ATORVASTATIN CALCIUM 40 MG: 40 TABLET, FILM COATED ORAL at 21:32

## 2024-05-04 RX ADMIN — HEPARIN SODIUM 5000 UNITS: 10000 INJECTION, SOLUTION INTRAVENOUS; SUBCUTANEOUS at 05:40

## 2024-05-04 RX ADMIN — HEPARIN SODIUM 5000 UNITS: 10000 INJECTION, SOLUTION INTRAVENOUS; SUBCUTANEOUS at 14:09

## 2024-05-04 RX ADMIN — HEPARIN SODIUM 5000 UNITS: 10000 INJECTION, SOLUTION INTRAVENOUS; SUBCUTANEOUS at 21:32

## 2024-05-04 RX ADMIN — PANTOPRAZOLE SODIUM 40 MG: 40 INJECTION, POWDER, FOR SOLUTION INTRAVENOUS at 08:12

## 2024-05-04 RX ADMIN — METOPROLOL TARTRATE 50 MG: 25 TABLET, FILM COATED ORAL at 21:31

## 2024-05-04 RX ADMIN — CALCIUM ACETATE 667 MG: 667 CAPSULE ORAL at 08:12

## 2024-05-04 RX ADMIN — CALCIUM ACETATE 667 MG: 667 CAPSULE ORAL at 19:26

## 2024-05-04 RX ADMIN — CALCIUM ACETATE 667 MG: 667 CAPSULE ORAL at 12:09

## 2024-05-04 RX ADMIN — AMLODIPINE BESYLATE 10 MG: 5 TABLET ORAL at 08:12

## 2024-05-04 ASSESSMENT — ACTIVITIES OF DAILY LIVING (ADL)
ADLS_ACUITY_SCORE: 32
ADLS_ACUITY_SCORE: 31
ADLS_ACUITY_SCORE: 32
ADLS_ACUITY_SCORE: 31
ADLS_ACUITY_SCORE: 31
ADLS_ACUITY_SCORE: 32
ADLS_ACUITY_SCORE: 31
ADLS_ACUITY_SCORE: 32
ADLS_ACUITY_SCORE: 31
ADLS_ACUITY_SCORE: 31
ADLS_ACUITY_SCORE: 32
ADLS_ACUITY_SCORE: 31
ADLS_ACUITY_SCORE: 32

## 2024-05-04 NOTE — PLAN OF CARE
Problem: Adult Inpatient Plan of Care  Goal: Optimal Comfort and Wellbeing  Outcome: Progressing     Problem: Infection  Goal: Absence of Infection Signs and Symptoms  Outcome: Progressing     Problem: Nausea and Vomiting  Goal: Nausea and Vomiting Relief  Outcome: Progressing  Intervention: Prevent and Manage Nausea and Vomiting  Recent Flowsheet Documentation  Taken 5/4/2024 0040 by Ren Montalvo, RN  Nausea/Vomiting Interventions: antiemetic   Goal Outcome Evaluation:    A/Ox4. Karenni speaking, family in room interpreted.     VSS on RA. Afebrile.    Denied pain.    Nausea and emesis at start of shift; notified Dr. Parsons and received order for PRN zofran; zofran given with improvement.     Having diarrhea at start of shift, stool sample collected.    Slept between cares; no BM or emesis in latter half of shift.            Ren Montalvo, RN

## 2024-05-04 NOTE — PLAN OF CARE
Problem: Adult Inpatient Plan of Care  Goal: Absence of Hospital-Acquired Illness or Injury  Intervention: Prevent Infection  Recent Flowsheet Documentation  Taken 5/3/2024 2000 by Cailtyn Stapleton, RN  Infection Prevention:   equipment surfaces disinfected   hand hygiene promoted   single patient room provided     Problem: Adult Inpatient Plan of Care  Goal: Absence of Hospital-Acquired Illness or Injury  Intervention: Identify and Manage Fall Risk  Intervention: Prevent Infection     Problem: Adult Inpatient Plan of Care  Goal: Plan of Care Review  Outcome: Progressing  5/3/2024 2200 by Caitlyn Stapleton, RN  Outcome: Progressing   Goal Outcome Evaluation:    1900... Pt was admitted via the ED, a/o x4, speaks Scott but daughter has interpreting, she also spending the night. She has not had any loose stool, denied pain, up with assist of 1 to the bathroom. Pt is on dialysis MWF, she had a run today.  Pt had a loose/watery stool at the end of the shift, sample was collected and sent to lab. She also had an emesis, MD notified.

## 2024-05-04 NOTE — PROVIDER NOTIFICATION
Lab called at 0754 and said stool came back + for camplyobacter species. Text paged MD Parsons and updated him. He sent a message back at 0823 that he put in an order for cdiff that was just sent. Will continue to monitor.     Kimmie Lo RN 5/4/2024 8:38 AM

## 2024-05-04 NOTE — PROGRESS NOTES
Chart reviewed - low risk for readmission. Patient is a 58 yo Karenni speaking female admitted yesterday with a diagnosis of colitis. She lives with family. Daughter Getachew presented with patient at admission. Patient does dialysis M,W,F as an outpatient. Anticipate patient with return home on discharge with family transport and support.    Mere Wheeler LGSW

## 2024-05-04 NOTE — PROGRESS NOTES
[facility]    PROGRESS NOTE - Hospitalist Service    Assessment and Plan    59 year old female with past medical history of ESRD on HD MWF (on transplant list), HTN, type 2 diabetes, anemia in chronic kidney disease, & HLD who presented to the ED with fever, diarrhea, and abdominal pain x 3 days.  And was admitted on 5/3/2024 for acute colitis.     Acute colitis  - Patient presented with fever, diarrhea and abdominal pain  - CT abdomen shows colitis extending from the cecum to the mid descending colon   - GI consult, appreciate input  - Positive stool culture for campylobacter   - Check C. difficile   - Continue IV steroid  - GI consult, appreciate input  - No plan for endoscopy at this point  - Monitor CRP    Nausea, vomiting and diarrhea  - Secondary to the above  - Improving  - Continue supportive care.  - Advance diet as tolerated    End-stage renal disease  - on HD MWF (on transplant list)  - Nephrology consult, appreciate input  - had full run yesterday; no indication for extra run today     Hyponatremia  - Chronic, secondary to renal failure  - Continue to monitor sodium    HTN  -Stable blood pressure  - PTA amlodipine, metoprolol    Chronic anemia  -Chronic kidney disease  -Stable hemoglobin     HLD  - PTA atorvastatin    50 MINUTES SPENT BY ME on the date of service doing chart review, history, exam, documentation & further activities per the note    Active Problems:    Diarrhea of presumed infectious origin    Colitis    ESRD on hemodialysis (H)    Fever, unspecified fever cause      VTE prophylaxis:  Heparin SQ  DIET: Orders Placed This Encounter      Combination Diet Regular Diet Adult      Disposition/Barriers to discharge: IV antibiotic, advancing diet, monitor abdominal pain  Code Status: Full Code    Subjective:  Via , Bu is feeling better today, tolerating liquid diet, no nausea or vomiting, daughter at bedside    PHYSICAL EXAM  Vitals:    05/03/24 1349   Weight:  43.1 kg (95 lb)     B/P:120/77 T:98.8 P:77 R:18     Intake/Output Summary (Last 24 hours) at 5/4/2024 1538  Last data filed at 5/4/2024 1239  Gross per 24 hour   Intake 720 ml   Output 400 ml   Net 320 ml      Body mass index is 20.56 kg/m .    Constitutional: awake, alert, cooperative, no apparent distress, and appears stated age  Respiratory: No increased work of breathing, good air exchange, clear to auscultation bilaterally, no crackles or wheezing  Cardiovascular: Normal apical impulse, regular rate and rhythm, normal S1 and S2, no S3 or S4, and no murmur noted  GI: No scars, normal bowel sounds, soft, non-distended, non-tender, no masses palpated, no hepatosplenomegally  Skin: no bruising or bleeding and normal skin color, texture, turgor  Musculoskeletal: There is no redness, warmth, or swelling of the joints.  Full range of motion noted.  no lower extremity pitting edema present  Neurologic: Awake, alert, oriented to name, place and time.  Cranial nerves II-XII are grossly intact.  Motor is 5 out of 5 bilaterally.   Sensory is intact.    Neuropsychiatric: Appropriate with examiner      PERTINENT LABS/IMAGING:    I have personally reviewed the following data over the past 24 hrs:    8.9  \   10.8 (L)   / 137 (L)     131 (L) 96 (L) 27.0 (H) /  109 (H)   4.2 22 8.30 (H) \     TSH: N/A T4: N/A A1C: 5.0     Procal: N/A CRP: 148.70 (H) Lactic Acid: N/A         Imaging results reviewed over the past 24 hrs:   No results found for this or any previous visit (from the past 24 hour(s)).    Discussed with patient, family, GI, nursing staff and discharge planner    Jaguar Parsons MD  [facility] Medicine Service  [phone]

## 2024-05-04 NOTE — CONSULTS
[facility]  [facility]   Nephrology Consultation/Initial Inpatient Care    [facility]   MRN: [id number]  : 1965   DOA: 5/3/2024     REASON FOR CONSULTATION: We are asked to see pt by Dr. Parsons    HISTORY OF PRESENT ILLNESS:59 year old female with ESRD on dialysis Q MWF who presented to ER last noc with complaint of fever and diarrhea and ab pain  It appears that she receives dialysis at [facility] dialysis unit under the care of [facility] and that she had a full run of dialysis yesterday  No recent issues with access   gets daugther on speaker phone; pt is overall feeling better; less diarrhea and belly pain; is eating and drinking small amounts; no CP or SOB      REVIEW OF SYSTEMS:  ROS was completely reviewed and otherwise negative and non-contributory    Past Medical History:   Diagnosis Date    Anemia in chronic kidney disease     Chewing tobacco use     ESRD (end stage renal disease) on dialysis (H)     dialysis start date 2021 per 2728 form    Hypertension     Poor dentition     Secondary renal hyperparathyroidism (H24)     Type 2 diabetes mellitus (H)        Social History     Socioeconomic History    Marital status: Single     Spouse name: Not on file    Number of children: Not on file    Years of education: Not on file    Highest education level: Not on file   Occupational History    Not on file   Tobacco Use    Smoking status: Never     Passive exposure: Never    Smokeless tobacco: Current     Types: Chew     Last attempt to quit: 2023    Tobacco comments:     Chewing tobacco    Vaping Use    Vaping status: Never Used   Substance and Sexual Activity    Alcohol use: Never    Drug use: Never    Sexual activity: Not on file   Other Topics Concern    Not on file   Social History Narrative    Not on file     Social Determinants of Health     Financial Resource Strain: Low Risk  (2023)    Financial Resource Strain      Within the past 12 months, have you or your family members you live with been unable to get utilities (heat, electricity) when it was really needed?: No   Food Insecurity: Low Risk  (12/7/2023)    Food Insecurity     Within the past 12 months, did you worry that your food would run out before you got money to buy more?: No     Within the past 12 months, did the food you bought just not last and you didn t have money to get more?: No   Transportation Needs: Low Risk  (12/7/2023)    Transportation Needs     Within the past 12 months, has lack of transportation kept you from medical appointments, getting your medicines, non-medical meetings or appointments, work, or from getting things that you need?: No   Physical Activity: Not on file   Stress: Not on file   Social Connections: Not on file   Interpersonal Safety: Not on file   Housing Stability: Low Risk  (12/7/2023)    Housing Stability     Do you have housing? : Yes     Are you worried about losing your housing?: No       Family History   Problem Relation Age of Onset    Breast Cancer Cousin     Coronary Artery Disease No family hx of     Diabetes No family hx of     Hypertension No family hx of        No Known Allergies    MEDICATIONS:  Current Facility-Administered Medications   Medication Dose Route Frequency Provider Last Rate Last Admin    amLODIPine (NORVASC) tablet 10 mg  10 mg Oral Daily LabolLinda landeros NP   10 mg at 05/04/24 0812    atorvastatin (LIPITOR) tablet 40 mg  40 mg Oral QPM LabLinda waite NP        calcium acetate (PHOSLO) capsule 667 mg  667 mg Oral TID w/meals LaboltLinda NP   667 mg at 05/04/24 0812    heparin ANTICOAGULANT injection 5,000 Units  5,000 Units Subcutaneous Q8H LabolLinda landeros NP   5,000 Units at 05/04/24 0540    metoprolol tartrate (LOPRESSOR) tablet 50 mg  50 mg Oral BID LaboltLinda NP   50 mg at 05/04/24 0812    pantoprazole (PROTONIX) IV push injection 40 mg  40 mg Intravenous Daily with  "breakfast Jaguar Parsons MD   40 mg at 05/04/24 0812    piperacillin-tazobactam (ZOSYN) 3.375 g vial to attach to  mL bag  3.375 g Intravenous Q12H LaboltLinda K, NP        sodium chloride (PF) 0.9% PF flush 3 mL  3 mL Intracatheter Q8H Labolt, Linda K, NP   3 mL at 05/04/24 0541    sodium chloride (PF) 0.9% PF flush 3 mL  3 mL Intracatheter Q8H Labolt, Linda K, NP   3 mL at 05/04/24 0814         PHYSICAL EXAM    /77 (BP Location: Right arm)   Pulse 77   Temp 98.8  F (37.1  C) (Oral)   Resp 18   Ht 1.448 m (4' 9\")   Wt 43.1 kg (95 lb)   LMP  (LMP Unknown)   SpO2 96%   BMI 20.56 kg/m        Intake/Output Summary (Last 24 hours) at 5/4/2024 0855  Last data filed at 5/4/2024 0547  Gross per 24 hour   Intake 120 ml   Output 200 ml   Net -80 ml       Alert/oriented x 3; awake and NAD  HEENT NC/AT; perrla; OP clear without lesions; mmm  Neck supple without LAD, TM  CV; RRR without rub or murmur  Lung: clear and equal; no extra sounds  Ab: soft and NT; not distended; normal bs  Ext: no edema and well perfused  Skin; no rash  Neuro; grossly intact    LABORATORIES    Last Renal Panel:  Sodium   Date Value Ref Range Status   05/04/2024 131 (L) 135 - 145 mmol/L Final     Comment:     Reference intervals for this test were updated on 09/26/2023 to more accurately reflect our healthy population. There may be differences in the flagging of prior results with similar values performed with this method. Interpretation of those prior results can be made in the context of the updated reference intervals.      Potassium   Date Value Ref Range Status   05/04/2024 4.2 3.4 - 5.3 mmol/L Final     Chloride   Date Value Ref Range Status   05/04/2024 96 (L) 98 - 107 mmol/L Final     Chloride (External)   Date Value Ref Range Status   07/13/2022 98 98 - 107 mEq/L Final     Carbon Dioxide (CO2)   Date Value Ref Range Status   05/04/2024 22 22 - 29 mmol/L Final     Anion Gap   Date Value Ref Range Status " "  05/04/2024 13 7 - 15 mmol/L Final     Glucose   Date Value Ref Range Status   05/04/2024 109 (H) 70 - 99 mg/dL Final     Urea Nitrogen   Date Value Ref Range Status   05/04/2024 27.0 (H) 6.0 - 20.0 mg/dL Final     Creatinine   Date Value Ref Range Status   05/04/2024 8.30 (H) 0.51 - 0.95 mg/dL Final     GFR Estimate   Date Value Ref Range Status   05/04/2024 5 (L) >60 mL/min/1.73m2 Final     Calcium   Date Value Ref Range Status   05/04/2024 8.4 (L) 8.6 - 10.0 mg/dL Final     Albumin   Date Value Ref Range Status   05/03/2024 4.1 3.5 - 5.2 g/dL Final     No components found for: \"URINE\"   Lab Results   Component Value Date    WBC 8.9 05/04/2024     Lab Results   Component Value Date    RBC 3.82 05/04/2024     Lab Results   Component Value Date    HGB 10.8 05/04/2024     Lab Results   Component Value Date    HCT 32.7 05/04/2024     No components found for: \"MCT\"  Lab Results   Component Value Date    MCV 86 05/04/2024     Lab Results   Component Value Date    MCH 28.3 05/04/2024     Lab Results   Component Value Date    MCHC 33.0 05/04/2024     Lab Results   Component Value Date    RDW 13.6 05/04/2024     Lab Results   Component Value Date     05/04/2024         I reviewed all labs    ASSESSMENT/PLAN:  59 year old female  ESRD; on HD Q MWF; had full run yesterday; no indication for extra run today  Fever and diarrhea; colitis on imaging; started on abx and GI consult requested--stool studies are positive for campylobacter--supportive cares recommended  Anemia;on NELLIE as OP; hgb in good range  Hyperlipid; on statin  Secondary hyperpara; on phoslo as binder  HTN; controlled on meds (norvasc and metoprolol)      Crystal Balderas MD  Nephrology  "

## 2024-05-04 NOTE — PLAN OF CARE
Goal Outcome Evaluation:      Plan of Care Reviewed With: patient    Overall Patient Progress: improving     Patient alert to person, place, and situation. Up with stand by assistance/independent in room. Daughter here in room to help interpret. Sent cdiff sample-stool loose and green. Had two stools during shift. COVID was negative. Denies pain and nausea. Will continue to monitor.     Kimmie Lo RN 5/4/2024 1:50 PM

## 2024-05-04 NOTE — CONSULTS
[facility] - Digestive Health Consultation     [facility]  [address]  [facility] [address]  59 year old female     Admission Date/Time: 5/3/2024  Primary Care Provider: Abhilash Cobb     We were asked to see the patient in consultation by Carlos NP for evaluation of Colitis.    ASSESSMENT:    Lyndsey Alejo is a 59 year old female with PMH of End-stage renal disease on HD, hypertension, diabetes type 2, hyperlipidemia who was admitted on 5/3/2024 for acute colitis as seen on CT with presenting symptoms of fevers, weakness, diarrhea, and abdominal pains.  Stool studies positive for Campylobacter.    # Diarrhea  # Weakness  # Colitis  3 to 4-day history of acute diarrhea with fever and abdominal pains.  CT scan showing uncomplicated colitis.  Stool studies positive for Campylobacter.    Suspect symptoms are due to Campylobacter infection, this typically should clear up on its own without need of antibiotics.  Supportive cares would be recommended at this time.    Doubtful of chronic inflammatory conditions like Crohn's or ulcerative colitis given acute onset of symptoms.  However, patient reports that she is scheduled for colonoscopy for CRC screening in the near future.  This should be able to evaluate for any underlying chronic mucosal diseases.    # ESRD  Cr up to 8 today. Nephrology following. Usually receives dialysis 3 days a week.     RECOMMENDATIONS:    Supportive cares.    No indications for antibiotics at this time.  Campylobacter is often a self-limited infection.  Antibiotics for Campylobacter treatment usually have little added benefit.    No indications for urgent colonoscopy at this time.  Recommend that she keep her appointment for outpatient colonoscopy as scheduled.    GI will sign off at this time.  Please contact us if there are any additional concerns.   Case discussed with Dr. Resendiz, please review MD addendum below.    40 minutes of total time was spent providing patient care, including  patient evaluation, reviewing documentation/test results, and     Torrey Kearney PA-C  [facility]  [phone]  ________________________________________________________________________        CC: Diarrhea, weakness     HPI:  Lyndsey Alejo is a 59 year old female with PMH of End-stage renal disease on HD, hypertension, diabetes type 2, hyperlipidemia who was admitted on 5/3/2024 for acute colitis as seen on CT with presenting symptoms of fevers, weakness, diarrhea, and abdominal pains.  Stool studies positive for Campylobacter.     Pt explains 3-day history of fevers, weakness, abdominal pains, and diarrhea.  She was doing fine prior to this.  She cannot identify any causes.  No changes in diet, lifestyle, or medications.  No sick contacts.  She was seen at dialysis and was recommended to be seen by urgent care for her symptoms.  Influenza workup negative.  Given persistent symptoms and progressive weakness, she came in to the ER for evaluation.    In the ER, patient had temp of 102.2F, now down to 98.8, normotensive.  CBC unremarkable, hemoglobin at baseline.  Sodium 130, creatinine 5.45 now up to 8.3.  LFTs normal.  CRP elevated at 148.7.  Lactic acid normal.  CT scan of the abdomen and pelvis noted long segment uncomplicated colitis from the cecum to the mid descending colon.  Stool studies positive for Campylobacter infection.    Today, patient states that she is feeling stronger.  Abdominal pains are less but still mildly present.  Diarrhea continues.  No bloody stools or melena.    Patient denies prior colonoscopy.  She mentions she is scheduled for colonoscopy for CRC screening in the coming weeks.    ROS: A comprehensive ten point review of systems was negative aside from those in mentioned in the HPI.      PAST MEDICAL HISTORY:  Patient Active Problem List    Diagnosis Date Noted    Diarrhea of presumed infectious origin 05/03/2024     Priority: Medium    Colitis 05/03/2024     Priority:  Medium    ESRD on hemodialysis (H) 05/03/2024     Priority: Medium    Fever, unspecified fever cause 05/03/2024     Priority: Medium    Physical deconditioning 11/14/2023     Priority: Medium    Cervical cancer screening 09/21/2023     Priority: Medium     Transplant listed for pt  09/13/23 NIL Pap, Neg HR HPV Plan cotest in 3 years per provider       Type 2 diabetes mellitus with chronic kidney disease on chronic dialysis, without long-term current use of insulin (H) 09/19/2023     Priority: Medium    Poor dentition 09/19/2023     Priority: Medium    Chewing tobacco use 09/19/2023     Priority: Medium    Anemia in chronic kidney disease 09/19/2023     Priority: Medium    Secondary renal hyperparathyroidism (H24) 09/19/2023     Priority: Medium    ESRD (end stage renal disease) on dialysis (H) 05/09/2023     Priority: Medium    Primary hypertension 05/09/2023     Priority: Medium    Neuropathy 08/02/2018     Priority: Medium    Elevated erythrocyte sedimentation rate 12/06/2017     Priority: Medium    Thyroid function test abnormal 12/06/2017     Priority: Medium     SOCIAL HISTORY:  Social History     Tobacco Use    Smoking status: Never     Passive exposure: Never    Smokeless tobacco: Current     Types: Chew     Last attempt to quit: 04/2023    Tobacco comments:     Chewing tobacco    Vaping Use    Vaping status: Never Used   Substance Use Topics    Alcohol use: Never    Drug use: Never     FAMILY HISTORY:  Family History   Problem Relation Age of Onset    Breast Cancer Cousin     Coronary Artery Disease No family hx of     Diabetes No family hx of     Hypertension No family hx of      ALLERGIES: No Known Allergies  MEDICATIONS:   Current Facility-Administered Medications   Medication Dose Route Frequency Provider Last Rate Last Admin    acetaminophen (TYLENOL) tablet 650 mg  650 mg Oral Q4H PRN Cary Dela Cruz MD        amLODIPine (NORVASC) tablet 10 mg  10 mg Oral Daily Linda Gonzalez NP   10 mg at  "05/03/24 1923    atorvastatin (LIPITOR) tablet 40 mg  40 mg Oral QPM Linda Gonzalez NP        calcium acetate (PHOSLO) capsule 667 mg  667 mg Oral TID w/meals LabLinda waite NP   667 mg at 05/03/24 1923    calcium carbonate (TUMS) chewable tablet 1,000 mg  1,000 mg Oral 4x Daily PRN Linda Gonzalez NP        heparin ANTICOAGULANT injection 5,000 Units  5,000 Units Subcutaneous Q8H Linda Gonzalez NP   5,000 Units at 05/04/24 0540    lidocaine (LMX4) cream   Topical Q1H PRN Linda Gonzalez NP        lidocaine 1 % 0.1-1 mL  0.1-1 mL Other Q1H PRN Linda Gonzalez NP        metoprolol tartrate (LOPRESSOR) tablet 50 mg  50 mg Oral BID Linda Gonzalez NP   50 mg at 05/03/24 1923    ondansetron (ZOFRAN) injection 4 mg  4 mg Intravenous Q6H PRN Jaguar Parsons MD   4 mg at 05/04/24 0045    pantoprazole (PROTONIX) IV push injection 40 mg  40 mg Intravenous Daily with breakfast Jaguar Parsons MD        piperacillin-tazobactam (ZOSYN) 3.375 g vial to attach to  mL bag  3.375 g Intravenous Q12H Linda Gonzalez NP        sodium chloride (PF) 0.9% PF flush 3 mL  3 mL Intracatheter q1 min prn Linda Gonzalez NP        sodium chloride (PF) 0.9% PF flush 3 mL  3 mL Intracatheter Q8H Linda Gonzalez NP   3 mL at 05/04/24 0541    sodium chloride (PF) 0.9% PF flush 3 mL  3 mL Intracatheter Q8H Linda Gonzalez NP   3 mL at 05/04/24 0039    sodium chloride (PF) 0.9% PF flush 3 mL  3 mL Intracatheter q1 min prn Linda Gonzalez NP           PHYSICAL EXAM:   /77 (BP Location: Right arm)   Pulse 77   Temp 98.8  F (37.1  C) (Oral)   Resp 18   Ht 1.448 m (4' 9\")   Wt 43.1 kg (95 lb)   LMP  (LMP Unknown)   SpO2 96%   BMI 20.56 kg/m     GEN: Alert, oriented x3, communicative and in NAD.  RENÉE: AT, anicteric, OP without erythema, exudate, or ulcers.    NECK: Supple.    LYMPH: No LAD noted.  HRT: RRR  LUNGS: CTA  ABD:  ND, +BS, Mild pain to palpation, no rebound,   SKIN: No " rash, jaundice or spider angiomata  MSKL: LE free of edema,  NEURO: CN grossly intact     ADDITIONAL DATA:   I reviewed the patient's new clinical lab test results.   Recent Labs   Lab Test 05/04/24  0555 05/03/24  1423 09/14/23  1225   WBC 8.9 10.6 7.2   HGB 10.8* 11.3* 10.8*   MCV 86 85 86   * 159 266   INR  --   --  1.00     Recent Labs   Lab Test 05/04/24  0555 05/03/24  1423 09/14/23  1225   POTASSIUM 4.2 3.3* 4.6   CHLORIDE 96* 93* 92*   CO2 22 22 25   BUN 27.0* 15.5 33.2*   CR 8.30* 5.45* 8.47*   ANIONGAP 13 15 15     Recent Labs   Lab Test 05/03/24 1423 09/14/23  1225 05/09/23  1107   ALBUMIN 4.1 4.9 4.8   BILITOTAL 0.4 0.6 0.5   ALT 26 7 24   AST 63* 14 23   LIPASE 32  --   --         IMAGING:  I reviewed the patient's new imaging results.        Narrative & Impression   EXAM: CT CHEST/ABDOMEN/PELVIS W CONTRAST  LOCATION: [facility]  DATE: 5/3/2024     INDICATION: Sepsis. Fever, diarrhea and abdominal pain abd pain.  COMPARISON: CT AP 11/28/2023, chest x-ray 9/14/2023  TECHNIQUE: CT scan of the chest, abdomen, and pelvis was performed following injection of IV contrast. Multiplanar reformats were obtained. Dose reduction techniques were used.   CONTRAST: isovue 370  46ml     FINDINGS: Respiratory motion artifact through most of the study.     LUNGS AND PLEURA: No evidence of a pneumonia or failure. No effusions.     MEDIASTINUM/AXILLAE: No adenopathy. Aorta and branches are normal. No central pulmonary emboli.       CORONARY ARTERY CALCIFICATION: None.     HEPATOBILIARY: Normal.     PANCREAS: Normal.     SPLEEN: Normal.     ADRENAL GLANDS: Normal.     KIDNEYS/BLADDER: Native kidneys are diminutive. No calculi or obstruction. There is a tiny cyst posteriorly in the left kidney that needs no follow-up.     BOWEL: The entire right, transverse and descending colon to the midportion is abnormal. There is submucosal edema throughout with again predominantly collapsed. No  pneumatosis, pericolonic stranding or mesenteric venous air. There are liquid fecal   contents in the rectosigmoid. No ascites. Small bowel and appendix are normal.     LYMPH NODES: Normal.     VASCULATURE: Mild arterial calcifications. No aneurysm. Vessels are patent.     PELVIC ORGANS: Normal.     MUSCULOSKELETAL: Prior laminectomies T12-T11 with epidural metallic closure at this level. Bone island along the left-sided T11.      Left subclavian venous stent with partial visualization of markedly enlarged venous collaterals in the left arm.                                                                      IMPRESSION:  1.  Patient has a long segment of uncomplicated colitis extending from the cecum to the mid descending colon.  2.  Most of the  colon is collapsed except for the rectosigmoid which has liquid fecal contents.  3.  Diminutive native kidneys.  4.  Other noncritical findings as noted above.

## 2024-05-05 ENCOUNTER — APPOINTMENT (OUTPATIENT)
Dept: OCCUPATIONAL THERAPY | Facility: HOSPITAL | Age: 59
End: 2024-05-05
Attending: INTERNAL MEDICINE
Payer: COMMERCIAL

## 2024-05-05 LAB
ALBUMIN SERPL BCG-MCNC: 3.7 G/DL (ref 3.5–5.2)
ALP SERPL-CCNC: 106 U/L (ref 40–150)
ALT SERPL W P-5'-P-CCNC: 20 U/L (ref 0–50)
ANION GAP SERPL CALCULATED.3IONS-SCNC: 14 MMOL/L (ref 7–15)
AST SERPL W P-5'-P-CCNC: 43 U/L (ref 0–45)
BILIRUB SERPL-MCNC: 0.5 MG/DL
BUN SERPL-MCNC: 41.1 MG/DL (ref 6–20)
CALCIUM SERPL-MCNC: 8.5 MG/DL (ref 8.6–10)
CHLORIDE SERPL-SCNC: 98 MMOL/L (ref 98–107)
CREAT SERPL-MCNC: 11.7 MG/DL (ref 0.51–0.95)
CRP SERPL-MCNC: 104.4 MG/L
DEPRECATED HCO3 PLAS-SCNC: 21 MMOL/L (ref 22–29)
EGFRCR SERPLBLD CKD-EPI 2021: 3 ML/MIN/1.73M2
ERYTHROCYTE [DISTWIDTH] IN BLOOD BY AUTOMATED COUNT: 13.8 % (ref 10–15)
GLUCOSE SERPL-MCNC: 98 MG/DL (ref 70–99)
HCT VFR BLD AUTO: 32.3 % (ref 35–47)
HGB BLD-MCNC: 10.5 G/DL (ref 11.7–15.7)
MCH RBC QN AUTO: 28.2 PG (ref 26.5–33)
MCHC RBC AUTO-ENTMCNC: 32.5 G/DL (ref 31.5–36.5)
MCV RBC AUTO: 87 FL (ref 78–100)
PLATELET # BLD AUTO: 149 10E3/UL (ref 150–450)
POTASSIUM SERPL-SCNC: 4.3 MMOL/L (ref 3.4–5.3)
PROT SERPL-MCNC: 7 G/DL (ref 6.4–8.3)
RBC # BLD AUTO: 3.73 10E6/UL (ref 3.8–5.2)
SODIUM SERPL-SCNC: 133 MMOL/L (ref 135–145)
WBC # BLD AUTO: 6.7 10E3/UL (ref 4–11)

## 2024-05-05 PROCEDURE — 86140 C-REACTIVE PROTEIN: CPT | Performed by: INTERNAL MEDICINE

## 2024-05-05 PROCEDURE — 80053 COMPREHEN METABOLIC PANEL: CPT | Performed by: INTERNAL MEDICINE

## 2024-05-05 PROCEDURE — 120N000001 HC R&B MED SURG/OB

## 2024-05-05 PROCEDURE — 99232 SBSQ HOSP IP/OBS MODERATE 35: CPT | Performed by: INTERNAL MEDICINE

## 2024-05-05 PROCEDURE — C9113 INJ PANTOPRAZOLE SODIUM, VIA: HCPCS | Performed by: INTERNAL MEDICINE

## 2024-05-05 PROCEDURE — 97530 THERAPEUTIC ACTIVITIES: CPT | Mod: GO

## 2024-05-05 PROCEDURE — 250N000011 HC RX IP 250 OP 636

## 2024-05-05 PROCEDURE — 250N000013 HC RX MED GY IP 250 OP 250 PS 637

## 2024-05-05 PROCEDURE — 85027 COMPLETE CBC AUTOMATED: CPT | Performed by: INTERNAL MEDICINE

## 2024-05-05 PROCEDURE — 97165 OT EVAL LOW COMPLEX 30 MIN: CPT | Mod: GO

## 2024-05-05 PROCEDURE — 250N000011 HC RX IP 250 OP 636: Performed by: INTERNAL MEDICINE

## 2024-05-05 PROCEDURE — 36415 COLL VENOUS BLD VENIPUNCTURE: CPT | Performed by: INTERNAL MEDICINE

## 2024-05-05 RX ADMIN — HEPARIN SODIUM 5000 UNITS: 10000 INJECTION, SOLUTION INTRAVENOUS; SUBCUTANEOUS at 05:30

## 2024-05-05 RX ADMIN — CALCIUM ACETATE 667 MG: 667 CAPSULE ORAL at 17:46

## 2024-05-05 RX ADMIN — AMLODIPINE BESYLATE 10 MG: 5 TABLET ORAL at 09:04

## 2024-05-05 RX ADMIN — METOPROLOL TARTRATE 50 MG: 25 TABLET, FILM COATED ORAL at 21:17

## 2024-05-05 RX ADMIN — HEPARIN SODIUM 5000 UNITS: 10000 INJECTION, SOLUTION INTRAVENOUS; SUBCUTANEOUS at 12:54

## 2024-05-05 RX ADMIN — CALCIUM ACETATE 667 MG: 667 CAPSULE ORAL at 12:54

## 2024-05-05 RX ADMIN — PIPERACILLIN AND TAZOBACTAM 3.38 G: 3; .375 INJECTION, POWDER, FOR SOLUTION INTRAVENOUS at 22:37

## 2024-05-05 RX ADMIN — PANTOPRAZOLE SODIUM 40 MG: 40 INJECTION, POWDER, FOR SOLUTION INTRAVENOUS at 09:05

## 2024-05-05 RX ADMIN — ATORVASTATIN CALCIUM 40 MG: 40 TABLET, FILM COATED ORAL at 21:17

## 2024-05-05 RX ADMIN — CALCIUM ACETATE 667 MG: 667 CAPSULE ORAL at 09:04

## 2024-05-05 RX ADMIN — HEPARIN SODIUM 5000 UNITS: 10000 INJECTION, SOLUTION INTRAVENOUS; SUBCUTANEOUS at 21:16

## 2024-05-05 RX ADMIN — METOPROLOL TARTRATE 50 MG: 25 TABLET, FILM COATED ORAL at 09:04

## 2024-05-05 RX ADMIN — PIPERACILLIN AND TAZOBACTAM 3.38 G: 3; .375 INJECTION, POWDER, FOR SOLUTION INTRAVENOUS at 10:13

## 2024-05-05 ASSESSMENT — ACTIVITIES OF DAILY LIVING (ADL)
ADLS_ACUITY_SCORE: 32
ADLS_ACUITY_SCORE: 31
ADLS_ACUITY_SCORE: 32
ADLS_ACUITY_SCORE: 31
ADLS_ACUITY_SCORE: 31
ADLS_ACUITY_SCORE: 32
ADLS_ACUITY_SCORE: 32
ADLS_ACUITY_SCORE: 31
ADLS_ACUITY_SCORE: 32

## 2024-05-05 NOTE — PROGRESS NOTES
05/05/24 1300   Appointment Info   Signing Clinician's Name / Credentials (PT) Denise Moreno DPT   Appointment Canceled Reason (PT) Other (see Cancel Comments row)   Appointment Cancel Comments (PT) no skilled PT needs at this time per OT, moving at her baseline

## 2024-05-05 NOTE — PROGRESS NOTES
[facility]    PROGRESS NOTE - Hospitalist Service    Assessment and Plan  59 year old female with past medical history of ESRD on HD MWF (on transplant list), HTN, type 2 diabetes, anemia in chronic kidney disease, & HLD who presented to the ED with fever, diarrhea, and abdominal pain x 3 days.  And was admitted on 5/3/2024 for acute colitis.      Acute colitis  - Patient presented with fever, diarrhea and abdominal pain  - CT abdomen shows colitis extending from the cecum to the mid descending colon   - GI consult, appreciate input  - Positive stool culture for campylobacter   - Negative C. difficile   - Continue IV Zosyn for 1 more day  - GI consult, appreciate input  - No plan for endoscopy at this point  - Improving CRP, continue to monitor     Nausea, vomiting and diarrhea  - Secondary to the above  - Improving  - Continue supportive care.  - Advance diet as tolerated     End-stage renal disease  - on HD MWF (on transplant list)  - Nephrology consult, appreciate input  - had full run yesterday; no indication for extra run today      Hyponatremia  - Chronic, secondary to renal failure  - Continue to monitor sodium     HTN  -Stable blood pressure  - PTA amlodipine, metoprolol     Chronic anemia  -Chronic kidney disease  -Stable hemoglobin     HLD  - PTA atorvastatin    Weakness and deconditioning  -Secondary to above   - PT/OT evaluation      40 MINUTES SPENT BY ME on the date of service doing chart review, history, exam, documentation & further activities per the note    Active Problems:    Diarrhea of presumed infectious origin    Colitis    ESRD on hemodialysis (H)    Fever, unspecified fever cause      VTE prophylaxis:  Heparin SQ  DIET: Orders Placed This Encounter      Renal Diet (dialysis)      Disposition/Barriers to discharge: IV antibiotic, monitor bowel function.  Plan for tomorrow discharge after dialysis  Code Status: Full Code    Subjective:  Via , Lyndsey is feeling  better, tolerating oral diet.  Family at bedside.  No nausea or vomiting.  No fever or chills.    PHYSICAL EXAM  Vitals:    05/03/24 1349   Weight: 43.1 kg (95 lb)     B/P:107/67 T:98 P:74 R:20     Intake/Output Summary (Last 24 hours) at 5/5/2024 1453  Last data filed at 5/5/2024 1149  Gross per 24 hour   Intake 600 ml   Output 0 ml   Net 600 ml      Body mass index is 20.56 kg/m .    Constitutional: awake, alert, cooperative, no apparent distress, and appears stated age  Respiratory: No increased work of breathing, good air exchange, clear to auscultation bilaterally, no crackles or wheezing  Cardiovascular: Normal apical impulse, regular rate and rhythm, normal S1 and S2, no S3 or S4, and no murmur noted  GI: No scars, normal bowel sounds, soft, non-distended, non-tender, no masses palpated, no hepatosplenomegally  Skin: no bruising or bleeding and normal skin color, texture, turgor  Musculoskeletal: There is no redness, warmth, or swelling of the joints.  Full range of motion noted.  no lower extremity pitting edema present  Neurologic: Awake, alert, oriented to name, place and time.  Cranial nerves II-XII are grossly intact.  Motor is 5 out of 5 bilaterally.   Sensory is intact.    Neuropsychiatric: Appropriate with examiner      PERTINENT LABS/IMAGING:    I have personally reviewed the following data over the past 24 hrs:    6.7  \   10.5 (L)   / 149 (L)     133 (L) 98 41.1 (H) /  98   4.3 21 (L) 11.70 (H) \     ALT: 20 AST: 43 AP: 106 TBILI: 0.5   ALB: 3.7 TOT PROTEIN: 7.0 LIPASE: N/A     Procal: N/A CRP: 104.40 (H) Lactic Acid: N/A         Imaging results reviewed over the past 24 hrs:   No results found for this or any previous visit (from the past 24 hour(s)).    Discussed with patient, family, nephrology, nursing staff and discharge planner    Jaguar Parsons MD  [facility] Medicine Service  [phone]

## 2024-05-05 NOTE — PLAN OF CARE
Goal Outcome Evaluation:      Plan of Care Reviewed With: patient    Overall Patient Progress: improving    Problem: Nausea and Vomiting  Goal: Nausea and Vomiting Relief  Outcome: Progressing     Problem: Infection  Goal: Absence of Infection Signs and Symptoms  Outcome: Progressing      Patient alert, up with stand by assistance with IV pole otherwise pretty independent. Karenni speaking, daughter here to help interpret. Denies pain, no nausea. Had one small loose bowel movement. IV zoysn running per orders. Will continue to monitor.     Kimmie Lo RN 5/5/2024 1:53 PM

## 2024-05-05 NOTE — PROGRESS NOTES
Occupational Therapy Discharge Summary    Reason for therapy discharge:    All goals and outcomes met, no further needs identified.    Progress towards therapy goal(s). See goals on Care Plan in [facility] electronic health record for goal details.  Goals met    Therapy recommendation(s):    No further therapy is recommended.       05/05/24 1315   Appointment Info   Signing Clinician's Name / Credentials (OT) Oneyda Marshall OTR/L       Present yes  (daughter via phone)   Language [facility]   Living Environment   People in Home spouse;child(leticia), adult  (daughter and daughter's family)   Current Living Arrangements apartment   Home Accessibility stairs to enter home   Number of Stairs, Main Entrance   (15)   Stair Railings, Main Entrance railings on both sides of stairs   Living Environment Comments uses cane PRN   Self-Care   Equipment Currently Used at Home cane, straight   Activity/Exercise/Self-Care Comment ind for BADLs with PRN assist. daughter is home during day.   Instrumental Activities of Daily Living (IADL)   IADL Comments assist for all IADLs from daughter   General Information   Onset of Illness/Injury or Date of Surgery 05/03/24   Referring Physician Jaguar Parsons MD   Patient/Family Therapy Goal Statement (OT) go home   Additional Occupational Profile Info/Pertinent History of Current Problem presents with fever, diarrhea, and abdominal pain x 3 days. And was admitted on 5/3/2024 for acute colitis. PMH ESRD on HD MWF (on transplant list), HTN, type 2 diabetes, anemia in chronic kidney disease, & HLD   Existing Precautions/Restrictions no known precautions/restrictions   Cognitive Status Examination   Affect/Mental Status (Cognitive) WFL   Follows Commands WFL   Pain Assessment   Patient Currently in Pain No   Range of Motion Comprehensive   General Range of Motion bilateral upper extremity ROM WFL   Strength Comprehensive (MMT)   Comment, General Manual Muscle Testing (MMT) Assessment  BUE WFL   Bed Mobility   Bed Mobility supine-sit;sit-supine   Supine-Sit [place] (Bed Mobility) independent   Sit-Supine [place] (Bed Mobility) independent   Transfers   Transfer Comments Supervision   Balance   Balance Comments supervision for mobility in room, reporting some fatigue   Activities of Daily Living   BADL Assessment/Intervention lower body dressing;toileting   Lower Body Dressing Assessment/Training   [place] Level (Lower Body Dressing) set up   Toileting   [place] Level (Toileting) independent   Clinical Impression   Criteria for Skilled Therapeutic Interventions Met (OT) Yes, treatment indicated   OT Diagnosis dec functional mobility   OT Problem List-Impairments impacting ADL problems related to;activity tolerance impaired   Assessment of Occupational Performance 1-3 Performance Deficits   Identified Performance Deficits community mobility, household mobility   Planned Therapy Interventions (OT) progressive activity/exercise   Clinical Decision Making Complexity (OT) problem focused assessment/low complexity   Risk & Benefits of therapy have been explained evaluation/treatment results reviewed;participants included;patient   OT Total Evaluation Time   OT Eval, Low Complexity Minutes (13106) 15   OT Goals   Therapy Frequency (OT) One time eval and treatment   OT Predicted Duration/Target Date for Goal Attainment 05/05/24   OT Goals OT Goal 1   OT: Goal 1 Pt will complete 5 min of dynamic activity with SBA to promote a return to BADL routines.   Therapeutic Activities   Therapeutic Activity Minutes (54710) 10   Symptoms noted during/after treatment none   Treatment Detail/Skilled Intervention Educ pt on pacing self according to activity tolerance. Following pt complete fonseca walk 150ft with SBA - assist for IV pole, and 6 steps up/down with SBA using R railing - hand hold assist to turn on step. Educ pt and daughter on continued daily walks to maintain and progress activity  tolerance while hospitalized and once home.   OT Discharge Planning   OT Plan d/c   OT Discharge Recommendation (DC Rec) home with assist   OT Rationale for DC Rec lives with supportive family and appears at baseline for BADLs   OT Brief overview of current status supervision d/t hospital environment   Total Session Time   Timed Code Treatment Minutes 10   Total Session Time (sum of timed and untimed services) 25

## 2024-05-05 NOTE — PROGRESS NOTES
[facility]  [facility]   Follow up    [facility]   MRN: [id number]  : 1965   DOA: 5/3/2024   CC: ESRD      Assessment and Plan:  59 year old female  ESRD; on HD Q MWF; next HD tomorrow  Fever and diarrhea; colitis on imaging; started on abx and GI consult requested--stool studies are positive for campylobacter--supportive cares recommended; clinically improving  Anemia;on NELLIE as OP; hgb in good range  Hyperlipid; on statin  Secondary hyperpara; on phoslo as binder  HTN; controlled on meds (norvasc and metoprolol)       Subjective  No events overnoc; able to eat some and looking brighter and stronger  Objective    Vital signs in last 24 hours  Temp:  [98  F (36.7  C)-98.8  F (37.1  C)] 98  F (36.7  C)  Pulse:  [68-75] 74  Resp:  [20] 20  BP: (101-115)/(59-68) 107/67  SpO2:  [95 %-96 %] 95 %      Intake/Output last 3 shifts  I/O last 3 completed shifts:  In: 840 [P.O.:840]  Out: 200 [Stool:200]  Intake/Output this shift:  No intake/output data recorded.    Physical Exam  Alert/oriented x 3, awake, NAD  CV: RRR without murmur or rub  Lung: clear and equal; no extra sounds  Ab: soft and NT; not distended; normal bs  Ext: no edema and well perfused  Skin; no rash    Pertinent Labs     Last Renal Panel:  Sodium   Date Value Ref Range Status   2024 133 (L) 135 - 145 mmol/L Final     Comment:     Reference intervals for this test were updated on 2023 to more accurately reflect our healthy population. There may be differences in the flagging of prior results with similar values performed with this method. Interpretation of those prior results can be made in the context of the updated reference intervals.      Potassium   Date Value Ref Range Status   2024 4.3 3.4 - 5.3 mmol/L Final     Chloride   Date Value Ref Range Status   2024 98 98 - 107 mmol/L Final     Chloride (External)   Date Value Ref Range Status   2022 98 98 - 107  mEq/L Final     Carbon Dioxide (CO2)   Date Value Ref Range Status   05/05/2024 21 (L) 22 - 29 mmol/L Final     Anion Gap   Date Value Ref Range Status   05/05/2024 14 7 - 15 mmol/L Final     Glucose   Date Value Ref Range Status   05/05/2024 98 70 - 99 mg/dL Final     Urea Nitrogen   Date Value Ref Range Status   05/05/2024 41.1 (H) 6.0 - 20.0 mg/dL Final     Creatinine   Date Value Ref Range Status   05/05/2024 11.70 (H) 0.51 - 0.95 mg/dL Final     GFR Estimate   Date Value Ref Range Status   05/05/2024 3 (L) >60 mL/min/1.73m2 Final     Calcium   Date Value Ref Range Status   05/05/2024 8.5 (L) 8.6 - 10.0 mg/dL Final     Albumin   Date Value Ref Range Status   05/05/2024 3.7 3.5 - 5.2 g/dL Final     Recent Labs   Lab 05/05/24  0716 05/04/24  0555 05/03/24  1423   HGB 10.5* 10.8* 11.3*          I reviewed all lab results  Crystal Balderas MD

## 2024-05-05 NOTE — PLAN OF CARE
Problem: Adult Inpatient Plan of Care  Goal: Optimal Comfort and Wellbeing  Outcome: Progressing     Problem: Infection  Goal: Absence of Infection Signs and Symptoms  Outcome: Progressing     Problem: Nausea and Vomiting  Goal: Nausea and Vomiting Relief  Outcome: Progressing   Goal Outcome Evaluation:    A/Ox4. Karenni speaking.    VSS.    Denied pain.     Denied nausea.    Independent in room. No diarrhea tonight.    Slept between cares.    Family in room overnight.              Ren Montalvo RN

## 2024-05-05 NOTE — PLAN OF CARE
Problem: Adult Inpatient Plan of Care  Goal: Plan of Care Review  Outcome: Progressing     Problem: Infection  Goal: Absence of Infection Signs and Symptoms  Outcome: Progressing     Problem: Adult Inpatient Plan of Care  Goal: Absence of Hospital-Acquired Illness or Injury  Intervention: Identify and Manage Fall Risk     Goal Outcome Evaluation:    [id number]..... Pt had a relatively quiet shift, up independently in her room, denied pain, remains on IV ABX, loose stool x1, no nausea/vomiting. Will continue to  monitor.

## 2024-05-06 VITALS
RESPIRATION RATE: 20 BRPM | HEART RATE: 78 BPM | TEMPERATURE: 98.2 F | SYSTOLIC BLOOD PRESSURE: 145 MMHG | BODY MASS INDEX: 20.49 KG/M2 | OXYGEN SATURATION: 96 % | HEIGHT: 57 IN | DIASTOLIC BLOOD PRESSURE: 72 MMHG | WEIGHT: 95 LBS

## 2024-05-06 LAB
ANION GAP SERPL CALCULATED.3IONS-SCNC: 17 MMOL/L (ref 7–15)
ATRIAL RATE - MUSE: 69 BPM
BUN SERPL-MCNC: 46 MG/DL (ref 6–20)
CALCIUM SERPL-MCNC: 8.3 MG/DL (ref 8.6–10)
CHLORIDE SERPL-SCNC: 98 MMOL/L (ref 98–107)
CREAT SERPL-MCNC: 13.82 MG/DL (ref 0.51–0.95)
CRP SERPL-MCNC: 53.9 MG/L
DEPRECATED HCO3 PLAS-SCNC: 19 MMOL/L (ref 22–29)
DIASTOLIC BLOOD PRESSURE - MUSE: NORMAL MMHG
EGFRCR SERPLBLD CKD-EPI 2021: 3 ML/MIN/1.73M2
GLUCOSE SERPL-MCNC: 91 MG/DL (ref 70–99)
INTERPRETATION ECG - MUSE: NORMAL
P AXIS - MUSE: 57 DEGREES
PLATELET # BLD AUTO: 150 10E3/UL (ref 150–450)
POTASSIUM SERPL-SCNC: 4.1 MMOL/L (ref 3.4–5.3)
PR INTERVAL - MUSE: 154 MS
QRS DURATION - MUSE: 92 MS
QT - MUSE: 452 MS
QTC - MUSE: 484 MS
R AXIS - MUSE: 80 DEGREES
SODIUM SERPL-SCNC: 134 MMOL/L (ref 135–145)
SYSTOLIC BLOOD PRESSURE - MUSE: NORMAL MMHG
T AXIS - MUSE: 61 DEGREES
VENTRICULAR RATE- MUSE: 69 BPM

## 2024-05-06 PROCEDURE — 250N000011 HC RX IP 250 OP 636: Performed by: INTERNAL MEDICINE

## 2024-05-06 PROCEDURE — 90935 HEMODIALYSIS ONE EVALUATION: CPT

## 2024-05-06 PROCEDURE — 99232 SBSQ HOSP IP/OBS MODERATE 35: CPT | Performed by: INTERNAL MEDICINE

## 2024-05-06 PROCEDURE — 93010 ELECTROCARDIOGRAM REPORT: CPT | Performed by: INTERNAL MEDICINE

## 2024-05-06 PROCEDURE — 86140 C-REACTIVE PROTEIN: CPT | Performed by: INTERNAL MEDICINE

## 2024-05-06 PROCEDURE — 36415 COLL VENOUS BLD VENIPUNCTURE: CPT | Performed by: INTERNAL MEDICINE

## 2024-05-06 PROCEDURE — 85049 AUTOMATED PLATELET COUNT: CPT

## 2024-05-06 PROCEDURE — 80048 BASIC METABOLIC PNL TOTAL CA: CPT | Performed by: INTERNAL MEDICINE

## 2024-05-06 PROCEDURE — 93005 ELECTROCARDIOGRAM TRACING: CPT

## 2024-05-06 PROCEDURE — 99239 HOSP IP/OBS DSCHRG MGMT >30: CPT | Performed by: INTERNAL MEDICINE

## 2024-05-06 PROCEDURE — 5A1D70Z PERFORMANCE OF URINARY FILTRATION, INTERMITTENT, LESS THAN 6 HOURS PER DAY: ICD-10-PCS | Performed by: INTERNAL MEDICINE

## 2024-05-06 PROCEDURE — 250N000013 HC RX MED GY IP 250 OP 250 PS 637: Performed by: INTERNAL MEDICINE

## 2024-05-06 PROCEDURE — 250N000011 HC RX IP 250 OP 636

## 2024-05-06 PROCEDURE — 250N000013 HC RX MED GY IP 250 OP 250 PS 637

## 2024-05-06 RX ORDER — PANTOPRAZOLE SODIUM 40 MG/1
40 TABLET, DELAYED RELEASE ORAL
Status: DISCONTINUED | OUTPATIENT
Start: 2024-05-06 | End: 2024-05-06 | Stop reason: HOSPADM

## 2024-05-06 RX ORDER — AZITHROMYCIN 250 MG/1
500 TABLET, FILM COATED ORAL ONCE
Qty: 2 TABLET | Refills: 0 | Status: COMPLETED | OUTPATIENT
Start: 2024-05-06 | End: 2024-05-06

## 2024-05-06 RX ORDER — ALBUMIN (HUMAN) 12.5 G/50ML
25 SOLUTION INTRAVENOUS
Status: DISCONTINUED | OUTPATIENT
Start: 2024-05-06 | End: 2024-05-06 | Stop reason: HOSPADM

## 2024-05-06 RX ORDER — AZITHROMYCIN 250 MG/1
250 TABLET, FILM COATED ORAL DAILY
Qty: 4 TABLET | Refills: 0 | Status: DISCONTINUED | OUTPATIENT
Start: 2024-05-07 | End: 2024-05-06 | Stop reason: HOSPADM

## 2024-05-06 RX ORDER — HEPARIN SODIUM 1000 [USP'U]/ML
500 INJECTION, SOLUTION INTRAVENOUS; SUBCUTANEOUS CONTINUOUS
Status: DISCONTINUED | OUTPATIENT
Start: 2024-05-06 | End: 2024-05-06 | Stop reason: HOSPADM

## 2024-05-06 RX ORDER — PANTOPRAZOLE SODIUM 40 MG/1
40 TABLET, DELAYED RELEASE ORAL DAILY
Qty: 90 TABLET | Refills: 0 | Status: SHIPPED | OUTPATIENT
Start: 2024-05-06 | End: 2024-08-04

## 2024-05-06 RX ORDER — AZITHROMYCIN 250 MG/1
250 TABLET, FILM COATED ORAL DAILY
Qty: 4 TABLET | Refills: 0 | Status: SHIPPED | OUTPATIENT
Start: 2024-05-07

## 2024-05-06 RX ADMIN — HEPARIN SODIUM 500 UNITS: 1000 INJECTION INTRAVENOUS; SUBCUTANEOUS at 14:12

## 2024-05-06 RX ADMIN — HEPARIN SODIUM 5000 UNITS: 10000 INJECTION, SOLUTION INTRAVENOUS; SUBCUTANEOUS at 05:16

## 2024-05-06 RX ADMIN — METOPROLOL TARTRATE 50 MG: 25 TABLET, FILM COATED ORAL at 09:07

## 2024-05-06 RX ADMIN — PANTOPRAZOLE SODIUM 40 MG: 40 TABLET, DELAYED RELEASE ORAL at 09:07

## 2024-05-06 RX ADMIN — ACETAMINOPHEN 650 MG: 325 TABLET ORAL at 17:15

## 2024-05-06 RX ADMIN — HEPARIN SODIUM 500 UNITS/HR: 1000 INJECTION INTRAVENOUS; SUBCUTANEOUS at 14:12

## 2024-05-06 RX ADMIN — AZITHROMYCIN DIHYDRATE 500 MG: 250 TABLET, FILM COATED ORAL at 09:09

## 2024-05-06 RX ADMIN — CALCIUM ACETATE 667 MG: 667 CAPSULE ORAL at 17:15

## 2024-05-06 RX ADMIN — CALCIUM ACETATE 667 MG: 667 CAPSULE ORAL at 09:08

## 2024-05-06 RX ADMIN — AMLODIPINE BESYLATE 10 MG: 5 TABLET ORAL at 09:09

## 2024-05-06 ASSESSMENT — ACTIVITIES OF DAILY LIVING (ADL)
ADLS_ACUITY_SCORE: 32

## 2024-05-06 NOTE — PLAN OF CARE
Problem: Adult Inpatient Plan of Care  Goal: Patient-Specific Goal (Individualized)  Outcome: Progressing     Problem: Infection  Goal: Absence of Infection Signs and Symptoms  Outcome: Progressing     Problem: Adult Inpatient Plan of Care  Goal: Absence of Hospital-Acquired Illness or Injury  Intervention: Identify and Manage Fall Risk  Intervention: Prevent Infection     Goal Outcome Evaluation:    [id number]..... Pt had a quiet shift, denied pain, no nausea reported. Up to the bathroom independently,  is at bedside. She remains on IV ABX, will continue to monitor.

## 2024-05-06 NOTE — PLAN OF CARE
Problem: Adult Inpatient Plan of Care  Goal: Optimal Comfort and Wellbeing  5/6/2024 0417 by Ren Montalvo RN  Outcome: Progressing  5/6/2024 0417 by Ren Montalvo RN  Outcome: Progressing     Problem: Infection  Goal: Absence of Infection Signs and Symptoms  5/6/2024 0417 by Ren Montalvo RN  Outcome: Progressing  5/6/2024 0417 by Ren Montalvo RN  Outcome: Progressing     Problem: Nausea and Vomiting  Goal: Nausea and Vomiting Relief  5/6/2024 0417 by Ren Montalvo RN  Outcome: Progressing  5/6/2024 0417 by Ren Montalvo RN  Outcome: Progressing   Goal Outcome Evaluation:    Alert.    VSS.    Denied pain and nausea.    Independent in room.     Slept between cares,  in room overnight.    Dialysis scheduled today.            Ren Montalvo, TYLER

## 2024-05-06 NOTE — PLAN OF CARE
Pt set to have dialysis this afternoon. She will be able to discharge home once dialysis is complete.   IV antibiotics changed to PO.

## 2024-05-06 NOTE — PROGRESS NOTES
Renal progress note  CC:ESRD  Assessment and Plan:  59-year-old female with past medical history of ESRD on intermittent hemodialysis Monday Wednesday Friday history of hypertension diabetes and anemia presented to the ER with ongoing fever diarrhea and abdominal pain diagnosed with Campylobacter diarrhea, nephrology consulted for ESRD and volume management    ESRD; on HD Q MWF; okay to discharge after dialysis today;  nephrology; Krish Mcdermott  Hyponatremia likely hypervolemic with possible some excess GI losses  HTN , on Amlodipine and Metoprolol  Fever and diarrhea; colitis on imaging; started on abx and GI consult requested--stool studies are positive for campylobacter--supportive cares recommended; clinically improving  Anemia;on NELLIE as OP; hgb in good range  Hyperlipid; on statin  Secondary hyperpara; on phoslo as binder       Thank you for the consultation we will follow  Hazel Hernández MD  [facility]  [phone]      Subjective  Seen at bedside  Diarrhea resolving  Plans for HD and then discharge today  No additional complaints    Objective    Vital signs in last 24 hours  Temp:  [98  F (36.7  C)-98.1  F (36.7  C)] 98  F (36.7  C)  Pulse:  [63-74] 66  Resp:  [20] 20  BP: (110-124)/(64-71) 121/70  SpO2:  [92 %-98 %] 98 %  Weight:   [unfilled]    Intake/Output last 3 shifts  I/O last 3 completed shifts:  In: 400 [P.O.:400]  Out: 0   Intake/Output this shift:  No intake/output data recorded.    Physical Exam  Alert/oriented x 3, awake, NAD  CV: RRR without murmur or rub  Lung: clear and equal; no extra sounds  Ab: soft and NT; not distended; normal bs  Ext: no edema and well perfused  Skin; no rash    Pertinent Labs   Lab Results   Component Value Date    WBC 6.7 05/05/2024    HGB 10.5 (L) 05/05/2024    HCT 32.3 (L) 05/05/2024    MCV 87 05/05/2024     05/06/2024     Lab Results   Component Value Date    BUN 46.0 (H) 05/06/2024     (L) 05/06/2024    CO2  "19 (L) 05/06/2024       Lab Results   Component Value Date    ALBUMIN 3.7 05/05/2024     No results found for: \"PHOS\"  I reviewed all lab results  Hazel Hernández MD   "

## 2024-05-06 NOTE — DISCHARGE SUMMARY
[facility]  Hospitalist Discharge Summary      Date of Admission:  5/3/2024  Date of Discharge:  5/6/2024  Discharging Provider: Jaguar Parsons MD  Discharge Service: Hospitalist Service    Discharge Diagnoses   Acute colitis, positive campylobacter in stool culture  Nausea, vomiting and diarrhea, resolved  End-stage renal disease on hemodialysis  Chronic hyponatremia  Hypertension  Hyperlipidemia  Weakness and deconditioning    Clinically Significant Risk Factors          Follow-ups Needed After Discharge   Follow-up Appointments     Follow-up and recommended labs and tests       Follow up with primary care provider, Abhilash Cobb, within 7 days for   hospital follow- up.  No follow up labs or test are needed.    Follow up with nephrologist as scheduled for dialysis            Unresulted Labs Ordered in the Past 30 Days of this Admission       Date and Time Order Name Status Description    5/3/2024  1:54 PM Blood Culture Peripheral Blood Preliminary     5/3/2024  1:54 PM Blood Culture Line, venous Preliminary         These results will be followed up by PCP    Discharge Disposition   Discharged to home  Condition at discharge: Stable    Hospital Course   59 year old female with past medical history of ESRD on HD MWF (on transplant list), HTN, type 2 diabetes, anemia in chronic kidney disease, & HLD who presented to the ED with fever, diarrhea, and abdominal pain x 3 days.  And was admitted on 5/3/2024 for acute colitis.  Please refer to H&P for details     Acute colitis  - Patient presented with fever, diarrhea and abdominal pain  - CT abdomen shows colitis extending from the cecum to the mid descending colon   - GI consult, appreciate input  - Positive stool culture for campylobacter   - Negative C. difficile   -Improving with IV Zosyn   - GI consult, appreciate input  - No plan for endoscopy at this point  - Improving CRP,   -Transition to oral antibiotic with Zithromax to finish course as  outpatient     Nausea, vomiting and diarrhea  - Secondary to the above  - Improving  - Continue supportive care.  - Advance diet as tolerated, patient tolerated regular diet before discharge     End-stage renal disease  - on HD MWF (on transplant list)  - Nephrology consult, appreciate input  -Resumed dialysis on regular schedule as before.    Hyponatremia  - Chronic, secondary to renal failure  - Continue to monitor sodium     HTN  -Stable blood pressure  - PTA amlodipine, metoprolol    GERD  -Change Pepcid to PPI because of renal function     Chronic anemia  -Chronic kidney disease  -Stable hemoglobin     HLD  - PTA atorvastatin     Weakness and deconditioning  -Secondary to above   - PT/OT evaluation  -Stable for home discharge.    Discussed with patient, family, nephrology, nursing staff and discharge planner.    Consultations This Hospital Stay   GASTROENTEROLOGY IP CONSULT  NEPHROLOGY IP CONSULT  INFECTIOUS DISEASES IP CONSULT  INFECTION PREVENTION IP CONSULT  PHYSICAL THERAPY ADULT IP CONSULT  OCCUPATIONAL THERAPY ADULT IP CONSULT    Code Status   Full Code    Time Spent on this Encounter   IJaguar MD, personally saw the patient today and spent greater than 30 minutes discharging this patient.       Jaguar Parsons MD  [place] [id number]  Phone: [phone]  Fax: [phone]  ______________________________________________________________________    Physical Exam   Vital Signs: Temp: 98  F (36.7  C) Temp src: Bladder BP: 130/67 Pulse: 67   Resp: 20 SpO2: 98 % O2 Device: None (Room air)    Weight: 95 lbs 0 oz  Constitutional: awake, alert, cooperative, no apparent distress, and appears stated age  Respiratory: No increased work of breathing, good air exchange, clear to auscultation bilaterally, no crackles or wheezing  Cardiovascular: Normal apical impulse, regular rate and rhythm, normal S1 and S2, no S3 or S4, and no murmur noted  GI:  No scars, normal bowel sounds, soft, non-distended, non-tender, no masses palpated, no hepatosplenomegally  Skin: no bruising or bleeding and normal skin color, texture, turgor  Musculoskeletal: There is no redness, warmth, or swelling of the joints.  Full range of motion noted.  no lower extremity pitting edema present  Neurologic: Awake, alert, oriented to name, place and time.  Cranial nerves II-XII are grossly intact.  Motor is 5 out of 5 bilaterally.   Sensory is intact.    Neuropsychiatric: Appropriate with examiner       Primary Care Physician   Abhilash Cobb    Discharge Orders      Reason for your hospital stay    Gastroenteritis with positive campylobacter in stool culture     Follow-up and recommended labs and tests     Follow up with primary care provider, Abhilash Cobb, within 7 days for hospital follow- up.  No follow up labs or test are needed.    Follow up with nephrologist as scheduled for dialysis     Activity    Your activity upon discharge: activity as tolerated     Diet    Follow this diet upon discharge: Orders Placed This Encounter      Renal Diet (dialysis)       Significant Results and Procedures   Most Recent 3 CBC's:  Recent Labs   Lab Test 05/06/24  0748 05/05/24  0716 05/04/24  0555 05/03/24  1423   WBC  --  6.7 8.9 10.6   HGB  --  10.5* 10.8* 11.3*   MCV  --  87 86 85    149* 137* 159     Most Recent 3 BMP's:  Recent Labs   Lab Test 05/06/24  0748 05/05/24  0716 05/04/24  0555   * 133* 131*   POTASSIUM 4.1 4.3 4.2   CHLORIDE 98 98 96*   CO2 19* 21* 22   BUN 46.0* 41.1* 27.0*   CR 13.82* 11.70* 8.30*   ANIONGAP 17* 14 13   CAMRON 8.3* 8.5* 8.4*   GLC 91 98 109*   ,   Results for orders placed or performed during the hospital encounter of 05/03/24   CT Chest/Abdomen/Pelvis w Contrast    Narrative    EXAM: CT CHEST/ABDOMEN/PELVIS W CONTRAST  LOCATION: [facility]  DATE: 5/3/2024    INDICATION: Sepsis. Fever, diarrhea and abdominal pain abd pain.  COMPARISON:  CT AP 11/28/2023, chest x-ray 9/14/2023  TECHNIQUE: CT scan of the chest, abdomen, and pelvis was performed following injection of IV contrast. Multiplanar reformats were obtained. Dose reduction techniques were used.   CONTRAST: isovue 370  46ml    FINDINGS: Respiratory motion artifact through most of the study.    LUNGS AND PLEURA: No evidence of a pneumonia or failure. No effusions.    MEDIASTINUM/AXILLAE: No adenopathy. Aorta and branches are normal. No central pulmonary emboli.      CORONARY ARTERY CALCIFICATION: None.    HEPATOBILIARY: Normal.    PANCREAS: Normal.    SPLEEN: Normal.    ADRENAL GLANDS: Normal.    KIDNEYS/BLADDER: Native kidneys are diminutive. No calculi or obstruction. There is a tiny cyst posteriorly in the left kidney that needs no follow-up.    BOWEL: The entire right, transverse and descending colon to the midportion is abnormal. There is submucosal edema throughout with again predominantly collapsed. No pneumatosis, pericolonic stranding or mesenteric venous air. There are liquid fecal   contents in the rectosigmoid. No ascites. Small bowel and appendix are normal.    LYMPH NODES: Normal.    VASCULATURE: Mild arterial calcifications. No aneurysm. Vessels are patent.    PELVIC ORGANS: Normal.    MUSCULOSKELETAL: Prior laminectomies T12-T11 with epidural metallic closure at this level. Bone island along the left-sided T11.     Left subclavian venous stent with partial visualization of markedly enlarged venous collaterals in the left arm.      Impression    IMPRESSION:  1.  Patient has a long segment of uncomplicated colitis extending from the cecum to the mid descending colon.  2.  Most of the  colon is collapsed except for the rectosigmoid which has liquid fecal contents.  3.  Diminutive native kidneys.  4.  Other noncritical findings as noted above.       Discharge Medications   Current Discharge Medication List        START taking these medications    Details   azithromycin (ZITHROMAX)  250 MG tablet Take 1 tablet (250 mg) by mouth daily  Qty: 4 tablet, Refills: 0    Associated Diagnoses: Diarrhea of presumed infectious origin      pantoprazole (PROTONIX) 40 MG EC tablet Take 1 tablet (40 mg) by mouth daily for 90 days  Qty: 90 tablet, Refills: 0    Associated Diagnoses: Gastroesophageal reflux disease without esophagitis           CONTINUE these medications which have NOT CHANGED    Details   acetaminophen (TYLENOL) 325 MG tablet Take 650 mg by mouth every 6 hours as needed for mild pain      amLODIPine (NORVASC) 10 MG tablet Take 1 tablet (10 mg) by mouth daily at 2 pm  Qty: 90 tablet, Refills: 3    Associated Diagnoses: Benign essential hypertension      atorvastatin (LIPITOR) 40 MG tablet Take 1 tablet (40 mg) by mouth daily  Qty: 90 tablet, Refills: 3    Associated Diagnoses: Hyperlipidemia LDL goal <100      calcium acetate (PHOSLO) 667 MG CAPS capsule Take 1 capsule (667 mg) by mouth 3 times daily (with meals)  Qty: 270 capsule, Refills: 3    Associated Diagnoses: ESRD (end stage renal disease) on dialysis (H)      FEROSUL 325 (65 Fe) MG tablet TAKE 1 TABLET BY MOUTH ONE TIME EACH DAY WITH BREAKFAST  Qty: 90 tablet, Refills: 3    Associated Diagnoses: ESRD (end stage renal disease) on dialysis (H)      metoprolol tartrate (LOPRESSOR) 50 MG tablet Take 1 tablet (50 mg) by mouth 2 times daily  Qty: 180 tablet, Refills: 3    Associated Diagnoses: Benign essential hypertension      bisacodyl (DULCOLAX) 5 MG EC tablet Take 2 tablets at 3 pm the day before your procedure. If your procedure is before 11 am, take 2 additional tablets at 11 pm. If your procedure is after 11 am, take 2 additional tablets at 6 am. For additional instructions refer to your colonoscopy prep instructions.  Qty: 4 tablet, Refills: 0    Associated Diagnoses: Special screening for malignant neoplasms, colon      polyethylene glycol (GOLYTELY) 236 g suspension The night before the exam at 6 pm drink an 8-ounce glass every 15  minutes until the jug is half empty. If you arrive before 11 AM: Drink the other half of the [employer]ly jug at 11 PM night before procedure. If you arrive after 11 AM: Drink the other half of the [employer]ly jug at 6 AM day of procedure. For additional instructions refer to your colonoscopy prep instructions.  Qty: 4000 mL, Refills: 0    Comments: Pharmacy may substitute for equivalent.  Associated Diagnoses: Special screening for malignant neoplasms, colon           STOP taking these medications       famotidine (PEPCID) 20 MG tablet Comments:   Reason for Stopping:             Allergies   No Known Allergies

## 2024-05-06 NOTE — PLAN OF CARE
Goal Outcome Evaluation:       Patient discharged to home with daughter. Went over discharge paperwork with daughter who speaks and reads English-she lives with the patient. Addressed all questions. Patient denies pain at discharge to AV fistula and feels ready to go home. Pt stable at time of discharge. Daughter driving her home.     Kimmie Lo RN 5/6/2024 6:25 PM

## 2024-05-06 NOTE — PROGRESS NOTES
Potassium   Date Value Ref Range Status   05/06/2024 4.1 3.4 - 5.3 mmol/L Final     Hemoglobin   Date Value Ref Range Status   05/05/2024 10.5 (L) 11.7 - 15.7 g/dL Final     Creatinine   Date Value Ref Range Status   05/06/2024 13.82 (H) 0.51 - 0.95 mg/dL Final     Urea Nitrogen   Date Value Ref Range Status   05/06/2024 46.0 (H) 6.0 - 20.0 mg/dL Final     Sodium   Date Value Ref Range Status   05/06/2024 134 (L) 135 - 145 mmol/L Final     Comment:     Reference intervals for this test were updated on 09/26/2023 to more accurately reflect our healthy population. There may be differences in the flagging of prior results with similar values performed with this method. Interpretation of those prior results can be made in the context of the updated reference intervals.      INR   Date Value Ref Range Status   09/14/2023 1.00 0.85 - 1.15 Final       DIALYSIS PROCEDURE NOTE  Hepatitis status of previous patient on machine log was checked and verified ok to use with this patients hepatitis status.  Patient dialyzed for 3 hrs. on a K3 bath with a net fluid removal of  1.2L.  A BFR of 400 ml/min was obtained via a left arm AVfistula using 15 gauge needles.      The treatment plan was discussed with Dr. Hernández pre treatment.    Total heparin received during the treatment: 1400 units.   Needle cannulation sites held x 10 min.     Meds  given: none   Complications: pain to Avfistula access site. Pt requested to be taken off 13 minutes early. Dr. Hernández notified. Give pain med and iced pack and monitor closely. If pain persist after med and iced pack june GIBSON. Primary RN made aware of plans.      Person educated: pt/daughter. Knowledge base substantial. Barriers to learning: language. Educated on procedure via verbal mode. patient/family verbalized understanding.   ICEBOAT? Timeout performed pre-treatment  I: Patient was identified using 2 identifiers  C:  Consent Signed Yes  E: Equipment preventative maintenance is current and  dialysis delivery system OK to use  B: Hepatitis B Surface Antigen: negative; Draw Date: 9/14/2023      Hepatitis B Surface Antibody: Immune; Draw Date: 9/14/2023  O: Dialysis orders present and complete prior to treatment  A: Vascular access verified and assessed prior to treatment  T: Treatment was performed at a clinically appropriate time  ?: Patient was allowed to ask questions and address concerns prior to treatment  See Adult Hemodialysis flowsheet in [facility] for further details and post assessment.  Machine water alarm in place and functioning. Transducer pods intact and checked every 15min.   Pt assisted with repositioning throughout dialysis treatment.  Pt returned via NA bedside tx.  Chlorine/Chloramine water system checked every 4 hours.  Outpatient Dialysis at [facility]. MWF.      Post treatment report given to Kimmie QUIROS RN regarding 1.2L of fluid removed and access pain.    Reyna MILLER [facility], RN

## 2024-05-08 ENCOUNTER — ANESTHESIA EVENT (OUTPATIENT)
Dept: GASTROENTEROLOGY | Facility: CLINIC | Age: 59
End: 2024-05-08
Payer: COMMERCIAL

## 2024-05-08 LAB
BACTERIA BLD CULT: NO GROWTH
BACTERIA BLD CULT: NO GROWTH

## 2024-05-09 ENCOUNTER — ANESTHESIA (OUTPATIENT)
Dept: GASTROENTEROLOGY | Facility: CLINIC | Age: 59
End: 2024-05-09
Payer: COMMERCIAL

## 2024-05-09 ENCOUNTER — HOSPITAL ENCOUNTER (OUTPATIENT)
Facility: CLINIC | Age: 59
Discharge: HOME | End: 2024-05-09
Attending: INTERNAL MEDICINE | Admitting: INTERNAL MEDICINE
Payer: COMMERCIAL

## 2024-05-09 VITALS — OXYGEN SATURATION: 100 % | SYSTOLIC BLOOD PRESSURE: 156 MMHG | DIASTOLIC BLOOD PRESSURE: 81 MMHG

## 2024-05-09 DIAGNOSIS — K52.9 COLITIS: Primary | ICD-10-CM

## 2024-05-09 LAB
COLONOSCOPY: NORMAL
GLUCOSE BLDC GLUCOMTR-MCNC: 97 MG/DL (ref 70–99)

## 2024-05-09 PROCEDURE — 45378 DIAGNOSTIC COLONOSCOPY: CPT | Performed by: INTERNAL MEDICINE

## 2024-05-09 PROCEDURE — 250N000009 HC RX 250: Performed by: STUDENT IN AN ORGANIZED HEALTH CARE EDUCATION/TRAINING PROGRAM

## 2024-05-09 PROCEDURE — 45378 DIAGNOSTIC COLONOSCOPY: CPT | Performed by: REGISTERED NURSE

## 2024-05-09 PROCEDURE — 45380 COLONOSCOPY AND BIOPSY: CPT | Performed by: INTERNAL MEDICINE

## 2024-05-09 PROCEDURE — 82962 GLUCOSE BLOOD TEST: CPT

## 2024-05-09 PROCEDURE — 370N000017 HC ANESTHESIA TECHNICAL FEE, PER MIN: Performed by: INTERNAL MEDICINE

## 2024-05-09 PROCEDURE — 88342 IMHCHEM/IMCYTCHM 1ST ANTB: CPT | Mod: 26 | Performed by: PATHOLOGY

## 2024-05-09 PROCEDURE — 258N000003 HC RX IP 258 OP 636: Performed by: STUDENT IN AN ORGANIZED HEALTH CARE EDUCATION/TRAINING PROGRAM

## 2024-05-09 PROCEDURE — 45378 DIAGNOSTIC COLONOSCOPY: CPT | Performed by: STUDENT IN AN ORGANIZED HEALTH CARE EDUCATION/TRAINING PROGRAM

## 2024-05-09 PROCEDURE — 250N000011 HC RX IP 250 OP 636: Performed by: STUDENT IN AN ORGANIZED HEALTH CARE EDUCATION/TRAINING PROGRAM

## 2024-05-09 PROCEDURE — 88305 TISSUE EXAM BY PATHOLOGIST: CPT | Mod: 26 | Performed by: PATHOLOGY

## 2024-05-09 PROCEDURE — 88342 IMHCHEM/IMCYTCHM 1ST ANTB: CPT | Mod: TC | Performed by: INTERNAL MEDICINE

## 2024-05-09 RX ORDER — NALOXONE HYDROCHLORIDE 0.4 MG/ML
0.2 INJECTION, SOLUTION INTRAMUSCULAR; INTRAVENOUS; SUBCUTANEOUS
Status: DISCONTINUED | OUTPATIENT
Start: 2024-05-09 | End: 2024-05-09 | Stop reason: HOSPADM

## 2024-05-09 RX ORDER — NALOXONE HYDROCHLORIDE 0.4 MG/ML
0.4 INJECTION, SOLUTION INTRAMUSCULAR; INTRAVENOUS; SUBCUTANEOUS
Status: DISCONTINUED | OUTPATIENT
Start: 2024-05-09 | End: 2024-05-09 | Stop reason: HOSPADM

## 2024-05-09 RX ORDER — PROPOFOL 10 MG/ML
INJECTION, EMULSION INTRAVENOUS PRN
Status: DISCONTINUED | OUTPATIENT
Start: 2024-05-09 | End: 2024-05-09

## 2024-05-09 RX ORDER — ONDANSETRON 2 MG/ML
4 INJECTION INTRAMUSCULAR; INTRAVENOUS EVERY 30 MIN PRN
Status: DISCONTINUED | OUTPATIENT
Start: 2024-05-09 | End: 2024-05-09 | Stop reason: HOSPADM

## 2024-05-09 RX ORDER — LIDOCAINE HYDROCHLORIDE 20 MG/ML
INJECTION, SOLUTION INFILTRATION; PERINEURAL PRN
Status: DISCONTINUED | OUTPATIENT
Start: 2024-05-09 | End: 2024-05-09

## 2024-05-09 RX ORDER — ONDANSETRON 2 MG/ML
4 INJECTION INTRAMUSCULAR; INTRAVENOUS EVERY 6 HOURS PRN
Status: DISCONTINUED | OUTPATIENT
Start: 2024-05-09 | End: 2024-05-09 | Stop reason: HOSPADM

## 2024-05-09 RX ORDER — ONDANSETRON 4 MG/1
4 TABLET, ORALLY DISINTEGRATING ORAL EVERY 6 HOURS PRN
Status: DISCONTINUED | OUTPATIENT
Start: 2024-05-09 | End: 2024-05-09 | Stop reason: HOSPADM

## 2024-05-09 RX ORDER — PROCHLORPERAZINE MALEATE 5 MG
10 TABLET ORAL EVERY 6 HOURS PRN
Status: DISCONTINUED | OUTPATIENT
Start: 2024-05-09 | End: 2024-05-09 | Stop reason: HOSPADM

## 2024-05-09 RX ORDER — LIDOCAINE 40 MG/G
CREAM TOPICAL
Status: DISCONTINUED | OUTPATIENT
Start: 2024-05-09 | End: 2024-05-09 | Stop reason: HOSPADM

## 2024-05-09 RX ORDER — OXYCODONE HYDROCHLORIDE 5 MG/1
5 TABLET ORAL EVERY 4 HOURS PRN
Status: DISCONTINUED | OUTPATIENT
Start: 2024-05-09 | End: 2024-05-09 | Stop reason: HOSPADM

## 2024-05-09 RX ORDER — SODIUM CHLORIDE, SODIUM LACTATE, POTASSIUM CHLORIDE, CALCIUM CHLORIDE 600; 310; 30; 20 MG/100ML; MG/100ML; MG/100ML; MG/100ML
INJECTION, SOLUTION INTRAVENOUS CONTINUOUS PRN
Status: DISCONTINUED | OUTPATIENT
Start: 2024-05-09 | End: 2024-05-09

## 2024-05-09 RX ORDER — ONDANSETRON 2 MG/ML
4 INJECTION INTRAMUSCULAR; INTRAVENOUS
Status: DISCONTINUED | OUTPATIENT
Start: 2024-05-09 | End: 2024-05-09 | Stop reason: HOSPADM

## 2024-05-09 RX ORDER — PROPOFOL 10 MG/ML
INJECTION, EMULSION INTRAVENOUS CONTINUOUS PRN
Status: DISCONTINUED | OUTPATIENT
Start: 2024-05-09 | End: 2024-05-09

## 2024-05-09 RX ORDER — ACETAMINOPHEN 325 MG/1
975 TABLET ORAL EVERY 6 HOURS PRN
Status: DISCONTINUED | OUTPATIENT
Start: 2024-05-09 | End: 2024-05-09 | Stop reason: HOSPADM

## 2024-05-09 RX ORDER — NALOXONE HYDROCHLORIDE 0.4 MG/ML
0.1 INJECTION, SOLUTION INTRAMUSCULAR; INTRAVENOUS; SUBCUTANEOUS
Status: DISCONTINUED | OUTPATIENT
Start: 2024-05-09 | End: 2024-05-09 | Stop reason: HOSPADM

## 2024-05-09 RX ORDER — DEXAMETHASONE SODIUM PHOSPHATE 4 MG/ML
4 INJECTION, SOLUTION INTRA-ARTICULAR; INTRALESIONAL; INTRAMUSCULAR; INTRAVENOUS; SOFT TISSUE
Status: DISCONTINUED | OUTPATIENT
Start: 2024-05-09 | End: 2024-05-09 | Stop reason: HOSPADM

## 2024-05-09 RX ORDER — ONDANSETRON 4 MG/1
4 TABLET, ORALLY DISINTEGRATING ORAL EVERY 30 MIN PRN
Status: DISCONTINUED | OUTPATIENT
Start: 2024-05-09 | End: 2024-05-09 | Stop reason: HOSPADM

## 2024-05-09 RX ORDER — FLUMAZENIL 0.1 MG/ML
0.2 INJECTION, SOLUTION INTRAVENOUS
Status: DISCONTINUED | OUTPATIENT
Start: 2024-05-09 | End: 2024-05-09 | Stop reason: HOSPADM

## 2024-05-09 RX ADMIN — PROPOFOL 100 MCG/KG/MIN: 10 INJECTION, EMULSION INTRAVENOUS at 08:01

## 2024-05-09 RX ADMIN — LIDOCAINE HYDROCHLORIDE 40 MG: 20 INJECTION, SOLUTION INFILTRATION; PERINEURAL at 08:01

## 2024-05-09 RX ADMIN — PROPOFOL 30 MG: 10 INJECTION, EMULSION INTRAVENOUS at 08:01

## 2024-05-09 RX ADMIN — SODIUM CHLORIDE, POTASSIUM CHLORIDE, SODIUM LACTATE AND CALCIUM CHLORIDE: 600; 310; 30; 20 INJECTION, SOLUTION INTRAVENOUS at 07:59

## 2024-05-09 ASSESSMENT — ACTIVITIES OF DAILY LIVING (ADL)
ADLS_ACUITY_SCORE: 36
ADLS_ACUITY_SCORE: 38
ADLS_ACUITY_SCORE: 38

## 2024-05-09 NOTE — ANESTHESIA CARE TRANSFER NOTE
Patient: [facility]    Procedure: Procedure(s):  Colonoscopy       Diagnosis: Screen for colon cancer [Z12.11]  ESRD (end stage renal disease) on dialysis (H) [N18.6, Z99.2]  Diagnosis Additional Information: No value filed.    Anesthesia Type:   MAC     Note:    Oropharynx: oropharynx clear of all foreign objects and spontaneously breathing  Level of Consciousness: awake  Oxygen Supplementation: room air    Independent Airway: airway patency satisfactory and stable  Dentition: dentition unchanged  Vital Signs Stable: post-procedure vital signs reviewed and stable  Report to RN Given: handoff report given  Patient transferred to: Phase II    Handoff Report: Identifed the Patient, Identified the Reponsible Provider, Reviewed the pertinent medical history, Discussed the surgical course, Reviewed Intra-OP anesthesia mangement and issues during anesthesia, Set expectations for post-procedure period and Allowed opportunity for questions and acknowledgement of understanding      Vitals:  Vitals Value Taken Time   /81 05/09/24 0900   Temp     Pulse     Resp     SpO2 100 % 05/09/24 0902   Vitals shown include unfiled device data.    Electronically Signed By: BENTON Warner CRNA  May 9, 2024  11:42 AM

## 2024-05-09 NOTE — ANESTHESIA PREPROCEDURE EVALUATION
Anesthesia Pre-Procedure Evaluation    Patient: Lyndsey [facility]   MRN: [id number] : 1965        Procedure : Procedure(s):  Colonoscopy          Past Medical History:   Diagnosis Date    Anemia in chronic kidney disease     Chewing tobacco use     ESRD (end stage renal disease) on dialysis (H)     dialysis start date 2021 per 2728 form    Hypertension     Poor dentition     Secondary renal hyperparathyroidism (H24)     Type 2 diabetes mellitus (H)       Past Surgical History:   Procedure Laterality Date    BACK SURGERY      IR DIALYSIS FISTULOGRAM RIGHT  2023      No Known Allergies   Social History     Tobacco Use    Smoking status: Never     Passive exposure: Never    Smokeless tobacco: Current     Types: Chew     Last attempt to quit: 2023    Tobacco comments:     Chewing tobacco    Substance Use Topics    Alcohol use: Never      Wt Readings from Last 1 Encounters:   24 43.1 kg (95 lb)        Anesthesia Evaluation   Pt has had prior anesthetic.         ROS/MED HX  ENT/Pulmonary:  - neg pulmonary ROS     Neurologic:  - neg neurologic ROS     Cardiovascular:     (+)  hypertension- -   -  - -                                 Previous cardiac testing   Echo: Date: Results:    Stress Test:  Date:  Results:  nl  ECG Reviewed:  Date: Results:    Cath:  Date: Results:      METS/Exercise Tolerance:     Hematologic:     (+)      anemia,          Musculoskeletal:  - neg musculoskeletal ROS     GI/Hepatic: Comment: Recent colitis      Renal/Genitourinary:     (+) renal disease, type: ESRD, Pt requires dialysis, type: Hemodialysis,          Endo:       Psychiatric/Substance Use:  - neg psychiatric ROS     Infectious Disease:  - neg infectious disease ROS     Malignancy:  - neg malignancy ROS     Other:            Physical Exam    Airway             Respiratory Devices and Support         Dental       (+) Multiple visibly decayed, broken teeth      Cardiovascular             Pulmonary          "          OUTSIDE LABS:  CBC:   Lab Results   Component Value Date    WBC 6.7 05/05/2024    WBC 8.9 05/04/2024    HGB 10.5 (L) 05/05/2024    HGB 10.8 (L) 05/04/2024    HCT 32.3 (L) 05/05/2024    HCT 32.7 (L) 05/04/2024     05/06/2024     (L) 05/05/2024     BMP:   Lab Results   Component Value Date     (L) 05/06/2024     (L) 05/05/2024    POTASSIUM 4.1 05/06/2024    POTASSIUM 4.3 05/05/2024    CHLORIDE 98 05/06/2024    CHLORIDE 98 05/05/2024    CO2 19 (L) 05/06/2024    CO2 21 (L) 05/05/2024    BUN 46.0 (H) 05/06/2024    BUN 41.1 (H) 05/05/2024    CR 13.82 (H) 05/06/2024    CR 11.70 (H) 05/05/2024    GLC 91 05/06/2024    GLC 98 05/05/2024     COAGS:   Lab Results   Component Value Date    PTT 33 09/14/2023    INR 1.00 09/14/2023     POC: No results found for: \"BGM\", \"HCG\", \"HCGS\"  HEPATIC:   Lab Results   Component Value Date    ALBUMIN 3.7 05/05/2024    PROTTOTAL 7.0 05/05/2024    ALT 20 05/05/2024    AST 43 05/05/2024    ALKPHOS 106 05/05/2024    BILITOTAL 0.5 05/05/2024     OTHER:   Lab Results   Component Value Date    LACT 1.8 05/03/2024    A1C 5.0 05/04/2024    CAMRON 8.3 (L) 05/06/2024    LIPASE 32 05/03/2024    TSH 0.36 05/09/2023       Anesthesia Plan    ASA Status:  3    NPO Status:  NPO Appropriate    Anesthesia Type: MAC.     - Reason for MAC: straight local not clinically adequate   Induction: Intravenous.   Maintenance: TIVA.        Consents    Anesthesia Plan(s) and associated risks, benefits, and realistic alternatives discussed. Questions answered and patient/representative(s) expressed understanding.     - Discussed:     - Discussed with:  Patient,             Postoperative Care    Pain management: Multi-modal analgesia.   PONV prophylaxis: Background Propofol Infusion     Comments:               Jazmín Montejo MD    I have reviewed the pertinent notes and labs in the chart from the past 30 days and (re)examined the patient.  Any updates or changes from those notes " are reflected in this note.    # Hypokalemia: Lowest K = 3.3 mmol/L in last 30 days, will replace as needed  # Hyponatremia: Lowest Na = 130 mmol/L in last 30 days, will monitor as appropriate   # Hypocalcemia: Lowest Ca = 8.3 mg/dL in last 30 days, will monitor and replace as appropriate     # Acute Kidney Injury, unspecified: based on a >150% or 0.3 mg/dL increase in last creatinine compared to past 90 day average, will monitor renal function

## 2024-05-09 NOTE — ANESTHESIA POSTPROCEDURE EVALUATION
Patient: Lyndsey [facility]    Procedure: Procedure(s):  Colonoscopy       Anesthesia Type:  MAC    Note:  Disposition: Outpatient   Postop Pain Control: Uneventful            Sign Out: Well controlled pain   PONV: No   Neuro/Psych: Uneventful            Sign Out: Acceptable/Baseline neuro status   Airway/Respiratory: Uneventful            Sign Out: Acceptable/Baseline resp. status   CV/Hemodynamics: Uneventful            Sign Out: Acceptable CV status; No obvious hypovolemia; No obvious fluid overload   Other NRE: NONE   DID A NON-ROUTINE EVENT OCCUR? No           Last vitals:  Vitals Value Taken Time   /81 05/09/24 0900   Temp     Pulse     Resp     SpO2 100 % 05/09/24 0902   Vitals shown include unfiled device data.    Electronically Signed By: Jazmín Montejo MD  May 9, 2024  11:47 AM

## 2024-05-09 NOTE — ANESTHESIA CARE TRANSFER NOTE
Patient: [facility]    Procedure: Procedure(s):  Colonoscopy       Diagnosis: Screen for colon cancer [Z12.11]  ESRD (end stage renal disease) on dialysis (H) [N18.6, Z99.2]  Diagnosis Additional Information: No value filed.    Anesthesia Type:   No value filed.     Note:    Oropharynx: oropharynx clear of all foreign objects  Level of Consciousness: awake  Oxygen Supplementation: room air    Independent Airway: airway patency satisfactory and stable  Dentition: dentition unchanged  Vital Signs Stable: post-procedure vital signs reviewed and stable  Report to RN Given: handoff report given  Patient transferred to: Phase II    Handoff Report: Identifed the Patient, Identified the Reponsible Provider, Reviewed the pertinent medical history, Discussed the surgical course, Reviewed Intra-OP anesthesia mangement and issues during anesthesia, Set expectations for post-procedure period and Allowed opportunity for questions and acknowledgement of understanding      Vitals:  Vitals Value Taken Time   /65 05/09/24 0830   Temp     Pulse 70    Resp 14    SpO2 99 % 05/09/24 0831   Vitals shown include unfiled device data.    Electronically Signed By: Brian Hadfield, DMD  May 9, 2024  8:31 AM

## 2024-05-10 ENCOUNTER — HOSPITAL ENCOUNTER (OUTPATIENT)
Dept: RADIOLOGY | Facility: HOSPITAL | Age: 59
Discharge: HOME | End: 2024-05-10
Attending: FAMILY MEDICINE
Payer: COMMERCIAL

## 2024-05-10 ENCOUNTER — HOSPITAL ENCOUNTER (OUTPATIENT)
Dept: SPEECH THERAPY | Facility: HOSPITAL | Age: 59
Discharge: HOME | End: 2024-05-10
Attending: FAMILY MEDICINE
Payer: COMMERCIAL

## 2024-05-10 ENCOUNTER — TELEPHONE (OUTPATIENT)
Dept: FAMILY MEDICINE | Facility: CLINIC | Age: 59
End: 2024-05-10

## 2024-05-10 ENCOUNTER — TELEPHONE (OUTPATIENT)
Dept: FAMILY MEDICINE | Facility: CLINIC | Age: 59
End: 2024-05-10
Payer: COMMERCIAL

## 2024-05-10 DIAGNOSIS — R13.10 DYSPHAGIA, UNSPECIFIED TYPE: ICD-10-CM

## 2024-05-10 PROCEDURE — 92610 EVALUATE SWALLOWING FUNCTION: CPT | Mod: GN

## 2024-05-10 PROCEDURE — 92611 MOTION FLUOROSCOPY/SWALLOW: CPT | Mod: GN

## 2024-05-10 PROCEDURE — 74230 X-RAY XM SWLNG FUNCJ C+: CPT

## 2024-05-10 RX ORDER — BARIUM SULFATE 400 MG/ML
SUSPENSION ORAL ONCE
Status: COMPLETED | OUTPATIENT
Start: 2024-05-10 | End: 2024-05-10

## 2024-05-10 RX ADMIN — BARIUM SULFATE: 400 SUSPENSION ORAL at 10:22

## 2024-05-10 NOTE — PROGRESS NOTES
SPEECH LANGUAGE PATHOLOGY EVALUATION    See electronic medical record for Abuse and Falls Screening details.    Subjective      Presenting condition or subjective complaint: Pain on right side of her neck with swallowing, mostly when drinking water. She reports pain is sharp and feels like her muscles are very tight. When asked if she's had changes to her voice she states yes, her voice is different than it used to be.          Relevant medical history: GERD with esophagitis, without hemorrhage. Prescribed Protonix.      Objective     SWALLOW EVALUTION  Dysphagia history: no known oral pharyngeal dysphagia  Current Diet/Method of Nutritional Intake: thin liquids (level 0), regular diet        CLINICAL SWALLOW EVALUATION    History and current function information gathered through patient interview w/Fernando .  Oral Motor Function: generally intact  Dentition: natural dentition, adequate dentition  Secretion management: WFL  Mucosal quality: adequate  Mandibular function: intact  Oral labial function: WFL  Lingual function: WFL  Buccal function: intact  Laryngeal function: impaired vocal quality: hoarse, appears strained at times      VIDEOFLUOROSCOPIC SWALLOW STUDY  Radiologist: Dr Lara  Views Taken: left lateral, A/P   Physical location of procedure: [facility]    VFSS textures trialed:   VFSS Eval: Thin Liquids  Mode of Presentation: cup   Order of Presentation: 1, 2, 6  Preparatory Phase:  bolus holding and piecemeal swallows, appears intentional as she's anticipating a painful swallow. At times was timely and WNL.  Oral Phase: premature pharyngeal entry, primarily when yury bolus  Bolus Location When Swallow Initiated: posterior angle of ramus, pyriforms  Pharyngeal Phase: WFL  Rosenbeck's Penetration Aspiration Scale: 1 - no aspiration, contrast does not enter airway  Response to Aspiration:  NA  Strategies and Compensations: not applicable  Diagnostic Statement: bolus  transfers bilaterally and symmetrically through the pharynx    VFSS Eval: Purees  Mode of Presentation: spoon   Order of Presentation: 3  Preparatory Phase: holding  Oral Phase:  slow controlled AP transit  Bolus Location When Swallow Initiated: posterior angle of ramus  Pharyngeal Phase: WFL  Rosenbeck s Penetration Aspiration Scale: 1 - no aspiration, contrast does not enter airway  Response to Aspiration:  NA  Strategies and Compensations: not applicable  Diagnostic Statement: WFL    VFSS Eval: Solids  Mode of Presentation: spoon   Order of Presentation: 4, 5  Preparatory Phase:  appears to be mashing cracker with her tongue and hard palate   Oral Phase: premature pharyngeal entry  Bolus Location When Swallow Initiated: posterior laryngeal surface of epiglottis, pyriforms  Pharyngeal Phase: WFL   Rosenbeck s Penetration Aspiration Scale: 1 - no aspiration, contrast does not enter airway  Response to Aspiration:  NA  Strategies and Compensations: not applicable  Diagnostic Statement: slow oral phase, WFL     ESOPHAGEAL PHASE OF SWALLOW  Trace amount of barium coating the cervical esophagus after all swallows      SWALLOW ASSESSMENT CLINICAL IMPRESSIONS AND RATIONALE  Diet Consistency Recommendations: thin liquids (level 0), regular diet      Assessment & Plan   CLINICAL IMPRESSIONS       Impression/Assessment:  Patient presents with no significant oral pharyngeal dysphagia. Overall function and structure is WNL.She demonstrates oral holding and piecemeal swallows that appear to be intentional as she anticipates pain with the swallow. She also winces during some swallows and touches the right side of her neck just lateral to her larynx. Once swallow is triggered there is good tongue back retraction and hyolaryngeal movement. Epiglottis inverts to protect the airway. There is no residual pharyngeal stasis with any consistency. No laryngeal penetration or aspiration with any consistency. Upon AP view liquid boluses  move symmetrically through the pyriform sinuses. Boluses move through cricopharyngeus without difficulty. There is trace amounts of barium coating the cervical esophagus with each swallow.     PLAN OF CARE  Recommended Referrals to Other Professionals:  consider ENT consult due to reported neck pain during swallows and feelings of tightness in the muscles, vocal changes.    Education Assessment:   Learner/Method: Patient;Listening;Pictures/Video  Education Comments: Utilized discussion and imaging playback to educate patient on results of study    Risks and benefits of evaluation/treatment have been explained.   Patient/Family/caregiver agrees with Plan of Care.     Evaluation Time:    SLP Eval: oral/pharyngeal swallow function, clinical minutes (44573): 8  SLP Eval: VideoFluoroscopic Swallow function Minutes (49870): 22     Present: Yes: Language: Fernando, ID Number/Identifier: agency via language line      Signing Clinician: ISAIAS Ramos    [facility]                                                                                   OUTPATIENT SPEECH LANGUAGE PATHOLOGY

## 2024-05-10 NOTE — TELEPHONE ENCOUNTER
Forms/Letter Request    Type of form/letter: Medical opinion      Do we have the form/letter: Yes:     Who is the form from? Patient    Where did/will the form come from? Patient or family brought in       When is form/letter needed by: ASAP    How would you like the form/letter returned: Fax : [place] [facility]  Attn: Sophia CAITLYN [phone]     Patient Notified form requests are processed in 5-7 business days:Yes    Okay to leave a detailed message?: Yes at Cell number on file:    Telephone Information:   Mobile [phone]       Thank you,    Shani ALVES  Pt Rep

## 2024-05-14 LAB
ADV 40+41 DNA STL QL NAA+NON-PROBE: NEGATIVE
ASTRO TYP 1-8 RNA STL QL NAA+NON-PROBE: NEGATIVE
C CAYETANENSIS DNA STL QL NAA+NON-PROBE: NEGATIVE
CAMPYLOBACTER DNA SPEC NAA+PROBE: POSITIVE
CRYPTOSP DNA STL QL NAA+NON-PROBE: NEGATIVE
E COLI O157 DNA STL QL NAA+NON-PROBE: ABNORMAL
E HISTOLYT DNA STL QL NAA+NON-PROBE: NEGATIVE
EAEC ASTA GENE ISLT QL NAA+PROBE: NEGATIVE
EC STX1+STX2 GENES STL QL NAA+NON-PROBE: NEGATIVE
EPEC EAE GENE STL QL NAA+NON-PROBE: NEGATIVE
ETEC LTA+ST1A+ST1B TOX ST NAA+NON-PROBE: NEGATIVE
G LAMBLIA DNA STL QL NAA+NON-PROBE: NEGATIVE
NOROVIRUS GI+II RNA STL QL NAA+NON-PROBE: NEGATIVE
P SHIGELLOIDES DNA STL QL NAA+NON-PROBE: NEGATIVE
PATH REPORT.COMMENTS IMP SPEC: NORMAL
PATH REPORT.FINAL DX SPEC: NORMAL
PATH REPORT.GROSS SPEC: NORMAL
PATH REPORT.MICROSCOPIC SPEC OTHER STN: NORMAL
PATH REPORT.RELEVANT HX SPEC: NORMAL
PHOTO IMAGE: NORMAL
RVA RNA STL QL NAA+NON-PROBE: NEGATIVE
SALMONELLA SP RPOD STL QL NAA+PROBE: NEGATIVE
SAPO I+II+IV+V RNA STL QL NAA+NON-PROBE: NEGATIVE
SHIGELLA SP+EIEC IPAH ST NAA+NON-PROBE: NEGATIVE
V CHOLERAE DNA SPEC QL NAA+PROBE: NEGATIVE
VIBRIO DNA SPEC NAA+PROBE: NEGATIVE
Y ENTEROCOL DNA STL QL NAA+PROBE: NEGATIVE

## 2024-05-15 ENCOUNTER — OFFICE VISIT (OUTPATIENT)
Dept: FAMILY MEDICINE | Facility: CLINIC | Age: 59
End: 2024-05-15
Payer: COMMERCIAL

## 2024-05-15 VITALS
HEART RATE: 81 BPM | DIASTOLIC BLOOD PRESSURE: 81 MMHG | SYSTOLIC BLOOD PRESSURE: 132 MMHG | WEIGHT: 95.5 LBS | OXYGEN SATURATION: 97 % | HEIGHT: 57 IN | TEMPERATURE: 98 F | RESPIRATION RATE: 16 BRPM | BODY MASS INDEX: 20.6 KG/M2

## 2024-05-15 DIAGNOSIS — E11.22 TYPE 2 DIABETES MELLITUS WITH CHRONIC KIDNEY DISEASE ON CHRONIC DIALYSIS, WITHOUT LONG-TERM CURRENT USE OF INSULIN (H): ICD-10-CM

## 2024-05-15 DIAGNOSIS — N18.6 ESRD (END STAGE RENAL DISEASE) ON DIALYSIS (H): ICD-10-CM

## 2024-05-15 DIAGNOSIS — Z23 IMMUNIZATION DUE: ICD-10-CM

## 2024-05-15 DIAGNOSIS — N25.81 SECONDARY RENAL HYPERPARATHYROIDISM (H): ICD-10-CM

## 2024-05-15 DIAGNOSIS — Z99.2 TYPE 2 DIABETES MELLITUS WITH CHRONIC KIDNEY DISEASE ON CHRONIC DIALYSIS, WITHOUT LONG-TERM CURRENT USE OF INSULIN (H): ICD-10-CM

## 2024-05-15 DIAGNOSIS — I10 PRIMARY HYPERTENSION: ICD-10-CM

## 2024-05-15 DIAGNOSIS — K52.9 COLITIS: ICD-10-CM

## 2024-05-15 DIAGNOSIS — R19.7 DIARRHEA OF PRESUMED INFECTIOUS ORIGIN: ICD-10-CM

## 2024-05-15 DIAGNOSIS — Z09 HOSPITAL DISCHARGE FOLLOW-UP: Primary | ICD-10-CM

## 2024-05-15 DIAGNOSIS — Z99.2 ESRD (END STAGE RENAL DISEASE) ON DIALYSIS (H): ICD-10-CM

## 2024-05-15 DIAGNOSIS — N18.6 TYPE 2 DIABETES MELLITUS WITH CHRONIC KIDNEY DISEASE ON CHRONIC DIALYSIS, WITHOUT LONG-TERM CURRENT USE OF INSULIN (H): ICD-10-CM

## 2024-05-15 PROCEDURE — 90471 IMMUNIZATION ADMIN: CPT | Performed by: FAMILY MEDICINE

## 2024-05-15 PROCEDURE — 90715 TDAP VACCINE 7 YRS/> IM: CPT | Performed by: FAMILY MEDICINE

## 2024-05-15 PROCEDURE — 90480 ADMN SARSCOV2 VAC 1/ONLY CMP: CPT | Performed by: FAMILY MEDICINE

## 2024-05-15 PROCEDURE — 99214 OFFICE O/P EST MOD 30 MIN: CPT | Mod: 25 | Performed by: FAMILY MEDICINE

## 2024-05-15 PROCEDURE — 91320 SARSCV2 VAC 30MCG TRS-SUC IM: CPT | Performed by: FAMILY MEDICINE

## 2024-05-15 NOTE — PROGRESS NOTES
Assessment & Plan     Hospital discharge follow-up  Colitis - resolved  Diarrhea of presumed infectious origin - resolved  Completed outpatient course of azithro. Symptoms have resolved, no longer having diarrhea    Type 2 diabetes mellitus with chronic kidney disease on chronic dialysis, without long-term current use of insulin (H)  A1c always below goal, but with her ESRD and anemia of chronic disease, it would not be the most accurate way to control DM - glucose at home has been within normal limits. No changes today.     ESRD (end stage renal disease) on dialysis (H)  Secondary renal hyperparathyroidism (H24)  Follows with nephrology outpatient, dialysis 3 times per week.     Primary hypertension  Well controlled. Continue amlodipine.     Immunization due  - TDAP 10-64Y (ADACEL,BOOSTRIX)  - COVID-19 12+ (2023-24) (PFIZER)          MED REC REQUIRED  Post Medication Reconciliation Status: discharge medications reconciled, continue medications without change    No follow-ups on file.      Jacques Solorio is a 59 year old, presenting for the following health issues:  Hospital F/U    [facility]         Hospital Follow-up Visit:    Hospital/Nursing Home/IP Rehab Facility: [facility]  Date of Admission: 05/03/2024  Date of Discharge: 05/06/2024  Reason(s) for Admission: Acute colitis, positive campylobacter in stool culture  Nausea, vomiting and diarrhea, resolved  End-stage renal disease on hemodialysis  Chronic hyponatremia  Hypertension  Hyperlipidemia  Weakness and deconditioning    Was the patient in the ICU or did the patient experience delirium during hospitalization?  No  Do you have any other stressors you would like to discuss with your provider? No    Problems taking medications regularly:  None  Medication changes since discharge: None  Problems adhering to non-medication therapy:  None    Summary of hospitalization:  [employer] [employer] discharge summary reviewed  Diagnostic  "Tests/Treatments reviewed.  Follow up needed:    Continue dialysis three days per week.    Other Healthcare Providers Involved in Patient s Care:          Nephrology  Update since discharge: improved.         Plan of care communicated with patient, family, and caregiver                   Objective    /81   Pulse 81   Temp 98  F (36.7  C) (Oral)   Resp 16   Ht 1.448 m (4' 9\")   Wt 43.3 kg (95 lb 8 oz)   LMP  (LMP Unknown)   SpO2 97%   BMI 20.67 kg/m    Body mass index is 20.67 kg/m .  Physical Exam   GENERAL: alert and no distress  NECK: no adenopathy, no asymmetry, masses, or scars  RESP: lungs clear to auscultation - no rales, rhonchi or wheezes  CV: regular rate and rhythm, normal S1 S2, no S3 or S4, no murmur, click or rub, no peripheral edema  ABDOMEN: soft, nontender, no hepatosplenomegaly, no masses and bowel sounds normal  MS: no gross musculoskeletal defects noted, no edema    No results found for any visits on 05/15/24.  No results found for this or any previous visit (from the past 24 hour(s)).        Signed Electronically by: Abhilash Cobb MD    "

## 2024-05-16 ENCOUNTER — THERAPY VISIT (OUTPATIENT)
Dept: PHYSICAL THERAPY | Facility: REHABILITATION | Age: 59
End: 2024-05-16
Payer: COMMERCIAL

## 2024-05-16 DIAGNOSIS — Z99.2 ANEMIA IN CHRONIC KIDNEY DISEASE, ON CHRONIC DIALYSIS (H): ICD-10-CM

## 2024-05-16 DIAGNOSIS — D63.1 ANEMIA IN CHRONIC KIDNEY DISEASE, ON CHRONIC DIALYSIS (H): ICD-10-CM

## 2024-05-16 DIAGNOSIS — N18.6 ESRD (END STAGE RENAL DISEASE) ON DIALYSIS (H): ICD-10-CM

## 2024-05-16 DIAGNOSIS — Z99.2 ESRD (END STAGE RENAL DISEASE) ON DIALYSIS (H): ICD-10-CM

## 2024-05-16 DIAGNOSIS — R53.81 PHYSICAL DECONDITIONING: Primary | ICD-10-CM

## 2024-05-16 DIAGNOSIS — N18.6 ANEMIA IN CHRONIC KIDNEY DISEASE, ON CHRONIC DIALYSIS (H): ICD-10-CM

## 2024-05-16 PROCEDURE — 97110 THERAPEUTIC EXERCISES: CPT | Mod: GP | Performed by: PHYSICAL THERAPIST

## 2024-05-16 NOTE — PROGRESS NOTES
DISCHARGE  Reason for Discharge: Patient has met all goals.    Equipment Issued: therabnd     Discharge Plan: Patient to continue home program.    Referring Provider:  Abhilash Cobb     05/16/24 0500   Appointment Info   Signing clinician's name / credentials Tresa Guan, PT, DPT, CLT-INDIO   Visits Used 4   Medical Diagnosis ESRD (end stage renal disease) on dialysis; Physical deconditioning  Anemia in chronic kidney disease, on chronic dialysis   PT Tx Diagnosis ESRD (end stage renal disease) on dialysis; Physical deconditioning Anemia in chronic kidney disease, on chronic dialysis   Quick Adds Certification   Progress Note/Certification   Start of Care Date 04/04/24   Onset of illness/injury or Date of Surgery 04/02/24   Therapy Frequency 1 time per week to every 2-4 weeks   Predicted Duration up to 12 weeks   Certification date from 04/04/24   Certification date to 07/03/24   Progress Note Completed Date 04/04/24       Present Yes    Language [facility]     ID or First/Last Name Daughter in person - unable to get ipad    GOALS   PT Goals 2;3   PT Goal 1   Goal Identifier HEP   Goal Description Patient will be independent in a HEP for on going symptom management in 90 days   Goal Progress goal met   Target Date 07/03/24   Date Met 05/16/24   PT Goal 2   Goal Identifier 6MWT   Goal Description Patient will improve distance walked on 6MWT by 10% from initial value and reporting fatigue <5/10 following in order to improve activity tolerance and endurance in 90 days   Goal Progress goal met - increase by 120ft today   Target Date 07/03/24   Date Met 05/16/24   PT Goal 3   Goal Identifier 5x STS   Goal Description Patient will decrease time taken to complete 5x STS by 2 or more seconds in order to improve functional LE strength in 90 days   Goal Progress goal met, decrease from 11.03 sec to 6.57 sec   Target Date 07/03/24   Date Met 05/16/24   Subjective Report    Subjective Report she has been feeling good overall, tired in the legs but she is walking ever y day. Today is her last appointment, she is feeling good and ready to DC.   Objective Measure 1   Objective Measure 6 MWT   Details 1349 ft, 411 meters (increased from 1229ft) age norms 538 meters   Objective Measure 2   Objective Measure 5x STS test/30 sec sit to stands   Details 6.57 seconds; 25 reps standard chair with arms across the chest; decreas ein 5x sit to stand from 11.03 sec at initial   Treatment Interventions (PT)   Interventions Therapeutic Procedure/Exercise;Neuromuscular Re-education;Manual Therapy   Therapeutic Procedure/Exercise   Therapeutic Procedures: strength, endurance, ROM, flexibility minutes (16511) 45   Ther Proc 1 instruction and demonstrated of previous exercises and addition of exercises to HEP   Ther Proc 1 - Details NuStep WL 5 x 10 min, performed 30 sec sit to stands and 6 minute walk test   PTRx Ther Proc 3 Marching in Place   PTRx Ther Proc 3 - Details x 10 reps B, added red band around knees x 10 reps   PTRx Ther Proc 4 Standing Trunk Rotations with Theraband   PTRx Ther Proc 4 - Details reviewed for HEP   PTRx Ther Proc 5 Shoulder Theraband Low Row/Pulldown   PTRx Ther Proc 5 - Details reviewed for HEP   PTRx Ther Proc 11 - Details For LE mobility - nustep level 4, 8 minutes, total steps 680 with subjective measures taken. For LE mobility and strength - Seated piriformis stretch 30'' B. Standing quad stretch 30'' B. Time spent educating pt on continuation of HEP for strengthening at home and overall doing well.   Skilled Intervention Functional leg strength   Patient Response/Progress fatigued but tolerated well overall   Ther Proc 2 standing hip extension and abduction x 10 reps B each, added red band around knees x 10 reps each way   Therapeutic Procedures Ther Proc 2   Education   Learner/Method Patient;Family;Listening;Demonstration;Pictures/Video   Plan   Home program PTRx    Plan for next session dc today with I HEP   Comments   Comments Patent is doing well, has met all of her goals and his ready to DC todya   Total Session Time   Timed Code Treatment Minutes 45   Total Treatment Time (sum of timed and untimed services) 45     Tresa Guan, PT, DPT, CLT-INDIO

## 2024-06-11 ENCOUNTER — PATIENT OUTREACH (OUTPATIENT)
Dept: FAMILY MEDICINE | Facility: CLINIC | Age: 59
End: 2024-06-11
Payer: COMMERCIAL

## 2024-06-11 NOTE — TELEPHONE ENCOUNTER
Patient Quality Outreach    Patient is due for the following:   Breast Cancer Screening - Mammogram  Cervical Cancer Screening - PAP Needed  Physical Preventive Adult Physical      Topic Date Due    Hepatitis B Vaccine (1 of 3 - 19+ 3-dose series) Never done    Diptheria Tetanus Pertussis (DTAP/TDAP/TD) Vaccine (1 - Tdap) Never done    Zoster (Shingles) Vaccine (1 of 2) Never done    COVID-19 Vaccine (1 - 2023-24 season) Never done       Next Steps:   Patient was scheduled for OV, and will address at this visit    Type of outreach:    Will address at upcoming visit      Questions for provider review:    None           Elda Mccain MA

## 2024-06-17 ENCOUNTER — TELEPHONE (OUTPATIENT)
Dept: TRANSPLANT | Facility: CLINIC | Age: 59
End: 2024-06-17
Payer: COMMERCIAL

## 2024-06-17 NOTE — TELEPHONE ENCOUNTER
Reviewed chart for purpose of pre kidney transplant evaluation status.  Pt still needs to review pre transplant education over the phone with myself and needs a dental clearance note sent to our Office.     Returned a call today to Trish SCRUGGS at [facility], updated her as above.     Called patient's daughter, Getachew, updated has as above. She will have dental note sent to our Office. She states they have not watched the videos yet, I did send the video link in a My Chart message to them today. Getachew to watch the videos with her mother and call me when completed. Getachew expressed excellent understanding of all and was in good agreement with the plan.

## 2024-06-24 ENCOUNTER — OFFICE VISIT (OUTPATIENT)
Dept: FAMILY MEDICINE | Facility: CLINIC | Age: 59
End: 2024-06-24
Attending: FAMILY MEDICINE
Payer: COMMERCIAL

## 2024-06-24 VITALS
HEIGHT: 57 IN | HEART RATE: 66 BPM | SYSTOLIC BLOOD PRESSURE: 138 MMHG | RESPIRATION RATE: 16 BRPM | DIASTOLIC BLOOD PRESSURE: 74 MMHG | TEMPERATURE: 97.5 F | OXYGEN SATURATION: 98 % | BODY MASS INDEX: 20.72 KG/M2 | WEIGHT: 96.04 LBS

## 2024-06-24 DIAGNOSIS — N18.6 TYPE 2 DIABETES MELLITUS WITH CHRONIC KIDNEY DISEASE ON CHRONIC DIALYSIS, WITHOUT LONG-TERM CURRENT USE OF INSULIN (H): Primary | ICD-10-CM

## 2024-06-24 DIAGNOSIS — I87.8 VENOUS STASIS: ICD-10-CM

## 2024-06-24 DIAGNOSIS — E11.22 TYPE 2 DIABETES MELLITUS WITH CHRONIC KIDNEY DISEASE ON CHRONIC DIALYSIS, WITHOUT LONG-TERM CURRENT USE OF INSULIN (H): Primary | ICD-10-CM

## 2024-06-24 DIAGNOSIS — Z99.2 TYPE 2 DIABETES MELLITUS WITH CHRONIC KIDNEY DISEASE ON CHRONIC DIALYSIS, WITHOUT LONG-TERM CURRENT USE OF INSULIN (H): Primary | ICD-10-CM

## 2024-06-24 PROCEDURE — 99213 OFFICE O/P EST LOW 20 MIN: CPT | Performed by: FAMILY MEDICINE

## 2024-06-24 NOTE — PROGRESS NOTES
"  Assessment & Plan     Type 2 diabetes mellitus with chronic kidney disease on chronic dialysis, without long-term current use of insulin (H)  A1c 5% last visit, will recheck at next appt.       Venous stasis  Dependent edema, recommend compression stockings when on her feet all day but ok to take off when resting - just elevate feet.   - Compression Sleeve/Stocking Order for DME - ONLY FOR DME      Return in about 3 months (around 9/24/2024) for Routine preventive.  Too early for preventative and patient had no new concerns.       Subjective   Lyndsey is a 59 year old, presenting for the following health issues:  Kidney transplant and overall health      6/24/2024     3:11 PM   Additional Questions   Roomed by SANJAY HUNT MA   Accompanied by Daughter     History of Present Illness       Reason for visit:  Preventive visit    She eats 0-1 servings of fruits and vegetables daily.She consumes 0 sweetened beverage(s) daily.She exercises with enough effort to increase her heart rate 9 or less minutes per day.  She exercises with enough effort to increase her heart rate 3 or less days per week. She is missing 1 dose(s) of medications per week.  She is not taking prescribed medications regularly due to remembering to take.           Objective    BP (!) 143/73   Pulse 66   Temp 97.5  F (36.4  C) (Temporal)   Resp 16   Ht 1.448 m (4' 9\")   Wt 43.6 kg (96 lb 0.6 oz)   LMP  (LMP Unknown)   SpO2 98%   BMI 20.78 kg/m    Body mass index is 20.78 kg/m .  Physical Exam   GENERAL: alert and no distress  MS: no gross musculoskeletal defects noted. Bilateral nonpitting edema to ankles.             Signed Electronically by: Abhilash Cobb MD    "

## 2024-06-25 ENCOUNTER — DOCUMENTATION ONLY (OUTPATIENT)
Dept: TRANSPLANT | Facility: CLINIC | Age: 59
End: 2024-06-25
Payer: COMMERCIAL

## 2024-06-25 NOTE — CONFIDENTIAL NOTE
Called [facility] in [facility] but was only able to leave a voice message. [phone]. Asked the dental office to fax a note with their letter head stating Lyndsey has completed all recommended dental work and from a dental perspective she may move forward with transplant. Provided our fax number [phone] and my call back information.

## 2024-06-25 NOTE — CONFIDENTIAL NOTE
Kidney Transplant Evaluation - 9/14/2023   Melo watched  the pre-transplant patient education class st home. Content was reviewed with Scott  and her daughter Melo Chilel.  The [facility] website pre-transplant modules were viewed; class participants were educated on using the site.     Content reviewed:  Living Donation and how to access that program  Paired exchange  Kidney Donor Profile Index (KDPI)  Waiting list issues (right to decline without penalty, high PHS risk donors, what to expect when called with an offer)  Hospital experience,  length of stay , need to stay locally post-discharge (2-4 weeks)  Surgical options (with pictures)                           Post-surgery lifting and driving restrictions  Post-transplant routines, frequency of lab work and clinic visits  Need to stay locally post-discharge (2-4 weeks)  Role of Transplant Coordinator    Participants were informed of the benefits of transplant as well as potential risks such as infection, cancer, and death.  The need for total adherence with immunosuppression medications and following transplant regimens was stressed.  The overall evaluation/approval/listing process was reviewed.        The patient was provided with the following documents:  What You Need to Know About a Kidney Transplant  Adult Kidney Transplant - A Guide for Patients  SRTR Data Sheet - Kidney  Brochure - Kidney Allocation  Brochure - Multiple Listing and Waiting Time Transfer  What Every Patient Needs to Know (UNOS)  UNOS Facts and Figures  Finding a Donor

## 2024-07-26 ENCOUNTER — OFFICE VISIT (OUTPATIENT)
Dept: FAMILY MEDICINE | Facility: CLINIC | Age: 59
End: 2024-07-26
Payer: COMMERCIAL

## 2024-07-26 VITALS
WEIGHT: 106.6 LBS | TEMPERATURE: 97.5 F | HEIGHT: 60 IN | DIASTOLIC BLOOD PRESSURE: 101 MMHG | BODY MASS INDEX: 20.93 KG/M2 | OXYGEN SATURATION: 99 % | SYSTOLIC BLOOD PRESSURE: 162 MMHG | RESPIRATION RATE: 12 BRPM | HEART RATE: 67 BPM

## 2024-07-26 DIAGNOSIS — R49.9 CHANGE IN VOICE: ICD-10-CM

## 2024-07-26 DIAGNOSIS — K21.00 GASTROESOPHAGEAL REFLUX DISEASE WITH ESOPHAGITIS WITHOUT HEMORRHAGE: ICD-10-CM

## 2024-07-26 DIAGNOSIS — Z00.00 ROUTINE GENERAL MEDICAL EXAMINATION AT A HEALTH CARE FACILITY: Primary | ICD-10-CM

## 2024-07-26 DIAGNOSIS — J02.9 SORE THROAT: ICD-10-CM

## 2024-07-26 DIAGNOSIS — Z12.31 ENCOUNTER FOR SCREENING MAMMOGRAM FOR BREAST CANCER: ICD-10-CM

## 2024-07-26 DIAGNOSIS — Z12.11 SCREEN FOR COLON CANCER: ICD-10-CM

## 2024-07-26 DIAGNOSIS — I10 BENIGN ESSENTIAL HYPERTENSION: ICD-10-CM

## 2024-07-26 PROCEDURE — 99396 PREV VISIT EST AGE 40-64: CPT | Performed by: PHYSICIAN ASSISTANT

## 2024-07-26 PROCEDURE — 99213 OFFICE O/P EST LOW 20 MIN: CPT | Mod: 25 | Performed by: PHYSICIAN ASSISTANT

## 2024-07-26 RX ORDER — LISINOPRIL 10 MG/1
10 TABLET ORAL DAILY
Qty: 90 TABLET | Refills: 3 | Status: SHIPPED | OUTPATIENT
Start: 2024-07-26

## 2024-07-26 RX ORDER — PANTOPRAZOLE SODIUM 40 MG/1
40 TABLET, DELAYED RELEASE ORAL DAILY
Qty: 90 TABLET | Refills: 3 | Status: SHIPPED | OUTPATIENT
Start: 2024-07-26 | End: 2024-08-29

## 2024-07-26 SDOH — HEALTH STABILITY: PHYSICAL HEALTH: ON AVERAGE, HOW MANY MINUTES DO YOU ENGAGE IN EXERCISE AT THIS LEVEL?: 20 MIN

## 2024-07-26 SDOH — HEALTH STABILITY: PHYSICAL HEALTH: ON AVERAGE, HOW MANY DAYS PER WEEK DO YOU ENGAGE IN MODERATE TO STRENUOUS EXERCISE (LIKE A BRISK WALK)?: 7 DAYS

## 2024-07-26 ASSESSMENT — SOCIAL DETERMINANTS OF HEALTH (SDOH): HOW OFTEN DO YOU GET TOGETHER WITH FRIENDS OR RELATIVES?: MORE THAN THREE TIMES A WEEK

## 2024-07-26 NOTE — PATIENT INSTRUCTIONS
Call [phone] to schedule your mammogram/breast imaging.       Preventive Care Advice   This is general advice given by our system to help you stay healthy. However, your care team may have specific advice just for you. Please talk to your care team about your preventive care needs.  Nutrition  Eat 5 or more servings of fruits and vegetables each day.  Try wheat bread, brown rice and whole grain pasta (instead of white bread, rice, and pasta).  Get enough calcium and vitamin D. Check the label on foods and aim for 100% of the RDA (recommended daily allowance).  Lifestyle  Exercise at least 150 minutes each week  (30 minutes a day, 5 days a week).  Do muscle strengthening activities 2 days a week. These help control your weight and prevent disease.  No smoking.  Wear sunscreen to prevent skin cancer.  Have a dental exam and cleaning every 6 months.  Yearly exams  See your health care team every year to talk about:  Any changes in your health.  Any medicines your care team has prescribed.  Preventive care, family planning, and ways to prevent chronic diseases.  Shots (vaccines)   HPV shots (up to age 26), if you've never had them before.  Hepatitis B shots (up to age 59), if you've never had them before.  COVID-19 shot: Get this shot when it's due.  Flu shot: Get a flu shot every year.  Tetanus shot: Get a tetanus shot every 10 years.  Pneumococcal, hepatitis A, and RSV shots: Ask your care team if you need these based on your risk.  Shingles shot (for age 50 and up)  General health tests  Diabetes screening:  Starting at age 35, Get screened for diabetes at least every 3 years.  If you are younger than age 35, ask your care team if you should be screened for diabetes.  Cholesterol test: At age 39, start having a cholesterol test every 5 years, or more often if advised.  Bone density scan (DEXA): At age 50, ask your care team if you should have this scan for osteoporosis (brittle bones).  Hepatitis C: Get tested  at least once in your life.  STIs (sexually transmitted infections)  Before age 24: Ask your care team if you should be screened for STIs.  After age 24: Get screened for STIs if you're at risk. You are at risk for STIs (including HIV) if:  You are sexually active with more than one person.  You don't use condoms every time.  You or a partner was diagnosed with a sexually transmitted infection.  If you are at risk for HIV, ask about PrEP medicine to prevent HIV.  Get tested for HIV at least once in your life, whether you are at risk for HIV or not.  Cancer screening tests  Cervical cancer screening: If you have a cervix, begin getting regular cervical cancer screening tests starting at age 21.  Breast cancer scan (mammogram): If you've ever had breasts, begin having regular mammograms starting at age 40. This is a scan to check for breast cancer.  Colon cancer screening: It is important to start screening for colon cancer at age 45.  Have a colonoscopy test every 10 years (or more often if you're at risk) Or, ask your provider about stool tests like a FIT test every year or Cologuard test every 3 years.  To learn more about your testing options, visit:   .  For help making a decision, visit:   https://bit.ly/db34535.  Prostate cancer screening test: If you have a prostate, ask your care team if a prostate cancer screening test (PSA) at age 55 is right for you.  Lung cancer screening: If you are a current or former smoker ages 50 to 80, ask your care team if ongoing lung cancer screenings are right for you.  For informational purposes only. Not to replace the advice of your health care provider. Copyright   2023 [facility]. All rights reserved. Clinically reviewed by the [facility]. OrderUp 053282 - REV 01/24.  Preventing Falls: Care Instructions  Injuries and health problems such as trouble walking or poor eyesight can increase your risk of falling. So can some medicines.  "But there are things you can do to help prevent falls. You can exercise to get stronger. You can also arrange your home to make it safer.    Talk to your doctor about the medicines you take. Ask if any of them increase the risk of falls and whether they can be changed or stopped.   Try to exercise regularly. It can help improve your strength and balance. This can help lower your risk of falling.     Practice fall safety and prevention.    Wear low-heeled shoes that fit well and give your feet good support. Talk to your doctor if you have foot problems that make this hard.  Carry a cellphone or wear a medical alert device that you can use to call for help.  Use stepladders instead of chairs to reach high objects. Don't climb if you're at risk for falls. Ask for help, if needed.  Wear the correct eyeglasses, if you need them.    Make your home safer.    Remove rugs, cords, clutter, and furniture from walkways.  Keep your house well lit. Use night-lights in hallways and bathrooms.  Install and use sturdy handrails on stairways.  Wear nonskid footwear, even inside. Don't walk barefoot or in socks without shoes.    Be safe outside.    Use handrails, curb cuts, and ramps whenever possible.  Keep your hands free by using a shoulder bag or backpack.  Try to walk in well-lit areas. Watch out for uneven ground, changes in pavement, and debris.  Be careful in the winter. Walk on the grass or gravel when sidewalks are slippery. Use de-icer on steps and walkways. Add non-slip devices to shoes.    Put grab bars and nonskid mats in your shower or tub and near the toilet. Try to use a shower chair or bath bench when bathing.   Get into a tub or shower by putting in your weaker leg first. Get out with your strong side first. Have a phone or medical alert device in the bathroom with you.   Where can you learn more?  Go to https://www.MeshApp.net/patiented  Enter G117 in the search box to learn more about \"Preventing Falls: Care " "Instructions.\"  Current as of: July 17, 2023               Content Version: 14.0    4327-2760 [employer].   Care instructions adapted under license by your healthcare professional. If you have questions about a medical condition or this instruction, always ask your healthcare professional. [employer] disclaims any warranty or liability for your use of this information.      Learning About Stress  What is stress?     Stress is your body's response to a hard situation. Your body can have a physical, emotional, or mental response. Stress is a fact of life for most people, and it affects everyone differently. What causes stress for you may not be stressful for someone else.  A lot of things can cause stress. You may feel stress when you go on a job interview, take a test, or run a race. This kind of short-term stress is normal and even useful. It can help you if you need to work hard or react quickly. For example, stress can help you finish an important job on time.  Long-term stress is caused by ongoing stressful situations or events. Examples of long-term stress include long-term health problems, ongoing problems at work, or conflicts in your family. Long-term stress can harm your health.  How does stress affect your health?  When you are stressed, your body responds as though you are in danger. It makes hormones that speed up your heart, make you breathe faster, and give you a burst of energy. This is called the fight-or-flight stress response. If the stress is over quickly, your body goes back to normal and no harm is done.  But if stress happens too often or lasts too long, it can have bad effects. Long-term stress can make you more likely to get sick, and it can make symptoms of some diseases worse. If you tense up when you are stressed, you may develop neck, shoulder, or low back pain. Stress is linked to high blood pressure and heart disease.  Stress also harms your emotional health. " It can make you zelaya, tense, or depressed. Your relationships may suffer, and you may not do well at work or school.  What can you do to manage stress?  You can try these things to help manage stress:   Do something active. Exercise or activity can help reduce stress. Walking is a great way to get started. Even everyday activities such as housecleaning or yard work can help.  Try yoga or ashley chi. These techniques combine exercise and meditation. You may need some training at first to learn them.  Do something you enjoy. For example, listen to music or go to a movie. Practice your hobby or do volunteer work.  Meditate. This can help you relax, because you are not worrying about what happened before or what may happen in the future.  Do guided imagery. Imagine yourself in any setting that helps you feel calm. You can use online videos, books, or a teacher to guide you.  Do breathing exercises. For example:  From a standing position, bend forward from the waist with your knees slightly bent. Let your arms dangle close to the floor.  Breathe in slowly and deeply as you return to a standing position. Roll up slowly and lift your head last.  Hold your breath for just a few seconds in the standing position.  Breathe out slowly and bend forward from the waist.  Let your feelings out. Talk, laugh, cry, and express anger when you need to. Talking with supportive friends or family, a counselor, or a yury leader about your feelings is a healthy way to relieve stress. Avoid discussing your feelings with people who make you feel worse.  Write. It may help to write about things that are bothering you. This helps you find out how much stress you feel and what is causing it. When you know this, you can find better ways to cope.  What can you do to prevent stress?  You might try some of these things to help prevent stress:  Manage your time. This helps you find time to do the things you want and need to do.  Get enough sleep. Your  "body recovers from the stresses of the day while you are sleeping.  Get support. Your family, friends, and community can make a difference in how you experience stress.  Limit your news feed. Avoid or limit time on social media or news that may make you feel stressed.  Do something active. Exercise or activity can help reduce stress. Walking is a great way to get started.  Where can you learn more?  Go to https://www.[employer]ulatoryBinder.net/patiented  Enter N032 in the search box to learn more about \"Learning About Stress.\"  Current as of: October 24, 2023               Content Version: 14.0    8586-4909 [employer].   Care instructions adapted under license by your healthcare professional. If you have questions about a medical condition or this instruction, always ask your healthcare professional. [employer] disclaims any warranty or liability for your use of this information.         "

## 2024-07-26 NOTE — PROGRESS NOTES
Preventive Care Visit  [facility] KURTIS Escamilla PA-C, Family Medicine  Jul 26, 2024      Assessment & Plan     Routine general medical examination at a health care facility      Change in voice  Needs laryngoscopy to look for pathology could have muscle tension dysphonia, mass, reflux, or other  - Adult ENT  Referral; Future  - OFFICE/OUTPT VISIT,EST,LEVL III    Sore throat  Same as eneida  - Adult ENT  Referral; Future  - OFFICE/OUTPT VISIT,EST,LEVL III    Screen for colon cancer    - Colonoscopy Screening  Referral; Future    Gastroesophageal reflux disease with esophagitis without hemorrhage    - pantoprazole (PROTONIX) 40 MG EC tablet; Take 1 tablet (40 mg) by mouth daily  - OFFICE/OUTPT VISIT,EST,LEVL III    Benign essential hypertension  Not to goal  Re-emphasized medication compliance  Recheck 4 weeks  - lisinopril (ZESTRIL) 10 MG tablet; Take 1 tablet (10 mg) by mouth daily  - OFFICE/OUTPT VISIT,EST,LEVL III    Encounter for screening mammogram for breast cancer    - MA Screen Bilateral w/[facility]; Future            Counseling  Appropriate preventive services were addressed with this patient via screening, questionnaire, or discussion as appropriate for fall prevention, nutrition, physical activity, Tobacco-use cessation, weight loss and cognition.  Checklist reviewing preventive services available has been given to the patient.  Reviewed patient's diet, addressing concerns and/or questions.   The patient was instructed to see the dentist every 6 months.       Patient Instructions   Call [phone] to schedule your mammogram/breast imaging.       Preventive Care Advice   This is general advice given by our system to help you stay healthy. However, your care team may have specific advice just for you. Please talk to your care team about your preventive care needs.  Nutrition  Eat 5 or more servings of fruits and vegetables each day.  Try wheat bread,  brown rice and whole grain pasta (instead of white bread, rice, and pasta).  Get enough calcium and vitamin D. Check the label on foods and aim for 100% of the RDA (recommended daily allowance).  Lifestyle  Exercise at least 150 minutes each week  (30 minutes a day, 5 days a week).  Do muscle strengthening activities 2 days a week. These help control your weight and prevent disease.  No smoking.  Wear sunscreen to prevent skin cancer.  Have a dental exam and cleaning every 6 months.  Yearly exams  See your health care team every year to talk about:  Any changes in your health.  Any medicines your care team has prescribed.  Preventive care, family planning, and ways to prevent chronic diseases.  Shots (vaccines)   HPV shots (up to age 26), if you've never had them before.  Hepatitis B shots (up to age 59), if you've never had them before.  COVID-19 shot: Get this shot when it's due.  Flu shot: Get a flu shot every year.  Tetanus shot: Get a tetanus shot every 10 years.  Pneumococcal, hepatitis A, and RSV shots: Ask your care team if you need these based on your risk.  Shingles shot (for age 50 and up)  General health tests  Diabetes screening:  Starting at age 35, Get screened for diabetes at least every 3 years.  If you are younger than age 35, ask your care team if you should be screened for diabetes.  Cholesterol test: At age 39, start having a cholesterol test every 5 years, or more often if advised.  Bone density scan (DEXA): At age 50, ask your care team if you should have this scan for osteoporosis (brittle bones).  Hepatitis C: Get tested at least once in your life.  STIs (sexually transmitted infections)  Before age 24: Ask your care team if you should be screened for STIs.  After age 24: Get screened for STIs if you're at risk. You are at risk for STIs (including HIV) if:  You are sexually active with more than one person.  You don't use condoms every time.  You or a partner was diagnosed with a sexually  transmitted infection.  If you are at risk for HIV, ask about PrEP medicine to prevent HIV.  Get tested for HIV at least once in your life, whether you are at risk for HIV or not.  Cancer screening tests  Cervical cancer screening: If you have a cervix, begin getting regular cervical cancer screening tests starting at age 21.  Breast cancer scan (mammogram): If you've ever had breasts, begin having regular mammograms starting at age 40. This is a scan to check for breast cancer.  Colon cancer screening: It is important to start screening for colon cancer at age 45.  Have a colonoscopy test every 10 years (or more often if you're at risk) Or, ask your provider about stool tests like a FIT test every year or Cologuard test every 3 years.  To learn more about your testing options, visit:   .  For help making a decision, visit:   https://bit.ly/wg28620.  Prostate cancer screening test: If you have a prostate, ask your care team if a prostate cancer screening test (PSA) at age 55 is right for you.  Lung cancer screening: If you are a current or former smoker ages 50 to 80, ask your care team if ongoing lung cancer screenings are right for you.  For informational purposes only. Not to replace the advice of your health care provider. Copyright   2023 [facility]. All rights reserved. Clinically reviewed by the [facility] Transitions Program. [employer] 212157 - REV 01/24.  Preventing Falls: Care Instructions  Injuries and health problems such as trouble walking or poor eyesight can increase your risk of falling. So can some medicines. But there are things you can do to help prevent falls. You can exercise to get stronger. You can also arrange your home to make it safer.    Talk to your doctor about the medicines you take. Ask if any of them increase the risk of falls and whether they can be changed or stopped.   Try to exercise regularly. It can help improve your strength and balance. This can help  "lower your risk of falling.     Practice fall safety and prevention.    Wear low-heeled shoes that fit well and give your feet good support. Talk to your doctor if you have foot problems that make this hard.  Carry a cellphone or wear a medical alert device that you can use to call for help.  Use stepladders instead of chairs to reach high objects. Don't climb if you're at risk for falls. Ask for help, if needed.  Wear the correct eyeglasses, if you need them.    Make your home safer.    Remove rugs, cords, clutter, and furniture from walkways.  Keep your house well lit. Use night-lights in hallways and bathrooms.  Install and use sturdy handrails on stairways.  Wear nonskid footwear, even inside. Don't walk barefoot or in socks without shoes.    Be safe outside.    Use handrails, curb cuts, and ramps whenever possible.  Keep your hands free by using a shoulder bag or backpack.  Try to walk in well-lit areas. Watch out for uneven ground, changes in pavement, and debris.  Be careful in the winter. Walk on the grass or gravel when sidewalks are slippery. Use de-icer on steps and walkways. Add non-slip devices to shoes.    Put grab bars and nonskid mats in your shower or tub and near the toilet. Try to use a shower chair or bath bench when bathing.   Get into a tub or shower by putting in your weaker leg first. Get out with your strong side first. Have a phone or medical alert device in the bathroom with you.   Where can you learn more?  Go to https://www.[employer].net/patiented  Enter G117 in the search box to learn more about \"Preventing Falls: Care Instructions.\"  Current as of: July 17, 2023               Content Version: 14.0    6745-1647 [employer].   Care instructions adapted under license by your healthcare professional. If you have questions about a medical condition or this instruction, always ask your healthcare professional. [employer] disclaims any warranty or liability for " your use of this information.      Learning About Stress  What is stress?     Stress is your body's response to a hard situation. Your body can have a physical, emotional, or mental response. Stress is a fact of life for most people, and it affects everyone differently. What causes stress for you may not be stressful for someone else.  A lot of things can cause stress. You may feel stress when you go on a job interview, take a test, or run a race. This kind of short-term stress is normal and even useful. It can help you if you need to work hard or react quickly. For example, stress can help you finish an important job on time.  Long-term stress is caused by ongoing stressful situations or events. Examples of long-term stress include long-term health problems, ongoing problems at work, or conflicts in your family. Long-term stress can harm your health.  How does stress affect your health?  When you are stressed, your body responds as though you are in danger. It makes hormones that speed up your heart, make you breathe faster, and give you a burst of energy. This is called the fight-or-flight stress response. If the stress is over quickly, your body goes back to normal and no harm is done.  But if stress happens too often or lasts too long, it can have bad effects. Long-term stress can make you more likely to get sick, and it can make symptoms of some diseases worse. If you tense up when you are stressed, you may develop neck, shoulder, or low back pain. Stress is linked to high blood pressure and heart disease.  Stress also harms your emotional health. It can make you zelaya, tense, or depressed. Your relationships may suffer, and you may not do well at work or school.  What can you do to manage stress?  You can try these things to help manage stress:   Do something active. Exercise or activity can help reduce stress. Walking is a great way to get started. Even everyday activities such as housecleaning or yard work  can help.  Try yoga or ashley chi. These techniques combine exercise and meditation. You may need some training at first to learn them.  Do something you enjoy. For example, listen to music or go to a movie. Practice your hobby or do volunteer work.  Meditate. This can help you relax, because you are not worrying about what happened before or what may happen in the future.  Do guided imagery. Imagine yourself in any setting that helps you feel calm. You can use online videos, books, or a teacher to guide you.  Do breathing exercises. For example:  From a standing position, bend forward from the waist with your knees slightly bent. Let your arms dangle close to the floor.  Breathe in slowly and deeply as you return to a standing position. Roll up slowly and lift your head last.  Hold your breath for just a few seconds in the standing position.  Breathe out slowly and bend forward from the waist.  Let your feelings out. Talk, laugh, cry, and express anger when you need to. Talking with supportive friends or family, a counselor, or a yury leader about your feelings is a healthy way to relieve stress. Avoid discussing your feelings with people who make you feel worse.  Write. It may help to write about things that are bothering you. This helps you find out how much stress you feel and what is causing it. When you know this, you can find better ways to cope.  What can you do to prevent stress?  You might try some of these things to help prevent stress:  Manage your time. This helps you find time to do the things you want and need to do.  Get enough sleep. Your body recovers from the stresses of the day while you are sleeping.  Get support. Your family, friends, and community can make a difference in how you experience stress.  Limit your news feed. Avoid or limit time on social media or news that may make you feel stressed.  Do something active. Exercise or activity can help reduce stress. Walking is a great way to get  "started.  Where can you learn more?  Go to https://www.[employer].net/patiented  Enter N032 in the search box to learn more about \"Learning About Stress.\"  Current as of: October 24, 2023               Content Version: 14.0    9498-2075 [employer].   Care instructions adapted under license by your healthcare professional. If you have questions about a medical condition or this instruction, always ask your healthcare professional. [employer] disclaims any warranty or liability for your use of this information.             Subjective   Duarte is a 59 year old, presenting for the following:  Physical and Throat Problem (Started when covid started; states feels like the muscle in her throat swells up)        7/26/2024     8:43 AM   Additional Questions   Roomed by Sabine MAGAÑA CMA   Accompanied by grand daughter and daughter         7/26/2024     8:43 AM   Patient Reported Additional Medications   Patient reports taking the following new medications none        Health Care Directive  Patient does not have a Health Care Directive or Living Will:     HPI    Has had throat pain since about 2020. This is her man concern today. See gerd info below. Talking at higher pitches she notices her voice goes out. Or sometimes it makes her feel she has to whisper.   No weight loss or night sweats. No history of smoking. No nasal symptoms.   Family is with today. Ipad  used.     Pap- 2 years ago had this done. Abstract. Normal. [place] .  Last mammogram was 5 years ago, ordered. Due for colonoscopy history of polyps.       Chronic issues:    Saw Dr. Duenas but she did not follow up at the scheduled appointment. History of medication compliance issues. History of gerd on PPI but stopped as it ran out. Had video swallow for dysphagia which was negative. Still having issues-will refer.     HTN-has not been taking her medication as she thought it was complete/done as it ran out. Bp high today understandably. " No chest pain or sob.     Had labs done 3 months ago including lipids.           7/26/2024   General Health   How would you rate your overall physical health? (!) FAIR   Feel stress (tense, anxious, or unable to sleep) Rather much      (!) STRESS CONCERN      7/26/2024   Nutrition   Three or more servings of calcium each day? (!) NO   Diet: Low salt    Low fat/cholesterol   How many servings of fruit and vegetables per day? (!) 2-3   How many sweetened beverages each day? 0-1       Multiple values from one day are sorted in reverse-chronological order         7/26/2024   Exercise   Days per week of moderate/strenous exercise 7 days   Average minutes spent exercising at this level 20 min            7/26/2024   Social Factors   Frequency of gathering with friends or relatives More than three times a week   Worry food won't last until get money to buy more No   Food not last or not have enough money for food? No   Do you have housing? (Housing is defined as stable permanent housing and does not include staying ouside in a car, in a tent, in an abandoned building, in an overnight shelter, or couch-surfing.) Yes   Are you worried about losing your housing? No   Lack of transportation? No   Unable to get utilities (heat,electricity)? No            7/26/2024   Fall Risk   Fallen 2 or more times in the past year? Yes   Trouble with walking or balance? Yes   Reason Gait Speed Test Not Completed Patient declines   Reason for decline language barrier             7/26/2024   Dental   Dentist two times every year? (!) NO            7/26/2024   TB Screening   Were you born outside of the [place]? Yes              7/26/2024   Substance Use   Alcohol more than 3/day or more than 7/wk Not Applicable   Do you use any other substances recreationally? No        Social History     Tobacco Use    Smoking status: Never     Passive exposure: Never    Smokeless tobacco: Never   Vaping Use    Vaping status: Never Used   Substance Use Topics     "Alcohol use: Not Currently     Comment: patient stopped in 2023.  Weekends.    Drug use: Never     Comment: Patient quit beetle-nut 01/01/24 7/26/2024   One time HIV Screening   Previous HIV test? I don't know          7/26/2024   STI Screening   New sexual partner(s) since last STI/HIV test? No        History of abnormal Pap smear: No - age 30- 64 PAP with HPV every 5 years recommended       ASCVD Risk   The 10-year ASCVD risk score (Arthur PRETTY, et al., 2019) is: 6.3%    Values used to calculate the score:      Age: 59 years      Sex: Female      Is Non- : No      Diabetic: No      Tobacco smoker: No      Systolic Blood Pressure: 162 mmHg      Is BP treated: Yes      HDL Cholesterol: 45 mg/dL      Total Cholesterol: 206 mg/dL         Reviewed and updated as needed this visit by Provider                    Past Medical History:   Diagnosis Date    Benign essential hypertension     Gastroesophageal reflux disease without esophagitis      Past Surgical History:   Procedure Laterality Date    TUBAL LIGATION       OB History   No obstetric history on file.         Review of Systems  Constitutional, HEENT, cardiovascular, pulmonary, gi and gu systems are negative, except as otherwise noted.     Objective    Exam  BP (!) 162/101   Pulse 67   Temp 97.5  F (36.4  C) (Temporal)   Resp 12   Ht 1.52 m (4' 11.84\")   Wt 48.4 kg (106 lb 9.6 oz)   LMP  (LMP Unknown)   SpO2 99%   BMI 20.93 kg/m     Estimated body mass index is 20.93 kg/m  as calculated from the following:    Height as of this encounter: 1.52 m (4' 11.84\").    Weight as of this encounter: 48.4 kg (106 lb 9.6 oz).    Physical Exam  GENERAL: alert and no distress  EYES: Eyes grossly normal to inspection, PERRL and conjunctivae and sclerae normal  HENT: ear canals and TM's normal, nose and mouth without ulcers or lesions  NECK: no adenopathy, no asymmetry, masses, or scars  RESP: lungs clear to auscultation - no " rales, rhonchi or wheezes  CV: regular rate and rhythm, normal S1 S2, no S3 or S4, no murmur, click or rub, no peripheral edema  MS: no gross musculoskeletal defects noted, no edema  SKIN: no suspicious lesions or rashes  NEURO: Normal strength and tone, mentation intact and speech normal  PSYCH: mentation appears normal, affect normal/bright        Signed Electronically by: Camryn Escamilla PA-C

## 2024-08-16 ENCOUNTER — HOSPITAL ENCOUNTER (EMERGENCY)
Facility: HOSPITAL | Age: 59
Discharge: HOME | End: 2024-08-16
Attending: EMERGENCY MEDICINE | Admitting: EMERGENCY MEDICINE
Payer: COMMERCIAL

## 2024-08-16 ENCOUNTER — APPOINTMENT (OUTPATIENT)
Dept: RADIOLOGY | Facility: HOSPITAL | Age: 59
End: 2024-08-16
Attending: EMERGENCY MEDICINE
Payer: COMMERCIAL

## 2024-08-16 VITALS
SYSTOLIC BLOOD PRESSURE: 126 MMHG | BODY MASS INDEX: 20.81 KG/M2 | RESPIRATION RATE: 21 BRPM | WEIGHT: 106 LBS | OXYGEN SATURATION: 99 % | DIASTOLIC BLOOD PRESSURE: 74 MMHG | TEMPERATURE: 96.8 F | HEART RATE: 57 BPM

## 2024-08-16 DIAGNOSIS — R07.0 THROAT PAIN: ICD-10-CM

## 2024-08-16 LAB
ALBUMIN SERPL BCG-MCNC: 4.2 G/DL (ref 3.5–5.2)
ALP SERPL-CCNC: 109 U/L (ref 40–150)
ALT SERPL W P-5'-P-CCNC: 43 U/L (ref 0–50)
ANION GAP SERPL CALCULATED.3IONS-SCNC: 10 MMOL/L (ref 7–15)
AST SERPL W P-5'-P-CCNC: 49 U/L (ref 0–45)
ATRIAL RATE - MUSE: 65 BPM
BASOPHILS # BLD AUTO: 0 10E3/UL (ref 0–0.2)
BASOPHILS NFR BLD AUTO: 0 %
BILIRUB DIRECT SERPL-MCNC: <0.2 MG/DL (ref 0–0.3)
BILIRUB SERPL-MCNC: 0.3 MG/DL
BUN SERPL-MCNC: 5.4 MG/DL (ref 6–20)
CALCIUM SERPL-MCNC: 9.3 MG/DL (ref 8.8–10.4)
CHLORIDE SERPL-SCNC: 101 MMOL/L (ref 98–107)
CREAT SERPL-MCNC: 0.66 MG/DL (ref 0.51–0.95)
DIASTOLIC BLOOD PRESSURE - MUSE: NORMAL MMHG
EGFRCR SERPLBLD CKD-EPI 2021: >90 ML/MIN/1.73M2
EOSINOPHIL # BLD AUTO: 0.2 10E3/UL (ref 0–0.7)
EOSINOPHIL NFR BLD AUTO: 4 %
ERYTHROCYTE [DISTWIDTH] IN BLOOD BY AUTOMATED COUNT: 11.7 % (ref 10–15)
GLUCOSE SERPL-MCNC: 85 MG/DL (ref 70–99)
GROUP A STREP BY PCR: NOT DETECTED
HCO3 SERPL-SCNC: 27 MMOL/L (ref 22–29)
HCT VFR BLD AUTO: 41.4 % (ref 35–47)
HGB BLD-MCNC: 14 G/DL (ref 11.7–15.7)
IMM GRANULOCYTES # BLD: 0 10E3/UL
IMM GRANULOCYTES NFR BLD: 0 %
INTERPRETATION ECG - MUSE: NORMAL
LIPASE SERPL-CCNC: 27 U/L (ref 13–60)
LYMPHOCYTES # BLD AUTO: 2.2 10E3/UL (ref 0.8–5.3)
LYMPHOCYTES NFR BLD AUTO: 40 %
MCH RBC QN AUTO: 30.4 PG (ref 26.5–33)
MCHC RBC AUTO-ENTMCNC: 33.8 G/DL (ref 31.5–36.5)
MCV RBC AUTO: 90 FL (ref 78–100)
MONOCYTES # BLD AUTO: 0.3 10E3/UL (ref 0–1.3)
MONOCYTES NFR BLD AUTO: 6 %
NEUTROPHILS # BLD AUTO: 2.7 10E3/UL (ref 1.6–8.3)
NEUTROPHILS NFR BLD AUTO: 49 %
NRBC # BLD AUTO: 0 10E3/UL
NRBC BLD AUTO-RTO: 0 /100
NT-PROBNP SERPL-MCNC: 129 PG/ML (ref 0–900)
P AXIS - MUSE: 66 DEGREES
PLATELET # BLD AUTO: 153 10E3/UL (ref 150–450)
POTASSIUM SERPL-SCNC: 3.5 MMOL/L (ref 3.4–5.3)
PR INTERVAL - MUSE: 162 MS
PROT SERPL-MCNC: 8.2 G/DL (ref 6.4–8.3)
QRS DURATION - MUSE: 70 MS
QT - MUSE: 410 MS
QTC - MUSE: 426 MS
R AXIS - MUSE: 56 DEGREES
RBC # BLD AUTO: 4.6 10E6/UL (ref 3.8–5.2)
SODIUM SERPL-SCNC: 138 MMOL/L (ref 135–145)
SYSTOLIC BLOOD PRESSURE - MUSE: NORMAL MMHG
T AXIS - MUSE: 69 DEGREES
TROPONIN T SERPL HS-MCNC: <6 NG/L
VENTRICULAR RATE- MUSE: 65 BPM
WBC # BLD AUTO: 5.5 10E3/UL (ref 4–11)

## 2024-08-16 PROCEDURE — 80048 BASIC METABOLIC PNL TOTAL CA: CPT | Performed by: FAMILY MEDICINE

## 2024-08-16 PROCEDURE — 250N000013 HC RX MED GY IP 250 OP 250 PS 637: Performed by: EMERGENCY MEDICINE

## 2024-08-16 PROCEDURE — 93005 ELECTROCARDIOGRAM TRACING: CPT | Performed by: STUDENT IN AN ORGANIZED HEALTH CARE EDUCATION/TRAINING PROGRAM

## 2024-08-16 PROCEDURE — 83880 ASSAY OF NATRIURETIC PEPTIDE: CPT | Performed by: FAMILY MEDICINE

## 2024-08-16 PROCEDURE — 99285 EMERGENCY DEPT VISIT HI MDM: CPT | Mod: 25

## 2024-08-16 PROCEDURE — 93005 ELECTROCARDIOGRAM TRACING: CPT | Performed by: EMERGENCY MEDICINE

## 2024-08-16 PROCEDURE — 83690 ASSAY OF LIPASE: CPT | Performed by: FAMILY MEDICINE

## 2024-08-16 PROCEDURE — 87651 STREP A DNA AMP PROBE: CPT | Performed by: EMERGENCY MEDICINE

## 2024-08-16 PROCEDURE — 36415 COLL VENOUS BLD VENIPUNCTURE: CPT | Performed by: FAMILY MEDICINE

## 2024-08-16 PROCEDURE — 84484 ASSAY OF TROPONIN QUANT: CPT | Performed by: FAMILY MEDICINE

## 2024-08-16 PROCEDURE — 71046 X-RAY EXAM CHEST 2 VIEWS: CPT

## 2024-08-16 PROCEDURE — 80053 COMPREHEN METABOLIC PANEL: CPT | Performed by: FAMILY MEDICINE

## 2024-08-16 PROCEDURE — 85004 AUTOMATED DIFF WBC COUNT: CPT | Performed by: FAMILY MEDICINE

## 2024-08-16 RX ORDER — MAGNESIUM HYDROXIDE/ALUMINUM HYDROXICE/SIMETHICONE 120; 1200; 1200 MG/30ML; MG/30ML; MG/30ML
15 SUSPENSION ORAL ONCE
Status: COMPLETED | OUTPATIENT
Start: 2024-08-16 | End: 2024-08-16

## 2024-08-16 RX ORDER — FAMOTIDINE 20 MG/1
20 TABLET, FILM COATED ORAL 2 TIMES DAILY
Qty: 40 TABLET | Refills: 0 | Status: SHIPPED | OUTPATIENT
Start: 2024-08-16 | End: 2024-09-05

## 2024-08-16 RX ADMIN — ALUMINUM HYDROXIDE, MAGNESIUM HYDROXIDE, AND SIMETHICONE 15 ML: 200; 200; 20 SUSPENSION ORAL at 15:25

## 2024-08-16 ASSESSMENT — ACTIVITIES OF DAILY LIVING (ADL)
ADLS_ACUITY_SCORE: 35
ADLS_ACUITY_SCORE: 33

## 2024-08-16 ASSESSMENT — COLUMBIA-SUICIDE SEVERITY RATING SCALE - C-SSRS
2. HAVE YOU ACTUALLY HAD ANY THOUGHTS OF KILLING YOURSELF IN THE PAST MONTH?: NO
6. HAVE YOU EVER DONE ANYTHING, STARTED TO DO ANYTHING, OR PREPARED TO DO ANYTHING TO END YOUR LIFE?: NO
1. IN THE PAST MONTH, HAVE YOU WISHED YOU WERE DEAD OR WISHED YOU COULD GO TO SLEEP AND NOT WAKE UP?: NO

## 2024-08-16 NOTE — ED TRIAGE NOTES
The pt presents for evaluation of 1 month of SOB and pain in her chest and throat, described as burning. Difficult to sleep last night due to abdominal pain and throat pain. Per family member present, she has been seen multiple times for this. She denies N/V. When asked to provide a number to her pain, she repeated through the  that she is having SOB, and burning CP/throat pain. She reports there seems to be a sensation that something is stuck in her throat.

## 2024-08-16 NOTE — DISCHARGE INSTRUCTIONS
Fortunately all of your testing today has been normal.  Take the prescribed medication as directed and follow-up closely with your primary care doctor.  Return to the emergency department for any worsening symptoms or other concerns.

## 2024-08-16 NOTE — ED PROVIDER NOTES
EMERGENCY DEPARTMENT ENCOUnter      NAME: Duarte Lieberman  AGE: 59 year old female  YOB: 1965  MRN: [id number]  EVALUATION DATE & TIME: 8/16/2024  3:02 PM    PCP: [facility] [place]    ED PROVIDER: Roman Rich DO      Chief Complaint   Patient presents with    Breathing Problem    Chest Pain         FINAL IMPRESSION:  1. Throat pain          ED COURSE & MEDICAL DECISION MAKING:    3:15 PM I met with the patient, obtained history, performed physical exam, and discussed ED plan.      The patient presented to the emergency department today complaining of throat pain and shortness of breath which have been present for the past several months.  She has had several workups for this with no identifiable etiology.  No concerning findings on physical exam.  Vital signs are stable.  Laboratory testing including troponin and BNP are normal as well.  Chest x-ray is normal as well.  Given her well appearance and normal workup, I feel that she can be safely discharged home.  She and family are comfortable with this plan.        Medical Decision Making  Obtained supplemental history:Supplemental history obtained?: Documented in chart  Reviewed external records: External records reviewed?: Documented in chart  Care impacted by chronic illness:Hyperlipidemia and Hypertension  Care significantly affected by social determinants of health:Access to Medical Care  Did you consider but not order tests?: Work up considered but not performed and documented in chart, if applicable  Did you interpret images independently?: Independent interpretation of ECG and images noted in documentation, when applicable.  Consultation discussion with other provider:Did you involve another provider (consultant, , pharmacy, etc.)?: No  Discharge. I prescribed additional prescription strength medication(s) as charted. See documentation for any additional details.    At the conclusion of the encounter I discussed the results of all of  the tests and the disposition. The questions were answered. The patient or family acknowledged understanding and was agreeable with the care plan.         MEDICATIONS GIVEN IN THE EMERGENCY:  Medications   alum & mag hydroxide-simethicone (MAALOX) suspension 15 mL (15 mLs Oral $Given 8/16/24 1525)       NEW PRESCRIPTIONS STARTED AT TODAY'S ER VISIT  Discharge Medication List as of 8/16/2024  4:41 PM        START taking these medications    Details   famotidine (PEPCID) 20 MG tablet Take 1 tablet (20 mg) by mouth 2 times daily for 20 days, Disp-40 tablet, R-0, Local Print                =================================================================    HPI        Duarte Lieberman is a 59 year old female with a pertinent history of hypertension, hyperlipidemia who presents to this ED via walk-in for evaluation of shortness of breath.    For the past month, the patient has had shortness of breath, chest pain, throat pain, and burning right upper quadrant abdominal pain. She has had these symptoms for the past couple years and never ended up getting her throat screened.    Denies nausea, vomiting, medical problems, or any other complaints at this time.    Per chart review,   7/26/24 The patient visited primary care provider for throat problem. Has not been taking her medication as she thought it was complete/done as it ran out. Bp high today understandably.     5/10/24 XR VIDEO SWALLOW WITH SLP OR OT: No penetration or aspiration.     4/22/24 The patient visited [facility] ED for pharyngitis. CT imaging of the abdomen pelvis was deferred. Labs obtained here are reassuring. Her oropharynx is clear. She feels improved after Pepcid and famotidine. Will plan on refilling lisinopril, omeprazole and sucralfate through her pharmacy here.     12/27/2023 Endoscopy. Chronic gastritis noted. H. pylori stain was negative. She was instructed to take pantoprazole twice daily.     PAST MEDICAL HISTORY:  Past Medical History:   Diagnosis Date     Benign essential hypertension     Gastroesophageal reflux disease without esophagitis        PAST SURGICAL HISTORY:  Past Surgical History:   Procedure Laterality Date    TUBAL LIGATION             CURRENT MEDICATIONS:    famotidine (PEPCID) 20 MG tablet  lisinopril (ZESTRIL) 10 MG tablet  lisinopril (ZESTRIL) 10 MG tablet  omeprazole (PRILOSEC) 20 MG DR capsule  pantoprazole (PROTONIX) 40 MG EC tablet        ALLERGIES:  No Known Allergies    FAMILY HISTORY:  No family history on file.    SOCIAL HISTORY:   Social History     Socioeconomic History    Marital status: Single   Tobacco Use    Smoking status: Never     Passive exposure: Never    Smokeless tobacco: Never   Vaping Use    Vaping status: Never Used   Substance and Sexual Activity    Alcohol use: Not Currently     Comment: patient stopped in 2023.  Weekends.    Drug use: Never     Comment: Patient quit beetle-nut 01/01/24     Social Determinants of Health     Financial Resource Strain: Low Risk  (7/26/2024)    Financial Resource Strain     Within the past 12 months, have you or your family members you live with been unable to get utilities (heat, electricity) when it was really needed?: No   Food Insecurity: Low Risk  (7/26/2024)    Food Insecurity     Within the past 12 months, did you worry that your food would run out before you got money to buy more?: No     Within the past 12 months, did the food you bought just not last and you didn t have money to get more?: No   Transportation Needs: Low Risk  (7/26/2024)    Transportation Needs     Within the past 12 months, has lack of transportation kept you from medical appointments, getting your medicines, non-medical meetings or appointments, work, or from getting things that you need?: No   Physical Activity: Insufficiently Active (7/26/2024)    Exercise Vital Sign     Days of Exercise per Week: 7 days     Minutes of Exercise per Session: 20 min   Stress: Stress Concern Present (7/26/2024)    [employer]  [place] [employer] - Occupational Stress Questionnaire     Feeling of Stress : Rather much   Social Connections: Unknown (7/26/2024)    Social Connection and Isolation Panel [NHANES]     Frequency of Social Gatherings with Friends and Family: More than three times a week   Housing Stability: Low Risk  (7/26/2024)    Housing Stability     Do you have housing? : Yes     Are you worried about losing your housing?: No       VITALS:  Patient Vitals for the past 24 hrs:   BP Temp Temp src Pulse Resp SpO2 Weight   08/16/24 1630 126/74 -- -- 57 21 99 % --   08/16/24 1615 130/76 -- -- 51 20 97 % --   08/16/24 1600 136/78 -- -- 55 20 100 % --   08/16/24 1335 (!) 154/80 96.8  F (36  C) Temporal 72 18 97 % 48.1 kg (106 lb)       PHYSICAL EXAM    Constitutional:  Well developed, Well nourished,  HENT:  Normocephalic, Atraumatic, Oropharynx moist, Nose normal. No posterior pharyngeal erythema.  Eyes:  EOMI, Conjunctiva normal, No discharge.   Respiratory:  Normal breath sounds, No respiratory distress, No wheezing, No chest tenderness.   Cardiovascular:  Normal heart rate, Normal rhythm, No murmurs  GI:  Soft, No tenderness, No guarding, No CVA tenderness.   Musculoskeletal:  No tenderness to palpation or major deformities noted.   Extremities: No lower extremity edema.  Neurologic:  Alert & oriented x 3, No focal deficits noted.   Psychiatric:  Affect normal, Judgment normal, Mood normal.        LAB:  All pertinent labs reviewed and interpreted.  Results for orders placed or performed during the hospital encounter of 08/16/24              Basic metabolic panel   Result Value Ref Range    Sodium 138 135 - 145 mmol/L    Potassium 3.5 3.4 - 5.3 mmol/L    Chloride 101 98 - 107 mmol/L    Carbon Dioxide (CO2) 27 22 - 29 mmol/L    Anion Gap 10 7 - 15 mmol/L    Urea Nitrogen 5.4 (L) 6.0 - 20.0 mg/dL    Creatinine 0.66 0.51 - 0.95 mg/dL    GFR Estimate >90 >60 mL/min/1.73m2    Calcium 9.3 8.8 - 10.4 mg/dL    Glucose 85 70 -  99 mg/dL   Hepatic function panel   Result Value Ref Range    Protein Total 8.2 6.4 - 8.3 g/dL    Albumin 4.2 3.5 - 5.2 g/dL    Bilirubin Total 0.3 <=1.2 mg/dL    Alkaline Phosphatase 109 40 - 150 U/L    AST 49 (H) 0 - 45 U/L    ALT 43 0 - 50 U/L    Bilirubin Direct <0.20 0.00 - 0.30 mg/dL   Result Value Ref Range    Lipase 27 13 - 60 U/L   Result Value Ref Range    Troponin T, High Sensitivity <6 <=14 ng/L   Nt probnp inpatient (BNP)   Result Value Ref Range    N terminal Pro BNP Inpatient 129 0 - 900 pg/mL   CBC with platelets and differential   Result Value Ref Range    WBC Count 5.5 4.0 - 11.0 10e3/uL    RBC Count 4.60 3.80 - 5.20 10e6/uL    Hemoglobin 14.0 11.7 - 15.7 g/dL    Hematocrit 41.4 35.0 - 47.0 %    MCV 90 78 - 100 fL    MCH 30.4 26.5 - 33.0 pg    MCHC 33.8 31.5 - 36.5 g/dL    RDW 11.7 10.0 - 15.0 %    Platelet Count 153 150 - 450 10e3/uL    % Neutrophils 49 %    % Lymphocytes 40 %    % Monocytes 6 %    % Eosinophils 4 %    % Basophils 0 %    % Immature Granulocytes 0 %    NRBCs per 100 WBC 0 <1 /100    Absolute Neutrophils 2.7 1.6 - 8.3 10e3/uL    Absolute Lymphocytes 2.2 0.8 - 5.3 10e3/uL    Absolute Monocytes 0.3 0.0 - 1.3 10e3/uL    Absolute Eosinophils 0.2 0.0 - 0.7 10e3/uL    Absolute Basophils 0.0 0.0 - 0.2 10e3/uL    Absolute Immature Granulocytes 0.0 <=0.4 10e3/uL    Absolute NRBCs 0.0 10e3/uL   Group A Streptococcus PCR Throat Swab    Specimen: Throat; Swab   Result Value Ref Range    Group A strep by PCR Not Detected Not Detected       RADIOLOGY:  I have independently reviewed and interpreted the above imaging, pending the final radiology read.  XR Chest 2 Views   Final Result   IMPRESSION: Negative chest.          EKG:    Normal sinus rhythm at 65 bpm.  Normal axis.  No signs of acute ischemia.  QRS 70 ms, QTc 426 ms.    I have independently reviewed and interpreted this EKG          IZane, am serving as a scribe to document services personally performed by Dr. Rich based on my  observation and the provider's statements to me. I, Roman Rich DO attest that Zane Jeffriesz is acting in a scribe capacity, has observed my performance of the services and has documented them in accordance with my direction.    Roman Rich DO  Emergency Medicine  [facility] EMERGENCY DEPARTMENT  [address] [place] [id number]  [phone]  Dept: [phone]     Roman Rich DO  08/16/24 0720

## 2024-08-19 ENCOUNTER — PATIENT OUTREACH (OUTPATIENT)
Dept: FAMILY MEDICINE | Facility: CLINIC | Age: 59
End: 2024-08-19
Payer: COMMERCIAL

## 2024-08-19 NOTE — PROGRESS NOTES
Assessment & Plan     3M follow-up   Consider SLP referral     Problem List Items Addressed This Visit          Digestive    Gastroesophageal reflux disease without esophagitis    Relevant Medications    pantoprazole (PROTONIX) 40 MG EC tablet     Other Visit Diagnoses       Dysphonia    -  Primary    Sore throat        LPRD (laryngopharyngeal reflux disease)        Relevant Medications    pantoprazole (PROTONIX) 40 MG EC tablet    Other Relevant Orders    Adult Gen Surg  Referral    XR Esophagram              20 minutes spent on the date of the encounter doing chart review, history and exam, documentation and further activities per the note  {     NATALIA Christine  [facility]    Subjective     HPI-  Change in voice, Sore throat, Referring provider requesting laryngoscopy to look for pathology- could have muscle tension dysphonia, mass, reflux, or other. Started in 2020, feels like her throat swells.  Had video swallow for dysphagia which was negative.  Previously been on PPI.        Coffee-not regularly-.    Pop infrequently  Mildly spicy food regularly.       service used over the phone.    FMLA form completed for spouse to accompany patient           Review of Systems   ENT as above      Objective    LMP  (LMP Unknown)     Physical Exam     Constitutional:   The patient was in no acute distress.      Head/Face:   Normocephalic and atraumatic.  No lesions or scars.     Ears:  The tympanic membranes are normal in appearance, bony landmarks are intact.  No retraction, perforation, or masses.   No fluid or purulence was seen in the external canal or the middle ear. No evidence of infection of the middle ear or external canal, cerumen was normal in appearance.    Nose:  Anterior rhinoscopy revealed midline septum and absence of purulence or polyps.      Mouth:  Normal tongue, floor of mouth, buccal mucosa, and palate.  No lesions, ulceration or  masses on  inspection, normal voice quality      Oropharynx:  Normal mucosa, palate symmetric with normal elevation. Tonsils  2+      Neck:  Supple with normal laryngeal and tracheal landmarks. No palpable thyroid.     Lymphatic:  There is no palpable lymphadenopathy in the neck.           Flexible Endoscopy -     The patient was counseled that their symptoms and history require a direct visualization with an endoscopy procedure.  They understood and we proceeded with a fiberoptic examination.  First I sprayed both sides of the nose with a mixture of lidocaine and oxymetazline.  I then passed the scope through the nasal cavity.  Color photographs were taken for the permanent medical record.  The nasal cavity was unremarkable.  The nasopharynx was mucosally covered and symmetric.  The Eustachian tube openings were unobstructed.  Going further down I had a clear view of the base of tongue which had normal appearing lingual tonsillar tissue.  The base of tongue was free of lesions, and the vallecula was open.  The epiglottis was smooth and mucosally covered.  The supraglottic larynx was then clearly visualized.  The vocal cords moved smoothly and symmetrically, they were pearly white and no lesions were seen.  The pyriform sinuses were open, and the limited view of the postcricoid region did not show any lesions. There was   cobblestoning of pharynx,  and   some posterior commissure pachyderma.   Patient tolerated well.

## 2024-08-19 NOTE — PATIENT INSTRUCTIONS
Try alkaline water (ph 9.5) instead of regular water.    If you are a coffee drinker you can add 1/4 teaspoon of baking soda to it to lower the acidity.      Reflux Gourmet is an over the counter supplement that may help with reflux symptoms.      Lifestyle changes:    Avoid eating 2-3 hours before bedtime.   You may find it helpful to elevate the head of your bed.     Avoid following foods that are likely to trigger acid reflux:    Coffee or tea (try LOW ACID coffee or herbal tea)  Anything that s fizzy or has caffeine in it  Alcohol   Citrus fruits, such as oranges and edi  Tomato based foods (salsa, pizza, lasanga)  Chocolate   Mint or peppermint  Fatty foods (ice cream)  Spicy foods  Onions and garlic      Laryngopharyngeal Reflux (LPR)    Characteristics of LPR  Laryngopharyngeal reflux (LPR) is also known as extraesophageal reflux disease. It results from chronic acid and pepsin exposure to the larynx. Patients are usually unaware of LPR and, unlike Gastroesophageal Reflux Disease (GERD) patients, do not usually complain of heartburn (only 35% do complain). GERD occurs when stomach acid and enzymes backflow into the esophagus, causing heartburn and damage to the esophageal lining. LPR occurs when stomach acid and/or food enzymes backflow all the way back into the lower part of the throat (laryngopharynx).     Many people with LPR do not have symptoms of heartburn. Comped to the esophagus, the voice box and the back of the throat are significantly more sensitive to the effects of acid/pepsin on the surrounding tissues. Acid that passes quickly through the esophagus (food pipe) does not have a chance to irritate the area for too long. However, acid that pools in the throat around the voice box causes prolonged irritation, resulting in the symptoms of LPR.    Common Symptoms of LPR  - Hoarseness  - Chronic throat irritation  - Chronic cough  - Cough that wakes you from your sleep  - Thick or too much mucus  -  Chronic throat-clearing  - Voice problems    Treatment of LPR    Diet Changes:    Different foods affect LPR by different mechanisms. These specific foods should be avoided or reduced drastically, or they will interfere with the healing process:    Caffeine, alcohol, chocolates and peppermints weaken the lower esophageal sphincter, which normally holds in the stomach contents.    Citrus fruits, tomatoes (and other acidic foods), spicy deli meats and hot spices directly irritate the throat lining. This means that even if the medicines are working well, eating these foods will cause direct irration and inflammation of the throat lining.    Carbonated beverages (such as sodas and beer) bring acidic contents into the throat.    Behavior Changes:    Do not increase the pressure within the abdomen for at least 2 hours after eating (no bending over, exercising, or singing) as it will force contents back into the throat.    Do not over-distend the stomach (eat smaller meals throughout the day, instead of 3 larger meals).    Do not lie down within 3 hours after eating a meal. Do not eat a snack or drink before going to sleep.    Medicines    Proton pump inhibitors (PPIs) are the most effective medicines for the treatment of LPR.    When treatment is indicated, we typically start with a low-dose PPI (eg, omeprazole 20 mg) once daily, one half-hour before a meal, preferably in the morning. If symptoms do not start to improve within six to eight weeks of use, contact the ENT clinic to determine if the dose should be increased.     Please keep in mind the lag between the time you take the medicine and the time you start feeling symptom relief. People who stop taking the medicines typically feel fine for 1-3 weeks and then notice gradual return of symptoms. It takes a few weeks to get back to where they were before. Some people successfully come off of the medicines but need to follow a strict diet.

## 2024-08-26 ENCOUNTER — TELEPHONE (OUTPATIENT)
Dept: GASTROENTEROLOGY | Facility: CLINIC | Age: 59
End: 2024-08-26
Payer: COMMERCIAL

## 2024-08-26 NOTE — TELEPHONE ENCOUNTER
"Endoscopy Scheduling Screen    Have you had a positive Covid test in the last 14 days?  No    What is your communication preference for Instructions and/or Bowel Prep?   MyChart    What insurance is in the chart?  Other:  [facility] [place]     Ordering/Referring Provider:     BRIAN GOOD       (If ordering provider performs procedure, schedule with ordering provider unless otherwise instructed. )    BMI: Estimated body mass index is 20.81 kg/m  as calculated from the following:    Height as of 7/26/24: 1.52 m (4' 11.84\").    Weight as of 8/16/24: 48.1 kg (106 lb).     Sedation Ordered  moderate sedation.   If patient BMI > 50 do not schedule in [facility].    If patient BMI > 45 do not schedule at [facility].    Are you taking methadone or Suboxone?  No    Have you had difficulties, pain, or discomfort during past endoscopy procedures?  No    Are you taking any prescription medications for pain 3 or more times per week?   YES, RN review needed to determine if MAC is required.  (RN Review required.)     Do you have a history of malignant hyperthermia?  No    (Females) Are you currently pregnant?   No     Have you been diagnosed or told you have pulmonary hypertension?   No    Do you have an LVAD?  No    Have you been told you have moderate to severe sleep apnea?  No    Have you been told you have COPD, asthma, or any other lung disease?  No    Do you have any heart conditions?  No     Have you ever had or are you waiting for an organ transplant?  No. Continue scheduling, no site restrictions.    Have you had a stroke or transient ischemic attack (TIA aka \"mini stroke\" in the last 6 months?   No    Have you been diagnosed with or been told you have cirrhosis of the liver?   No    Are you currently on dialysis?   No    Do you need assistance transferring?   No    BMI: Estimated body mass index is 20.81 kg/m  as calculated from the following:    Height as of 7/26/24: 1.52 m (4' 11.84\").    Weight as of 8/16/24: 48.1 kg (106 lb). "     Is patients BMI > 40 and scheduling location [facility]?  No    Do you take an injectable medication for weight loss or diabetes (excluding insulin)?  No    Do you take the medication Naltrexone?  No    Do you take blood thinners?  No       Prep   Are you currently on dialysis or do you have chronic kidney disease?  No    Do you have a diagnosis of diabetes?  No    Do you have a diagnosis of cystic fibrosis (CF)?  No    On a regular basis do you go 3 -5 days between bowel movements?  No    BMI > 40?  No    Preferred Pharmacy:    [facility] [place]  [place], [place] - [address] [facility]  [address] [facility] [address]  Phone: [phone] Fax: [phone]      Final Scheduling Details     Procedure scheduled  Colonoscopy    Surgeon:  Elvin      Date of procedure:  11/27/2024     Pre-OP / PAC:   No - Not required for this site.    Location  [facility] - Per order.    Sedation   MAC/Deep Sedation  per loc       Patient Reminders:   You will receive a call from a Nurse to review instructions and health history.  This assessment must be completed prior to your procedure.  Failure to complete the Nurse assessment may result in the procedure being cancelled.      On the day of your procedure, please designate an adult(s) who can drive you home stay with you for the next 24 hours. The medicines used in the exam will make you sleepy. You will not be able to drive.      You cannot take public transportation, ride share services, or non-medical taxi service without a responsible caregiver.  Medical transport services are allowed with the requirement that a responsible caregiver will receive you at your destination.  We require that drivers and caregivers are confirmed prior to your procedure.

## 2024-08-26 NOTE — TELEPHONE ENCOUNTER
Pre Assessment RN Review    Focused Assessments    Pain Medication Review    Per scheduling questionnaire, patient reports taking prescription pain medication three or more times per week.    Per chart review, patient does not have an active prescription for an opioid medication.     Patient thought she had pain meds prescribed from her ED visit on 8/16/24. I could not find anything.       Scheduling Status & Recommendations    Sedation: Moderate Sedation - Per RN assessment.    Jacque Barillas RN Colorectal Cancer   Division of Gastroenterology at [facility]

## 2024-08-29 ENCOUNTER — OFFICE VISIT (OUTPATIENT)
Dept: OTOLARYNGOLOGY | Facility: CLINIC | Age: 59
End: 2024-08-29
Attending: PHYSICIAN ASSISTANT
Payer: COMMERCIAL

## 2024-08-29 DIAGNOSIS — R49.0 DYSPHONIA: Primary | ICD-10-CM

## 2024-08-29 DIAGNOSIS — K21.9 GASTROESOPHAGEAL REFLUX DISEASE WITHOUT ESOPHAGITIS: ICD-10-CM

## 2024-08-29 DIAGNOSIS — J02.9 SORE THROAT: ICD-10-CM

## 2024-08-29 DIAGNOSIS — K21.9 LPRD (LARYNGOPHARYNGEAL REFLUX DISEASE): ICD-10-CM

## 2024-08-29 PROCEDURE — 31575 DIAGNOSTIC LARYNGOSCOPY: CPT | Performed by: PHYSICIAN ASSISTANT

## 2024-08-29 PROCEDURE — 99204 OFFICE O/P NEW MOD 45 MIN: CPT | Mod: 25 | Performed by: PHYSICIAN ASSISTANT

## 2024-08-29 RX ORDER — PANTOPRAZOLE SODIUM 40 MG/1
40 TABLET, DELAYED RELEASE ORAL DAILY
Qty: 90 TABLET | Refills: 3 | Status: SHIPPED | OUTPATIENT
Start: 2024-08-29

## 2024-08-29 NOTE — LETTER
8/29/2024      [facility]  [address] [place] [address]      Dear Colleague,    Thank you for referring your patient, Duarte Lieberman, to the [facility]. Please see a copy of my visit note below.    Assessment & Plan    3M follow-up   Consider SLP referral     Problem List Items Addressed This Visit          Digestive    Gastroesophageal reflux disease without esophagitis    Relevant Medications    pantoprazole (PROTONIX) 40 MG EC tablet     Other Visit Diagnoses       Dysphonia    -  Primary    Sore throat        LPRD (laryngopharyngeal reflux disease)        Relevant Medications    pantoprazole (PROTONIX) 40 MG EC tablet    Other Relevant Orders    Adult Gen Surg  Referral    XR Esophagram              20 minutes spent on the date of the encounter doing chart review, history and exam, documentation and further activities per the note  {     NATALIA Christine  [facility]    Subjective     HPI-  Change in voice, Sore throat, Referring provider requesting laryngoscopy to look for pathology- could have muscle tension dysphonia, mass, reflux, or other. Started in 2020, feels like her throat swells.  Had video swallow for dysphagia which was negative.  Previously been on PPI.        Coffee-not regularly-.    Pop infrequently  Mildly spicy food regularly.       service used over the phone.    FMLA form completed for spouse to accompany patient           Review of Systems   ENT as above      Objective    LMP  (LMP Unknown)     Physical Exam     Constitutional:   The patient was in no acute distress.      Head/Face:   Normocephalic and atraumatic.  No lesions or scars.     Ears:  The tympanic membranes are normal in appearance, bony landmarks are intact.  No retraction, perforation, or masses.   No fluid or purulence was seen in the external canal or the middle ear. No evidence of infection of the middle ear or external canal, cerumen was normal in  appearance.    Nose:  Anterior rhinoscopy revealed midline septum and absence of purulence or polyps.      Mouth:  Normal tongue, floor of mouth, buccal mucosa, and palate.  No lesions, ulceration or  masses on inspection, normal voice quality      Oropharynx:  Normal mucosa, palate symmetric with normal elevation. Tonsils  2+      Neck:  Supple with normal laryngeal and tracheal landmarks. No palpable thyroid.     Lymphatic:  There is no palpable lymphadenopathy in the neck.           Flexible Endoscopy -     The patient was counseled that their symptoms and history require a direct visualization with an endoscopy procedure.  They understood and we proceeded with a fiberoptic examination.  First I sprayed both sides of the nose with a mixture of lidocaine and oxymetazline.  I then passed the scope through the nasal cavity.  Color photographs were taken for the permanent medical record.  The nasal cavity was unremarkable.  The nasopharynx was mucosally covered and symmetric.  The Eustachian tube openings were unobstructed.  Going further down I had a clear view of the base of tongue which had normal appearing lingual tonsillar tissue.  The base of tongue was free of lesions, and the vallecula was open.  The epiglottis was smooth and mucosally covered.  The supraglottic larynx was then clearly visualized.  The vocal cords moved smoothly and symmetrically, they were pearly white and no lesions were seen.  The pyriform sinuses were open, and the limited view of the postcricoid region did not show any lesions. There was   cobblestoning of pharynx,  and   some posterior commissure pachyderma.   Patient tolerated well.               Again, thank you for allowing me to participate in the care of your patient.        Sincerely,        NATALIA Christine

## 2024-09-06 ENCOUNTER — HOSPITAL ENCOUNTER (OUTPATIENT)
Dept: RADIOLOGY | Facility: HOSPITAL | Age: 59
Discharge: HOME | End: 2024-09-06
Attending: PHYSICIAN ASSISTANT | Admitting: PHYSICIAN ASSISTANT
Payer: COMMERCIAL

## 2024-09-06 ENCOUNTER — TELEPHONE (OUTPATIENT)
Dept: OTOLARYNGOLOGY | Facility: CLINIC | Age: 59
End: 2024-09-06
Payer: COMMERCIAL

## 2024-09-06 DIAGNOSIS — K21.9 GASTROESOPHAGEAL REFLUX DISEASE WITHOUT ESOPHAGITIS: ICD-10-CM

## 2024-09-06 DIAGNOSIS — K21.9 LPRD (LARYNGOPHARYNGEAL REFLUX DISEASE): ICD-10-CM

## 2024-09-06 DIAGNOSIS — K22.4 ESOPHAGEAL DYSMOTILITY: Primary | ICD-10-CM

## 2024-09-06 PROCEDURE — 74220 X-RAY XM ESOPHAGUS 1CNTRST: CPT

## 2024-09-06 NOTE — TELEPHONE ENCOUNTER
Please contact patient. The esophagram showed mild abnormalities in how the esophagus moves while she swallows.  This may be contributing to her symptoms.  I have placed a referral to GI for further eval.    Jacob Jackson PA-C

## 2024-09-09 NOTE — TELEPHONE ENCOUNTER
Spoke with patient and daughter, Hunter, via [employer] . They verbalized understanding of Jacob' message and will wait for a phone call from Gen Surg to schedule a follow up.   Echo Lindsay RN on 9/9/2024 at 1:27 PM

## 2024-09-24 ENCOUNTER — OFFICE VISIT (OUTPATIENT)
Dept: FAMILY MEDICINE | Facility: CLINIC | Age: 59
End: 2024-09-24
Payer: COMMERCIAL

## 2024-09-24 VITALS
HEART RATE: 73 BPM | HEIGHT: 57 IN | BODY MASS INDEX: 21.23 KG/M2 | SYSTOLIC BLOOD PRESSURE: 170 MMHG | OXYGEN SATURATION: 100 % | DIASTOLIC BLOOD PRESSURE: 69 MMHG | TEMPERATURE: 98.9 F | RESPIRATION RATE: 16 BRPM | WEIGHT: 98.4 LBS

## 2024-09-24 DIAGNOSIS — L65.9 HAIR LOSS: ICD-10-CM

## 2024-09-24 DIAGNOSIS — Z99.2 TYPE 2 DIABETES MELLITUS WITH CHRONIC KIDNEY DISEASE ON CHRONIC DIALYSIS, WITHOUT LONG-TERM CURRENT USE OF INSULIN (H): ICD-10-CM

## 2024-09-24 DIAGNOSIS — N18.6 TYPE 2 DIABETES MELLITUS WITH CHRONIC KIDNEY DISEASE ON CHRONIC DIALYSIS, WITHOUT LONG-TERM CURRENT USE OF INSULIN (H): ICD-10-CM

## 2024-09-24 DIAGNOSIS — E11.22 TYPE 2 DIABETES MELLITUS WITH CHRONIC KIDNEY DISEASE ON CHRONIC DIALYSIS, WITHOUT LONG-TERM CURRENT USE OF INSULIN (H): ICD-10-CM

## 2024-09-24 DIAGNOSIS — Z23 IMMUNIZATION DUE: ICD-10-CM

## 2024-09-24 DIAGNOSIS — N18.6 ANEMIA IN CHRONIC KIDNEY DISEASE, ON CHRONIC DIALYSIS (H): ICD-10-CM

## 2024-09-24 DIAGNOSIS — D63.1 ANEMIA IN CHRONIC KIDNEY DISEASE, ON CHRONIC DIALYSIS (H): ICD-10-CM

## 2024-09-24 DIAGNOSIS — Z00.00 ROUTINE GENERAL MEDICAL EXAMINATION AT A HEALTH CARE FACILITY: Primary | ICD-10-CM

## 2024-09-24 DIAGNOSIS — Z99.2 ANEMIA IN CHRONIC KIDNEY DISEASE, ON CHRONIC DIALYSIS (H): ICD-10-CM

## 2024-09-24 LAB
ALBUMIN SERPL BCG-MCNC: 4.6 G/DL (ref 3.5–5.2)
ALP SERPL-CCNC: 109 U/L (ref 40–150)
ALT SERPL W P-5'-P-CCNC: 9 U/L (ref 0–50)
ANION GAP SERPL CALCULATED.3IONS-SCNC: 14 MMOL/L (ref 7–15)
AST SERPL W P-5'-P-CCNC: 21 U/L (ref 0–45)
BILIRUB SERPL-MCNC: 0.5 MG/DL
BUN SERPL-MCNC: 29.4 MG/DL (ref 6–20)
CALCIUM SERPL-MCNC: 9.2 MG/DL (ref 8.8–10.4)
CHLORIDE SERPL-SCNC: 96 MMOL/L (ref 98–107)
CHOLEST SERPL-MCNC: 150 MG/DL
CREAT SERPL-MCNC: 8.14 MG/DL (ref 0.51–0.95)
EGFRCR SERPLBLD CKD-EPI 2021: 5 ML/MIN/1.73M2
ERYTHROCYTE [DISTWIDTH] IN BLOOD BY AUTOMATED COUNT: 12.4 % (ref 10–15)
EST. AVERAGE GLUCOSE BLD GHB EST-MCNC: 103 MG/DL
FASTING STATUS PATIENT QL REPORTED: ABNORMAL
FASTING STATUS PATIENT QL REPORTED: ABNORMAL
GLUCOSE SERPL-MCNC: 101 MG/DL (ref 70–99)
HBA1C MFR BLD: 5.2 % (ref 0–5.6)
HCO3 SERPL-SCNC: 26 MMOL/L (ref 22–29)
HCT VFR BLD AUTO: 31.9 % (ref 35–47)
HDLC SERPL-MCNC: 35 MG/DL
HGB BLD-MCNC: 10.7 G/DL (ref 11.7–15.7)
LDLC SERPL CALC-MCNC: 76 MG/DL
MCH RBC QN AUTO: 29.6 PG (ref 26.5–33)
MCHC RBC AUTO-ENTMCNC: 33.5 G/DL (ref 31.5–36.5)
MCV RBC AUTO: 88 FL (ref 78–100)
NONHDLC SERPL-MCNC: 115 MG/DL
PLATELET # BLD AUTO: 172 10E3/UL (ref 150–450)
POTASSIUM SERPL-SCNC: 4 MMOL/L (ref 3.4–5.3)
PROT SERPL-MCNC: 7.8 G/DL (ref 6.4–8.3)
RBC # BLD AUTO: 3.62 10E6/UL (ref 3.8–5.2)
SODIUM SERPL-SCNC: 136 MMOL/L (ref 135–145)
TRIGL SERPL-MCNC: 195 MG/DL
TSH SERPL DL<=0.005 MIU/L-ACNC: 0.73 UIU/ML (ref 0.3–4.2)
VIT B12 SERPL-MCNC: 315 PG/ML (ref 232–1245)
VIT D+METAB SERPL-MCNC: 18 NG/ML (ref 20–50)
WBC # BLD AUTO: 4 10E3/UL (ref 4–11)

## 2024-09-24 PROCEDURE — 36415 COLL VENOUS BLD VENIPUNCTURE: CPT | Performed by: FAMILY MEDICINE

## 2024-09-24 PROCEDURE — 80061 LIPID PANEL: CPT | Performed by: FAMILY MEDICINE

## 2024-09-24 PROCEDURE — 91320 SARSCV2 VAC 30MCG TRS-SUC IM: CPT | Performed by: FAMILY MEDICINE

## 2024-09-24 PROCEDURE — 99396 PREV VISIT EST AGE 40-64: CPT | Mod: 25 | Performed by: FAMILY MEDICINE

## 2024-09-24 PROCEDURE — 82306 VITAMIN D 25 HYDROXY: CPT | Performed by: FAMILY MEDICINE

## 2024-09-24 PROCEDURE — 85027 COMPLETE CBC AUTOMATED: CPT | Performed by: FAMILY MEDICINE

## 2024-09-24 PROCEDURE — 90480 ADMN SARSCOV2 VAC 1/ONLY CMP: CPT | Performed by: FAMILY MEDICINE

## 2024-09-24 PROCEDURE — 83036 HEMOGLOBIN GLYCOSYLATED A1C: CPT | Performed by: FAMILY MEDICINE

## 2024-09-24 PROCEDURE — 84443 ASSAY THYROID STIM HORMONE: CPT | Performed by: FAMILY MEDICINE

## 2024-09-24 PROCEDURE — 82607 VITAMIN B-12: CPT | Performed by: FAMILY MEDICINE

## 2024-09-24 PROCEDURE — 99214 OFFICE O/P EST MOD 30 MIN: CPT | Mod: 25 | Performed by: FAMILY MEDICINE

## 2024-09-24 PROCEDURE — 80053 COMPREHEN METABOLIC PANEL: CPT | Performed by: FAMILY MEDICINE

## 2024-09-24 RX ORDER — FAMOTIDINE 20 MG/1
20 TABLET, FILM COATED ORAL 2 TIMES DAILY
COMMUNITY

## 2024-09-24 NOTE — PATIENT INSTRUCTIONS
Patient Education   Preventive Care Advice   This is general advice given by our system to help you stay healthy. However, your care team may have specific advice just for you. Please talk to your care team about your preventive care needs.  Nutrition  Eat 5 or more servings of fruits and vegetables each day.  Try wheat bread, brown rice and whole grain pasta (instead of white bread, rice, and pasta).  Get enough calcium and vitamin D. Check the label on foods and aim for 100% of the RDA (recommended daily allowance).  Lifestyle  Exercise at least 150 minutes each week  (30 minutes a day, 5 days a week).  Do muscle strengthening activities 2 days a week. These help control your weight and prevent disease.  No smoking.  Wear sunscreen to prevent skin cancer.  Have a dental exam and cleaning every 6 months.  Yearly exams  See your health care team every year to talk about:  Any changes in your health.  Any medicines your care team has prescribed.  Preventive care, family planning, and ways to prevent chronic diseases.  Shots (vaccines)   HPV shots (up to age 26), if you've never had them before.  Hepatitis B shots (up to age 59), if you've never had them before.  COVID-19 shot: Get this shot when it's due.  Flu shot: Get a flu shot every year.  Tetanus shot: Get a tetanus shot every 10 years.  Pneumococcal, hepatitis A, and RSV shots: Ask your care team if you need these based on your risk.  Shingles shot (for age 50 and up)  General health tests  Diabetes screening:  Starting at age 35, Get screened for diabetes at least every 3 years.  If you are younger than age 35, ask your care team if you should be screened for diabetes.  Cholesterol test: At age 39, start having a cholesterol test every 5 years, or more often if advised.  Bone density scan (DEXA): At age 50, ask your care team if you should have this scan for osteoporosis (brittle bones).  Hepatitis C: Get tested at least once in your life.  STIs (sexually  transmitted infections)  Before age 24: Ask your care team if you should be screened for STIs.  After age 24: Get screened for STIs if you're at risk. You are at risk for STIs (including HIV) if:  You are sexually active with more than one person.  You don't use condoms every time.  You or a partner was diagnosed with a sexually transmitted infection.  If you are at risk for HIV, ask about PrEP medicine to prevent HIV.  Get tested for HIV at least once in your life, whether you are at risk for HIV or not.  Cancer screening tests  Cervical cancer screening: If you have a cervix, begin getting regular cervical cancer screening tests starting at age 21.  Breast cancer scan (mammogram): If you've ever had breasts, begin having regular mammograms starting at age 40. This is a scan to check for breast cancer.  Colon cancer screening: It is important to start screening for colon cancer at age 45.  Have a colonoscopy test every 10 years (or more often if you're at risk) Or, ask your provider about stool tests like a FIT test every year or Cologuard test every 3 years.  To learn more about your testing options, visit:   .  For help making a decision, visit:   https://bit.ly/jv43364.  Prostate cancer screening test: If you have a prostate, ask your care team if a prostate cancer screening test (PSA) at age 55 is right for you.  Lung cancer screening: If you are a current or former smoker ages 50 to 80, ask your care team if ongoing lung cancer screenings are right for you.  For informational purposes only. Not to replace the advice of your health care provider. Copyright   2023 [employer]. All rights reserved. Clinically reviewed by the [facility] Transitions Program. [employer] [id number] - REV 01/24.

## 2024-09-24 NOTE — PROGRESS NOTES
Preventive Care Visit  [facility] Cobb MD, Family Medicine  Sep 24, 2024      Assessment & Plan     Routine general medical examination at a health care facility    Type 2 diabetes mellitus with chronic kidney disease on chronic dialysis, without long-term current use of insulin (H)  A1c well controlled, not on medicaitons. However, she is on dialysis so A1c likely falsely depressed.   - Lipid panel reflex to direct LDL Non-fasting  - Adult Eye  Referral  - Hemoglobin A1c  - Lipid panel reflex to direct LDL Non-fasting  - Hemoglobin A1c    Hypertension  Fluctuates on dialysis days. Hold on changes, defer to nephrology.     Anemia in chronic kidney disease  Hair loss  Possibly due to general health, but it's been stable for years and nothing is new including dialysis and medications. Hair loss is very recent. Labs look ok. Will refer to dermatology to discuss options.   - TSH with free T4 reflex  - Vitamin B12  - Vitamin D Deficiency  - TSH with free T4 reflex  - Vitamin B12  - Vitamin D Deficiency    Immunization due  - COVID-19 12+ (PFIZER)    MWF    Counseling  Appropriate preventive services were addressed with this patient via screening, questionnaire, or discussion as appropriate for fall prevention, nutrition, physical activity, Tobacco-use cessation, social engagement, weight loss and cognition.  Checklist reviewing preventive services available has been given to the patient.  Reviewed patient's diet, addressing concerns and/or questions.   She is at risk for lack of exercise and has been provided with information to increase physical activity for the benefit of her well-being.       Return in about 6 months (around 3/24/2025) for hair loss.      Subjective   Bu is a 59 year old, presenting for the following:  Physical (Hair been falling out a lot)        9/24/2024     9:44 AM   Additional Questions   Roomed by effie mccartney   Accompanied by [facility]  Directive  Patient does not have a Health Care Directive or Living Will: Discussed advance care planning with patient; information given to patient to review.    HPI          9/24/2024   General Health   How would you rate your overall physical health? Good   Feel stress (tense, anxious, or unable to sleep) Not at all            9/24/2024   Nutrition   Three or more servings of calcium each day? Yes   Diet: Low salt   How many servings of fruit and vegetables per day? (!) 0-1   How many sweetened beverages each day? 0-1            9/24/2024   Exercise   Days per week of moderate/strenous exercise 3 days            9/24/2024   Social Factors   Frequency of gathering with friends or relatives Once a week   Worry food won't last until get money to buy more No   Food not last or not have enough money for food? No   Do you have housing? (Housing is defined as stable permanent housing and does not include staying ouside in a car, in a tent, in an abandoned building, in an overnight shelter, or couch-surfing.) Yes   Are you worried about losing your housing? No   Lack of transportation? No   Unable to get utilities (heat,electricity)? No            9/24/2024   Fall Risk   Fallen 2 or more times in the past year? No   Trouble with walking or balance? No             9/24/2024   Dental   Dentist two times every year? Yes                 Today's PHQ-2 Score:       5/15/2024    12:13 PM   PHQ-2 ( 1999 [employer])   Q1: Little interest or pleasure in doing things 0   Q2: Feeling down, depressed or hopeless 0   PHQ-2 Score 0   Q1: Little interest or pleasure in doing things Not at all   Q2: Feeling down, depressed or hopeless Not at all   PHQ-2 Score 0         9/24/2024   Substance Use   Alcohol more than 3/day or more than 7/wk Not Applicable   Do you use any other substances recreationally? No        Social History     Tobacco Use    Smoking status: Never     Passive exposure: Never    Smokeless tobacco: Current     Types: Chew      "Last attempt to quit: 04/2023    Tobacco comments:     Chewing tobacco    Vaping Use    Vaping status: Never Used   Substance Use Topics    Alcohol use: Never    Drug use: Never           9/13/2023   LAST FHS-7 RESULTS   1st degree relative breast or ovarian cancer Unknown   Any relative bilateral breast cancer Unknown   Any male have breast cancer No   Any ONE woman have BOTH breast AND ovarian cancer Unknown   Any woman with breast cancer before 50yrs Unknown   2 or more relatives with breast AND/OR ovarian cancer Unknown   2 or more relatives with breast AND/OR bowel cancer Unknown           Mammogram Screening - Mammogram every 1-2 years updated in Health Maintenance based on mutual decision making        9/24/2024   STI Screening   New sexual partner(s) since last STI/HIV test? No        History of abnormal Pap smear: No - age 30- 64 PAP with HPV every 5 years recommended        Latest Ref Rng & Units 9/13/2023     3:12 PM   PAP / HPV   PAP  Negative for Intraepithelial Lesion or Malignancy (NILM)    HPV 16 DNA Negative Negative    HPV 18 DNA Negative Negative    Other HR HPV Negative Negative      ASCVD Risk   The 10-year ASCVD risk score (Ana Luisa PRYOR, et al., 2019) is: 11.9%    Values used to calculate the score:      Age: 59 years      Sex: Female      Is Non- : No      Diabetic: Yes      Tobacco smoker: No      Systolic Blood Pressure: 170 mmHg      Is BP treated: Yes      HDL Cholesterol: 35 mg/dL      Total Cholesterol: 150 mg/dL           Reviewed and updated as needed this visit by Provider   Tobacco  Allergies  Meds  Problems  Med Hx  Surg Hx  Fam Hx                     Objective    Exam  BP (!) 170/69 (BP Location: Right arm, Patient Position: Sitting, Cuff Size: Adult Small)   Pulse 73   Temp 98.9  F (37.2  C) (Oral)   Resp 16   Ht 1.455 m (4' 9.28\")   Wt 44.6 kg (98 lb 6.4 oz)   LMP  (LMP Unknown)   SpO2 100%   BMI 21.08 kg/m     Estimated body mass " "index is 21.08 kg/m  as calculated from the following:    Height as of this encounter: 1.455 m (4' 9.28\").    Weight as of this encounter: 44.6 kg (98 lb 6.4 oz).    Physical Exam  GENERAL: alert and no distress  NECK: no adenopathy, no asymmetry, masses, or scars  RESP: lungs clear to auscultation - no rales, rhonchi or wheezes  CV: regular rate and rhythm, normal S1 S2, no S3 or S4, no murmur, click or rub, no peripheral edema  ABDOMEN: soft, nontender, no hepatosplenomegaly, no masses and bowel sounds normal  MS: fistula on upper left arm. no gross musculoskeletal defects noted, no edema        Signed Electronically by: Abhilash Cobb MD    "

## 2024-09-27 ENCOUNTER — MYC MEDICAL ADVICE (OUTPATIENT)
Dept: FAMILY MEDICINE | Facility: CLINIC | Age: 59
End: 2024-09-27
Payer: COMMERCIAL

## 2024-10-04 ENCOUNTER — TELEPHONE (OUTPATIENT)
Dept: TRANSPLANT | Facility: CLINIC | Age: 59
End: 2024-10-04
Payer: COMMERCIAL

## 2024-10-04 DIAGNOSIS — Z76.82 AWAITING ORGAN TRANSPLANT: Primary | ICD-10-CM

## 2024-10-04 NOTE — LETTER
PHYSICIAN ORDER   ALA/PRA BLOOD    DATE & TIME ISSUED: 2024 9:55 AM  PATIENT NAME: Lyndsey Alejo   : 1965     [facility] MR#  [id number]     DIAGNOSIS/ICD-10 CODE: Awaiting Organ transplant [Z76.82}   EXPIRES: (1 YEAR AFTER DATE ISSUED)  EVERY 12 weeks / 3 months   1. Please draw 20ml of blood in red top (plain) tube for Antileukocyte Antibody (ALA or PRA).   2. Label tubes with the patient s name, complete lab slip.         3. Mailers, lab slips with instructions are sent to patient separately.      4. Call the [facility] at [phone] to reorder mailers.       5. Mail blood to (this address is also on the mailers):    IMMUNOLOGY LABORATORY   [facility]   [address] [place] [address]    .  Beverly Rodarte in Immunology and Transplantation  Surgical Director, Kidney & Pancreas Transplant Programs  Medical Director, Solid Organ Transplant Unit

## 2024-10-04 NOTE — TELEPHONE ENCOUNTER
Called pt's daughter, Getachew, today and spoke with her. EExplained I still do not have dental clearance letter. She will ask dentist office to refax now to our Office.  I explained that she needs a PRA sample drawn now in anticipation of being listed soon. I explained I will have a package of PRA mailers sent to pt's home and she should bring to dialysis. I explained I will also send PRA order to pt and dialysis unit now. Plan to review at next week's Selection Committee as I should have received dental note by then.  Getachew expressed excellent understanding of all and was in good agreement with the plan.  Email to [facility] to mail out PRA mailers.  Generated PRA order today in EPIC - electronically sent to pt/ dialysis.  Called Trish SCRUGGS at dialysis and updated her per content in this letter.

## 2024-10-07 ENCOUNTER — TELEPHONE (OUTPATIENT)
Dept: SURGERY | Facility: CLINIC | Age: 59
End: 2024-10-07

## 2024-10-07 ENCOUNTER — OFFICE VISIT (OUTPATIENT)
Dept: SURGERY | Facility: CLINIC | Age: 59
End: 2024-10-07
Payer: COMMERCIAL

## 2024-10-07 DIAGNOSIS — M54.2 NECK PAIN: Primary | ICD-10-CM

## 2024-10-07 PROCEDURE — 99204 OFFICE O/P NEW MOD 45 MIN: CPT | Performed by: SURGERY

## 2024-10-07 NOTE — PROGRESS NOTES
"PEQ GERD-HRQL    Heartburn:  How bad is the heartburn?: 5   Heartburn when lying down?: 5   Heartburn when standing up?: 5  Heartburn after meals?: 5  Does heartburn change your diet?: 0  Does heartburn wake you from sleep?: 5  Do you have difficulty swallowing?: 5  Do you have pain with swallowing?: 5  Do you have bloating or gassy feelings?: 5  If you take reflux medication, does this affect your daily life?: 0  Heartburn Sub-score:: 30     Reflux:  How bad is your regurgitation?: 5   Do you have regurgitation when lying down?: 5  Do you have regurgitation when standing up?: 5        Does regurgitation wake you from sleep?: 0  Are you currently taking any medications for heartburn or GERD?: No     How satisfied are you with your present condition?: Dissatisfied        Total:       HPI: Duarte Lieberman is a 59 year old female referred to see me by [facility] for evaluation of gastroesophageal reflux disease.  She notes  a history of significant throat pain with chest burning.  She has an inability to lay flat without having \"burning\" in her chest.  Her main complaint is right-sided neck pain which she states is sharp and stabbing.  She has seen an ENT and underwent laryngoscopy which was relatively unremarkable.  She does not complain of any nighttime cough or regurgitation.  However, her GERD HR Q OL is 55.  Is unclear whether or not she understands the significance of the GERD HR Q OL at present.  Most of information was garnered via a .    Allergies:Patient has no known allergies.    Past Medical History:   Diagnosis Date    Benign essential hypertension     Gastroesophageal reflux disease without esophagitis        Past Surgical History:   Procedure Laterality Date    TUBAL LIGATION         CURRENT MEDS:    Current Outpatient Medications:     lisinopril (ZESTRIL) 10 MG tablet, Take 1 tablet (10 mg) by mouth daily, Disp: 90 tablet, Rfl: 3    pantoprazole (PROTONIX) 40 MG EC tablet, Take " 1 tablet (40 mg) by mouth daily. 30 minutes before breakfast, Disp: 90 tablet, Rfl: 3    No family history on file.     reports that she has never smoked. She has never been exposed to tobacco smoke. She has never used smokeless tobacco. She reports that she does not currently use alcohol. She reports that she does not use drugs.    Review of Systems:  The 12 system review is within normal limits except for as mentioned above.  General ROS: No complaints or constitutional symptoms  Ophthalmic ROS: No complaints of visual changes  Skin: No complaints or symptoms   Endocrine: No complaints or symptoms  Hematologic/Lymphatic: No symptoms or complaints  Psychiatric: No symptoms or complaints  Respiratory ROS: no cough, shortness of breath, or wheezing  Cardiovascular ROS: no chest pain or dyspnea on exertion  Gastrointestinal ROS: As per HPI  Genito-Urinary ROS: no dysuria, trouble voiding, or hematuria  Musculoskeletal ROS: no joint or muscle pain  Neurological ROS: no TIA or stroke symptoms      EXAM:  LMP  (LMP Unknown)   GENERAL: Well developed female, No acute distress, pleasant and conversant   EYES: Pupils equal, round and reactive, no scleral icterus  ABDOMEN: Soft, non-tender, no masses, non-distended  SKIN: Pink, warm and dry, no obvious rashes or lesions   NEURO:No focal deficits, ambulatory  MUSCULOSKELETAL:No obvious deformities, no swelling, normal appearing      LABS:  Lab Results   Component Value Date    WBC 5.5 08/16/2024    HGB 14.0 08/16/2024    HCT 41.4 08/16/2024    MCV 90 08/16/2024     08/16/2024     INR/Prothrombin Time  @LABRCNTIP(NA,K,CL,co2,bun,creatinine,labglom,glucose,calcium)@  Lab Results   Component Value Date    ALT 43 08/16/2024    AST 49 (H) 08/16/2024    ALKPHOS 109 08/16/2024       IMAGES:   Relevant images were reviewed and discussed with the patient.  Notable findings were: Esophagram and is relatively unremarkable with mild retrograde esophageal  contractions    Assessment/Plan:   Duarte Lieberman is a 59 year old female with signs and symptoms consistent with neck pain of an unclear etiology.  I have explained the pathophysiology of GERD in detail as well as the surgical versus non-operative management strategies.      At this point we will proceed with continued initial work-up.  This will include CT scan of the neck as well as an endoscopy to elucidate whether or not reflux is an actual etiology of this patient's complaint.    Jatin Ramirez DO FACS  Foregut and Acute Care Surgery  [phone]  [facility] Department of Surgery

## 2024-10-07 NOTE — LETTER
"10/7/2024      Duarte Lieberman  [address] [place] [address]      Dear Colleague,    Thank you for referring your patient, Duarte Lieberman, to the [facility] [place]. Please see a copy of my visit note below.    PEQ GERD-HRQL    Heartburn:  How bad is the heartburn?: 5   Heartburn when lying down?: 5   Heartburn when standing up?: 5  Heartburn after meals?: 5  Does heartburn change your diet?: 0  Does heartburn wake you from sleep?: 5  Do you have difficulty swallowing?: 5  Do you have pain with swallowing?: 5  Do you have bloating or gassy feelings?: 5  If you take reflux medication, does this affect your daily life?: 0  Heartburn Sub-score:: 30     Reflux:  How bad is your regurgitation?: 5   Do you have regurgitation when lying down?: 5  Do you have regurgitation when standing up?: 5        Does regurgitation wake you from sleep?: 0  Are you currently taking any medications for heartburn or GERD?: No     How satisfied are you with your present condition?: Dissatisfied        Total:       HPI: Duaret Lieberman is a 59 year old female referred to see me by [facility] for evaluation of gastroesophageal reflux disease.  She notes  a history of significant throat pain with chest burning.  She has an inability to lay flat without having \"burning\" in her chest.  Her main complaint is right-sided neck pain which she states is sharp and stabbing.  She has seen an ENT and underwent laryngoscopy which was relatively unremarkable.  She does not complain of any nighttime cough or regurgitation.  However, her GERD HR Q OL is 55.  Is unclear whether or not she understands the significance of the GERD HR Q OL at present.  Most of information was garnered via a .    Allergies:Patient has no known allergies.    Past Medical History:   Diagnosis Date     Benign essential hypertension      Gastroesophageal reflux disease without esophagitis        Past Surgical History: "   Procedure Laterality Date     TUBAL LIGATION         CURRENT MEDS:    Current Outpatient Medications:      lisinopril (ZESTRIL) 10 MG tablet, Take 1 tablet (10 mg) by mouth daily, Disp: 90 tablet, Rfl: 3     pantoprazole (PROTONIX) 40 MG EC tablet, Take 1 tablet (40 mg) by mouth daily. 30 minutes before breakfast, Disp: 90 tablet, Rfl: 3    No family history on file.     reports that she has never smoked. She has never been exposed to tobacco smoke. She has never used smokeless tobacco. She reports that she does not currently use alcohol. She reports that she does not use drugs.    Review of Systems:  The 12 system review is within normal limits except for as mentioned above.  General ROS: No complaints or constitutional symptoms  Ophthalmic ROS: No complaints of visual changes  Skin: No complaints or symptoms   Endocrine: No complaints or symptoms  Hematologic/Lymphatic: No symptoms or complaints  Psychiatric: No symptoms or complaints  Respiratory ROS: no cough, shortness of breath, or wheezing  Cardiovascular ROS: no chest pain or dyspnea on exertion  Gastrointestinal ROS: As per HPI  Genito-Urinary ROS: no dysuria, trouble voiding, or hematuria  Musculoskeletal ROS: no joint or muscle pain  Neurological ROS: no TIA or stroke symptoms      EXAM:  LMP  (LMP Unknown)   GENERAL: Well developed female, No acute distress, pleasant and conversant   EYES: Pupils equal, round and reactive, no scleral icterus  ABDOMEN: Soft, non-tender, no masses, non-distended  SKIN: Pink, warm and dry, no obvious rashes or lesions   NEURO:No focal deficits, ambulatory  MUSCULOSKELETAL:No obvious deformities, no swelling, normal appearing      LABS:  Lab Results   Component Value Date    WBC 5.5 08/16/2024    HGB 14.0 08/16/2024    HCT 41.4 08/16/2024    MCV 90 08/16/2024     08/16/2024     INR/Prothrombin Time  @LABRCNTIP(NA,K,CL,co2,bun,creatinine,labglom,glucose,calcium)@  Lab Results   Component Value Date    ALT 43  08/16/2024    AST 49 (H) 08/16/2024    ALKPHOS 109 08/16/2024       IMAGES:   Relevant images were reviewed and discussed with the patient.  Notable findings were: Esophagram and is relatively unremarkable with mild retrograde esophageal contractions    Assessment/Plan:   Duarte Lieberman is a 59 year old female with signs and symptoms consistent with neck pain of an unclear etiology.  I have explained the pathophysiology of GERD in detail as well as the surgical versus non-operative management strategies.      At this point we will proceed with continued initial work-up.  This will include CT scan of the neck as well as an endoscopy to elucidate whether or not reflux is an actual etiology of this patient's complaint.    Jatin Ramirez DO [facility]  Foregut and Acute Care Surgery  [phone]  [facility] Department of Surgery                   Again, thank you for allowing me to participate in the care of your patient.        Sincerely,        Jatin Ramirez DO

## 2024-10-14 ENCOUNTER — HOSPITAL ENCOUNTER (OUTPATIENT)
Dept: CT IMAGING | Facility: HOSPITAL | Age: 59
Discharge: HOME | End: 2024-10-14
Attending: SURGERY | Admitting: SURGERY
Payer: COMMERCIAL

## 2024-10-14 DIAGNOSIS — M54.2 NECK PAIN: ICD-10-CM

## 2024-10-14 LAB
CREAT BLD-MCNC: 0.9 MG/DL (ref 0.6–1.1)
EGFRCR SERPLBLD CKD-EPI 2021: >60 ML/MIN/1.73M2

## 2024-10-14 PROCEDURE — 250N000009 HC RX 250: Performed by: SURGERY

## 2024-10-14 PROCEDURE — 82565 ASSAY OF CREATININE: CPT

## 2024-10-14 PROCEDURE — 250N000011 HC RX IP 250 OP 636: Performed by: SURGERY

## 2024-10-14 PROCEDURE — 70491 CT SOFT TISSUE NECK W/DYE: CPT

## 2024-10-14 RX ORDER — IOPAMIDOL 755 MG/ML
90 INJECTION, SOLUTION INTRAVASCULAR ONCE
Status: COMPLETED | OUTPATIENT
Start: 2024-10-14 | End: 2024-10-14

## 2024-10-14 RX ADMIN — SODIUM CHLORIDE 90 ML: 9 INJECTION, SOLUTION INTRAVENOUS at 18:50

## 2024-10-14 RX ADMIN — IOPAMIDOL 90 ML: 755 INJECTION, SOLUTION INTRAVENOUS at 18:50

## 2024-10-25 ENCOUNTER — TELEPHONE (OUTPATIENT)
Dept: TRANSPLANT | Facility: CLINIC | Age: 59
End: 2024-10-25
Payer: COMMERCIAL

## 2024-10-25 NOTE — TELEPHONE ENCOUNTER
Spoke with Dr. Griffith, pt's dentist at [facility], today. She explained she wants to wait until pt's next appt on 11/05/2024 first and then after reviewing xrays and doing the exam she will provide a clearance letter. I told her this is fine.     My chart message today to pt that we will wait for dental note as above.

## 2024-10-25 NOTE — TELEPHONE ENCOUNTER
Have not yet received a PRA sample yet.  Called dialysis today and requested PRA sample be drawn asap. Explained pt will be reviewed and likely approved for listing ACTIVE status at next week's Selection Committee and will need a fresh PRA sample here in order to then be listed ACTIVE status.   will let nurse know this.

## 2024-10-30 ENCOUNTER — LAB (OUTPATIENT)
Dept: LAB | Facility: CLINIC | Age: 59
End: 2024-10-30
Payer: COMMERCIAL

## 2024-10-30 DIAGNOSIS — Z76.82 AWAITING ORGAN TRANSPLANT: ICD-10-CM

## 2024-10-30 PROCEDURE — 86833 HLA CLASS II HIGH DEFIN QUAL: CPT

## 2024-10-30 PROCEDURE — 86832 HLA CLASS I HIGH DEFIN QUAL: CPT

## 2024-11-01 ENCOUNTER — OFFICE VISIT (OUTPATIENT)
Dept: FAMILY MEDICINE | Facility: CLINIC | Age: 59
End: 2024-11-01
Payer: COMMERCIAL

## 2024-11-01 ENCOUNTER — TELEPHONE (OUTPATIENT)
Dept: OTOLARYNGOLOGY | Facility: CLINIC | Age: 59
End: 2024-11-01

## 2024-11-01 VITALS
HEIGHT: 58 IN | BODY MASS INDEX: 21.67 KG/M2 | DIASTOLIC BLOOD PRESSURE: 79 MMHG | RESPIRATION RATE: 12 BRPM | SYSTOLIC BLOOD PRESSURE: 132 MMHG | OXYGEN SATURATION: 100 % | WEIGHT: 103.25 LBS | HEART RATE: 60 BPM | TEMPERATURE: 97.8 F

## 2024-11-01 DIAGNOSIS — K21.9 GASTROESOPHAGEAL REFLUX DISEASE WITHOUT ESOPHAGITIS: ICD-10-CM

## 2024-11-01 DIAGNOSIS — I10 BENIGN ESSENTIAL HYPERTENSION: ICD-10-CM

## 2024-11-01 DIAGNOSIS — Z01.818 PREOP GENERAL PHYSICAL EXAM: Primary | ICD-10-CM

## 2024-11-01 PROCEDURE — 99214 OFFICE O/P EST MOD 30 MIN: CPT | Performed by: FAMILY MEDICINE

## 2024-11-01 NOTE — H&P (VIEW-ONLY)
Preoperative Evaluation  [address]  [place] [address]  Phone: [phone]  Fax: [phone]  Primary Provider: [facility]  Pre-op Performing Provider: Melanie Arango MD  Nov 1, 2024 11/1/2024   Surgical Information   What procedure is being done? EGD with Biopsy    Facility or Hospital where procedure/surgery will be performed: [facility]    Who is doing the procedure / surgery? Dr Ramirez    Date of surgery / procedure: November 14, 2024    Time of surgery / procedure: UNK    Where do you plan to recover after surgery? at home with family        Patient-reported     Fax number for surgical facility: Note does not need to be faxed, will be available electronically in Epic.    Assessment & Plan     The proposed surgical procedure is considered LOW risk.    Preop general physical exam    Gastroesophageal reflux disease without esophagitis: On PPI- has seen ENT and planning EGD.     Benign essential hypertension: BP at goal- continue lisinopril- of note, patient typically takes this in the evening.           - No identified additional risk factors other than previously addressed    Preoperative Medication Instructions  Antiplatelet or Anticoagulation Medication Instructions   - Patient is on no antiplatelet or anticoagulation medications.    Additional Medication Instructions  Take all scheduled medications on the day of surgery    Recommendation  Approval given to proceed with proposed procedure, without further diagnostic evaluation.    Zahra Ramachandran is a 59 year old, presenting for the following:  Pre-Op Exam          11/1/2024    12:43 PM   Additional Questions   Roomed by BERNARD Yousif   Accompanied by Niece: Hunter Lieberman     HPI related to upcoming procedure: History of right neck, epigastric pain. Has undergone workup with ENT. CT unremarkable. Planning EGD with general surgery.        11/1/2024   Pre-Op  Questionnaire   Have you ever had a heart attack or stroke? No    Have you ever had surgery on your heart or blood vessels, such as a stent placement, a coronary artery bypass, or surgery on an artery in your head, neck, heart, or legs? No    Do you have chest pain with activity? No- she has epigastric burning only   Do you have a history of heart failure? No    Do you currently have a cold, bronchitis or symptoms of other infection? No    Do you have a cough, shortness of breath, or wheezing? (!) YES - shortness of breath only when she has the pain in her throat/neck pain.    Do you or anyone in your family have previous history of blood clots? No    Do you or does anyone in your family have a serious bleeding problem such as prolonged bleeding following surgeries or cuts? No    Have you ever had problems with anemia or been told to take iron pills? No    Have you had any abnormal blood loss such as black, tarry or bloody stools, or abnormal vaginal bleeding? No    Have you ever had a blood transfusion? No    Are you willing to have a blood transfusion if it is medically needed before, during, or after your surgery? Yes    Have you or any of your relatives ever had problems with anesthesia? No    Do you have sleep apnea, excessive snoring or daytime drowsiness? No    Do you have any artifical heart valves or other implanted medical devices like a pacemaker, defibrillator, or continuous glucose monitor? No    Do you have artificial joints? No    Are you allergic to latex? No        Patient-reported     Health Care Directive  Patient does not have a Health Care Directive: not on file    Preoperative Review of    reviewed - no record of controlled substances prescribed.          Patient Active Problem List    Diagnosis Date Noted    Neck pain 10/07/2024     Priority: Medium    Benign essential hypertension      Priority: Medium    Gastroesophageal reflux disease without esophagitis      Priority: Medium     "  Past Medical History:   Diagnosis Date    Benign essential hypertension     Gastroesophageal reflux disease without esophagitis      Past Surgical History:   Procedure Laterality Date    TUBAL LIGATION       Current Outpatient Medications   Medication Sig Dispense Refill    lisinopril (ZESTRIL) 10 MG tablet Take 1 tablet (10 mg) by mouth daily 90 tablet 3    pantoprazole (PROTONIX) 40 MG EC tablet Take 1 tablet (40 mg) by mouth daily. 30 minutes before breakfast 90 tablet 3       No Known Allergies     Social History     Tobacco Use    Smoking status: Never     Passive exposure: Never    Smokeless tobacco: Never   Substance Use Topics    Alcohol use: Not Currently     Comment: patient stopped in 2023.  Weekends.       History   Drug Use Unknown     Comment: Patient quit beetle-nut 01/01/24               Objective    /79 (BP Location: Left arm, Patient Position: Sitting, Cuff Size: Adult Regular)   Pulse 60   Temp 97.8  F (36.6  C) (Oral)   Resp 12   Ht 1.485 m (4' 10.47\")   Wt 46.8 kg (103 lb 4 oz)   LMP  (LMP Unknown)   SpO2 100%   BMI 21.24 kg/m     Estimated body mass index is 21.24 kg/m  as calculated from the following:    Height as of this encounter: 1.485 m (4' 10.47\").    Weight as of this encounter: 46.8 kg (103 lb 4 oz).  Physical Exam  GENERAL: alert and no distress  EYES: Eyes grossly normal to inspection, PERRL and conjunctivae and sclerae normal  HENT: ear canals and TM's normal, nose and mouth without ulcers or lesions  NECK: no adenopathy, no asymmetry, masses, or scars  RESP: lungs clear to auscultation - no rales, rhonchi or wheezes  CV: regular rate and rhythm, normal S1 S2, no S3 or S4, no murmur, click or rub, no peripheral edema  ABDOMEN: soft, nontender, no hepatosplenomegaly, no masses and bowel sounds normal  MS: no gross musculoskeletal defects noted, no edema  SKIN: no suspicious lesions or rashes  NEURO: Normal strength and tone, mentation intact and speech normal  PSYCH: " mentation appears normal, affect normal/bright    Recent Labs   Lab Test 10/14/24  1720 08/16/24  1446 04/26/24  0938 04/22/24  0900   HGB  --  14.0  --  14.6   PLT  --  153  --  161   NA  --  138 140 140   POTASSIUM  --  3.5 3.6 3.6   CR 0.9 0.66 0.67 0.61        Diagnostics  No labs were ordered during this visit.   No EKG required for low risk surgery (cataract, skin procedure, breast biopsy, etc).  No EKG required, no history of coronary heart disease, significant arrhythmia, peripheral arterial disease or other structural heart disease.    Revised Cardiac Risk Index (RCRI)  The patient has the following serious cardiovascular risks for perioperative complications:   - No serious cardiac risks = 0 points     RCRI Interpretation: 0 points: Class I (very low risk - 0.4% complication rate)         Signed Electronically by: Melanie Arango MD  A copy of this evaluation report is provided to the requesting physician.

## 2024-11-01 NOTE — PROGRESS NOTES
Preoperative Evaluation  [address]  [place] [address]  Phone: [phone]  Fax: [phone]  Primary Provider: [facility]  Pre-op Performing Provider: Melanie Arango MD  Nov 1, 2024 11/1/2024   Surgical Information   What procedure is being done? EGD with Biopsy    Facility or Hospital where procedure/surgery will be performed: [facility]    Who is doing the procedure / surgery? Dr Ramirez    Date of surgery / procedure: November 14, 2024    Time of surgery / procedure: UNK    Where do you plan to recover after surgery? at home with family        Patient-reported     Fax number for surgical facility: Note does not need to be faxed, will be available electronically in Epic.    Assessment & Plan     The proposed surgical procedure is considered LOW risk.    Preop general physical exam    Gastroesophageal reflux disease without esophagitis: On PPI- has seen ENT and planning EGD.     Benign essential hypertension: BP at goal- continue lisinopril- of note, patient typically takes this in the evening.           - No identified additional risk factors other than previously addressed    Preoperative Medication Instructions  Antiplatelet or Anticoagulation Medication Instructions   - Patient is on no antiplatelet or anticoagulation medications.    Additional Medication Instructions  Take all scheduled medications on the day of surgery    Recommendation  Approval given to proceed with proposed procedure, without further diagnostic evaluation.    Zahra Ramachandran is a 59 year old, presenting for the following:  Pre-Op Exam          11/1/2024    12:43 PM   Additional Questions   Roomed by BERNARD Yousif   Accompanied by Niece: Hunter Lieberman     HPI related to upcoming procedure: History of right neck, epigastric pain. Has undergone workup with ENT. CT unremarkable. Planning EGD with general surgery.        11/1/2024   Pre-Op  Questionnaire   Have you ever had a heart attack or stroke? No    Have you ever had surgery on your heart or blood vessels, such as a stent placement, a coronary artery bypass, or surgery on an artery in your head, neck, heart, or legs? No    Do you have chest pain with activity? No- she has epigastric burning only   Do you have a history of heart failure? No    Do you currently have a cold, bronchitis or symptoms of other infection? No    Do you have a cough, shortness of breath, or wheezing? (!) YES - shortness of breath only when she has the pain in her throat/neck pain.    Do you or anyone in your family have previous history of blood clots? No    Do you or does anyone in your family have a serious bleeding problem such as prolonged bleeding following surgeries or cuts? No    Have you ever had problems with anemia or been told to take iron pills? No    Have you had any abnormal blood loss such as black, tarry or bloody stools, or abnormal vaginal bleeding? No    Have you ever had a blood transfusion? No    Are you willing to have a blood transfusion if it is medically needed before, during, or after your surgery? Yes    Have you or any of your relatives ever had problems with anesthesia? No    Do you have sleep apnea, excessive snoring or daytime drowsiness? No    Do you have any artifical heart valves or other implanted medical devices like a pacemaker, defibrillator, or continuous glucose monitor? No    Do you have artificial joints? No    Are you allergic to latex? No        Patient-reported     Health Care Directive  Patient does not have a Health Care Directive: not on file    Preoperative Review of    reviewed - no record of controlled substances prescribed.          Patient Active Problem List    Diagnosis Date Noted    Neck pain 10/07/2024     Priority: Medium    Benign essential hypertension      Priority: Medium    Gastroesophageal reflux disease without esophagitis      Priority: Medium     "  Past Medical History:   Diagnosis Date    Benign essential hypertension     Gastroesophageal reflux disease without esophagitis      Past Surgical History:   Procedure Laterality Date    TUBAL LIGATION       Current Outpatient Medications   Medication Sig Dispense Refill    lisinopril (ZESTRIL) 10 MG tablet Take 1 tablet (10 mg) by mouth daily 90 tablet 3    pantoprazole (PROTONIX) 40 MG EC tablet Take 1 tablet (40 mg) by mouth daily. 30 minutes before breakfast 90 tablet 3       No Known Allergies     Social History     Tobacco Use    Smoking status: Never     Passive exposure: Never    Smokeless tobacco: Never   Substance Use Topics    Alcohol use: Not Currently     Comment: patient stopped in 2023.  Weekends.       History   Drug Use Unknown     Comment: Patient quit beetle-nut 01/01/24               Objective    /79 (BP Location: Left arm, Patient Position: Sitting, Cuff Size: Adult Regular)   Pulse 60   Temp 97.8  F (36.6  C) (Oral)   Resp 12   Ht 1.485 m (4' 10.47\")   Wt 46.8 kg (103 lb 4 oz)   LMP  (LMP Unknown)   SpO2 100%   BMI 21.24 kg/m     Estimated body mass index is 21.24 kg/m  as calculated from the following:    Height as of this encounter: 1.485 m (4' 10.47\").    Weight as of this encounter: 46.8 kg (103 lb 4 oz).  Physical Exam  GENERAL: alert and no distress  EYES: Eyes grossly normal to inspection, PERRL and conjunctivae and sclerae normal  HENT: ear canals and TM's normal, nose and mouth without ulcers or lesions  NECK: no adenopathy, no asymmetry, masses, or scars  RESP: lungs clear to auscultation - no rales, rhonchi or wheezes  CV: regular rate and rhythm, normal S1 S2, no S3 or S4, no murmur, click or rub, no peripheral edema  ABDOMEN: soft, nontender, no hepatosplenomegaly, no masses and bowel sounds normal  MS: no gross musculoskeletal defects noted, no edema  SKIN: no suspicious lesions or rashes  NEURO: Normal strength and tone, mentation intact and speech normal  PSYCH: " mentation appears normal, affect normal/bright    Recent Labs   Lab Test 10/14/24  1720 08/16/24  1446 04/26/24  0938 04/22/24  0900   HGB  --  14.0  --  14.6   PLT  --  153  --  161   NA  --  138 140 140   POTASSIUM  --  3.5 3.6 3.6   CR 0.9 0.66 0.67 0.61        Diagnostics  No labs were ordered during this visit.   No EKG required for low risk surgery (cataract, skin procedure, breast biopsy, etc).  No EKG required, no history of coronary heart disease, significant arrhythmia, peripheral arterial disease or other structural heart disease.    Revised Cardiac Risk Index (RCRI)  The patient has the following serious cardiovascular risks for perioperative complications:   - No serious cardiac risks = 0 points     RCRI Interpretation: 0 points: Class I (very low risk - 0.4% complication rate)         Signed Electronically by: Melanie Arango MD  A copy of this evaluation report is provided to the requesting physician.

## 2024-11-01 NOTE — PATIENT INSTRUCTIONS
How to Take Your Medication Before Surgery  Preoperative Medication Instructions   Antiplatelet or Anticoagulation Medication Instructions   - Patient is on no antiplatelet or anticoagulation medications.    Additional Medication Instructions  Take all scheduled medications on the day of surgery       Patient Education   Preparing for Your Surgery  For Adults  Getting started  In most cases, a nurse will call to review your health history and instructions. They will give you an arrival time based on your scheduled surgery time. Please be ready to share:  Your doctor's clinic name and phone number  Your medical, surgical, and anesthesia history  A list of allergies and sensitivities  A list of medicines, including herbal treatments and over-the-counter drugs  Whether the patient has a legal guardian (ask how to send us the papers in advance)  Note: You may not receive a call if you were seen at our PAC (Preoperative Assessment Center).  Please tell us if you're pregnant--or if there's any chance you might be pregnant. Some surgeries may injure a fetus (unborn baby), so they require a pregnancy test. Surgeries that are safe for a fetus don't always need a test, and you can choose whether to have one.   Preparing for surgery  Within 10 to 30 days of surgery: Have a pre-op exam (sometimes called an H&P, or History and Physical). This can be done at a clinic or pre-operative center.  If you're having a , you may not need this exam. Talk to your care team.  At your pre-op exam, talk to your care team about all medicines you take. (This includes CBD oil and any drugs, such as THC, marijuana, and other forms of cannabis.) If you need to stop any medicine before surgery, ask when to start taking it again.  This is for your safety. Many medicines and drugs can make you bleed too much during surgery. Some change how well surgery (anesthesia) drugs work.  Call your insurance company to let them know you're having  surgery. (If you don't have insurance, call [phone].)  Call your clinic if there's any change in your health. This includes a scrape or scratch near the surgery site, or any signs of a cold (sore throat, runny nose, cough, rash, fever).  Eating and drinking guidelines  For your safety: Unless your surgeon tells you otherwise, follow the guidelines below.  Eat and drink as normal until 8 hours before you arrive for surgery. After that, no food or milk. You can spit out gum when you arrive.  Drink clear liquids until 2 hours before you arrive. These are liquids you can see through, like water, Gatorade, and Propel Water. They also include plain black coffee and tea (no cream or milk).  No alcohol for 24 hours before you arrive. The night before surgery, stop any drinks that contain THC.  If your care team tells you to take medicine on the morning of surgery, it's okay to take it with a sip of water. No other medicines or drugs are allowed (including CBD oil)--follow your care team's instructions.  If you have questions the day of surgery, call your hospital or surgery center.   Preventing infection  Shower or bathe the night before and the morning of surgery. Follow the instructions your clinic gave you. (If no instructions, use regular soap.)  Don't shave or clip hair near your surgery site. We'll remove the hair if needed.  Don't smoke or vape the morning of surgery. No chewing tobacco for 6 hours before you arrive. A nicotine patch is okay. You may spit out nicotine gum when you arrive.  For some surgeries, the surgeon will tell you to fully quit smoking and nicotine.  We will make every effort to keep you safe from infection. We will:  Clean our hands often with soap and water (or an alcohol-based hand rub).  Clean the skin at your surgery site with a special soap that kills germs.  Give you a special gown to keep you warm. (Cold raises the risk of infection.)  Wear hair covers, masks, gowns, and gloves  during surgery.  Give antibiotic medicine, if prescribed. Not all surgeries need this medicine.  What to bring on the day of surgery  Photo ID and insurance card  Copy of your health care directive, if you have one  Glasses and hearing aids (bring cases)  You can't wear contacts during surgery  Inhaler and eye drops, if you use them (tell us about these when you arrive)  CPAP machine or breathing device, if you use them  A few personal items, if spending the night  If you have . . .  A pacemaker, ICD (cardiac defibrillator), or other implant: Bring the ID card.  An implanted stimulator: Bring the remote control.  A legal guardian: Bring a copy of the certified (court-stamped) guardianship papers.  Please remove any jewelry, including body piercings. Leave jewelry and other valuables at home.  If you're going home the day of surgery  You must have a responsible adult drive you home. They should stay with you overnight as well.  If you don't have someone to stay with you, and you aren't safe to go home alone, we may keep you overnight. Insurance often won't pay for this.  After surgery  If it's hard to control your pain or you need more pain medicine, please call your surgeon's office.  Questions?   If you have any questions for your care team, list them here:   ____________________________________________________________________________________________________________________________________________________________________________________________________________________________________________________________  For informational purposes only. Not to replace the advice of your health care provider. Copyright   2003, 2019 [facility]. All rights reserved. Clinically reviewed by Zheng Hidalgo MD. [employer] [id number] - REV 08/24.

## 2024-11-04 NOTE — TELEPHONE ENCOUNTER
Attempted to reach [facility]  but was on hold for 20 minutes. I will try to reach the patient via  again a little later today.     Pt is scheduled for a follow up with Jacob Jackson on 12/12 at 330pm.     Echo Lindsay RN on 11/1/2024 at 11:15 AM        
Left detailed message via [employer]  with appointment information.  Echo Lindsay RN on 11/4/2024 at 2:43 PM    
baseline...

## 2024-11-05 ENCOUNTER — ANCILLARY PROCEDURE (OUTPATIENT)
Dept: MAMMOGRAPHY | Facility: CLINIC | Age: 59
End: 2024-11-05
Payer: COMMERCIAL

## 2024-11-05 DIAGNOSIS — Z12.31 VISIT FOR SCREENING MAMMOGRAM: ICD-10-CM

## 2024-11-05 PROCEDURE — 77067 SCR MAMMO BI INCL CAD: CPT | Mod: TC | Performed by: RADIOLOGY

## 2024-11-08 ENCOUNTER — TELEPHONE (OUTPATIENT)
Dept: FAMILY MEDICINE | Facility: CLINIC | Age: 59
End: 2024-11-08
Payer: COMMERCIAL

## 2024-11-08 NOTE — TELEPHONE ENCOUNTER
----- Message from Haritha Bruno sent at 11/8/2024  2:58 PM CST -----  Regarding: Please Call  Patient is scheduled an appointment with me on Monday for filling out the forms.  Please call this patient to make an appointment to establish care with a primary care provider or follow-up with their current primary care provider if that be outside the system. This is something that needs to be done by a PCP. Thanks    Haritha Bruno, MINISTERIO FNP-C  Family Nurse Practitioner - Same Day Provider  [facility]

## 2024-11-08 NOTE — TELEPHONE ENCOUNTER
Called through  service, informed of appt cancellation b/c Haritha is only same-day provider.  Rescheduled to 11/12/24  with Judith.

## 2024-11-11 ENCOUNTER — TELEPHONE (OUTPATIENT)
Dept: SURGERY | Facility: CLINIC | Age: 59
End: 2024-11-11

## 2024-11-11 ENCOUNTER — TELEPHONE (OUTPATIENT)
Dept: GASTROENTEROLOGY | Facility: CLINIC | Age: 59
End: 2024-11-11

## 2024-11-11 NOTE — TELEPHONE ENCOUNTER
ID AYMA    Name: Cristina     assisted writer to call patient. Hunter answered the call and Consent is on file.   Writer informed Colonoscopy updated arrival time of 12PM on 11/27 with Dr. Howard.  Due to Dr. Elvin cobb released.    Patient family member informed and confirmed.   service on procedure day was called and updated new arrival.   Qbrexza Counseling:  I discussed with the patient the risks of Qbrexza including but not limited to headache, mydriasis, blurred vision, dry eyes, nasal dryness, dry mouth, dry throat, dry skin, urinary hesitation, and constipation.  Local skin reactions including erythema, burning, stinging, and itching can also occur.

## 2024-11-11 NOTE — TELEPHONE ENCOUNTER
Patient had clarifying questions on prep before surgery. Let her know that the pre-op nurse would be calling her a few days after her surgery that will go over her prep in more detail. We talked about fasting for 8 hours before her procedure, then clear liquids until 2 hour prior, and then nothing for the last 2 hours. We also talked about taking her medications as usual with small sips of water as normal until the 2 hours prior to her procedure. There are not any blood thinners listed in medications but patient was not able to confirm.    Karis Phelps RN  [facility]  General Surgery  [address] [place] [id number]  [email or web address]  Office:[phone]  Employed by [facility]

## 2024-11-12 ENCOUNTER — OFFICE VISIT (OUTPATIENT)
Dept: FAMILY MEDICINE | Facility: CLINIC | Age: 59
End: 2024-11-12
Payer: COMMERCIAL

## 2024-11-12 VITALS
DIASTOLIC BLOOD PRESSURE: 78 MMHG | WEIGHT: 102 LBS | TEMPERATURE: 97.6 F | RESPIRATION RATE: 17 BRPM | HEART RATE: 79 BPM | SYSTOLIC BLOOD PRESSURE: 119 MMHG | OXYGEN SATURATION: 99 % | BODY MASS INDEX: 21.41 KG/M2 | HEIGHT: 58 IN

## 2024-11-12 DIAGNOSIS — Z02.9 ADMINISTRATIVE ENCOUNTER: ICD-10-CM

## 2024-11-12 DIAGNOSIS — R26.2 DIFFICULTY WALKING: Primary | ICD-10-CM

## 2024-11-12 DIAGNOSIS — R20.0 NUMBNESS AND TINGLING OF RIGHT UPPER AND LOWER EXTREMITY: ICD-10-CM

## 2024-11-12 DIAGNOSIS — M54.2 NECK PAIN: ICD-10-CM

## 2024-11-12 DIAGNOSIS — R20.2 NUMBNESS AND TINGLING OF RIGHT UPPER AND LOWER EXTREMITY: ICD-10-CM

## 2024-11-12 DIAGNOSIS — G89.29 CHRONIC PAIN OF RIGHT LOWER EXTREMITY: ICD-10-CM

## 2024-11-12 DIAGNOSIS — M79.604 CHRONIC PAIN OF RIGHT LOWER EXTREMITY: ICD-10-CM

## 2024-11-12 LAB
ALBUMIN SERPL BCG-MCNC: 4.1 G/DL (ref 3.5–5.2)
ALP SERPL-CCNC: 99 U/L (ref 40–150)
ALT SERPL W P-5'-P-CCNC: 27 U/L (ref 0–50)
ANION GAP SERPL CALCULATED.3IONS-SCNC: 8 MMOL/L (ref 7–15)
AST SERPL W P-5'-P-CCNC: 36 U/L (ref 0–45)
BILIRUB SERPL-MCNC: 0.4 MG/DL
BUN SERPL-MCNC: 7.5 MG/DL (ref 6–20)
CALCIUM SERPL-MCNC: 9.5 MG/DL (ref 8.8–10.4)
CHLORIDE SERPL-SCNC: 104 MMOL/L (ref 98–107)
CREAT SERPL-MCNC: 0.69 MG/DL (ref 0.51–0.95)
EGFRCR SERPLBLD CKD-EPI 2021: >90 ML/MIN/1.73M2
ERYTHROCYTE [DISTWIDTH] IN BLOOD BY AUTOMATED COUNT: 12.7 % (ref 10–15)
EST. AVERAGE GLUCOSE BLD GHB EST-MCNC: 134 MG/DL
GLUCOSE SERPL-MCNC: 123 MG/DL (ref 70–99)
HBA1C MFR BLD: 6.3 % (ref 0–5.6)
HCO3 SERPL-SCNC: 27 MMOL/L (ref 22–29)
HCT VFR BLD AUTO: 39.8 % (ref 35–47)
HGB BLD-MCNC: 13.1 G/DL (ref 11.7–15.7)
MCH RBC QN AUTO: 30.6 PG (ref 26.5–33)
MCHC RBC AUTO-ENTMCNC: 32.9 G/DL (ref 31.5–36.5)
MCV RBC AUTO: 93 FL (ref 78–100)
PLATELET # BLD AUTO: 177 10E3/UL (ref 150–450)
POTASSIUM SERPL-SCNC: 4.1 MMOL/L (ref 3.4–5.3)
PROT SERPL-MCNC: 7.9 G/DL (ref 6.4–8.3)
RBC # BLD AUTO: 4.28 10E6/UL (ref 3.8–5.2)
SODIUM SERPL-SCNC: 139 MMOL/L (ref 135–145)
WBC # BLD AUTO: 5.1 10E3/UL (ref 4–11)

## 2024-11-12 PROCEDURE — 85027 COMPLETE CBC AUTOMATED: CPT | Performed by: NURSE PRACTITIONER

## 2024-11-12 PROCEDURE — 80053 COMPREHEN METABOLIC PANEL: CPT | Performed by: NURSE PRACTITIONER

## 2024-11-12 PROCEDURE — 36415 COLL VENOUS BLD VENIPUNCTURE: CPT | Performed by: NURSE PRACTITIONER

## 2024-11-12 PROCEDURE — 99214 OFFICE O/P EST MOD 30 MIN: CPT | Performed by: NURSE PRACTITIONER

## 2024-11-12 PROCEDURE — 83036 HEMOGLOBIN GLYCOSYLATED A1C: CPT | Performed by: NURSE PRACTITIONER

## 2024-11-12 ASSESSMENT — PAIN SCALES - GENERAL: PAINLEVEL_OUTOF10: EXTREME PAIN (9)

## 2024-11-12 NOTE — PROGRESS NOTES
Utilized [employer]  for this visit.     Assessment & Plan     Difficulty walking  Chronic pain of right lower extremity  - Physical Therapy  Referral    Numbness and tingling of right upper and lower extremity  - CBC with platelets  - Comprehensive metabolic panel  - Hemoglobin A1c  - MR Lumbar Spine w/o Contrast  - Physical Therapy  Referral  - CBC with platelets  - Comprehensive metabolic panel  - Hemoglobin A1c    Neck pain  Cervical radiculopathy   MR Cervical Spine w/o Contrast  -- Physical Therapy  Referral    Patient with chronic pain, difficulty walking due to pain, numbness and tingling of RUE and LUE. No prior treatment. I had a thorough discussion with patient and her niece about the importance of receiving further investigation given her symptoms, as well as to receive treatment for her health problem. I will refer her to PT and initiate imaging as symptoms consistent with possible nerve impingement.  Additional recommendations per imaging results.    For her chronic voice/neck pain issues advised to follow up as scheduled with ENT.    Administrative encounter  Medical opinion form for county was filled out -temporary request- pending treatment as above.  - for immigration purposes- recommended establishing care with MD at new clinic closer to home, per her preference. Inquire about immigration letter on follow up. At this time will initiate treatment as above. She was also advised to consult with immigration -as sometimes immigration requires civil surgeon evaluation designated by [facility].            Subjective   Duarte is a 59 year old, with past medical history of hypertension, GERD, neck pain, chronic pain on right side of body, and difficulty with walking presenting for the following health issues:  Establish Care ([employer] ), Forms (Pt has Medical Opinion forms for provider to fill out. ), Future appts (Date: 11/14/2024, Time:  2:15 PM Location: [place]  "Main OR        /ESOPHAGOGASTRODUODENOSCOPY, WITH BIOPS//11/27/24: Colonoscopy), Pain (Throat pain, pt reports that she still is chewing betel nut twice a day. ), and Tingling (Right arm and right leg tingling. Pt reports that this has been on going for 'years\")        11/12/2024     2:16 PM   Additional Questions   Roomed by Mary Anne   Accompanied by Hunter - Niece     Patient presents requesting medical opinion form and letter for immigration.   She is a new patient to me. She is not wishing to establish care -states that she needs to transfer somewhere near her house- prefers [facility]. She cannot drive.     She cannot work anymore. Needs medical opinion form. Has not been working for 2 years.  Has difficulties with mobility and her daily activities. [place], [place], and is living with her niece. Has issues with balance, dizziness, and had a fall around April 2023.     Has chronic pain on RLE, her RLE and RUE become stiff after sitting even for short periods. Is hard to stand from sitting to standing.   At times her hand becomes stiff she cannot bend fingers. Symptoms ongoing for 2 years but worsening this year.  Pain behind right knee, also below shoulder blade, up to the neck. Chronic tingling on RLE and RUE.  States going to the ED after a fall but this was in [place]. Reports no prior care for this problems.   Additionally, notices that her voice also changes at times. She has seen ENT for this and is now scheduled to have an upper endoscopy and colonoscopy scheduled 11/24/24.      Musculoskeletal Problem                       Objective    /78 (BP Location: Left arm, Patient Position: Sitting, Cuff Size: Adult Regular)   Pulse 79   Temp 97.6  F (36.4  C) (Oral)   Resp 17   Ht 1.473 m (4' 9.99\")   Wt 46.3 kg (102 lb)   LMP  (LMP Unknown)   SpO2 99%   BMI 21.32 kg/m    Body mass index is 21.32 kg/m .  Physical Exam   GENERAL: alert and no distress  NECK: no " adenopathy, no asymmetry, masses, or scars  RESP: lungs clear to auscultation - no rales, rhonchi or wheezes  CV: regular rate and rhythm, normal S1 S2, no S3 or S4, no murmur, click or rub, no peripheral edema  ABDOMEN: soft, nontender, no hepatosplenomegaly, no masses and bowel sounds normal  MS: no gross musculoskeletal defects noted, no edema  NEURO: Normal tone, mentation intact and speech normal. Decreased strength on RUE 3+  PSYCH: mentation appears normal, affect normal            Signed Electronically by: EMY Sheth CNP

## 2024-11-12 NOTE — PATIENT INSTRUCTIONS
Immigration doctor for immigration paperwork- get paperwork required by immigration to be completed by MD

## 2024-11-13 ENCOUNTER — ANESTHESIA EVENT (OUTPATIENT)
Dept: SURGERY | Facility: AMBULATORY SURGERY CENTER | Age: 59
End: 2024-11-13
Payer: COMMERCIAL

## 2024-11-13 ENCOUNTER — TELEPHONE (OUTPATIENT)
Dept: SURGERY | Facility: CLINIC | Age: 59
End: 2024-11-13
Payer: COMMERCIAL

## 2024-11-13 DIAGNOSIS — Z12.11 SPECIAL SCREENING FOR MALIGNANT NEOPLASMS, COLON: ICD-10-CM

## 2024-11-13 LAB
PROTOCOL CUTOFF: NORMAL
SA 1  COMMENTS: NORMAL
SA 1 CELL: NORMAL
SA 1 TEST METHOD: NORMAL
SA 2 CELL: NORMAL
SA 2 COMMENTS: NORMAL
SA 2 TEST METHOD: NORMAL
SA1 HI RISK ABY: NORMAL
SA1 MOD RISK ABY: NORMAL
SA2 HI RISK ABY: NORMAL
SA2 MOD RISK ABY: NORMAL
UNACCEPTABLE ANTIGENS: NORMAL
UNOS CPRA: 11

## 2024-11-13 NOTE — TELEPHONE ENCOUNTER
Patient had questions regarding her upcoming colonoscopy with Dr. Howard and medication questions. Provided [facility] pre-op RN number [phone] for patient to call to clarify her questions.    Polina PATRICIA RN, BSN    [facility]  General Surgery  [address] [place] [id number]  Office: [phone]  Employed by [facility]

## 2024-11-14 ENCOUNTER — ANESTHESIA (OUTPATIENT)
Dept: SURGERY | Facility: AMBULATORY SURGERY CENTER | Age: 59
End: 2024-11-14
Payer: COMMERCIAL

## 2024-11-14 ENCOUNTER — HOSPITAL ENCOUNTER (OUTPATIENT)
Facility: AMBULATORY SURGERY CENTER | Age: 59
Discharge: HOME | End: 2024-11-14
Attending: SURGERY
Payer: COMMERCIAL

## 2024-11-14 VITALS
BODY MASS INDEX: 21.62 KG/M2 | RESPIRATION RATE: 15 BRPM | HEART RATE: 109 BPM | HEIGHT: 58 IN | TEMPERATURE: 96.7 F | SYSTOLIC BLOOD PRESSURE: 106 MMHG | WEIGHT: 103 LBS | DIASTOLIC BLOOD PRESSURE: 65 MMHG | OXYGEN SATURATION: 98 %

## 2024-11-14 DIAGNOSIS — M54.2 NECK PAIN: ICD-10-CM

## 2024-11-14 PROBLEM — R73.03 PREDIABETES: Status: ACTIVE | Noted: 2024-11-14

## 2024-11-14 LAB — UPPER GI ENDOSCOPY: NORMAL

## 2024-11-14 RX ORDER — NALOXONE HYDROCHLORIDE 0.4 MG/ML
0.1 INJECTION, SOLUTION INTRAMUSCULAR; INTRAVENOUS; SUBCUTANEOUS
Status: DISCONTINUED | OUTPATIENT
Start: 2024-11-14 | End: 2024-11-15 | Stop reason: HOSPADM

## 2024-11-14 RX ORDER — LIDOCAINE HYDROCHLORIDE 20 MG/ML
INJECTION, SOLUTION INFILTRATION; PERINEURAL PRN
Status: DISCONTINUED | OUTPATIENT
Start: 2024-11-14 | End: 2024-11-14

## 2024-11-14 RX ORDER — POLYETHYLENE GLYCOL 3350, SODIUM SULFATE ANHYDROUS, SODIUM BICARBONATE, SODIUM CHLORIDE, POTASSIUM CHLORIDE 236; 22.74; 6.74; 5.86; 2.97 G/4L; G/4L; G/4L; G/4L; G/4L
POWDER, FOR SOLUTION ORAL
Qty: 4000 ML | Refills: 0 | Status: SHIPPED | OUTPATIENT
Start: 2024-11-14

## 2024-11-14 RX ORDER — BISACODYL 5 MG
TABLET, DELAYED RELEASE (ENTERIC COATED) ORAL
Qty: 4 TABLET | Refills: 0 | Status: SHIPPED | OUTPATIENT
Start: 2024-11-14

## 2024-11-14 RX ORDER — GLYCOPYRROLATE 0.2 MG/ML
INJECTION, SOLUTION INTRAMUSCULAR; INTRAVENOUS PRN
Status: DISCONTINUED | OUTPATIENT
Start: 2024-11-14 | End: 2024-11-14

## 2024-11-14 RX ORDER — DEXAMETHASONE SODIUM PHOSPHATE 4 MG/ML
4 INJECTION, SOLUTION INTRA-ARTICULAR; INTRALESIONAL; INTRAMUSCULAR; INTRAVENOUS; SOFT TISSUE
Status: DISCONTINUED | OUTPATIENT
Start: 2024-11-14 | End: 2024-11-15 | Stop reason: HOSPADM

## 2024-11-14 RX ORDER — LIDOCAINE 40 MG/G
CREAM TOPICAL
Status: DISCONTINUED | OUTPATIENT
Start: 2024-11-14 | End: 2024-11-15 | Stop reason: HOSPADM

## 2024-11-14 RX ORDER — ONDANSETRON 4 MG/1
4 TABLET, ORALLY DISINTEGRATING ORAL EVERY 30 MIN PRN
Status: DISCONTINUED | OUTPATIENT
Start: 2024-11-14 | End: 2024-11-15 | Stop reason: HOSPADM

## 2024-11-14 RX ORDER — ONDANSETRON 2 MG/ML
4 INJECTION INTRAMUSCULAR; INTRAVENOUS EVERY 30 MIN PRN
Status: DISCONTINUED | OUTPATIENT
Start: 2024-11-14 | End: 2024-11-15 | Stop reason: HOSPADM

## 2024-11-14 RX ORDER — PROPOFOL 10 MG/ML
INJECTION, EMULSION INTRAVENOUS CONTINUOUS PRN
Status: DISCONTINUED | OUTPATIENT
Start: 2024-11-14 | End: 2024-11-14

## 2024-11-14 RX ORDER — ACETAMINOPHEN 325 MG/1
975 TABLET ORAL ONCE
Status: DISCONTINUED | OUTPATIENT
Start: 2024-11-14 | End: 2024-11-15 | Stop reason: HOSPADM

## 2024-11-14 RX ORDER — PROPOFOL 10 MG/ML
INJECTION, EMULSION INTRAVENOUS PRN
Status: DISCONTINUED | OUTPATIENT
Start: 2024-11-14 | End: 2024-11-14

## 2024-11-14 RX ADMIN — PROPOFOL 50 MG: 10 INJECTION, EMULSION INTRAVENOUS at 14:21

## 2024-11-14 RX ADMIN — PROPOFOL 200 MCG/KG/MIN: 10 INJECTION, EMULSION INTRAVENOUS at 14:20

## 2024-11-14 RX ADMIN — LIDOCAINE HYDROCHLORIDE 2 ML: 20 INJECTION, SOLUTION INFILTRATION; PERINEURAL at 14:20

## 2024-11-14 RX ADMIN — GLYCOPYRROLATE 0.2 MG: 0.2 INJECTION, SOLUTION INTRAMUSCULAR; INTRAVENOUS at 14:19

## 2024-11-14 NOTE — ANESTHESIA PREPROCEDURE EVALUATION
Anesthesia Pre-Procedure Evaluation    Patient: [facility]   MRN: [id number] : 1965        Procedure : Procedure(s):  ESOPHAGOGASTRODUODENOSCOPY, WITH BIOPSY          Past Medical History:   Diagnosis Date    Benign essential hypertension     Difficulty walking     Gastroesophageal reflux disease without esophagitis     Motion sickness     Other chronic pain     arm, leg, neck, face- sometimes pain and tingling    Walking troubles     Requires Assistance of One      Past Surgical History:   Procedure Laterality Date    TUBAL LIGATION        No Known Allergies   Social History     Tobacco Use    Smoking status: Never     Passive exposure: Never    Smokeless tobacco: Former    Tobacco comments:     Chewing betel nut.    Substance Use Topics    Alcohol use: Not Currently     Comment: patient stopped in .      Wt Readings from Last 1 Encounters:   24 46.7 kg (103 lb)        Anesthesia Evaluation   Pt has had prior anesthetic.     No history of anesthetic complications       ROS/MED HX  ENT/Pulmonary:  - neg pulmonary ROS     Neurologic:  - neg neurologic ROS   (+)    peripheral neuropathy,                            Cardiovascular:  - neg cardiovascular ROS   (+)  hypertension- -   -  - -                                      METS/Exercise Tolerance:     Hematologic:  - neg hematologic  ROS     Musculoskeletal:  - neg musculoskeletal ROS     GI/Hepatic:  - neg GI/hepatic ROS   (+) GERD,                   Renal/Genitourinary:  - neg Renal ROS     Endo:  - neg endo ROS   (+)  type II DM (preDM), Last HgA1c: 6.3,                   Psychiatric/Substance Use:  - neg psychiatric ROS     Infectious Disease:  - neg infectious disease ROS     Malignancy:  - neg malignancy ROS     Other:  - neg other ROS    (+)  , H/O Chronic Pain,         Physical Exam    Airway        Mallampati: II   TM distance: > 3 FB   Neck ROM: full   Mouth opening: > 3 cm    Respiratory Devices and Support         Dental     Comment: Betel nut  "stain        Cardiovascular   cardiovascular exam normal          Pulmonary   pulmonary exam normal                OUTSIDE LABS:  CBC:   Lab Results   Component Value Date    WBC 5.1 11/12/2024    WBC 5.5 08/16/2024    HGB 13.1 11/12/2024    HGB 14.0 08/16/2024    HCT 39.8 11/12/2024    HCT 41.4 08/16/2024     11/12/2024     08/16/2024     BMP:   Lab Results   Component Value Date     11/12/2024     08/16/2024    POTASSIUM 4.1 11/12/2024    POTASSIUM 3.5 08/16/2024    CHLORIDE 104 11/12/2024    CHLORIDE 101 08/16/2024    CO2 27 11/12/2024    CO2 27 08/16/2024    BUN 7.5 11/12/2024    BUN 5.4 (L) 08/16/2024    CR 0.69 11/12/2024    CR 0.9 10/14/2024     (H) 11/12/2024    GLC 85 08/16/2024     COAGS: No results found for: \"PTT\", \"INR\", \"FIBR\"  POC: No results found for: \"BGM\", \"HCG\", \"HCGS\"  HEPATIC:   Lab Results   Component Value Date    ALBUMIN 4.1 11/12/2024    PROTTOTAL 7.9 11/12/2024    ALT 27 11/12/2024    AST 36 11/12/2024    ALKPHOS 99 11/12/2024    BILITOTAL 0.4 11/12/2024     OTHER:   Lab Results   Component Value Date    A1C 6.3 (H) 11/12/2024    JULIENNE 9.5 11/12/2024    LIPASE 27 08/16/2024    TSH 2.81 04/26/2024       Anesthesia Plan    ASA Status:  2    NPO Status:  NPO Appropriate    Anesthesia Type: MAC.     - Reason for MAC: immobility needed, straight local not clinically adequate   Induction: Propofol.   Maintenance: TIVA.        Consents    Anesthesia Plan(s) and associated risks, benefits, and realistic alternatives discussed. Questions answered and patient/representative(s) expressed understanding.     - Discussed:     - Discussed with:  Patient,  (and child)            Postoperative Care    Pain management: Multi-modal analgesia.        Comments:    Other Comments: Propofol    Anesthetic risks, benefits, and options reviewed. Patient agrees to proceed.               Angelo Fish MD    I have reviewed the pertinent notes and labs in the chart from the " past 30 days and (re)examined the patient.  Any updates or changes from those notes are reflected in this note.               # Hypertension: Noted on problem list

## 2024-11-14 NOTE — ANESTHESIA POSTPROCEDURE EVALUATION
Patient: [facility]    Procedure: Procedure(s):  ESOPHAGOGASTRODUODENOSCOPY, WITH BIOPSY       Anesthesia Type:  MAC    Note:  Disposition: Outpatient   Postop Pain Control: Uneventful            Sign Out: Well controlled pain   PONV: No   Neuro/Psych: Uneventful            Sign Out: Acceptable/Baseline neuro status   Airway/Respiratory: Uneventful            Sign Out: Acceptable/Baseline resp. status   CV/Hemodynamics: Uneventful            Sign Out: Acceptable CV status; No obvious hypovolemia; No obvious fluid overload   Other NRE: NONE   DID A NON-ROUTINE EVENT OCCUR? No           Last vitals:  Vitals Value Taken Time   /65 11/14/24 1445   Temp 96.7  F (35.9  C) 11/14/24 1435   Pulse 109 11/14/24 1449   Resp 15 11/14/24 1435   SpO2 98 % 11/14/24 1449       Electronically Signed By: Angelo Fish MD  November 14, 2024  3:03 PM

## 2024-11-14 NOTE — INTERVAL H&P NOTE
I have reviewed the surgical (or preoperative) H&P that is linked to this encounter, and examined the patient. There are no significant changes    Clinical Conditions Present on Arrival:  Clinically Significant Risk Factors Present on Admission                      plan for egd    Saad Ramirez [facility] Surgery  [phone]

## 2024-11-14 NOTE — ANESTHESIA CARE TRANSFER NOTE
Patient: [facility]    Procedure: Procedure(s):  ESOPHAGOGASTRODUODENOSCOPY, WITH BIOPSY       Diagnosis: Neck pain [M54.2]  Diagnosis Additional Information: No value filed.    Anesthesia Type:   MAC     Note:    Oropharynx: oropharynx clear of all foreign objects and spontaneously breathing  Level of Consciousness: drowsy  Oxygen Supplementation: face mask  Level of Supplemental Oxygen (L/min / FiO2): 10  Independent Airway: airway patency satisfactory and stable  Dentition: dentition unchanged  Vital Signs Stable: post-procedure vital signs reviewed and stable  Report to RN Given: handoff report given  Patient transferred to: Phase II    Handoff Report: Identifed the Patient, Identified the Reponsible Provider, Reviewed the pertinent medical history, Discussed the surgical course, Reviewed Intra-OP anesthesia mangement and issues during anesthesia, Set expectations for post-procedure period and Allowed opportunity for questions and acknowledgement of understanding      Vitals:  Vitals Value Taken Time   /63 11/14/24 1435   Temp 96.7  F (35.9  C) 11/14/24 1435   Pulse 106 11/14/24 1436   Resp 15 11/14/24 1435   SpO2 97 % 11/14/24 1436   Vitals shown include unfiled device data.    Electronically Signed By: EMY Rodriguze CRNA  November 14, 2024  2:37 PM

## 2024-11-14 NOTE — DISCHARGE INSTRUCTIONS
If you have any questions or concerns regarding your procedure, please contact Dr. Ramirez, his office number is [phone].               Adult Discharge Orders & Instructions     For 24 hours after surgery    Get plenty of rest.  A responsible adult must stay with you for at least 24 hours after you leave the hospital.   Do not drive or use heavy equipment.  If you have weakness or tingling, don't drive or use heavy equipment until this feeling goes away.  Do not drink alcohol.  Avoid strenuous or risky activities.  Ask for help when climbing stairs.   You may feel lightheaded.  IF so, sit for a few minutes before standing.  Have someone help you get up.   If you have nausea (feel sick to your stomach): Drink only clear liquids such as apple juice, ginger ale, broth or 7-Up.  Rest may also help.  Be sure to drink enough fluids.  Move to a regular diet as you feel able.  You may have a slight fever. Call the doctor if your fever is over 100 F (37.7 C) (taken under the tongue) or lasts longer than 24 hours.  You may have a dry mouth, a sore throat, muscle aches or trouble sleeping.  These should go away after 24 hours.  Do not make important or legal decisions.     Call your doctor for any of the followin.  Signs of infection (fever, growing tenderness at the surgery site, a large amount of drainage or bleeding, severe pain, foul-smelling drainage, redness, swelling).    2. It has been over 8 to 10 hours since surgery and you are still not able to urinate (pass water).    3.  Headache for over 24 hours.          Discharge Instructions:    Take you medications as directed and follow up with you primary provider as scheduled.   Follow these care guidelines during your recovery for the next 24 hours.   If you have any questions or concerns please contact the provider that performed your procedure.     You were given medicine for sedation:  You have been given medicines during your procedure that might make you  sleepy and weak. To prevent problems:    *Rest for the rest of the day after you are home. You should be back to your normal activity tomorrow.  *For the next 24 hours:   -Do not drink alcoholic beverages.   -Do not make any important decisions or sign any legal forms.   -Do not work around machinery or power equipment.     The medicines used for sedation may make you feel nauseated.   *Start with clear liquids, such as tea, jell-o, broth and ginger ale. As you feel better you may add soft foods such as pudding and ice cream.   *When you no longer feel nauseated, you may try your normal diet.     You should be back to eating your normal after 24 hours.     Call if you have any of these problems:  *Fever of 101 degree F or 38 degrees C  *Nausea with vomiting that does not ease after a few hours.  *Abdominal pain or bloating  *Fainting

## 2024-11-18 ENCOUNTER — THERAPY VISIT (OUTPATIENT)
Dept: PHYSICAL THERAPY | Facility: REHABILITATION | Age: 59
End: 2024-11-18
Attending: NURSE PRACTITIONER
Payer: COMMERCIAL

## 2024-11-18 ENCOUNTER — HOSPITAL ENCOUNTER (EMERGENCY)
Facility: HOSPITAL | Age: 59
Discharge: LEFT BEFORE DISCHARGE | End: 2024-11-18
Payer: COMMERCIAL

## 2024-11-18 DIAGNOSIS — R26.2 DIFFICULTY WALKING: ICD-10-CM

## 2024-11-18 DIAGNOSIS — R20.0 NUMBNESS AND TINGLING OF RIGHT UPPER AND LOWER EXTREMITY: ICD-10-CM

## 2024-11-18 DIAGNOSIS — R20.2 NUMBNESS AND TINGLING OF RIGHT UPPER AND LOWER EXTREMITY: ICD-10-CM

## 2024-11-18 DIAGNOSIS — G89.29 CHRONIC PAIN OF RIGHT LOWER EXTREMITY: ICD-10-CM

## 2024-11-18 DIAGNOSIS — R13.10 DYSPHAGIA, UNSPECIFIED TYPE: ICD-10-CM

## 2024-11-18 DIAGNOSIS — M79.604 CHRONIC PAIN OF RIGHT LOWER EXTREMITY: ICD-10-CM

## 2024-11-18 DIAGNOSIS — M54.2 NECK PAIN: ICD-10-CM

## 2024-11-18 ASSESSMENT — ACTIVITIES OF DAILY LIVING (ADL)
ADLS_ACUITY_SCORE: 0

## 2024-11-18 NOTE — PROGRESS NOTES
PHYSICAL THERAPY EVALUATION  Type of Visit: Evaluation        Fall Risk Screen:  Fall screen completed by: PT  Have you fallen 2 or more times in the past year?: No  Have you fallen and had an injury in the past year?: No  Is patient a fall risk?: No    Subjective         Presenting condition or subjective complaint: (Patient-Rptd) nerves pain neck pain knee pain    The patient is a 59-year-old female presenting with multiple complaints of pain throughout her body, including nerve pain, neck pain, and knee pain. She also reports experiencing difficulty with walking. She has had one fall in the past six months, which occurred when she was walking at night, blacked out, and subsequently fell. She often relies on furniture and walls for support while moving around. She's been dealing with all of this for the past two years. Her primary concern is neck pain, which she describes as nerve-related. She feels unable to move or speak quickly. Eating is manageable, but she needs to do so very slowly. Drinking poses no issues. She points to the anterior part of her neck, almost where her scalenes would be.       Date of onset: 11/12/24    Relevant medical history:     Dates & types of surgery: (Patient-Rptd) endoscopy 9/2024    Prior diagnostic imaging/testing results: (Patient-Rptd) Video fluoroscopic swallow study     Prior therapy history for the same diagnosis, illness or injury: (Patient-Rptd) No      Prior Level of Function  Mod I    Living Environment  Social support: (Patient-Rptd) With family members   Type of home: (Patient-Rptd) House   Stairs to enter the home: (Patient-Rptd) No       Ramp: (Patient-Rptd) No   Stairs inside the home: (Patient-Rptd) Yes (Patient-Rptd) 10 Is there a railing: (Patient-Rptd) No     Help at home: (Patient-Rptd) Self Cares (home health aide/personal care attendant, family, etc); Home management tasks (cooking, cleaning); Medication and/or finances; Home and Yard maintenance tasks; Assist  for driving and community activities  Equipment owned:       Employment: (Patient-Rptd) No    Hobbies/Interests: (Patient-Rptd) n/a    Patient goals for therapy: (Patient-Rptd) feel confortable    Pain assessment: Pain present       She rates her pain 5-6/10 today.       Objective      Cognitive Status Examination  Orientation:    Level of Consciousness:   Follows Commands and Answers Questions:   Personal Safety and Judgement:   Memory:     OBSERVATION:   INTEGUMENTARY:   POSTURE: Sitting Posture: Rounded shoulders, Forward head, Thoracic kyphosis increased  PALPATION:   RANGE OF MOTION: LE ROM WFL  UE ROM WFL  STRENGTH: LE Strength WFL  UE Strength WFL    BED MOBILITY:     TRANSFERS:     WHEELCHAIR MOBILITY:     GAIT:   Level of [place]:   Assistive Device(s):   Gait Deviations:   Gait Distance:   Stairs:     BALANCE:     SPECIAL TESTS  Functional Gait Assessment (FGA)      10 Meter Walk Test (Comfortable)     10 Meter Walk Test (Fast)     6 Minute Walk Test (6MWT)           Mata Balance Scale (BBS)     5 Times Sit-to-Stand (5TSTS)  26.32 sec     Dynamic Gait Index (DGI)     Timed Up and Go (TUG) - sec 28.63 sec   Single Leg Stance Right (sec) 10 sec   Single Leg Stance Left (sec) 5 sec   Modified CTSIB Conditions (sec) Cond 1:   Cond 2:   Cond 4:   Cond 5 :    Romberg  (sec)    Sharpened Romberg (sec)    30 Second Sit to Stand (reps/height)    Mini-BESTest              SENSATION:     REFLEXES:   COORDINATION:   MUSCLE TONE:         Assessment & Plan   CLINICAL IMPRESSIONS  Medical Diagnosis: R26.2 (ICD-10-CM) - Difficulty walking  M79.604, G89.29 (ICD-10-CM) - Chronic pain of right lower extremity  R20.0, R20.2 (ICD-10-CM) - Numbness and tingling of right upper and lower extremity  M54.2 (ICD-10-CM) - Neck pain    Treatment Diagnosis: balance deficits, gait instability, LE weakness   Impression/Assessment: Patient is a 59 year old female with neck pain, right knee pain, difficulty with walking complaints.   The following significant findings have been identified: Pain, Decreased ROM/flexibility, Decreased joint mobility, Decreased strength, Impaired balance, Impaired muscle performance, Decreased activity tolerance, and Impaired posture. These impairments interfere with their ability to perform self care tasks, work tasks, recreational activities, household chores, driving , household mobility, and community mobility as compared to previous level of function.     Clinical Decision Making (Complexity):  Clinical Presentation: Stable/Uncomplicated  Clinical Presentation Rationale: based on medical and personal factors listed in PT evaluation  Clinical Decision Making (Complexity): Low complexity    PLAN OF CARE  Treatment Interventions:  Modalities: Cryotherapy, Hot Pack  Interventions: Gait Training, Manual Therapy, Neuromuscular Re-education, Therapeutic Activity, Therapeutic Exercise, Self-Care/Home Management    Long Term Goals     PT Goal 1  Goal Identifier: HEP  Goal Description: Pt will be IND in HEP and self care management of symptoms  Target Date: 02/10/25  PT Goal 2  Goal Identifier: 5XSTS  Goal Description: Patient will show decreased 5xSTS score by >5 seconds to demonstrate improved LE strengthening  Target Date: 02/10/25  PT Goal 3  Goal Identifier: TUG  Goal Description: Patient's TUG score will decrease by 5 seconds or more to demonstrate improved ability to transfer  Target Date: 02/10/25  PT Goal 4  Goal Identifier: Pain  Goal Description: Reduce neck pain from 6/10 to 3/10 on the pain scale during daily activities within 12 weeks through pain management strategies and activity modifications.  Target Date: 02/10/25      Frequency of Treatment: 1x/week  Duration of Treatment: 12x weeks    Recommended Referrals to Other Professionals:   Education Assessment:   Learner/Method: Patient    Risks and benefits of evaluation/treatment have been explained.   Patient/Family/caregiver agrees with Plan of Care.      Evaluation Time:     PT Eval, Low Complexity Minutes (54270): 45       Signing Clinician: Zachery Rondon, PT        [facility]                                                                                   OUTPATIENT PHYSICAL THERAPY      PLAN OF TREATMENT FOR OUTPATIENT REHABILITATION   Patient's Last Name, First Name, MARINO Lieberman,    YOB: 1965   Provider's Name   [facility]   Medical Record No.  [id number]     Onset Date: 11/12/24  Start of Care Date: 11/18/24     Medical Diagnosis:  R26.2 (ICD-10-CM) - Difficulty walking  M79.604, G89.29 (ICD-10-CM) - Chronic pain of right lower extremity  R20.0, R20.2 (ICD-10-CM) - Numbness and tingling of right upper and lower extremity  M54.2 (ICD-10-CM) - Neck pain      PT Treatment Diagnosis:  balance deficits, gait instability, LE weakness Plan of Treatment  Frequency/Duration: 1x/week/ 12x weeks    Certification date from 11/18/24 to 02/10/25         See note for plan of treatment details and functional goals     Zachery Rondon, PT                         I CERTIFY THE NEED FOR THESE SERVICES FURNISHED UNDER        THIS PLAN OF TREATMENT AND WHILE UNDER MY CARE     (Physician attestation of this document indicates review and certification of the therapy plan).              Referring Provider:  Judith Devine    Initial Assessment  See Epic Evaluation- Start of Care Date: 11/18/24

## 2024-11-27 ENCOUNTER — HOSPITAL ENCOUNTER (OUTPATIENT)
Facility: AMBULATORY SURGERY CENTER | Age: 59
End: 2024-11-27
Attending: COLON & RECTAL SURGERY
Payer: COMMERCIAL

## 2024-11-27 RX ORDER — ONDANSETRON 2 MG/ML
4 INJECTION INTRAMUSCULAR; INTRAVENOUS EVERY 30 MIN PRN
Status: CANCELLED | OUTPATIENT
Start: 2024-11-27

## 2024-11-27 RX ORDER — NALOXONE HYDROCHLORIDE 0.4 MG/ML
0.1 INJECTION, SOLUTION INTRAMUSCULAR; INTRAVENOUS; SUBCUTANEOUS
Status: CANCELLED | OUTPATIENT
Start: 2024-11-27

## 2024-11-27 RX ORDER — OXYCODONE HYDROCHLORIDE 10 MG/1
10 TABLET ORAL
Status: CANCELLED | OUTPATIENT
Start: 2024-11-27

## 2024-11-27 RX ORDER — OXYCODONE HYDROCHLORIDE 5 MG/1
5 TABLET ORAL
Status: CANCELLED | OUTPATIENT
Start: 2024-11-27

## 2024-11-27 RX ORDER — ONDANSETRON 4 MG/1
4 TABLET, ORALLY DISINTEGRATING ORAL EVERY 30 MIN PRN
Status: CANCELLED | OUTPATIENT
Start: 2024-11-27

## 2024-11-27 RX ORDER — DEXAMETHASONE SODIUM PHOSPHATE 4 MG/ML
4 INJECTION, SOLUTION INTRA-ARTICULAR; INTRALESIONAL; INTRAMUSCULAR; INTRAVENOUS; SOFT TISSUE
Status: CANCELLED | OUTPATIENT
Start: 2024-11-27

## 2024-11-27 RX ORDER — LIDOCAINE 40 MG/G
CREAM TOPICAL
OUTPATIENT
Start: 2024-11-27

## 2024-12-03 NOTE — PROGRESS NOTES
Assessment & Plan     Symptoms persist.  Incr pantoprazole to BID. F/up SLP and GI.  4M follow-up     Problem List Items Addressed This Visit          Digestive    Gastroesophageal reflux disease without esophagitis    Relevant Medications    famotidine (PEPCID) 20 MG tablet    Other Relevant Orders    Adult GI  Referral - Consult Only     Other Visit Diagnoses       Dysphonia    -  Primary    Relevant Orders    Speech Therapy  Referral    Esophageal dysmotility        Relevant Orders    Adult GI  Referral - Consult Only    Dysphagia, unspecified type        Relevant Orders    Speech Therapy  Referral             Review of external notes as documented elsewhere in note    16 minutes spent on the date of the encounter doing chart review, history and exam, documentation and further activities per the note  {     Narinder Jackson [facility]    Subjective     HPI-    Last Visit:  Assessment & Plan  3M follow-up   Consider SLP referral        Digestive     Gastroesophageal reflux disease without esophagitis     Relevant Medications     pantoprazole (PROTONIX) 40 MG EC tablet       Dysphonia    -  Primary     Sore throat         LPRD (laryngopharyngeal reflux disease)         Relevant Medications     pantoprazole (PROTONIX) 40 MG EC tablet     Other Relevant Orders     Adult Gen Surg  Referral     XR Esophagram        Interim Hx:  Esophagram:IMPRESSION:   Mild esophageal dysmotility, otherwise normal esophagram.    Neck CT:  IMPRESSION:    No acute findings visualized. No specific imaging explanation for the patient's symptoms.    Upper EGD-Impression: Normal second portion of the duodenum. Gastroesophageal flap valve classified as Hill Grade I (prominent fold, tight to endoscope). AFS 1, d0l0 Normal stomach. Z-line regular, 34 cm from the incisors. Biopsied. small area of irregularity biopsied. Recommendation: Await pathology  results.    Bx:DISTAL ESOPHAGUS, BIOPSY:        - MILD REFLUX DISEASE, NO INTRAEPITHELIAL EOSINOPHILIA        - NO EVIDENCE OF INTESTINAL METAPLASIA, NEGATIVE FOR DYSPLASIA     12/5 Dr Ramirez: No show    Recent p/t appt:  She feels unable to move or speak quickly. Eating is manageable, but she needs to do so very slowly. Drinking poses no issues. She points to the anterior part of her neck, almost where her scalenes would be.     Today, symptoms persist despite PPI.       service used over the phone.      Review of Systems   ENT as above      Objective    LMP  (LMP Unknown)     Physical Exam     Gen appears well

## 2024-12-04 ENCOUNTER — TELEPHONE (OUTPATIENT)
Dept: TRANSPLANT | Facility: CLINIC | Age: 59
End: 2024-12-04
Payer: COMMERCIAL

## 2024-12-04 NOTE — TELEPHONE ENCOUNTER
Called pt's daughter, Getachew, today and spoke with her. Explained her mother is due for her mammogram and should be scheduled now.  Explained she should let me know as soon as this is completed and I will then have her mom's status reviewed at the Multidisciplinary Selection Committee for determination of next steps/ approval.  Getachew expressed excellent understanding of all and was in good agreement with the plan

## 2024-12-05 ENCOUNTER — ANCILLARY PROCEDURE (OUTPATIENT)
Dept: MAMMOGRAPHY | Facility: HOSPITAL | Age: 59
End: 2024-12-05
Attending: FAMILY MEDICINE
Payer: COMMERCIAL

## 2024-12-05 DIAGNOSIS — Z12.31 VISIT FOR SCREENING MAMMOGRAM: ICD-10-CM

## 2024-12-05 PROCEDURE — 77067 SCR MAMMO BI INCL CAD: CPT

## 2024-12-05 PROCEDURE — 77063 BREAST TOMOSYNTHESIS BI: CPT

## 2024-12-05 NOTE — TELEPHONE ENCOUNTER
I do see we have consent to communicate with family members (a niece and grandson) on file if needed.    The phone number for Bu is the same as the niece (Hunter Lieberman).    [facility]   [facility] ; on hold over 8 minutes during which time 3 different language line staff came on the line and asked what language I needed.    The follow up for this patient does not seem urgent, plan to address another day.    Donna PINEDA RN  [facility] Triage              
See 8/16/24 ER visit notes:    The patient presented to the emergency department today complaining of throat pain and shortness of breath which have been present for the past several months.  She has had several workups for this with no identifiable etiology.  No concerning findings on physical exam.  Vital signs are stable.  Laboratory testing including troponin and BNP are normal as well.  Chest x-ray is normal as well.  Given her well appearance and normal workup, I feel that she can be safely discharged home.  She and family are comfortable with this plan.     I see she is scheduled with ENT in 10 days.    Routed to RN team to follow up with patient, note[facility]  [facility].    Donna PINEDA RN  [facility] Triage    
: Yes

## 2024-12-09 DIAGNOSIS — N18.6 ESRD (END STAGE RENAL DISEASE) ON DIALYSIS (H): ICD-10-CM

## 2024-12-09 DIAGNOSIS — Z99.2 ESRD (END STAGE RENAL DISEASE) ON DIALYSIS (H): ICD-10-CM

## 2024-12-09 RX ORDER — CALCIUM ACETATE 667 MG/1
667 CAPSULE ORAL
Qty: 90 CAPSULE | Refills: 3 | Status: SHIPPED | OUTPATIENT
Start: 2024-12-09

## 2024-12-11 ENCOUNTER — HOSPITAL ENCOUNTER (OUTPATIENT)
Dept: MRI IMAGING | Facility: HOSPITAL | Age: 59
Discharge: HOME | End: 2024-12-11
Attending: NURSE PRACTITIONER
Payer: COMMERCIAL

## 2024-12-11 DIAGNOSIS — R20.0 NUMBNESS AND TINGLING OF RIGHT UPPER AND LOWER EXTREMITY: ICD-10-CM

## 2024-12-11 DIAGNOSIS — R20.2 NUMBNESS AND TINGLING OF RIGHT UPPER AND LOWER EXTREMITY: ICD-10-CM

## 2024-12-11 DIAGNOSIS — M54.2 NECK PAIN: ICD-10-CM

## 2024-12-11 PROCEDURE — 72141 MRI NECK SPINE W/O DYE: CPT

## 2024-12-11 PROCEDURE — 72148 MRI LUMBAR SPINE W/O DYE: CPT

## 2024-12-12 ENCOUNTER — OFFICE VISIT (OUTPATIENT)
Dept: OTOLARYNGOLOGY | Facility: CLINIC | Age: 59
End: 2024-12-12
Payer: COMMERCIAL

## 2024-12-12 DIAGNOSIS — K22.4 ESOPHAGEAL DYSMOTILITY: ICD-10-CM

## 2024-12-12 DIAGNOSIS — R13.10 DYSPHAGIA, UNSPECIFIED TYPE: ICD-10-CM

## 2024-12-12 DIAGNOSIS — R49.0 DYSPHONIA: Primary | ICD-10-CM

## 2024-12-12 DIAGNOSIS — K21.9 GASTROESOPHAGEAL REFLUX DISEASE WITHOUT ESOPHAGITIS: ICD-10-CM

## 2024-12-12 RX ORDER — FAMOTIDINE 20 MG/1
TABLET, FILM COATED ORAL
COMMUNITY
Start: 2024-10-14

## 2024-12-12 NOTE — LETTER
12/12/2024      [facility]  [address] [place] [address]      Dear Colleague,    Thank you for referring your patient,  Mio, to the [facility]. Please see a copy of my visit note below.    Assessment & Plan    Symptoms persist.  Incr pantoprazole to BID. F/up SLP and GI.  4M follow-up     Problem List Items Addressed This Visit          Digestive    Gastroesophageal reflux disease without esophagitis    Relevant Medications    famotidine (PEPCID) 20 MG tablet    Other Relevant Orders    Adult GI  Referral - Consult Only     Other Visit Diagnoses       Dysphonia    -  Primary    Relevant Orders    Speech Therapy  Referral    Esophageal dysmotility        Relevant Orders    Adult GI  Referral - Consult Only    Dysphagia, unspecified type        Relevant Orders    Speech Therapy  Referral             Review of external notes as documented elsewhere in note    16 minutes spent on the date of the encounter doing chart review, history and exam, documentation and further activities per the note  {     NATALIA Christine  [facility]    Subjective     HPI-    Last Visit:  Assessment & Plan  3M follow-up   Consider SLP referral        Digestive     Gastroesophageal reflux disease without esophagitis     Relevant Medications     pantoprazole (PROTONIX) 40 MG EC tablet       Dysphonia    -  Primary     Sore throat         LPRD (laryngopharyngeal reflux disease)         Relevant Medications     pantoprazole (PROTONIX) 40 MG EC tablet     Other Relevant Orders     Adult Gen Surg  Referral     XR Esophagram        Interim Hx:  Esophagram:IMPRESSION:   Mild esophageal dysmotility, otherwise normal esophagram.    Neck CT:  IMPRESSION:    No acute findings visualized. No specific imaging explanation for the patient's symptoms.    Upper EGD-Impression: Normal second portion of the duodenum. Gastroesophageal flap valve  classified as Hill Grade I (prominent fold, tight to endoscope). AFS 1, d0l0 Normal stomach. Z-line regular, 34 cm from the incisors. Biopsied. small area of irregularity biopsied. Recommendation: Await pathology results.    Bx:DISTAL ESOPHAGUS, BIOPSY:        - MILD REFLUX DISEASE, NO INTRAEPITHELIAL EOSINOPHILIA        - NO EVIDENCE OF INTESTINAL METAPLASIA, NEGATIVE FOR DYSPLASIA     12/5 Dr Ramirez: No show    Recent p/t appt:  She feels unable to move or speak quickly. Eating is manageable, but she needs to do so very slowly. Drinking poses no issues. She points to the anterior part of her neck, almost where her scalenes would be.     Today, symptoms persist despite PPI.       service used over the phone.      Review of Systems   ENT as above      Objective    LMP  (LMP Unknown)     Physical Exam     Gen appears well         Again, thank you for allowing me to participate in the care of your patient.        Sincerely,        NATALIA Christine

## 2024-12-13 ENCOUNTER — TELEPHONE (OUTPATIENT)
Dept: GASTROENTEROLOGY | Facility: CLINIC | Age: 59
End: 2024-12-13

## 2024-12-13 NOTE — TELEPHONE ENCOUNTER
[facility] Call Center    Phone Message    May a detailed message be left on voicemail: Yes    Reason for Call: Other: Patient is currently scheduled on 03/31/2025, as visit type New GI Urgent. This is outside the expected timeline for this referral. Patient has been added to the waitlist.      Action Taken: Message routed to:  Other: GI REFERRAL TRIAGE POOL     Travel Screening: Not Applicable

## 2024-12-16 ENCOUNTER — TELEPHONE (OUTPATIENT)
Dept: FAMILY MEDICINE | Facility: CLINIC | Age: 59
End: 2024-12-16
Payer: COMMERCIAL

## 2024-12-16 NOTE — TELEPHONE ENCOUNTER
December 16, 2024    Patient's pre-operative physical documentation and labs have been completed by Lissa Melendez DNP.  The pre-operative paperwork was faxed to [facility] at [phone] per instructions from the surgeon.    Betzaida Valdes

## 2024-12-18 ENCOUNTER — THERAPY VISIT (OUTPATIENT)
Dept: SPEECH THERAPY | Facility: CLINIC | Age: 59
End: 2024-12-18
Attending: PHYSICIAN ASSISTANT
Payer: COMMERCIAL

## 2024-12-18 DIAGNOSIS — R13.10 DYSPHAGIA, UNSPECIFIED TYPE: ICD-10-CM

## 2024-12-18 DIAGNOSIS — R49.0 DYSPHONIA: ICD-10-CM

## 2024-12-18 PROCEDURE — 92610 EVALUATE SWALLOWING FUNCTION: CPT | Mod: GN | Performed by: SPEECH-LANGUAGE PATHOLOGIST

## 2024-12-18 PROCEDURE — 92526 ORAL FUNCTION THERAPY: CPT | Mod: GN | Performed by: SPEECH-LANGUAGE PATHOLOGIST

## 2024-12-18 NOTE — PROGRESS NOTES
"SPEECH LANGUAGE PATHOLOGY EVALUATION     Fall Risk Screen:  Fall screen completed by: SLP  Have you fallen 2 or more times in the past year?: Yes  Have you fallen and had an injury in the past year?: Yes  Is patient a fall risk?: No  Fall screen comments: Pt seen by PT 11/24.    Subjective        Presenting condition or subjective complaint: Pt is a 59 y.o. female  Pt is a 59 y.o. female who was referred for an OP SLP evaluation d/t concerns with Pt reported neck pain and dysphagia. Pt completed an OP VFSS on 5/10/24: \"Patient presents with no significant oral pharyngeal dysphagia. Overall function and structure is WNL.She demonstrates oral holding and piecemeal swallows that appear to be intentional as she anticipates pain with the swallow. She also winces during some swallows and touches the right side of her neck just lateral to her larynx. Once swallow is triggered there is good tongue back retraction and hyolaryngeal movement. Epiglottis inverts to protect the airway. There is no residual pharyngeal stasis with any consistency. No laryngeal penetration or aspiration with any consistency. Upon AP view liquid boluses move symmetrically through the pyriform sinuses. Boluses move through cricopharyngeus without difficulty. There is trace amounts of barium coating the cervical esophagus with each swallow. \" Pt was then seen by ENT on 12/12, the prescribed Famotidine for GERD. She also completed an esophagram (Per ENT notes) which demonstrated mild esophageal dysmotility.   Date of onset: 12/12/24 (Date of MD orders.)    Relevant medical history:   Please see EMR.   Dates & types of surgery: (Patient-Rptd) endoscopy 9/2024    Prior diagnostic imaging/testing results: (Patient-Rptd) Video fluoroscopic swallow study     Prior therapy history for the same diagnosis, illness or injury: (Patient-Rptd) No      Living Environment  Social support: (Patient-Rptd) With family members   Help at home: (Patient-Rptd) Self Cares " (home health aide/personal care attendant, family, etc); Home management tasks (cooking, cleaning); Medication and/or finances; Home and Yard maintenance tasks; Assist for driving and community activities    Employment: (Patient-Rptd) No    Hobbies/Interests: (Patient-Rptd) n/a    Patient goals for therapy: (Patient-Rptd) feel confortable    Pain assessment: Pain present  Location: Rt neck /Ratin/10      Objective     SWALLOW EVALUTION  Dysphagia history: Pt completed OP VFSS, see results in EMR or copied above. No oropharyngeal dysphagia dx'ed based on that study.   Current Diet/Method of Nutritional Intake: oral diet, thin liquids (level 0), minced & moist (level 5), soft & bite-sized (level 6), Pt unsure of if she is losing weight but endorses difficulty eating d/t chewing and pain in her throat.          CLINICAL SWALLOW EVALUATION  Oral Motor Function: generally intact     Level of assist required for feeding: no assistance needed   Textures Trialed:   Clinical Swallow Eval: Thin Liquids  Mode of presentation: cup, self-fed   Volume presented: 1tbs   Preparatory Phase: WFL  Oral Phase: WFL  Pharyngeal phase of swallow: intact   Strategies trialed during procedure: THERESA SLP CLINICAL EVAL STRATEGIES: head turn to the right, head turn to the left   Diagnostic statement: No overt s/x of aspiration nor penetration. Pt demonstrated pain with each swallow, tried head turns but worsened pain.     Clinical Swallow Eval: Soft & Bite Sized  Mode of presentation: spoon, self-fed   Volume presented: 1/2 tsp  Preparatory Phase: WFL  Oral Phase: WFL  Pharyngeal phase of swallow: intact   Strategies trialed during procedure: THERESA SLP CLINICAL EVAL STRATEGIES: none. Pt indep. Utilized multiple swallows.     Diagnostic statement: No overt s/sx of aspiration nor penetration noted during this assessment.  Pt demonstrating multiple swallows for pharyngeal clearance.     ESOPHAGEAL PHASE OF SWALLOW  patient reports symptoms of  esophageal dysphagia     SWALLOW ASSESSMENT CLINICAL IMPRESSIONS AND RATIONALE  Diet Consistency Recommendations: oral diet, thin liquids (level 0), pureed (level 4), minced & moist (level 5)    Recommended Feeding/Eating Techniques: small bolus size, slow rate of intake, double swallow, effortful (hard) swallow, monitor for cough or change in vocal quality with intake, maintain upright sitting position for eating, maintain upright posture during/after eating for 30 minutes   Medication Administration Recommendations: Per Pt choice.   Instrumental Assessment Recommendations: instrumental evaluation not recommended at this time as it was previously completed.     Assessment & Plan   CLINICAL IMPRESSIONS   Medical Diagnosis: Dysphonia (R49.0)  - Primary. Dysphagia, unspecified type (R13.10) (Dysphagia, Dysphonia)    Treatment Diagnosis: Pharyngeal Dysphagia   Impression/Assessment: Pt is a 59 year old female with neck pain complaints. The following significant findings have been identified: impaired swallowing, characterized by neck pain, slow pharyngeal transit and need for multiple swallows across all textures trialed (did not trial solid textures d/t Pt pain level). Identified deficits interfere with their ability to consume an oral diet and maintain nutrition as compared to previous level of function.    PLAN OF CARE  Treatment Interventions:  None, recommend GI consultation. No overt s/x of aspiration/penetration noted. Evaluation was severely limited by level of Pt discomfort/pain.     Prognosis to achieve stated therapy goals is  N/A     Rehab potential is impacted by: comorbidities    Long Term Goals:   SLP Goal 1  Goal Identifier: LTG 1-Dysphagia  Goal Description: Pt will verbalize understanding of results and recommendations for safe swallow strategies and GERD precautions with 100% in order to reduce pain and dysphagia.  Rationale: To maximize safety, ease and/or independence of oral intake  Target Date:  12/24/24      Frequency of Treatment: 1x/week  Duration of Treatment: 7 days     Recommended Referrals to Other Professionals:  GI  Education Assessment:   Learner/Method: Patient;Family;Listening;Reading;No Barriers to Learning    Risks and benefits of evaluation/treatment have been explained.   Patient/Family/caregiver agrees with Plan of Care.     Evaluation Time:    SLP Eval: oral/pharyngeal swallow function, clinical minutes (21841): 45     Present: Yes: Language: Fernando, ID Number/Identifier: [id number]      Signing Clinician: ISAIAS Mcmillan      [facility]                                                                                   OUTPATIENT SPEECH LANGUAGE PATHOLOGY      PLAN OF TREATMENT FOR OUTPATIENT REHABILITATION   Patient's Last Name, First Name, M.MYA.  Duarte Lieberman    YOB: 1965   Provider's Name   [facility]   Medical Record No.  [id number]     Onset Date: 12/12/24 (Date of MD orders.) Start of Care Date: 12/18/24     Medical Diagnosis:  Dysphonia (R49.0)  - Primary. Dysphagia, unspecified type (R13.10) (Dysphagia, Dysphonia)      SLP Treatment Diagnosis: Pharyngeal Dysphagia  Plan of Treatment  Frequency/Duration: 1x/week  / 7 days     Certification date from 12/18/24   To 12/24/24          See note for plan of treatment details and functional goals     ISAIAS Mcmillan                         I CERTIFY THE NEED FOR THESE SERVICES FURNISHED UNDER        THIS PLAN OF TREATMENT AND WHILE UNDER MY CARE     (Physician attestation of this document indicates review and certification of the therapy plan).              Referring Provider:  NATALIA Christine    Initial Assessment  See Epic Evaluation- 12/18/24      Thank you for referring [facility] to outpatient therapy at [facility]. Please call Malini Marquez MA, SLP-CCC at [phone] email  [email or web address] with any questions or concerns.      Malini Marquez M.A., SLP-CCC  Speech-Language Pathologist

## 2024-12-18 NOTE — PROGRESS NOTES
OP SLP DISCHARGE NOTE       12/18/24 0500   Appointment Info   Treating Provider Malini Marquez MA CCC-SLP   Visits Used 1/10   Medical Diagnosis Dysphonia (R49.0)  - Primary. Dysphagia, unspecified type (R13.10)  (Dysphagia, Dysphonia)   SLP Tx Diagnosis Pharyngeal Dysphagia   Quick Adds Certification   Progress Note/Certification   Start Of Care Date 12/18/24   Onset Of Illness/injury Or Date Of Surgery 12/12/24  (Date of MD orders.)   Therapy Frequency 1x/week   Predicted Duration 7 days   Certification date from 12/18/24   Certification date to 12/24/24   Progress Note Due Date 12/24/24   Progress Note Completed Date 12/18/24       Present Yes    Language [place]     ID or First/Last Name Edison Hernandez   SLP Goals   SLP Goals 1   SLP Goal 1   Goal Identifier LTG 1-Dysphagia   Goal Description Pt will verbalize understanding of results and recommendations for safe swallow strategies and GERD precautions with 100% in order to reduce pain and dysphagia.   Rationale To maximize safety, ease and/or independence of oral intake   Goal Progress Goal met.   Target Date 12/24/24   Date Met 12/18/24   Treatment Interventions (SLP)   Treatment Interventions Treatment Swallow/Oral dysfunction   Treatment Swallow/Oral dysfunction   Treatment of Swallowing Dysfunction &/or Oral Function for Feeding Minutes (38884) 8 Minutes   Skilled Intervention Educated patient on swallowing strategies.;Other  (GERD/LPRD recommendations)   Patient Response/Progress LTG 1-SLP provided education on safe swallow strategies including; reduced amts, slowed rate of intake, alternating viscosities and multiple swallows. Education on GERD/LPRD precautions and education on PPI medications ENT recommended as well as recommendation to remain upright after eating for at least 30 minutes. Pt may try exteral heat/ice to reduce throat pain.   Eval/Assessments   Eval/Assessments Oral/Pharyngeal Swallow Function    SLP Eval: oral/pharyngeal swallow function, clinical minutes (40921) 69   Education   Learner/Method Patient;Family;Listening;Reading;No Barriers to Learning   Plan   Home program Safe swallow strategies, follow up with GI.   Plan for next session Discharge.   Total Session Time   Total Treatment Time (sum of timed and untimed services) 53         DISCHARGE  Reason for Discharge: Patient has met all goals.  No further expectation of progress.    Equipment Issued: none     Discharge Plan: Patient to continue home program.  Other services: GI. SLP reached out to GI scheduling 12/18/24, no current openings prior to Pt's appt in March, however, Pt is on the wait list.     Referring Provider:  NATALIA Christine     Thank you for referring [facility] to outpatient therapy at [facility]. Please call Malini Marquez MA, SLP-CCC at [phone] email [email or web address] with any questions or concerns.      Malini Marquez M.A., SLP-CCC  Speech-Language Pathologist

## 2024-12-23 ENCOUNTER — NURSE TRIAGE (OUTPATIENT)
Dept: FAMILY MEDICINE | Facility: CLINIC | Age: 59
End: 2024-12-23
Payer: COMMERCIAL

## 2024-12-23 NOTE — TELEPHONE ENCOUNTER
"Called pt with . Relayed message below.   Taking the pantoprazole twice a day already. Only taking tylenol \"as needed for severe pain\".     Encouraged disposition to be seen in ED. Pt and niece say they \"will go to ED later.\"    "

## 2024-12-23 NOTE — TELEPHONE ENCOUNTER
Agree with ER disposition for SOB, chest pain, and dysphasia..  In order to treat chest pain, we need to know hat causes it and I'm unable to diagnose without evaluation.  Recent neck MRI showed moderate arthritis but nothing to cause severe pain.   They can try Tylenol for pain 1000 mg 3 times a day, heating pack for neck pain.  Is she taking Pantoprazole for throat pain and GERd? If yes, can increase it to twice a day, if not, start taking it daily.

## 2024-12-23 NOTE — TELEPHONE ENCOUNTER
"Nurse Triage SBAR    Is this a 2nd Level Triage? YES, LICENSED PRACTITIONER REVIEW IS REQUIRED    Situation: Breathing difficulty, voice changes, chest discomfort.    Background: Seen by Judith 11/12. Seen by ENT 12/12.    Assessment:   Patient seen ENT 12/12 for the following: Dysphonia, esophageal dysmotility, and dysphagia.     Breathing concerns- patient and daughter reports patient has had a hard time breathing over the last two years however breathing is gradually getting worse. Patient reports feeling like she has to take a deep breath. Patient reports she is losing her voice.     Mild pain constantly in throat, neck and chest. Tension in back of neck. Reports after eating reports a sharp pain in chest and throat. Reports only noticing this sharp pain in throat and chest after eating.     Sleeping- Discussed swallowing concerns and Gastroesophageal reflux disease. Reports at night time it is hard for her to sleep due to patient neck pain, inability to eat much and throughout the day     Hearing loss- Daughter reports she is losing her hearing. Reports when patient first came to live with daughter (in march) her hearing was good and now reports patient hearing is declining. Denies ear pain. Patient reports she can hear what patient are saying but can not interpret what they are saying.     Neck Pain- see MRI results imaging. Reports continued on and off pain. Reports when pain is present, pain is severe. Reports pain has been there for two years. Reports sometimes the pain is so severe she feels she can not \"speak out.\" This has been occurring for the last 2 years, however has worsened over time.     Reports these symptoms were discussed with Judith however reports symptoms are more severe now.     Scheduled January 30th for follow up and establish care.     Protocol Recommended Disposition:   Go to ED Now    Recommendation: Informed of ED recommendation for breathing concerns or chest pain. Requesting pain " medication recommendations. States if it becomes worse will proceed to the ED however reports symptoms are manageable at time of call.  Routing to provider for review and recommendation.     Routed to provider    Does the patient meet one of the following criteria for ADS visit consideration? 16+ years old, with an [facility] PCP     TIP  Providers, please consider if this condition is appropriate for management at one of our Acute and Diagnostic Services sites.     If patient is a good candidate, please use dotphrase <dot>triageresponse and select Refer to ADS to document.  Reason for Disposition   MODERATE difficulty breathing (e.g., speaks in phrases, SOB even at rest, pulse 100-120) of new-onset or worse than normal    Additional Information   Negative: SEVERE difficulty breathing (e.g., struggling for each breath, speaks in single words, pulse > 120)   Negative: Breathing stopped and hasn't returned   Negative: Choking on something   Negative: Bluish (or gray) lips or face   Negative: Difficult to awaken or acting confused (e.g., disoriented, slurred speech)   Negative: Passed out (e.g., fainted, lost consciousness, blacked out and was not responding)   Negative: Wheezing started suddenly after medicine, an allergic food, or bee sting   Negative: Stridor (harsh sound while breathing in)   Negative: Slow, shallow and weak breathing   Negative: Sounds like a life-threatening emergency to the triager   Negative: Chest pain   Negative: Wheezing (high pitched whistling sound) and previous asthma attacks or use of asthma medicines   Negative: Breathing difficulty and within 14 days of COVID-19 EXPOSURE (close contact) with someone diagnosed with COVID-19 (e.g., COVID test positive)   Negative: Breathing difficulty and COVID-19 is widespread in the community   Negative: Breathing diffculty and only present when coughing   Negative: Breathing difficulty and only from stuffy nose   Negative: Breathing diffculty and only from  stuffy nose or runny nose from common cold    Protocols used: Breathing Difficulty-A-[place]

## 2024-12-24 NOTE — TELEPHONE ENCOUNTER
Clinic Navigator sent a [employer] message to inform the Pt that Pt is on the wait list for a sooner appointment. Clinic Navigator will reach out to the Pt via phone call or [place] when a sooner appointment becomes available.

## 2024-12-30 DIAGNOSIS — N18.6 ESRD (END STAGE RENAL DISEASE) ON DIALYSIS (H): ICD-10-CM

## 2024-12-30 DIAGNOSIS — Z99.2 ESRD (END STAGE RENAL DISEASE) ON DIALYSIS (H): ICD-10-CM

## 2024-12-30 RX ORDER — FERROUS SULFATE 325(65) MG
TABLET ORAL
Qty: 30 TABLET | Refills: 3 | Status: SHIPPED | OUTPATIENT
Start: 2024-12-30

## 2025-01-29 NOTE — TELEPHONE ENCOUNTER
Writer called and left voice message for Pt's jonathan Harrison. Writer called to help get Pt scheduled for a sooner GI appointment. Writer left call back number.

## 2025-01-29 NOTE — TELEPHONE ENCOUNTER
Pt's niece, Hector, called back on Pt's behalf. Pt accepted an appointment with Dr. Celis on 2/24/2025 at 4 PM for an in-person clinic visit. The clinic address was given to Hector to provider to the Pt.

## 2025-01-30 ENCOUNTER — OFFICE VISIT (OUTPATIENT)
Dept: FAMILY MEDICINE | Facility: CLINIC | Age: 60
End: 2025-01-30
Payer: COMMERCIAL

## 2025-01-30 VITALS
TEMPERATURE: 98 F | HEIGHT: 58 IN | RESPIRATION RATE: 14 BRPM | DIASTOLIC BLOOD PRESSURE: 70 MMHG | HEART RATE: 70 BPM | SYSTOLIC BLOOD PRESSURE: 106 MMHG | WEIGHT: 98 LBS | BODY MASS INDEX: 20.57 KG/M2 | OXYGEN SATURATION: 96 %

## 2025-01-30 DIAGNOSIS — Z75.8 LANGUAGE BARRIER: ICD-10-CM

## 2025-01-30 DIAGNOSIS — Z98.890 S/P LAMINECTOMY: Primary | ICD-10-CM

## 2025-01-30 DIAGNOSIS — R26.89 IMPAIRED GAIT AND MOBILITY: ICD-10-CM

## 2025-01-30 DIAGNOSIS — G95.89 INTRADURAL MASS (H): ICD-10-CM

## 2025-01-30 DIAGNOSIS — T75.3XXA MOTION SICKNESS, INITIAL ENCOUNTER: ICD-10-CM

## 2025-01-30 DIAGNOSIS — R49.0 DYSPHONIA: ICD-10-CM

## 2025-01-30 DIAGNOSIS — K22.4 ESOPHAGEAL DYSMOTILITY: ICD-10-CM

## 2025-01-30 DIAGNOSIS — R13.10 DYSPHAGIA, UNSPECIFIED TYPE: ICD-10-CM

## 2025-01-30 DIAGNOSIS — Z11.59 ENCOUNTER FOR HEPATITIS C SCREENING TEST FOR LOW RISK PATIENT: ICD-10-CM

## 2025-01-30 DIAGNOSIS — K21.9 GASTROESOPHAGEAL REFLUX DISEASE WITHOUT ESOPHAGITIS: ICD-10-CM

## 2025-01-30 DIAGNOSIS — Z11.4 ENCOUNTER FOR SCREENING FOR HIV: ICD-10-CM

## 2025-01-30 DIAGNOSIS — Z60.3 LANGUAGE BARRIER: ICD-10-CM

## 2025-01-30 DIAGNOSIS — R10.13 EPIGASTRIC PAIN: ICD-10-CM

## 2025-01-30 DIAGNOSIS — R29.818 DIFFICULTY BALANCING: ICD-10-CM

## 2025-01-30 DIAGNOSIS — R29.898 WEAKNESS OF BOTH LOWER EXTREMITIES: ICD-10-CM

## 2025-01-30 DIAGNOSIS — M54.2 NECK PAIN: Primary | ICD-10-CM

## 2025-01-30 DIAGNOSIS — I10 BENIGN ESSENTIAL HYPERTENSION: ICD-10-CM

## 2025-01-30 LAB — HIV 1+2 AB+HIV1 P24 AG SERPL QL IA: NONREACTIVE

## 2025-01-30 PROCEDURE — 86803 HEPATITIS C AB TEST: CPT | Performed by: FAMILY MEDICINE

## 2025-01-30 PROCEDURE — 86364 TISS TRNSGLTMNASE EA IG CLAS: CPT | Performed by: FAMILY MEDICINE

## 2025-01-30 PROCEDURE — 99215 OFFICE O/P EST HI 40 MIN: CPT | Performed by: FAMILY MEDICINE

## 2025-01-30 PROCEDURE — 87389 HIV-1 AG W/HIV-1&-2 AB AG IA: CPT | Performed by: FAMILY MEDICINE

## 2025-01-30 PROCEDURE — 86231 EMA EACH IG CLASS: CPT | Mod: 90 | Performed by: FAMILY MEDICINE

## 2025-01-30 PROCEDURE — 82784 ASSAY IGA/IGD/IGG/IGM EACH: CPT | Performed by: FAMILY MEDICINE

## 2025-01-30 PROCEDURE — 86258 DGP ANTIBODY EACH IG CLASS: CPT | Performed by: FAMILY MEDICINE

## 2025-01-30 PROCEDURE — 36415 COLL VENOUS BLD VENIPUNCTURE: CPT | Performed by: FAMILY MEDICINE

## 2025-01-30 PROCEDURE — 99000 SPECIMEN HANDLING OFFICE-LAB: CPT | Performed by: FAMILY MEDICINE

## 2025-01-30 PROCEDURE — G2211 COMPLEX E/M VISIT ADD ON: HCPCS | Performed by: FAMILY MEDICINE

## 2025-01-30 RX ORDER — FAMOTIDINE 20 MG/1
20 TABLET, FILM COATED ORAL 2 TIMES DAILY PRN
Qty: 60 TABLET | Refills: 5 | Status: SHIPPED | OUTPATIENT
Start: 2025-01-30

## 2025-01-30 RX ORDER — PANTOPRAZOLE SODIUM 40 MG/1
40 TABLET, DELAYED RELEASE ORAL DAILY
Qty: 90 TABLET | Refills: 3 | Status: SHIPPED | OUTPATIENT
Start: 2025-01-30

## 2025-01-30 RX ORDER — ACETAMINOPHEN 500 MG
500-1000 TABLET ORAL EVERY 6 HOURS PRN
Qty: 100 TABLET | Refills: 3 | Status: SHIPPED | OUTPATIENT
Start: 2025-01-30

## 2025-01-30 RX ORDER — MECLIZINE HYDROCHLORIDE 25 MG/1
25 TABLET ORAL 3 TIMES DAILY PRN
Qty: 30 TABLET | Refills: 2 | Status: SHIPPED | OUTPATIENT
Start: 2025-01-30

## 2025-01-30 SDOH — SOCIAL STABILITY - SOCIAL INSECURITY: ACCULTURATION DIFFICULTY: Z60.3

## 2025-01-30 NOTE — PROGRESS NOTES
OFFICE VISIT    Assessment/Plan:     Patient Instructions:    -Complete the TWO stool tests and return them to clinic.   -Take medications as prescribed.   -Bring the handicap parking application to the [employer].  -Continue to follow the recommendations from the specialist.    Please seek immediate medical attention (go to the emergency room or urgent care) for the following reasons: worsening symptoms, or any concerning changes.        Bu was seen today for establish care, balance/ vestibular and letter for pca.  Diagnoses and all orders for this visit:    Neck pain  Gastroesophageal reflux disease without esophagitis  Epigastric pain  Dysphonia  Esophageal dysmotility  Dysphagia, unspecified type  Language barrier: Plan as below. Further recommendations pending results of tests.   -     Helicobacter pylori Antigen Stool; Future  -     IgA [LAB73]; Future  -     Deamidated Giladin Peptide Brian IgA IgG [EMU2140]; Future  -     Tissue transglutaminase brian IgA and IgG [WFC1432]; Future  -     Endomysial Antibody IgA by IFA [DLM7603]; Future  -     pantoprazole (PROTONIX) 40 MG EC tablet; Take 1 tablet (40 mg) by mouth daily. 30 minutes before breakfast  -     famotidine (PEPCID) 20 MG tablet; Take 1 tablet (20 mg) by mouth 2 times daily as needed (heartburn).    Difficulty balancing  Impaired gait and mobility  Weakness of both lower extremities: plan as below. Uses others for support to ambulate at this time.   -     Cane Order for DME - ONLY FOR DME    Benign essential hypertension: stable. Continue lisinopril 10mg once daily.     Motion sickness, initial encounter: start medication as below. Noted with long car rides.  -     meclizine (ANTIVERT) 25 MG tablet; Take 1 tablet (25 mg) by mouth 3 times daily as needed (motion sickness.).    Encounter for screening for HIV  -     HIV Antigen Antibody Combo; Future    Encounter for hepatitis C screening test for low risk patient  -     Hepatitis C Screen Reflex to HCV RNA Quant  and Genotype; Future        Return in about 3 months (around 4/30/2025) for Medication follow up.    The diagnoses, treatment options, risk, benefits, and recommendations were reviewed with patient/guardian.  Questions were answered to patient's/guardian satisfaction.  Red flag signs were reviewed.  Patient/guardian is in agreement with above plan.      Subjective: 60 year old female with history of back pain, hypertension, GERD, prediabetes who presents to clinic for the following complaints:   Patient presents with:  Establish Care  Balance/ Vestibular  letter for PCA    Answers submitted by the patient for this visit:  General Questionnaire (Submitted on 1/30/2025)  Chief Complaint: Chronic problems general questions HPI Form  What is the reason for your visit today? : balance issues  sore throat  PCA letter  Questionnaire about: Chronic problems general questions HPI Form (Submitted on 1/30/2025)  Chief Complaint: Chronic problems general questions HPI Form        Balance concerns: Patient has a diagnosis of balance deficits, gait instability, lower extremity weakness and has been referred to physical therapy for treatment.  Patient was most recently seen on 11/18/2024 and recommendation was to have treatment once per week for 12 weeks.    Patient has been seen by ENT, most recently on 12/12/2024.  Diagnosis of dysphonia, esophageal dysmotility, dysphagia, GERD without esophagitis.  Treated with famotidine and referred to speech therapy.  Patient has seen speech therapy most recently on 12/18/2024.  Patient on famotidine for the reflux symptoms.    H. pylori testing was completed on endoscopy from 12/27/2023.  Noted to have chronic gastritis.  H. pylori stain was negative.  Patient will have been instructed to take pantoprazole twice daily at that time.  Patient currently is prescribed the pantoprazole 40 mg, though only once daily.  Managed by gastroenterology.      They would like to apply for disability  parking permit. If she walks far, she gets dizzy and tired. She has balance issues as well. She then has to rest before she can keep going. She feels weakness around the knees and then feels that she is unable to support herself. This sensation can come on suddenly. Patient would like a cane.  Handicap parking application completed and given to patient.    Does not need a PCA letter.       Brings in two medication bottles: lisinopril 10mg and pantoprazole 40mg.     She wants some medications for the pain (of the throat). Sometimes, the pain comes back and can be really intense. The pain is noted in the throat and can be really bad and she feels like all her muscles are stiff and she can't sleep with it. She has has a sharp pain now and again. She had a CT neck soft tissue as below in 10/2024 and didn't show any concerning findings. Taking tylenol and ibuprofen does help. Before, she had been given medications (two tablets at one time) and that helped her. Later, the mediation finished, and the family couldn't get anymore medications. The pantoprazole is not helpful for the throat pains.     Had an endsoscopy test about a month ago. Uncertain of results.     Completed the Cologuard test and they were instructed to bring the box into clinic as this is easier for them.     If she goes too far in a car, she gets motion sickness. Discussed treatment ooptions.       Medical history:   -Denies history of CAD, diabetes.    Family history:  -Denies family history of CAD, diabetes, hypertension.         HM due was reviewed with patient/parent.  Recommendations, risk, benefits were reviewed.  Accepted recommendations were ordered.  Otherwise, patient/parent declined.    Health Maintenance Due   Topic Date Due    ADVANCE CARE PLANNING  Never done    COLORECTAL CANCER SCREENING  Never done    HIV SCREENING  Never done    HEPATITIS C SCREENING  Never done    ANNUAL REVIEW OF HM ORDERS  04/26/2025    PAP  07/01/2025     Pap smear  completed in 07/01/2022. Normal results. Repeat in 5 years. Updated HM.     Immunization History   Administered Date(s) Administered    COVID-19 12+ (Pfizer) 05/15/2024, 09/24/2024    COVID-19 Bivalent 12+ (Pfizer) 01/16/2023    COVID-19 MONOVALENT 12+ (Pfizer) 05/02/2021, 05/28/2021, 06/18/2021    COVID-19 Monovalent 12+ (Pfizer 2022) 07/21/2021, 08/11/2021, 02/04/2022    COVID-19 Vaccine ([place]erson) 04/03/2021    Flu, Unspecified 10/02/2010, 09/28/2012, 03/26/2018    HEPA 07/24/2009    HepB, Unspecified 02/26/2018, 03/29/2018, 06/20/2019, 07/22/2019, 02/04/2020    Hepatitis B, Adult 02/19/2009, 04/07/2009, 06/16/2009, 10/12/2010, 04/12/2011, 07/18/2018, 08/12/2024    Influenza (H1N1) 01/12/2010    Influenza Vaccine >6 months,quad, PF 09/18/2010, 10/04/2016, 11/06/2018, 12/10/2018, 09/17/2019, 10/22/2019, 10/09/2020, 11/01/2021, 11/20/2021, 12/06/2021, 10/27/2022, 12/31/2022, 09/11/2023, 09/13/2023    Influenza Vaccine, 6+MO IM (QUADRIVALENT W/PRESERVATIVES) 11/09/2023    Influenza, Intradermal, Quad, Pf 02/26/2018, 06/20/2019, 09/09/2020    Influenza, Split Virus, Trivalent, Pf (Fluzone\Fluarix) 09/09/2024, 09/24/2024    MMR 02/19/2009, 04/07/2009, 06/16/2009, 08/12/2009, 02/26/2018, 03/26/2018, 03/29/2018, 06/20/2019, 07/22/2019    Meningococcal Mcv4 Conjugate,unspecified  07/24/2009    Pneumo Conj 13-V (2010&after) 12/06/2021    Pneumococcal 20 valent Conjugate (Prevnar 20) 09/24/2024    Pneumococcal 23 valent 12/06/2021    TD,PF 7+ (Tenivac) 02/19/2009, 11/26/2009    TDAP (Adacel,Boostrix) 11/20/2018, 09/21/2022, 05/15/2024, 08/12/2024    Td (Adult), Adsorbed 02/26/2018, 03/29/2018, 06/20/2019, 07/22/2019    Varicella 06/16/2009    Zoster recombinant adjuvanted (SHINGRIX) 02/07/2023, 04/11/2023         Presents with daughter.  A professional [employer] telephone  was utilized for the office visit.     The 10 point review of system is negative except as stated in the HPI.    Allergies were reviewed  and updated.      Narrative & Impression   EXAM: CT SOFT TISSUE NECK W CONTRAST  LOCATION: [facility]  DATE: 10/14/2024     INDICATION: right sided sharp pain in right neck     COMPARISON: None.     CONTRAST: 90 mL Isovue 370     TECHNIQUE: Routine CT Soft Tissue Neck with IV contrast. Multiplanar reformats. Dose reduction techniques were used.     FINDINGS:      MUCOSAL SPACES/SOFT TISSUES: Normal mucosal spaces of the upper aerodigestive tract. No mucosal mass or inflammation identified. Normal vocal cords and infraglottic trachea. Normal parapharyngeal space and subcutaneous soft tissues. Normal oral cavity,    spaces, and floor of mouth structures.     LYMPH NODES: No pathologic lymph nodes by size or morphology criteria.      SALIVARY GLANDS: Normal parotid and submandibular glands.     THYROID: Normal.      VESSELS: Vascular structures of the neck are patent.     VISUALIZED INTRACRANIAL/ORBITS/SINUSES: No abnormality of the visualized intracranial compartment or orbits. Large mucosal polyp in the left maxillary sinus. The visualized mastoid air cells are clear.     OTHER: No destructive osseous lesion. The included lung apices are clear.                                                                      IMPRESSION:      No acute findings visualized. No specific imaging explanation for the patient's symptoms.     The 10-year ASCVD risk score (Quincy DK, et al., 2019) is: 3%    Values used to calculate the score:      Age: 60 years      Sex: Female      Is Non- : No      Diabetic: No      Tobacco smoker: No      Systolic Blood Pressure: 106 mmHg      Is BP treated: Yes      HDL Cholesterol: 45 mg/dL      Total Cholesterol: 206 mg/dL    Upper GI Endoscopy 2024  2:04 PM [facility]  Patient: [facility]  MRN: [id number]  : 1965  Age: 59 Years  Procedure: Upper GI endoscopy  Date: 2024  Attending Physician: Jatin  MD Ashley  Referring MD:       HUSSAIN RAYMOND  Indications:         -  Heartburn         -  Suspected gastro-esophageal reflux disease  Complications:         -  No immediate complications.  Procedure:         - The  was introduced through the mouth and advanced to the second   part            of the duodenum.         -  The upper GI endoscopy was accomplished without difficulty.         -  The patient tolerated the procedure well.  Findings:         -  The second portion of the duodenum was normal.         -  The gastroesophageal flap valve was visualized endoscopically and            classified as Hill Grade I (prominent fold, tight to endoscope).  AFS  1, d0l0         -  The entire examined stomach was normal.         -  The Z-line was regular and was found 34 cm from the incisors.  small  area            of irregularity biopsied.  Biopsies were taken with a cold forceps for            histology.  Impression:         -  Normal second portion of the duodenum.         -  Gastroesophageal flap valve classified as Hill Grade I (prominent  fold,            tight to endoscope).         - AFS 1, d0l0         -  Normal stomach.         -  Z-line regular, 34 cm from the incisors.  Biopsied.         - small area of irregularity biopsied.  Recommendation:         -  Await pathology results.     Surgical Pathology Exam  Order: [id number]  Collected 11/14/2024 12:00 AM       Status: Final result       Visible to patient: Yes (not seen)       Dx: Neck pain    0 Result Notes        Narrative  Performed by: [email or web address]  [facility], [address] [place] [address], [address], [place] [address]  CLIA#: [id number]  CASE: [id number]  PATIENT: RAMONE MCNALLY  GENDER: F  DATE OF BIRTH: 1965  ICD9 Code: K21.9, K21.9   SPECIMEN(S):   Distal esophagus biopsy (65110) Alcian blue stain  (44531)    CLINICAL INFORMATION:    Neck pain (M54.2) - EGD with biopsy        DIAGNOSIS:  DISTAL ESOPHAGUS, BIOPSY:    "    - MILD REFLUX DISEASE, NO INTRAEPITHELIAL EOSINOPHILIA       - NO EVIDENCE OF INTESTINAL METAPLASIA, NEGATIVE FOR DYSPLASIA       ECG 12-LEAD WITH MUSE (E)  Order: [id number]  Status: Edited Result - FINAL       Visible to patient: Yes (not seen)       Next appt: 02/24/2025 at 04:00 PM in Gastroenterology (Horacio Celis MD)    0 Result Notes          Component  Ref Range & Units 8/16/24  1:42 PM 4/22/24  9:07 AM    Systolic Blood Pressure  mmHg  187    Diastolic Blood Pressure  mmHg  118    Ventricular Rate  BPM 65 58    Atrial Rate  BPM 65 58    NE Interval  ms 162 172    QRS Duration  ms 70 66    QT  ms 410 398    QTc  ms 426 390    P Axis  degrees 66 49    R AXIS  degrees 56 21    T Axis  degrees 69 36    Interpretation ECG Sinus rhythm  Normal ECG  When compared with ECG of 22-Apr-2024 09:07,  No significant change was found  Confirmed by SEE ED PROVIDER NOTE FOR, ECG INTERPRETATION (4000),  JORGE ALBERTO VARGAS ([id number]) on 8/16/2024 9:34:14 PM Sinus bradycardia  Otherwise normal ECG  No previous ECGs available  Confirmed by SEE ED PROVIDER NOTE FOR, ECG INTERPRETATION (4000),  JORGE ALBERTO VARGAS ([id number]) on 4/28/2024 6:44:24 AM             Specimen Collected: 08/16/24  1:42 PM Last Resulted: 08/16/24         Objective:   /70   Pulse 70   Temp 98  F (36.7  C)   Resp 14   Ht 1.485 m (4' 10.47\")   Wt 44.5 kg (98 lb)   LMP  (LMP Unknown)   SpO2 96%   BMI 20.16 kg/m    General: Active, alert, nontoxic-appearing.  No acute distress.  HEENT: Normocephalic, atraumatic.  Pupils are equal and round.  Sclera is clear.  Normal external ears. Nares patent.  Moist mucous membranes.  Patient endorsing discomforts to palpation along the sternocleidomastoid muscle bilaterally, more particularly on the anterior medial portions.  No cervical lymphadenopathy appreciated.  Normal swallow reflex.  Cardiac: RRR.  S1, S2 present.  No murmurs, rubs, or gallops.  Respiratory/chest: Clear to auscultation bilaterally.  " No wheezes, rales, rhonchi.  Breathing is not labored.  No accessory muscle usage.  Extremities: Voluntary movements intact.  Integumentary: No concerning rash or skin changes appreciated.    Amount of time spent in chart review, direct patient contact, care coordination, and related activities to patient care on the day of appointment: 43 minutes.       Jerman Oconnor MD  [facility] [place] [address]  Phone: [phone]  Fax: [phone]    2/4/2025  7:30 AM            Current Outpatient Medications   Medication Sig Dispense Refill    acetaminophen (TYLENOL) 500 MG tablet Take 1-2 tablets (500-1,000 mg) by mouth every 6 hours as needed for fever or pain. (Max of 3,000 mg/day) 100 tablet 3    famotidine (PEPCID) 20 MG tablet Take 1 tablet (20 mg) by mouth 2 times daily as needed (heartburn). 60 tablet 5    lisinopril (ZESTRIL) 10 MG tablet Take 1 tablet (10 mg) by mouth daily 90 tablet 3    meclizine (ANTIVERT) 25 MG tablet Take 1 tablet (25 mg) by mouth 3 times daily as needed (motion sickness.). 30 tablet 2    pantoprazole (PROTONIX) 40 MG EC tablet Take 1 tablet (40 mg) by mouth daily. 30 minutes before breakfast 90 tablet 3     No current facility-administered medications for this visit.       No Known Allergies    Patient Active Problem List    Diagnosis Date Noted    Dysphagia, unspecified type 01/30/2025     Priority: Medium    Esophageal dysmotility 01/30/2025     Priority: Medium    Dysphonia 01/30/2025     Priority: Medium    Weakness of both lower extremities 01/30/2025     Priority: Medium    Impaired gait and mobility 01/30/2025     Priority: Medium    Difficulty balancing 01/30/2025     Priority: Medium    Prediabetes 11/14/2024     Priority: Medium    Neck pain 10/07/2024     Priority: Medium    Benign essential hypertension      Priority: Medium    Gastroesophageal reflux disease without esophagitis      Priority: Medium       Family History   Problem Relation Age of  Onset    Breast Cancer No family hx of     Ovarian Cancer No family hx of        Past Surgical History:   Procedure Laterality Date    ESOPHAGOSCOPY, GASTROSCOPY, DUODENOSCOPY (EGD), COMBINED N/A 11/14/2024    Procedure: ESOPHAGOGASTRODUODENOSCOPY, WITH BIOPSY;  Surgeon: Jatin Ramirez DO;  Location: [facility]    TUBAL LIGATION          Social History     Socioeconomic History    Marital status: Single     Spouse name: Not on file    Number of children: Not on file    Years of education: Not on file    Highest education level: Not on file   Occupational History    Not on file   Tobacco Use    Smoking status: Never     Passive exposure: Never    Smokeless tobacco: Current     Types: Chew    Tobacco comments:     Chewing betel nut.    Vaping Use    Vaping status: Never Used   Substance and Sexual Activity    Alcohol use: Not Currently     Comment: patient stopped in 2023.    Drug use: Never     Comment: Patient quit beetle-nut 01/01/24    Sexual activity: Not on file   Other Topics Concern    Not on file   Social History Narrative    Not on file     Social Drivers of Health     Financial Resource Strain: Low Risk  (1/30/2025)    Financial Resource Strain     Within the past 12 months, have you or your family members you live with been unable to get utilities (heat, electricity) when it was really needed?: No   Food Insecurity: Low Risk  (1/30/2025)    Food Insecurity     Within the past 12 months, did you worry that your food would run out before you got money to buy more?: No     Within the past 12 months, did the food you bought just not last and you didn t have money to get more?: No   Transportation Needs: Low Risk  (1/30/2025)    Transportation Needs     Within the past 12 months, has lack of transportation kept you from medical appointments, getting your medicines, non-medical meetings or appointments, work, or from getting things that you need?: No   Physical Activity: Insufficiently Active  (7/26/2024)    Exercise Vital Sign     Days of Exercise per Week: 7 days     Minutes of Exercise per Session: 20 min   Stress: Stress Concern Present (7/26/2024)    [employer] [place] [employer] - Occupational Stress Questionnaire     Feeling of Stress : Rather much   Social Connections: Unknown (7/26/2024)    Social Connection and Isolation Panel [NHANES]     Frequency of Communication with Friends and Family: Not on file     Frequency of Social Gatherings with Friends and Family: More than three times a week     Attends Zoroastrian Services: Not on file     Active Member of Clubs or Organizations: Not on file     Attends Club or Organization Meetings: Not on file     Marital Status: Not on file   Interpersonal Safety: Low Risk  (11/22/2024)    Interpersonal Safety     Do you feel physically and emotionally safe where you currently live?: Yes     Within the past 12 months, have you been hit, slapped, kicked or otherwise physically hurt by someone?: Patient unable to answer     Within the past 12 months, have you been humiliated or emotionally abused in other ways by your partner or ex-partner?: Patient unable to answer   Housing Stability: Low Risk  (1/30/2025)    Housing Stability     Do you have housing? : Yes     Are you worried about losing your housing?: No

## 2025-01-30 NOTE — PATIENT INSTRUCTIONS
-Thank you for choosing the [facility].  -It was a pleasure to see you today.  -Please take a look at the information below for more specific details regarding the treatment plan and recommendations.  -In this after visit summary is a list of your medications and specific instructions.  Please review this carefully as there may be changes made to your medication list.  -If there are any particular questions or concerns, please feel free to reach out to Dr. Oconnor.  -If any labs have been completed, we will reach out to you about results.  If the results are normal or not concerning, a letter or [employer] message will be sent to you.  If any follow-up is needed, either Dr. Oconnor or the nurse will give you a call.  If you have not heard regarding results after 2 weeks, please reach out to the clinic.    Patient Instructions:    -Complete the TWO stool tests and return them to clinic.   -Take medications as prescribed.   -Bring the handicap parking application to the [employer].  -Continue to follow the recommendations from the specialist.    Please seek immediate medical attention (go to the emergency room or urgent care) for the following reasons: worsening symptoms, or any concerning changes.      --------------------------------------------------------------------------------------------------------------------    -We are always looking for ways to improve.  You may be selected to receive a survey regarding your visit today.  We encourage you to complete the survey and provide specific, constructive feedback to help us improve our processes.  Thank you for your time!  -Please review the contact information listed on the after visit summary and in the electronic chart.  Below is the phone number that we have on file.  If there are any changes that are needed to be made, please reach out to the clinic.  [phone] (home)

## 2025-01-31 LAB
HCV AB SERPL QL IA: NONREACTIVE
IGA SERPL-MCNC: 264 MG/DL (ref 84–499)

## 2025-02-01 LAB
GLIADIN IGA SER-ACNC: 0.4 U/ML
GLIADIN IGG SER-ACNC: 0.9 U/ML
TTG IGA SER-ACNC: <0.2 U/ML
TTG IGG SER-ACNC: 1.3 U/ML

## 2025-02-03 LAB — ENDOMYSIUM IGA TITR SER IF: NORMAL {TITER}

## 2025-02-03 PROCEDURE — 87338 HPYLORI STOOL AG IA: CPT | Performed by: FAMILY MEDICINE

## 2025-02-04 PROBLEM — Z75.8 LANGUAGE BARRIER: Status: ACTIVE | Noted: 2025-02-04

## 2025-02-04 PROBLEM — Z60.3 LANGUAGE BARRIER: Status: ACTIVE | Noted: 2025-02-04

## 2025-02-05 LAB — H PYLORI AG STL QL IA: NEGATIVE

## 2025-02-06 ENCOUNTER — TELEPHONE (OUTPATIENT)
Dept: FAMILY MEDICINE | Facility: CLINIC | Age: 60
End: 2025-02-06
Payer: COMMERCIAL

## 2025-02-06 DIAGNOSIS — Z60.3 LANGUAGE BARRIER: ICD-10-CM

## 2025-02-06 DIAGNOSIS — R73.03 PREDIABETES: ICD-10-CM

## 2025-02-06 DIAGNOSIS — I10 BENIGN ESSENTIAL HYPERTENSION: ICD-10-CM

## 2025-02-06 DIAGNOSIS — M54.2 NECK PAIN: Primary | ICD-10-CM

## 2025-02-06 DIAGNOSIS — Z75.8 LANGUAGE BARRIER: ICD-10-CM

## 2025-02-06 DIAGNOSIS — K21.9 GASTROESOPHAGEAL REFLUX DISEASE WITHOUT ESOPHAGITIS: ICD-10-CM

## 2025-02-06 SDOH — SOCIAL STABILITY - SOCIAL INSECURITY: ACCULTURATION DIFFICULTY: Z60.3

## 2025-02-06 NOTE — TELEPHONE ENCOUNTER
Team - please call patient with results.   Mail letter if unable to get a hold of patient.   Patient doesn't check MyChart.     Tori Lieberman,    I hope you have been well since our last visit. Below are the results from the testing completed at the visit.     The stool test and blood tests all come back normal.  As discussed, with normal testing, we will complete her testing.  An order has been placed for you.  -If you do not hear from the specialist to schedule an appointment within a week's time from today, please call the [facility] and speak with the specialty  to help you schedule the appointment to see the specialist.  Dr. Oconnor would like you to complete the heart test within the next 2-3 weeks.    Otherwise, Dr. Oconnor recommends that you continue on the plan as discussed in clinic.    If there are any questions or concerns, please call the clinic or schedule an appointment for follow up.     Best wishes,           Jerman Oconnor MD  [facility]  2/6/2025  11:07 AM    Diagnoses and all orders for this visit:    Neck pain  -     NM Lexiscan stress test; Future    Gastroesophageal reflux disease without esophagitis  -     NM Lexiscan stress test; Future    Benign essential hypertension  -     NM Lexiscan stress test; Future    Prediabetes  -     NM Lexiscan stress test; Future    Language barrier  -     NM Lexiscan stress test; Future

## 2025-02-07 NOTE — TELEPHONE ENCOUNTER
"Writer attempt #1 to call patient with the help of an [facility] \"[employer]ernando[employer]\"    regarding clinician's message below. Clinician's message relayed to patient.    Patient verbalizes understanding, agrees with plan and has no further questions.    Writer routing message to [facility] Sched[facility] team to assist patient in obtaining appointment as mentioned below.    PABLO ChandlerN, RN, PHN   [facility]    "

## 2025-02-10 ENCOUNTER — TRANSFERRED RECORDS (OUTPATIENT)
Dept: HEALTH INFORMATION MANAGEMENT | Facility: CLINIC | Age: 60
End: 2025-02-10

## 2025-02-10 NOTE — TELEPHONE ENCOUNTER
Requesting return call around 4:45 pm today, jonathan is currently not with the patient for availability.

## 2025-02-14 NOTE — TELEPHONE ENCOUNTER
Attempt #2, writer called patient at [phone] no answer, lvm with [facility] number [phone] and [phone] to call to schedule patient stress test.

## 2025-02-17 DIAGNOSIS — K21.9 GASTROESOPHAGEAL REFLUX DISEASE WITHOUT ESOPHAGITIS: Primary | ICD-10-CM

## 2025-02-20 RX ORDER — FAMOTIDINE 20 MG/1
20 TABLET, FILM COATED ORAL 2 TIMES DAILY
Qty: 60 TABLET | Refills: 11 | Status: SHIPPED | OUTPATIENT
Start: 2025-02-20

## 2025-02-20 NOTE — TELEPHONE ENCOUNTER
Patient and daughter returned call.  Has been taking the Famotidine all along. Last dose took 2/14/25.  Requested refill.     Will route encounter to provider for an update for approval/denial.    Lon Rogel RN  [facility]

## 2025-02-20 NOTE — TELEPHONE ENCOUNTER
Called patient with help from a [facility] . Left message to call clinic back. Please clarify as indicated below if patient calls back.      Baldemar Harmon, BSN RN  [facility]

## 2025-02-24 ENCOUNTER — OFFICE VISIT (OUTPATIENT)
Dept: GASTROENTEROLOGY | Facility: CLINIC | Age: 60
End: 2025-02-24
Attending: PHYSICIAN ASSISTANT
Payer: COMMERCIAL

## 2025-02-24 VITALS
SYSTOLIC BLOOD PRESSURE: 124 MMHG | BODY MASS INDEX: 20.36 KG/M2 | HEIGHT: 59 IN | DIASTOLIC BLOOD PRESSURE: 81 MMHG | OXYGEN SATURATION: 99 % | WEIGHT: 101 LBS | HEART RATE: 69 BPM

## 2025-02-24 DIAGNOSIS — K22.4 ESOPHAGEAL DYSMOTILITY: ICD-10-CM

## 2025-02-24 DIAGNOSIS — K21.9 GASTROESOPHAGEAL REFLUX DISEASE WITHOUT ESOPHAGITIS: ICD-10-CM

## 2025-02-24 DIAGNOSIS — M54.2 NECK PAIN: Primary | ICD-10-CM

## 2025-02-24 PROCEDURE — 99205 OFFICE O/P NEW HI 60 MIN: CPT | Performed by: STUDENT IN AN ORGANIZED HEALTH CARE EDUCATION/TRAINING PROGRAM

## 2025-02-24 PROCEDURE — T1013 SIGN LANG/ORAL INTERPRETER: HCPCS | Mod: U4

## 2025-02-24 ASSESSMENT — PAIN SCALES - GENERAL: PAINLEVEL_OUTOF10: SEVERE PAIN (9)

## 2025-02-24 NOTE — NURSING NOTE
"Do you have a history of colon cancer in your immediate family? NO    If yes who: negative     And what age  were they diagnosed: n/a      Chief Complaint   Patient presents with    New Patient       Vitals:    02/24/25 1556   BP: 124/81   Pulse: 69   SpO2: 99%   Weight: 45.8 kg (101 lb)   Height: 1.499 m (4' 11\")       Body mass index is 20.4 kg/m .    Harshad James MA      "

## 2025-02-24 NOTE — LETTER
2/24/2025      Duarte Lieberman  [address] [place] [address]      Dear Colleague,    Thank you for referring your patient, Duarte Lieberman, to the [facility] [place]. Please see a copy of my visit note below.    Gastroenterology Visit for: Duarte Lieberman 1965   MRN: [id number]     Reason for Visit:  chief complaint Dysphagia     was utilized for this visit    Referred by: Manuel  / [address] [facility], [place] [address] / [facility] [id number]  Patient Care Team:  Jerman Oconnor MD as PCP - General (Family Medicine)  Narinder Jackson PA as Physician Assistant (Otolaryngology)  Camryn Escamilla PA-C as Assigned PCP  Jatin Ramirez DO as Assigned Surgical Provider  Rupinder Darby PA-C as Physician Assistant (Gastroenterology)    History of Present Illness:   Duarte Lieberman is a delightful 60 year old female with who is presenting as a new patient in consultation at the request of Dr. Jackson with a chief complaint of neck pain and intermittent episodes of dysphagia.    The patient reports several years history of neck pain.  She states that the pain sensation is like pain is wrapping around her neck.  She does have pain even at baseline when she does not move.  The pain exacerbates with bending neck laterally and forward and backward.  She had an MR cervical spine without contrast on 12/11/2024 that showed multiple cervical spondylosis superimposed on congenital spinal canal narrowing as well as mild to moderate spinal canal stenosis and other findings as stated below.      The patient also reports several years history of pain around the neck area with swallowing associated with food getting caught off at the level of neck.  She states that the sensation and pain she feels with swallowing is similar to her neck pain at baseline.  She states that the food sticking and trouble swallowing is not common and happens maybe once a month.The patient had a video fluoroscopy swallow  evaluation with speech on 4/10/2024 that overall it showed no abnormality. marshall presents with no significant oral pharyngeal dysphagia. Overall function and structure is WNL.She demonstrates oral holding and piecemeal swallows that appear to be intentional as she anticipates pain with the swallow. She also winces during some swallows and touches the right side of her neck just lateral to her larynx. Once swallow is triggered there is good tongue back retraction and hyolaryngeal movement. Epiglottis inverts to protect the airway. There is no residual pharyngeal stasis with any consistency. No laryngeal penetration or aspiration with any consistency. Upon AP view liquid boluses move symmetrically through the pyriform sinuses. Boluses move through cricopharyngeus without difficulty. There is trace amounts of barium coating the cervical esophagus with each swallow. The patient had a standard esophagram with no barium tablet that was negative for any evidence of stenosis stricture mass or any other anatomical abnormalities.The patient had an upper endoscopy at outside institution on 11/14/2024 that was normal except for the presence of irregular Z-line that was biopsied.  Biopsies were negative for any evidence of intestinal metaplasia and or dysplasia.      MR Cervical Spine w/o Contrast (Order: [id number]) - 12/11/2024    MPRESSION:  1.  Mild multilevel cervical spondylosis superimposed on congenital spinal canal narrowing.  2.  Mild to moderate spinal canal stenosis C3-C6. Mild spinal canal stenosis at C2-C3 and C6-C7.  3.  Mild right neural foraminal stenosis at C4-C5.  4.  Normal cervical spinal cord signal.        Order-Level Documents:    There are no order-level documents.  Lab and Collection    MR Lumbar Spine w/o Contrast (Order: [id number]) - 12/11/2024     IMPRESSION:  1.  Multilevel lower lumbar spondylosis.  2.  Moderate-sized central disc protrusion at L4-L5 contacts the bilateral descending L5 nerve  roots.  3.  Moderate to severe spinal canal stenosis at L4-L5. Mild spinal canal stenosis at L3-L4 and L5-S1.  4.  Moderate bilateral neural foraminal stenosis at L4-L5. Mild to moderate bilateral neural foraminal stenosis at L5-S1.        10/14/2024   IMPRESSION:      No acute findings visualized. No specific imaging explanation for the patient's symptoms.    Wt Readings from Last 5 Encounters:   01/30/25 44.5 kg (98 lb)   11/14/24 46.7 kg (103 lb)   11/12/24 46.3 kg (102 lb)   11/01/24 46.8 kg (103 lb 4 oz)   08/16/24 48.1 kg (106 lb)         STUDIES & PROCEDURES:       Prior medical records were reviewed including, but not limited to, notes from referring providers, lab work, radiographic tests, and other diagnostic tests. Pertinent results were summarized above.     History     Past Medical History:   Diagnosis Date     Benign essential hypertension      Difficulty walking      Gastroesophageal reflux disease without esophagitis      Motion sickness      Other chronic pain     arm, leg, neck, face- sometimes pain and tingling     Walking troubles     Requires Assistance of One       Past Surgical History:   Procedure Laterality Date     ESOPHAGOSCOPY, GASTROSCOPY, DUODENOSCOPY (EGD), COMBINED N/A 11/14/2024    Procedure: ESOPHAGOGASTRODUODENOSCOPY, WITH BIOPSY;  Surgeon: Jatin Ramierz DO;  [facility] OR     TUBAL LIGATION         Social History     Socioeconomic History     Marital status: Single     Spouse name: Not on file     Number of children: Not on file     Years of education: Not on file     Highest education level: Not on file   Occupational History     Not on file   Tobacco Use     Smoking status: Never     Passive exposure: Never     Smokeless tobacco: Current     Types: Chew     Tobacco comments:     Chewing betel nut.    Vaping Use     Vaping status: Never Used   Substance and Sexual Activity     Alcohol use: Not Currently     Comment: patient stopped in 2023.     Drug use:  Never     Comment: Patient quit beetle-nut 01/01/24     Sexual activity: Not on file   Other Topics Concern     Not on file   Social History Narrative     Not on file     Social Drivers of Health     Financial Resource Strain: Low Risk  (1/30/2025)    Financial Resource Strain      Within the past 12 months, have you or your family members you live with been unable to get utilities (heat, electricity) when it was really needed?: No   Food Insecurity: Low Risk  (1/30/2025)    Food Insecurity      Within the past 12 months, did you worry that your food would run out before you got money to buy more?: No      Within the past 12 months, did the food you bought just not last and you didn t have money to get more?: No   Transportation Needs: Low Risk  (1/30/2025)    Transportation Needs      Within the past 12 months, has lack of transportation kept you from medical appointments, getting your medicines, non-medical meetings or appointments, work, or from getting things that you need?: No   Physical Activity: Insufficiently Active (7/26/2024)    Exercise Vital Sign      Days of Exercise per Week: 7 days      Minutes of Exercise per Session: 20 min   Stress: Stress Concern Present (7/26/2024)    [employer] - Occupational Stress Questionnaire      Feeling of Stress : Rather much   Social Connections: Unknown (7/26/2024)    Social Connection and Isolation Panel [NHANES]      Frequency of Communication with Friends and Family: Not on file      Frequency of Social Gatherings with Friends and Family: More than three times a week      Attends Protestant Services: Not on file      Active Member of Clubs or Organizations: Not on file      Attends Club or Organization Meetings: Not on file      Marital Status: Not on file   Interpersonal Safety: Low Risk  (11/22/2024)    Interpersonal Safety      Do you feel physically and emotionally safe where you currently live?: Yes      Within the past 12 months,  have you been hit, slapped, kicked or otherwise physically hurt by someone?: Patient unable to answer      Within the past 12 months, have you been humiliated or emotionally abused in other ways by your partner or ex-partner?: Patient unable to answer   Housing Stability: Low Risk  (1/30/2025)    Housing Stability      Do you have housing? : Yes      Are you worried about losing your housing?: No       Family History   Problem Relation Age of Onset     Breast Cancer No family hx of      Ovarian Cancer No family hx of      Coronary Artery Disease No family hx of      Diabetes No family hx of      Hypertension No family hx of      Family history reviewed and edited as appropriate    Medications and Allergies:     Outpatient Encounter Medications as of 2/24/2025   Medication Sig Dispense Refill     acetaminophen (TYLENOL) 500 MG tablet Take 1-2 tablets (500-1,000 mg) by mouth every 6 hours as needed for fever or pain. (Max of 3,000 mg/day) 100 tablet 3     famotidine (PEPCID) 20 MG tablet Take 1 tablet (20 mg) by mouth 2 times daily as needed (heartburn). 60 tablet 5     lisinopril (ZESTRIL) 10 MG tablet Take 1 tablet (10 mg) by mouth daily 90 tablet 3     meclizine (ANTIVERT) 25 MG tablet Take 1 tablet (25 mg) by mouth 3 times daily as needed (motion sickness.). 30 tablet 2     pantoprazole (PROTONIX) 40 MG EC tablet Take 1 tablet (40 mg) by mouth daily. 30 minutes before breakfast 90 tablet 3     No facility-administered encounter medications on file as of 2/24/2025.        No Known Allergies     Review of systems:  A full 10 point review of systems was obtained and was negative except for the pertinent positives and negatives stated within the HPI.    Objective Findings:   Physical Exam:    Constitutional: LMP  (LMP Unknown)   General: Alert, cooperative, no distress, well-appearing  Head: Atraumatic, normocephalic, no obvious abnormalities   Eyes: EOMI, Sclera anicteric  Nose: Nares normal, no obvious malformation,  no obvious rhinorrhea   Skin: No jaundice, no obvious rash  Neurologic: AAOx3, no obvious neurologic abnormality  Psychiatric: Normal Affect, appropriate mood  Extremities: No obvious edema, no obvious malformation     Labs, Radiology, Pathology     Lab Results   Component Value Date    WBC 5.1 11/12/2024    WBC 5.5 08/16/2024    WBC 4.6 04/22/2024    HGB 13.1 11/12/2024    HGB 14.0 08/16/2024    HGB 14.6 04/22/2024     11/12/2024     08/16/2024     04/22/2024    CHOL 206 (H) 04/26/2024    TRIG 124 04/26/2024    HDL 45 (L) 04/26/2024    ALT 27 11/12/2024    ALT 43 08/16/2024    ALT 37 04/26/2024    AST 36 11/12/2024    AST 49 (H) 08/16/2024    AST 43 04/26/2024     11/12/2024     08/16/2024     04/26/2024    BUN 7.5 11/12/2024    BUN 5.4 (L) 08/16/2024    BUN 6.6 04/26/2024    CO2 27 11/12/2024    CO2 27 08/16/2024    CO2 27 04/26/2024    TSH 2.81 04/26/2024        Liver Function Studies -   Recent Labs   Lab Test 11/12/24  1541   PROTTOTAL 7.9   ALBUMIN 4.1   BILITOTAL 0.4   ALKPHOS 99   AST 36   ALT 27          Patient Active Problem List    Diagnosis Date Noted     Language barrier 02/04/2025     Priority: Medium     Dysphagia, unspecified type 01/30/2025     Priority: Medium     Esophageal dysmotility 01/30/2025     Priority: Medium     Dysphonia 01/30/2025     Priority: Medium     Weakness of both lower extremities 01/30/2025     Priority: Medium     Impaired gait and mobility 01/30/2025     Priority: Medium     Difficulty balancing 01/30/2025     Priority: Medium     Prediabetes 11/14/2024     Priority: Medium     Neck pain 10/07/2024     Priority: Medium     Benign essential hypertension      Priority: Medium     Gastroesophageal reflux disease without esophagitis      Priority: Medium      Assessment and Plan   Assessment/Plan:      # Several years history of neck pain exacerbated by movement with MR cervical spine without contrast on 12/11/2024 demonstrating cervical  spondylosis superimposed on congenital spinal canal narrowing as well as mild to moderate spinal canal stenosis and other findings as stated below.    # Several years history of neck pain with swallowing which is same quality as her baseline neck pain as well as infrequent episodes of dysphagia to solids at the level of neck with negative standard esophagram with a barium tablet, negative fluoroscopy swallow evaluation with speech and upper endoscopy on 11/14/2024 that was reported to be normal however proximal esophageal biopsies were not obtained overall and her    Overall in her case it seems like the majority of her problems are musculoskeletal related to the spinal abnormalities reported on her imaging.  This is also evidenced by her normal swallow studies and upper endoscopy.  To complete her swallow evaluation I will proceed with high-resolution esophageal motility.  Meanwhile I will refer her to spine surgery for further evaluation management of her cervical spine pain.  I will see the patient back in clinic and if manometry is nonrevealing and patient continues to have swallowing difficulties I may proceed with repeating an upper endoscopy to obtain proximal and distal esophageal biopsies and may consider empiric dilation for dysphagia although this would not be beneficial for her pain.  With regards to pain with eating this could very well be due to her sacral spine abnormality however we could consider muscle tension dysphagia and consider therapy to target muscle tension dysphagia as well.          Follow up plan:   Return to clinic after testing ordered    The risks and benefits of my recommendations, as well as other treatment options were discussed with the patient and any available family today. All questions were answered.     Follow up: As planned above. Today, I personally spent 60 minutes spent on the date of the encounter doing chart review, history and exam, documentation and further activities  per the note.    The patient verbalized understanding of the plan and was appreciative for the time spent and information provided during the office visit.     Author:   Horacio Celis MD    Director of Digital Health and [employer]  Co-Director of Esophageal Disorders Program   of Medicine  Division of Gastroenterology, Hepatology and Nutrition    [facility] [id number]  [address] [place] [address]    Dr. Celis speaks for [employer] regarding vonoprazan. He receives income for these activities. When discussing the medication, patients were informed of Dr. Celis's role and potential conflict of interest. All questions and/or concerns were answered during the encounter.     Documentation assisted by voice recognition and documentation system.       Again, thank you for allowing me to participate in the care of your patient.        Sincerely,        Horacio Celis MD    Electronically signed

## 2025-02-24 NOTE — PROGRESS NOTES
Gastroenterology Visit for: Duarte Lieberman 1965   MRN: [id number]     Reason for Visit:  chief complaint Dysphagia     was utilized for this visit    Referred by: Manuel  / [address] [address]  Patient Care Team:  Jerman Oconnor MD as PCP - General (Family Medicine)  Narinder Jackson PA as Physician Assistant (Otolaryngology)  Camryn Escamilla PA-C as Assigned PCP  Jatin Ramirez DO as Assigned Surgical Provider  Rupinder Darby PA-C as Physician Assistant (Gastroenterology)    History of Present Illness:   Duarte Lieberman is a delightful 60 year old female with who is presenting as a new patient in consultation at the request of Dr. Jackson with a chief complaint of neck pain and intermittent episodes of dysphagia.    The patient reports several years history of neck pain.  She states that the pain sensation is like pain is wrapping around her neck.  She does have pain even at baseline when she does not move.  The pain exacerbates with bending neck laterally and forward and backward.  She had an MR cervical spine without contrast on 12/11/2024 that showed multiple cervical spondylosis superimposed on congenital spinal canal narrowing as well as mild to moderate spinal canal stenosis and other findings as stated below.      The patient also reports several years history of pain around the neck area with swallowing associated with food getting caught off at the level of neck.  She states that the sensation and pain she feels with swallowing is similar to her neck pain at baseline.  She states that the food sticking and trouble swallowing is not common and happens maybe once a month.The patient had a video fluoroscopy swallow evaluation with speech on 4/10/2024 that overall it showed no abnormality. jacknt presents with no significant oral pharyngeal dysphagia. Overall function and structure is WNL.She demonstrates oral holding and piecemeal swallows that appear  to be intentional as she anticipates pain with the swallow. She also winces during some swallows and touches the right side of her neck just lateral to her larynx. Once swallow is triggered there is good tongue back retraction and hyolaryngeal movement. Epiglottis inverts to protect the airway. There is no residual pharyngeal stasis with any consistency. No laryngeal penetration or aspiration with any consistency. Upon AP view liquid boluses move symmetrically through the pyriform sinuses. Boluses move through cricopharyngeus without difficulty. There is trace amounts of barium coating the cervical esophagus with each swallow. The patient had a standard esophagram with no barium tablet that was negative for any evidence of stenosis stricture mass or any other anatomical abnormalities.The patient had an upper endoscopy at outside institution on 11/14/2024 that was normal except for the presence of irregular Z-line that was biopsied.  Biopsies were negative for any evidence of intestinal metaplasia and or dysplasia.      MR Cervical Spine w/o Contrast (Order: [id number]) - 12/11/2024    MPRESSION:  1.  Mild multilevel cervical spondylosis superimposed on congenital spinal canal narrowing.  2.  Mild to moderate spinal canal stenosis C3-C6. Mild spinal canal stenosis at C2-C3 and C6-C7.  3.  Mild right neural foraminal stenosis at C4-C5.  4.  Normal cervical spinal cord signal.        Order-Level Documents:    There are no order-level documents.  Lab and Collection    MR Lumbar Spine w/o Contrast (Order: [id number]) - 12/11/2024     IMPRESSION:  1.  Multilevel lower lumbar spondylosis.  2.  Moderate-sized central disc protrusion at L4-L5 contacts the bilateral descending L5 nerve roots.  3.  Moderate to severe spinal canal stenosis at L4-L5. Mild spinal canal stenosis at L3-L4 and L5-S1.  4.  Moderate bilateral neural foraminal stenosis at L4-L5. Mild to moderate bilateral neural foraminal stenosis at  L5-S1.        10/14/2024   IMPRESSION:      No acute findings visualized. No specific imaging explanation for the patient's symptoms.    Wt Readings from Last 5 Encounters:   01/30/25 44.5 kg (98 lb)   11/14/24 46.7 kg (103 lb)   11/12/24 46.3 kg (102 lb)   11/01/24 46.8 kg (103 lb 4 oz)   08/16/24 48.1 kg (106 lb)         STUDIES & PROCEDURES:       Prior medical records were reviewed including, but not limited to, notes from referring providers, lab work, radiographic tests, and other diagnostic tests. Pertinent results were summarized above.     History     Past Medical History:   Diagnosis Date    Benign essential hypertension     Difficulty walking     Gastroesophageal reflux disease without esophagitis     Motion sickness     Other chronic pain     arm, leg, neck, face- sometimes pain and tingling    Walking troubles     Requires Assistance of One       Past Surgical History:   Procedure Laterality Date    ESOPHAGOSCOPY, GASTROSCOPY, DUODENOSCOPY (EGD), COMBINED N/A 11/14/2024    Procedure: ESOPHAGOGASTRODUODENOSCOPY, WITH BIOPSY;  Surgeon: Jatin Ramirez DO;  Location: [facility]    TUBAL LIGATION         Social History     Socioeconomic History    Marital status: Single     Spouse name: Not on file    Number of children: Not on file    Years of education: Not on file    Highest education level: Not on file   Occupational History    Not on file   Tobacco Use    Smoking status: Never     Passive exposure: Never    Smokeless tobacco: Current     Types: Chew    Tobacco comments:     Chewing betel nut.    Vaping Use    Vaping status: Never Used   Substance and Sexual Activity    Alcohol use: Not Currently     Comment: patient stopped in 2023.    Drug use: Never     Comment: Patient quit beetle-nut 01/01/24    Sexual activity: Not on file   Other Topics Concern    Not on file   Social History Narrative    Not on file     Social Drivers of Health     Financial Resource Strain: Low Risk  (1/30/2025)     Financial Resource Strain     Within the past 12 months, have you or your family members you live with been unable to get utilities (heat, electricity) when it was really needed?: No   Food Insecurity: Low Risk  (1/30/2025)    Food Insecurity     Within the past 12 months, did you worry that your food would run out before you got money to buy more?: No     Within the past 12 months, did the food you bought just not last and you didn t have money to get more?: No   Transportation Needs: Low Risk  (1/30/2025)    Transportation Needs     Within the past 12 months, has lack of transportation kept you from medical appointments, getting your medicines, non-medical meetings or appointments, work, or from getting things that you need?: No   Physical Activity: Insufficiently Active (7/26/2024)    Exercise Vital Sign     Days of Exercise per Week: 7 days     Minutes of Exercise per Session: 20 min   Stress: Stress Concern Present (7/26/2024)    [employer] - Occupational Stress Questionnaire     Feeling of Stress : Rather much   Social Connections: Unknown (7/26/2024)    Social Connection and Isolation Panel [NHANES]     Frequency of Communication with Friends and Family: Not on file     Frequency of Social Gatherings with Friends and Family: More than three times a week     Attends Ep[employer] Services: Not on file     Active Member of Clubs or Organizations: Not on file     Attends Club or Organization Meetings: Not on file     Marital Status: Not on file   Interpersonal Safety: Low Risk  (11/22/2024)    Interpersonal Safety     Do you feel physically and emotionally safe where you currently live?: Yes     Within the past 12 months, have you been hit, slapped, kicked or otherwise physically hurt by someone?: Patient unable to answer     Within the past 12 months, have you been humiliated or emotionally abused in other ways by your partner or ex-partner?: Patient unable to answer   Housing  Stability: Low Risk  (1/30/2025)    Housing Stability     Do you have housing? : Yes     Are you worried about losing your housing?: No       Family History   Problem Relation Age of Onset    Breast Cancer No family hx of     Ovarian Cancer No family hx of     Coronary Artery Disease No family hx of     Diabetes No family hx of     Hypertension No family hx of      Family history reviewed and edited as appropriate    Medications and Allergies:     Outpatient Encounter Medications as of 2/24/2025   Medication Sig Dispense Refill    acetaminophen (TYLENOL) 500 MG tablet Take 1-2 tablets (500-1,000 mg) by mouth every 6 hours as needed for fever or pain. (Max of 3,000 mg/day) 100 tablet 3    famotidine (PEPCID) 20 MG tablet Take 1 tablet (20 mg) by mouth 2 times daily as needed (heartburn). 60 tablet 5    lisinopril (ZESTRIL) 10 MG tablet Take 1 tablet (10 mg) by mouth daily 90 tablet 3    meclizine (ANTIVERT) 25 MG tablet Take 1 tablet (25 mg) by mouth 3 times daily as needed (motion sickness.). 30 tablet 2    pantoprazole (PROTONIX) 40 MG EC tablet Take 1 tablet (40 mg) by mouth daily. 30 minutes before breakfast 90 tablet 3     No facility-administered encounter medications on file as of 2/24/2025.        No Known Allergies     Review of systems:  A full 10 point review of systems was obtained and was negative except for the pertinent positives and negatives stated within the HPI.    Objective Findings:   Physical Exam:    Constitutional: LMP  (LMP Unknown)   General: Alert, cooperative, no distress, well-appearing  Head: Atraumatic, normocephalic, no obvious abnormalities   Eyes: EOMI, Sclera anicteric  Nose: Nares normal, no obvious malformation, no obvious rhinorrhea   Skin: No jaundice, no obvious rash  Neurologic: AAOx3, no obvious neurologic abnormality  Psychiatric: Normal Affect, appropriate mood  Extremities: No obvious edema, no obvious malformation     Labs, Radiology, Pathology     Lab Results   Component  Value Date    WBC 5.1 11/12/2024    WBC 5.5 08/16/2024    WBC 4.6 04/22/2024    HGB 13.1 11/12/2024    HGB 14.0 08/16/2024    HGB 14.6 04/22/2024     11/12/2024     08/16/2024     04/22/2024    CHOL 206 (H) 04/26/2024    TRIG 124 04/26/2024    HDL 45 (L) 04/26/2024    ALT 27 11/12/2024    ALT 43 08/16/2024    ALT 37 04/26/2024    AST 36 11/12/2024    AST 49 (H) 08/16/2024    AST 43 04/26/2024     11/12/2024     08/16/2024     04/26/2024    BUN 7.5 11/12/2024    BUN 5.4 (L) 08/16/2024    BUN 6.6 04/26/2024    CO2 27 11/12/2024    CO2 27 08/16/2024    CO2 27 04/26/2024    TSH 2.81 04/26/2024        Liver Function Studies -   Recent Labs   Lab Test 11/12/24  1541   PROTTOTAL 7.9   ALBUMIN 4.1   BILITOTAL 0.4   ALKPHOS 99   AST 36   ALT 27          Patient Active Problem List    Diagnosis Date Noted    Language barrier 02/04/2025     Priority: Medium    Dysphagia, unspecified type 01/30/2025     Priority: Medium    Esophageal dysmotility 01/30/2025     Priority: Medium    Dysphonia 01/30/2025     Priority: Medium    Weakness of both lower extremities 01/30/2025     Priority: Medium    Impaired gait and mobility 01/30/2025     Priority: Medium    Difficulty balancing 01/30/2025     Priority: Medium    Prediabetes 11/14/2024     Priority: Medium    Neck pain 10/07/2024     Priority: Medium    Benign essential hypertension      Priority: Medium    Gastroesophageal reflux disease without esophagitis      Priority: Medium      Assessment and Plan   Assessment/Plan:      # Several years history of neck pain exacerbated by movement with MR cervical spine without contrast on 12/11/2024 demonstrating cervical spondylosis superimposed on congenital spinal canal narrowing as well as mild to moderate spinal canal stenosis and other findings as stated below.    # Several years history of neck pain with swallowing which is same quality as her baseline neck pain as well as infrequent episodes of  dysphagia to solids at the level of neck with negative standard esophagram with a barium tablet, negative fluoroscopy swallow evaluation with speech and upper endoscopy on 11/14/2024 that was reported to be normal however proximal esophageal biopsies were not obtained overall and her    Overall in her case it seems like the majority of her problems are musculoskeletal related to the spinal abnormalities reported on her imaging.  This is also evidenced by her normal swallow studies and upper endoscopy.  To complete her swallow evaluation I will proceed with high-resolution esophageal motility.  Meanwhile I will refer her to spine surgery for further evaluation management of her cervical spine pain.  I will see the patient back in clinic and if manometry is nonrevealing and patient continues to have swallowing difficulties I may proceed with repeating an upper endoscopy to obtain proximal and distal esophageal biopsies and may consider empiric dilation for dysphagia although this would not be beneficial for her pain.  With regards to pain with eating this could very well be due to her sacral spine abnormality however we could consider muscle tension dysphagia and consider therapy to target muscle tension dysphagia as well.          Follow up plan:   Return to clinic after testing ordered    The risks and benefits of my recommendations, as well as other treatment options were discussed with the patient and any available family today. All questions were answered.     Follow up: As planned above. Today, I personally spent 60 minutes spent on the date of the encounter doing chart review, history and exam, documentation and further activities per the note.    The patient verbalized understanding of the plan and was appreciative for the time spent and information provided during the office visit.     Author:   Horacio Celis MD    Director of Digital Health and [place]  Co-Director of Esophageal Disorders Program  Assistant  Professor of Medicine  Division of Gastroenterology, Hepatology and Nutrition    [facility] [address] [place] [address]    Dr. Celis speaks for [email or web address] regarding vonoprazan. He receives income for these activities. When discussing the medication, patients were informed of Dr. Celis's role and potential conflict of interest. All questions and/or concerns were answered during the encounter.     Documentation assisted by voice recognition and documentation system.

## 2025-02-25 ENCOUNTER — PATIENT OUTREACH (OUTPATIENT)
Dept: CARE COORDINATION | Facility: CLINIC | Age: 60
End: 2025-02-25
Payer: COMMERCIAL

## 2025-02-27 ENCOUNTER — PATIENT OUTREACH (OUTPATIENT)
Dept: CARE COORDINATION | Facility: CLINIC | Age: 60
End: 2025-02-27
Payer: COMMERCIAL

## 2025-02-27 NOTE — TELEPHONE ENCOUNTER
Called Niece  ([facility]) but pt is not with niece at the moment. Request to call back around 12-1. TC will make another attempt.

## 2025-02-27 NOTE — TELEPHONE ENCOUNTER
Lexiscan Stress scheduled for   3/5/2025 12:30 PM (Arrive by 12:15 PM) [place]  [facility]

## 2025-03-03 ENCOUNTER — TRANSFERRED RECORDS (OUTPATIENT)
Dept: HEALTH INFORMATION MANAGEMENT | Facility: CLINIC | Age: 60
End: 2025-03-03
Payer: COMMERCIAL

## 2025-03-04 ENCOUNTER — HOSPITAL ENCOUNTER (OUTPATIENT)
Dept: MRI IMAGING | Facility: HOSPITAL | Age: 60
Discharge: HOME | End: 2025-03-04
Attending: NEUROLOGICAL SURGERY
Payer: COMMERCIAL

## 2025-03-04 PROCEDURE — 255N000002 HC RX 255 OP 636: Performed by: NEUROLOGICAL SURGERY

## 2025-03-04 PROCEDURE — A9585 GADOBUTROL INJECTION: HCPCS | Performed by: NEUROLOGICAL SURGERY

## 2025-03-04 PROCEDURE — T1013 SIGN LANG/ORAL INTERPRETER: HCPCS

## 2025-03-04 PROCEDURE — 72157 MRI CHEST SPINE W/O & W/DYE: CPT

## 2025-03-04 RX ORDER — GADOBUTROL 604.72 MG/ML
0.1 INJECTION INTRAVENOUS ONCE
Status: COMPLETED | OUTPATIENT
Start: 2025-03-04 | End: 2025-03-04

## 2025-03-04 RX ADMIN — GADOBUTROL 4 ML: 604.72 INJECTION INTRAVENOUS at 08:19

## 2025-03-05 ENCOUNTER — HOSPITAL ENCOUNTER (OUTPATIENT)
Dept: CARDIOLOGY | Facility: HOSPITAL | Age: 60
Discharge: HOME | End: 2025-03-05
Attending: FAMILY MEDICINE
Payer: COMMERCIAL

## 2025-03-05 ENCOUNTER — HOSPITAL ENCOUNTER (OUTPATIENT)
Dept: NUCLEAR MEDICINE | Facility: HOSPITAL | Age: 60
Discharge: HOME | End: 2025-03-05
Attending: FAMILY MEDICINE
Payer: COMMERCIAL

## 2025-03-05 DIAGNOSIS — Z75.8 LANGUAGE BARRIER: ICD-10-CM

## 2025-03-05 DIAGNOSIS — R73.03 PREDIABETES: ICD-10-CM

## 2025-03-05 DIAGNOSIS — I10 BENIGN ESSENTIAL HYPERTENSION: ICD-10-CM

## 2025-03-05 DIAGNOSIS — Z60.3 LANGUAGE BARRIER: ICD-10-CM

## 2025-03-05 DIAGNOSIS — M54.2 NECK PAIN: ICD-10-CM

## 2025-03-05 DIAGNOSIS — K21.9 GASTROESOPHAGEAL REFLUX DISEASE WITHOUT ESOPHAGITIS: ICD-10-CM

## 2025-03-05 LAB
CV STRESS CURRENT BP HE: NORMAL
CV STRESS CURRENT HR HE: 100
CV STRESS CURRENT HR HE: 109
CV STRESS CURRENT HR HE: 55
CV STRESS CURRENT HR HE: 67
CV STRESS CURRENT HR HE: 68
CV STRESS CURRENT HR HE: 68
CV STRESS CURRENT HR HE: 74
CV STRESS CURRENT HR HE: 75
CV STRESS CURRENT HR HE: 77
CV STRESS CURRENT HR HE: 77
CV STRESS CURRENT HR HE: 78
CV STRESS CURRENT HR HE: 78
CV STRESS CURRENT HR HE: 79
CV STRESS CURRENT HR HE: 82
CV STRESS CURRENT HR HE: 83
CV STRESS CURRENT HR HE: 88
CV STRESS CURRENT HR HE: 90
CV STRESS CURRENT HR HE: 93
CV STRESS CURRENT HR HE: 95
CV STRESS DEVIATION TIME HE: NORMAL
CV STRESS ECHO PERCENT HR HE: NORMAL
CV STRESS EXERCISE STAGE HE: NORMAL
CV STRESS FINAL RESTING BP HE: NORMAL
CV STRESS FINAL RESTING HR HE: 68
CV STRESS MAX HR HE: 114
CV STRESS MAX TREADMILL GRADE HE: 0
CV STRESS MAX TREADMILL SPEED HE: 0
CV STRESS PEAK DIA BP HE: NORMAL
CV STRESS PEAK SYS BP HE: NORMAL
CV STRESS PHASE HE: NORMAL
CV STRESS PROTOCOL HE: NORMAL
CV STRESS RESTING PT POSITION HE: NORMAL
CV STRESS ST DEVIATION AMOUNT HE: NORMAL
CV STRESS ST DEVIATION ELEVATION HE: NORMAL
CV STRESS ST EVELATION AMOUNT HE: NORMAL
CV STRESS TEST TYPE HE: NORMAL
CV STRESS TOTAL STAGE TIME MIN 1 HE: NORMAL
NUC STRESS EJECTION FRACTION: 77 %
RATE PRESSURE PRODUCT: NORMAL
STRESS ECHO BASELINE DIASTOLIC HE: 67
STRESS ECHO BASELINE HR: 56
STRESS ECHO BASELINE SYSTOLIC BP: 124
STRESS ECHO CALCULATED PERCENT HR: 70 %
STRESS ECHO LAST STRESS DIASTOLIC BP: 61
STRESS ECHO LAST STRESS HR: 90
STRESS ECHO LAST STRESS SYSTOLIC BP: 113
STRESS ECHO TARGET HR: 162

## 2025-03-05 PROCEDURE — A9500 TC99M SESTAMIBI: HCPCS | Performed by: FAMILY MEDICINE

## 2025-03-05 PROCEDURE — 93018 CV STRESS TEST I&R ONLY: CPT | Performed by: INTERNAL MEDICINE

## 2025-03-05 PROCEDURE — 78452 HT MUSCLE IMAGE SPECT MULT: CPT

## 2025-03-05 PROCEDURE — 78452 HT MUSCLE IMAGE SPECT MULT: CPT | Mod: 26 | Performed by: INTERNAL MEDICINE

## 2025-03-05 PROCEDURE — 93017 CV STRESS TEST TRACING ONLY: CPT

## 2025-03-05 PROCEDURE — 343N000001 HC RX 343 MED OP 636: Performed by: FAMILY MEDICINE

## 2025-03-05 PROCEDURE — 93016 CV STRESS TEST SUPVJ ONLY: CPT | Performed by: INTERNAL MEDICINE

## 2025-03-05 PROCEDURE — T1013 SIGN LANG/ORAL INTERPRETER: HCPCS | Mod: U4

## 2025-03-05 PROCEDURE — 250N000011 HC RX IP 250 OP 636: Performed by: FAMILY MEDICINE

## 2025-03-05 RX ORDER — AMINOPHYLLINE 25 MG/ML
50-100 INJECTION, SOLUTION INTRAVENOUS
Status: COMPLETED | OUTPATIENT
Start: 2025-03-05 | End: 2025-03-05

## 2025-03-05 RX ORDER — ALBUTEROL SULFATE 0.83 MG/ML
2.5 SOLUTION RESPIRATORY (INHALATION)
Status: DISCONTINUED | OUTPATIENT
Start: 2025-03-05 | End: 2025-03-05 | Stop reason: HOSPADM

## 2025-03-05 RX ORDER — REGADENOSON 0.08 MG/ML
0.4 INJECTION, SOLUTION INTRAVENOUS ONCE
Status: COMPLETED | OUTPATIENT
Start: 2025-03-05 | End: 2025-03-05

## 2025-03-05 RX ORDER — CAFFEINE 200 MG
200 TABLET ORAL
Status: DISCONTINUED | OUTPATIENT
Start: 2025-03-05 | End: 2025-03-05 | Stop reason: HOSPADM

## 2025-03-05 RX ORDER — CAFFEINE CITRATE 20 MG/ML
60 SOLUTION INTRAVENOUS
Status: DISCONTINUED | OUTPATIENT
Start: 2025-03-05 | End: 2025-03-05 | Stop reason: HOSPADM

## 2025-03-05 RX ADMIN — REGADENOSON 0.4 MG: 0.08 INJECTION, SOLUTION INTRAVENOUS at 13:03

## 2025-03-05 RX ADMIN — Medication 8 MILLICURIE: at 11:38

## 2025-03-05 RX ADMIN — AMINOPHYLLINE 50 MG: 25 INJECTION, SOLUTION INTRAVENOUS at 13:11

## 2025-03-05 RX ADMIN — Medication 30 MILLICURIE: at 13:05

## 2025-03-05 SDOH — SOCIAL STABILITY - SOCIAL INSECURITY: ACCULTURATION DIFFICULTY: Z60.3

## 2025-03-06 ENCOUNTER — TELEPHONE (OUTPATIENT)
Dept: NEUROSURGERY | Facility: CLINIC | Age: 60
End: 2025-03-06
Payer: COMMERCIAL

## 2025-03-06 NOTE — TELEPHONE ENCOUNTER
Attempted to reach patient to remind them about appointment scheduled with Torrey Grover MD on 3/7/25 in our [place] clinic.    A voicemail was left with a call back number if the patient has questions or would like to reschedule.

## 2025-03-07 ENCOUNTER — OFFICE VISIT (OUTPATIENT)
Dept: NEUROSURGERY | Facility: CLINIC | Age: 60
End: 2025-03-07
Payer: COMMERCIAL

## 2025-03-07 VITALS — OXYGEN SATURATION: 99 % | DIASTOLIC BLOOD PRESSURE: 84 MMHG | HEART RATE: 66 BPM | SYSTOLIC BLOOD PRESSURE: 166 MMHG

## 2025-03-07 DIAGNOSIS — D32.1: Primary | ICD-10-CM

## 2025-03-07 PROCEDURE — 3079F DIAST BP 80-89 MM HG: CPT | Performed by: NEUROLOGICAL SURGERY

## 2025-03-07 PROCEDURE — 3077F SYST BP >= 140 MM HG: CPT | Performed by: NEUROLOGICAL SURGERY

## 2025-03-07 PROCEDURE — 99203 OFFICE O/P NEW LOW 30 MIN: CPT | Performed by: NEUROLOGICAL SURGERY

## 2025-03-07 PROCEDURE — 1126F AMNT PAIN NOTED NONE PRSNT: CPT | Performed by: NEUROLOGICAL SURGERY

## 2025-03-07 RX ORDER — CHOLECALCIFEROL (VITAMIN D3) 50 MCG
TABLET ORAL
COMMUNITY
Start: 2025-02-10

## 2025-03-07 RX ORDER — OXYCODONE HYDROCHLORIDE 5 MG/1
5-10 TABLET ORAL
COMMUNITY
Start: 2024-12-19

## 2025-03-07 RX ORDER — LISINOPRIL 10 MG/1
1 TABLET ORAL DAILY
COMMUNITY
Start: 2024-10-22

## 2025-03-07 RX ORDER — PANTOPRAZOLE SODIUM 40 MG/1
1 TABLET, DELAYED RELEASE ORAL DAILY
COMMUNITY
Start: 2024-11-04

## 2025-03-07 RX ORDER — LOSARTAN POTASSIUM 25 MG/1
1 TABLET ORAL DAILY
COMMUNITY
Start: 2024-12-16 | End: 2025-12-16

## 2025-03-07 RX ORDER — MECLIZINE HYDROCHLORIDE 25 MG/1
TABLET ORAL
COMMUNITY
Start: 2025-02-12

## 2025-03-07 RX ORDER — NAPROXEN 500 MG/1
TABLET ORAL
COMMUNITY
Start: 2024-05-30

## 2025-03-07 ASSESSMENT — PAIN SCALES - GENERAL: PAINLEVEL_OUTOF10: NO PAIN (0)

## 2025-03-07 NOTE — LETTER
3/7/2025      [facility]  [address]  [facility] [address]      Dear Colleague,    Thank you for referring your patient, Lyndsey Alejo, to the [facility] NEUROLOG[facility]. Please see a copy of my visit note below.    It was a pleasure to see Lyndsey Alejo today in Neurosurgery Clinic. She is a 60 year old female who underwent resection of a thoracic meningioma in [place] in 2018.  She is here at the request of the transplant team for evaluation.  Prior to the surgery the patient had symptoms of lower abdominal and chest heaviness with left leg numbness and tingling.  Most of the symptoms have resolved since the surgery with the exception of some mild residual left leg numbness and tingling.  She denies any significant concerns or symptoms.    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    Past Medical History:   Diagnosis Date     Anemia in chronic kidney disease      Chewing tobacco use      ESRD (end stage renal disease) on dialysis (H)     dialysis start date 11/28/2021 per 2728 form     Hypertension      Poor dentition      Secondary renal hyperparathyroidism      Type 2 diabetes mellitus (H)      Past Surgical History:   Procedure Laterality Date     BACK SURGERY       COLONOSCOPY N/A 5/9/2024    Procedure: Colonoscopy;  Surgeon: Jahaira Shields MD;  Location: [facility]     IR DIALYSIS FISTULOGRAM RIGHT  4/4/2023      No Known Allergies    Current Outpatient Medications:      amLODIPine (NORVASC) 10 MG tablet, TAKE 1 TABLET (10 MG) BY MOUTH DAILY AT 2 PM, Disp: 30 tablet, Rfl: 3     atorvastatin (LIPITOR) 40 MG tablet, TAKE 1 TABLET (40 MG) BY MOUTH DAILY, Disp: 30 tablet, Rfl: 3     calcium acetate (PHOSLO) 667 MG CAPS capsule, TAKE 1 CAPSULE (667 MG) BY MOUTH 3 TIMES DAILY (WITH MEALS), Disp: 90 capsule, Rfl: 3     lisinopril (ZESTRIL) 10 MG tablet, Take 1 tablet by mouth daily., Disp: , Rfl:      losartan (COZAAR) 25 MG tablet, Take 1 tablet by mouth daily.,  Disp: , Rfl:      meclizine (ANTIVERT) 25 MG tablet, , Disp: , Rfl:      metoprolol tartrate (LOPRESSOR) 50 MG tablet, Take 1 tablet (50 mg) by mouth 2 times daily, Disp: 180 tablet, Rfl: 3     naproxen (NAPROSYN) 500 MG tablet, , Disp: , Rfl:      oxyCODONE (ROXICODONE) 5 MG tablet, Take 5-10 mg by mouth., Disp: , Rfl:      pantoprazole (PROTONIX) 40 MG EC tablet, Take 1 tablet by mouth daily., Disp: , Rfl:      vitamin D3 (CHOLECALCIFEROL) 50 mcg (2000 units) tablet, , Disp: , Rfl:      acetaminophen (TYLENOL) 325 MG tablet, Take 650 mg by mouth every 6 hours as needed for mild pain, Disp: , Rfl:      famotidine (PEPCID) 20 MG tablet, TAKE 1 TABLET BY MOUTH TWICE DAILY, Disp: 60 tablet, Rfl: 11     FEROSUL 325 (65 Fe) MG tablet, TAKE 1 TABLET BY MOUTH ONE TIME EACH DAY WITH BREAKFAST, Disp: 30 tablet, Rfl: 3     minoxidil (ROGAINE) 5 % external solution, Apply topically daily., Disp: 120 mL, Rfl: 3  Social History     Socioeconomic History     Marital status: Single     Spouse name: None     Number of children: None     Years of education: None     Highest education level: None   Tobacco Use     Smoking status: Never     Passive exposure: Never     Smokeless tobacco: Current     Types: Chew     Last attempt to quit: 04/2023     Tobacco comments:     Chewing tobacco    Vaping Use     Vaping status: Never Used   Substance and Sexual Activity     Alcohol use: Never     Drug use: Never     Social Drivers of Health     Financial Resource Strain: Low Risk  (9/24/2024)    Financial Resource Strain      Within the past 12 months, have you or your family members you live with been unable to get utilities (heat, electricity) when it was really needed?: No   Food Insecurity: Low Risk  (9/24/2024)    Food Insecurity      Within the past 12 months, did you worry that your food would run out before you got money to buy more?: No      Within the past 12 months, did the food you bought just not last and you didn t have money to  get more?: No   Transportation Needs: Low Risk  (9/24/2024)    Transportation Needs      Within the past 12 months, has lack of transportation kept you from medical appointments, getting your medicines, non-medical meetings or appointments, work, or from getting things that you need?: No   Physical Activity: Unknown (9/24/2024)    Exercise Vital Sign      Days of Exercise per Week: 3 days   Stress: No Stress Concern Present (9/24/2024)    [employer] - Occupational Stress Questionnaire      Feeling of Stress : Not at all   Social Connections: Unknown (9/24/2024)    Social Connection and Isolation Panel [NHANES]      Frequency of Social Gatherings with Friends and Family: Once a week   Interpersonal Safety: Low Risk  (12/13/2024)    Interpersonal Safety      Do you feel physically and emotionally safe where you currently live?: Yes      Within the past 12 months, have you been hit, slapped, kicked or otherwise physically hurt by someone?: No      Within the past 12 months, have you been humiliated or emotionally abused in other ways by your partner or ex-partner?: No   Housing Stability: Low Risk  (9/24/2024)    Housing Stability      Do you have housing? : Yes      Are you worried about losing your housing?: No      Problem (# of Occurrences) Relation (Name,Age of Onset)    Breast Cancer (1) Cousin           Negative family history of: Coronary Artery Disease, Diabetes, Hypertension             ROS: 10 point ROS neg other than the symptoms noted above in the HPI.    Vitals:    03/07/25 1359   BP: (!) 166/84   BP Location: Right arm   Patient Position: Sitting   Cuff Size: Adult Regular   Pulse: 66   SpO2: 99%     There is no height or weight on file to calculate BMI.  No Pain (0)    Well-healed incision  Bilateral lower extremity strength is 5 out of 5 in all muscle groups  Reflexes symmetric and normal    Imaging: Review of her updated MRI shows her lower thoracic laminectomy without  evidence of residual recurrent tumor.  The imaging was reviewed with the patient and her family in clinic today.    Assessment: Status post thoracic laminectomy for resection of meningioma.    Plan: I explained that a meningioma is a benign tumor and that her tumor appears to be completely excised.  At this point I do not think that this diagnosis would be a contraindication to transplantation in the future.  I do not think that she needs ongoing care or follow-up for this issue at this time.         Again, thank you for allowing me to participate in the care of your patient.        Sincerely,        Torrey Grover MD    Electronically signed

## 2025-03-07 NOTE — NURSING NOTE
"Lyndsey Alejo is a 60 year old female who presents for:  Chief Complaint   Patient presents with    Consult        Initial Vitals:  BP (!) 166/84 (BP Location: Right arm, Patient Position: Sitting, Cuff Size: Adult Regular)   Pulse 66   LMP  (LMP Unknown)   SpO2 99%  Estimated body mass index is 20.37 kg/m  as calculated from the following:    Height as of 12/13/24: 4' 9.5\" (1.461 m).    Weight as of 12/13/24: 95 lb 12.8 oz (43.5 kg).. There is no height or weight on file to calculate BSA. BP completed using cuff size: regular  No Pain (0)    Merrill Medina    "

## 2025-03-07 NOTE — PROGRESS NOTES
It was a pleasure to see Lyndsey Alejo today in Neurosurgery Clinic. She is a 60 year old female who underwent resection of a thoracic meningioma in [place] in 2018.  She is here at the request of the transplant team for evaluation.  Prior to the surgery the patient had symptoms of lower abdominal and chest heaviness with left leg numbness and tingling.  Most of the symptoms have resolved since the surgery with the exception of some mild residual left leg numbness and tingling.  She denies any significant concerns or symptoms.    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    Past Medical History:   Diagnosis Date    Anemia in chronic kidney disease     Chewing tobacco use     ESRD (end stage renal disease) on dialysis (H)     dialysis start date 11/28/2021 per 2728 form    Hypertension     Poor dentition     Secondary renal hyperparathyroidism     Type 2 diabetes mellitus (H)      Past Surgical History:   Procedure Laterality Date    BACK SURGERY      COLONOSCOPY N/A 5/9/2024    Procedure: Colonoscopy;  Surgeon: Jahaira Shields MD;  Location:  GI    IR DIALYSIS FISTULOGRAM RIGHT  4/4/2023      No Known Allergies    Current Outpatient Medications:     amLODIPine (NORVASC) 10 MG tablet, TAKE 1 TABLET (10 MG) BY MOUTH DAILY AT 2 PM, Disp: 30 tablet, Rfl: 3    atorvastatin (LIPITOR) 40 MG tablet, TAKE 1 TABLET (40 MG) BY MOUTH DAILY, Disp: 30 tablet, Rfl: 3    calcium acetate (PHOSLO) 667 MG CAPS capsule, TAKE 1 CAPSULE (667 MG) BY MOUTH 3 TIMES DAILY (WITH MEALS), Disp: 90 capsule, Rfl: 3    lisinopril (ZESTRIL) 10 MG tablet, Take 1 tablet by mouth daily., Disp: , Rfl:     losartan (COZAAR) 25 MG tablet, Take 1 tablet by mouth daily., Disp: , Rfl:     meclizine (ANTIVERT) 25 MG tablet, , Disp: , Rfl:     metoprolol tartrate (LOPRESSOR) 50 MG tablet, Take 1 tablet (50 mg) by mouth 2 times daily, Disp: 180 tablet, Rfl: 3    naproxen (NAPROSYN) 500 MG tablet, , Disp: , Rfl:      oxyCODONE (ROXICODONE) 5 MG tablet, Take 5-10 mg by mouth., Disp: , Rfl:     pantoprazole (PROTONIX) 40 MG EC tablet, Take 1 tablet by mouth daily., Disp: , Rfl:     vitamin D3 (CHOLECALCIFEROL) 50 mcg (2000 units) tablet, , Disp: , Rfl:     acetaminophen (TYLENOL) 325 MG tablet, Take 650 mg by mouth every 6 hours as needed for mild pain, Disp: , Rfl:     famotidine (PEPCID) 20 MG tablet, TAKE 1 TABLET BY MOUTH TWICE DAILY, Disp: 60 tablet, Rfl: 11    FEROSUL 325 (65 Fe) MG tablet, TAKE 1 TABLET BY MOUTH ONE TIME EACH DAY WITH BREAKFAST, Disp: 30 tablet, Rfl: 3    minoxidil (ROGAINE) 5 % external solution, Apply topically daily., Disp: 120 mL, Rfl: 3  Social History     Socioeconomic History    Marital status: Single     Spouse name: None    Number of children: None    Years of education: None    Highest education level: None   Tobacco Use    Smoking status: Never     Passive exposure: Never    Smokeless tobacco: Current     Types: Chew     Last attempt to quit: 04/2023    Tobacco comments:     Chewing tobacco    Vaping Use    Vaping status: Never Used   Substance and Sexual Activity    Alcohol use: Never    Drug use: Never     Social Drivers of Health     Financial Resource Strain: Low Risk  (9/24/2024)    Financial Resource Strain     Within the past 12 months, have you or your family members you live with been unable to get utilities (heat, electricity) when it was really needed?: No   Food Insecurity: Low Risk  (9/24/2024)    Food Insecurity     Within the past 12 months, did you worry that your food would run out before you got money to buy more?: No     Within the past 12 months, did the food you bought just not last and you didn t have money to get more?: No   Transportation Needs: Low Risk  (9/24/2024)    Transportation Needs     Within the past 12 months, has lack of transportation kept you from medical appointments, getting your medicines, non-medical meetings or appointments, work, or from getting things  that you need?: No   Physical Activity: Unknown (9/24/2024)    Exercise Vital Sign     Days of Exercise per Week: 3 days   Stress: No Stress Concern Present (9/24/2024)    [employer] - Occupational Stress Questionnaire     Feeling of Stress : Not at all   Social Connections: Unknown (9/24/2024)    Social Connection and Isolation Panel [NHANES]     Frequency of Social Gatherings with Friends and Family: Once a week   Interpersonal Safety: Low Risk  (12/13/2024)    Interpersonal Safety     Do you feel physically and emotionally safe where you currently live?: Yes     Within the past 12 months, have you been hit, slapped, kicked or otherwise physically hurt by someone?: No     Within the past 12 months, have you been humiliated or emotionally abused in other ways by your partner or ex-partner?: No   Housing Stability: Low Risk  (9/24/2024)    Housing Stability     Do you have housing? : Yes     Are you worried about losing your housing?: No      Problem (# of Occurrences) Relation (Name,Age of Onset)    Breast Cancer (1) Cousin           Negative family history of: Coronary Artery Disease, Diabetes, Hypertension             ROS: 10 point ROS neg other than the symptoms noted above in the HPI.    Vitals:    03/07/25 1359   BP: (!) 166/84   BP Location: Right arm   Patient Position: Sitting   Cuff Size: Adult Regular   Pulse: 66   SpO2: 99%     There is no height or weight on file to calculate BMI.  No Pain (0)    Well-healed incision  Bilateral lower extremity strength is 5 out of 5 in all muscle groups  Reflexes symmetric and normal    Imaging: Review of her updated MRI shows her lower thoracic laminectomy without evidence of residual recurrent tumor.  The imaging was reviewed with the patient and her family in clinic today.    Assessment: Status post thoracic laminectomy for resection of meningioma.    Plan: I explained that a meningioma is a benign tumor and that her tumor appears to be  completely excised.  At this point I do not think that this diagnosis would be a contraindication to transplantation in the future.  I do not think that she needs ongoing care or follow-up for this issue at this time.

## 2025-03-12 ENCOUNTER — COMMITTEE REVIEW (OUTPATIENT)
Dept: TRANSPLANT | Facility: CLINIC | Age: 60
End: 2025-03-12
Payer: COMMERCIAL

## 2025-03-12 NOTE — LETTER
PHYSICIAN ORDER   ALA/PRA BLOOD    DATE & TIME ISSUED: 2025 3:09 PM  PATIENT NAME: Lyndsey Alejo   : 1965     [facility] MR#  [id number]     DIAGNOSIS/ICD-10 CODE: Awaiting Organ transplant [Z76.82}   EXPIRES: (1 YEAR AFTER DATE ISSUED)  EVERY 12 weeks / 3 months   1. Please draw 20ml of blood in red top (plain) tube for Antileukocyte Antibody (ALA or PRA).   2. Label tubes with the patient s name, complete lab slip.         3. Mailers, lab slips with instructions are sent to patient separately.      4. Call the Outreach Lab at [phone] to reorder mailers.       5. Mail blood to (this address is also on the mailers):    IMMUNOLOGY LABORATORY   [facility]   [address] [place] [address]    .  Beverly Rodarte in Immunology and Transplantation  Surgical Director, Kidney & Pancreas Transplant Programs  Medical Director, Solid Organ Transplant Unit

## 2025-03-13 NOTE — COMMITTEE REVIEW
Kidney/Pancreas Committee Review Note     Evaluation Date: 9/14/2023  Committee Review Date: 3/12/2025    Organ being evaluated for: Kidney    Transplant Phase: Evaluation  Transplant Status: Active    Transplant Coordinator: Dolly Garcia  Transplant Surgeon:  Dr. Akins       Referring Physician: Faviola Jang    Primary Diagnosis: ESRD on dialysis   Secondary Diagnosis: Unknown etiology     Committee Review Members:  Amilcar, Margaret Hobbs, CHARAN   Nephrology Filomena Urban, BETO, Amber Benson MD, Kwasi Braxton MD   Nurse Harman Lainez, TYLER   Physician Assistant - Medical Alana Lenz, APRN CNP    - Clinical Karma Gallardo, MSW, Bernarda Elaine, [facility], Trey Diaz, [facility]   Transplant ILAN SKAGGS, TYLER, Blanca Medel, TYLER, Gypsy Guevara, TYLER, Gretta Damian, TYLER, Za Smith, RN, Sussy Valencia, RN   Transplant Surgery Tressa Mcclure, BENTON DYSON, John Lopez MD       Transplant Eligibility: Irreversible chronic kidney disease treated w/dialysis or expected need for dialysis    Committee Review Decision: Approved    Relative Contraindications: None    Absolute Contraindications: None     Committee Chair John Lopez MD verbally attested to the committee's decision.    Committee Discussion Details: Reviewed medical history and evaluation results to date. Reviewed neurosurgery consult 03/07/2025 with Dr. Torrey Grover for consult regarding previous laminectomy 2018, determined this is okay to proceed with transplant.  Noted all other items for eval were approved at the previous Selection Committee Meeting on 01/14/2025. Determined pt is approved a candidate for kidney transplant and will list asap on ACTIVE status. Needs updated PRA sample here in order to list.

## 2025-03-17 ENCOUNTER — LAB (OUTPATIENT)
Dept: LAB | Facility: CLINIC | Age: 60
End: 2025-03-17
Payer: COMMERCIAL

## 2025-03-17 DIAGNOSIS — Z76.82 AWAITING ORGAN TRANSPLANT: ICD-10-CM

## 2025-03-25 ENCOUNTER — OFFICE VISIT (OUTPATIENT)
Dept: FAMILY MEDICINE | Facility: CLINIC | Age: 60
End: 2025-03-25
Payer: COMMERCIAL

## 2025-03-25 VITALS
OXYGEN SATURATION: 98 % | HEIGHT: 58 IN | DIASTOLIC BLOOD PRESSURE: 60 MMHG | TEMPERATURE: 98 F | SYSTOLIC BLOOD PRESSURE: 132 MMHG | WEIGHT: 95.19 LBS | RESPIRATION RATE: 16 BRPM | BODY MASS INDEX: 19.98 KG/M2 | HEART RATE: 63 BPM

## 2025-03-25 DIAGNOSIS — N18.6 TYPE 2 DIABETES MELLITUS WITH CHRONIC KIDNEY DISEASE ON CHRONIC DIALYSIS, WITHOUT LONG-TERM CURRENT USE OF INSULIN (H): Primary | ICD-10-CM

## 2025-03-25 DIAGNOSIS — E11.22 TYPE 2 DIABETES MELLITUS WITH CHRONIC KIDNEY DISEASE ON CHRONIC DIALYSIS, WITHOUT LONG-TERM CURRENT USE OF INSULIN (H): Primary | ICD-10-CM

## 2025-03-25 DIAGNOSIS — L65.9 HAIR LOSS: ICD-10-CM

## 2025-03-25 DIAGNOSIS — E78.5 HYPERLIPIDEMIA LDL GOAL <100: ICD-10-CM

## 2025-03-25 DIAGNOSIS — N18.6 ESRD (END STAGE RENAL DISEASE) ON DIALYSIS (H): ICD-10-CM

## 2025-03-25 DIAGNOSIS — Z99.2 TYPE 2 DIABETES MELLITUS WITH CHRONIC KIDNEY DISEASE ON CHRONIC DIALYSIS, WITHOUT LONG-TERM CURRENT USE OF INSULIN (H): Primary | ICD-10-CM

## 2025-03-25 DIAGNOSIS — Z99.2 ESRD (END STAGE RENAL DISEASE) ON DIALYSIS (H): ICD-10-CM

## 2025-03-25 RX ORDER — ATORVASTATIN CALCIUM 40 MG/1
40 TABLET, FILM COATED ORAL DAILY
Qty: 30 TABLET | Refills: 3 | Status: SHIPPED | OUTPATIENT
Start: 2025-03-25

## 2025-03-25 NOTE — PROGRESS NOTES
Assessment & Plan     Hyperlipidemia LDL goal <100  Continue statin.   - atorvastatin (LIPITOR) 40 MG tablet  Dispense: 30 tablet; Refill: 3    ESRD (end stage renal disease) on dialysis (H)  Type 2 Diabetes mellitus with chronic kidney disease on chronic dialysis without long term use of insulin  Following with nephrology and transplant team.   A1c well controlled at 5.2%, not on medications.     Hair loss  Getting some benefit from minoxidil, slated to see derm in may. Will refill.   - minoxidil (ROGAINE) 5 % external solution  Dispense: 120 mL; Refill: 3          Subjective   Lyndsey is a 60 year old, presenting for the following health issues:  Follow Up        3/25/2025     9:43 AM   Additional Questions   Roomed by Reyna INGRAM   Accompanied by Daughter     History of Present Illness       CKD: She is not using over the counter pain medicine.     Hypertension: She presents for follow up of hypertension.  She does not check blood pressure  regularly outside of the clinic. Outside blood pressures have been over 140/90. She follows a low salt diet.     She eats 0-1 servings of fruits and vegetables daily.She consumes 0 sweetened beverage(s) daily.She exercises with enough effort to increase her heart rate 9 or less minutes per day.  She exercises with enough effort to increase her heart rate 4 days per week. She is missing 1 dose(s) of medications per week.  She is not taking prescribed medications regularly due to remembering to take.            Type 2 diabetes mellitus with chronic kidney disease on chronic dialysis, without long-term current use of insulin (H)  A1c well controlled, not on medicaitons. However, she is on dialysis so A1c likely falsely depressed.        Hypertension  Fluctuates on dialysis days. Hold on changes, defer to nephrology.      Anemia in chronic kidney disease  Hair loss  Possibly due to general health, but it's been stable for years and nothing is new including dialysis and medications. Hair  "loss is very recent. Labs look ok. Will refer to dermatology to discuss options.   Today:   Referred to dermatology, scheduled in may. Minoxidil is actually showing some benefit              Objective    BP (!) 148/70   Pulse 63   Temp 98  F (36.7  C) (Oral)   Resp 16   Ht 1.461 m (4' 9.5\")   Wt 43.2 kg (95 lb 3 oz)   LMP  (LMP Unknown)   SpO2 98%   BMI 20.24 kg/m    Body mass index is 20.24 kg/m .  Physical Exam   GENERAL: alert and no distress  NECK: no adenopathy, no asymmetry, masses, or scars  RESP: lungs clear to auscultation - no rales, rhonchi or wheezes  CV: regular rate and rhythm, normal S1 S2, no S3 or S4, no murmur, click or rub, no peripheral edema  ABDOMEN: soft, nontender, no hepatosplenomegaly, no masses and bowel sounds normal  MS: no gross musculoskeletal defects noted, no edema    No results found for any visits on 03/25/25.  No results found for this or any previous visit (from the past 24 hours).        Signed Electronically by: Abhilash Cobb MD    "

## 2025-03-26 ENCOUNTER — TELEPHONE (OUTPATIENT)
Dept: TRANSPLANT | Facility: CLINIC | Age: 60
End: 2025-03-26
Payer: COMMERCIAL

## 2025-03-26 NOTE — LETTER
2025    [facility]  [address]  [facility] [address]      Dear Ms. Alejo,    This letter is sent to confirm that you have completed your transplant work-up and you are a candidate in the kidney transplant program at the [facility].  You were placed on the kidney ACTIVE waitlist on 2025.      Per our routine Office workflow, you will now be transferred to the kidney waitlist transplant coordinator team. Your new coordinator will be Rebecca SCOTT assisted by Ashleigh VOSS. Either can be reached by calling our Main Office Number in the upper right hand corner of this letter.     You are now ACTIVE on the waitlist and an organ becomes available, a coordinator will need to speak to you immediately.  You could be contacted at any time during the day or night as an organ could become available at any time.  Please make certain our office always has your current telephone numbers and address.      Items we will need from you:    We have received approval from your insurance company for the transplant procedure.  It is critical that you notify us if there is any change in your insurance.  It is also important that you familiarize yourself with the details of your specific insurance policy.  Our patient  is available to assist you if you should have any questions regarding your coverage.    An ALA or PRA blood sample need to be sent here every 3 months to match you with  donors or any potential living donors. Your next sample is due here by 2025. You can have this drawn at any [facility] by simply making a lab appointment. You can also have this done at any outside lab such as dialysis.  In the event you will be using an outside lab, special mailing boxes (called mailers) will be sent to you directly from the Outreach Department.  You should take the physician order form and the  to your outside lab when it is time  to for this testing to be done.  Additional mailers can be obtained by calling the Transplant Office and asking to speak to a kidney .    During this waiting period, we may request additional periodic laboratory tests with your primary physician.  It will be your responsibility to remind your physician to forward your results to the Transplant Office.    We need to be kept informed of any changes in your medical condition such as:    changes in your medications,   significant changes in your health  significant infections (such as pneumonia or abscesses)  blood transfusions  any condition which requires hospitalization  any surgery    Remember to complete any routine cancer screening tests required before your transplant.  This includes colonoscopy; prostate screening for men, and mammogram and gynecologic testing for women, as well as dental work.  Your primary care clinic can assist you with arranging for these exams.  Remind your caregivers to forward copies of the records and final reports.      We want you to know that the [facility] Transplant Program has physician and surgeon coverage 24 hours a day, 365 days a year, and is readily available to facilitate organ acceptance, procurement, and transplantation in a timely manner. Our additional transplant surgeons and physicians are credentialed by the hospital to provide transplant services and can independently manage the care of transplant patients. Our additional transplant surgeons are also able to independently perform transplant operations and organ procurement procedures. Additionally, our transplant surgeons and physicians will not be on call for two or more transplant programs more than 30 miles apart unless the circumstances have been reviewed and approved by the [employer] Organ [employer]aring (UNOS) Membership and Professional Standards Committee (MPSC). Finally, our primary physicians and primary surgeons are not  designated as the primary surgeon or primary physician at more than 1 transplant hospital. If this coverage changes or there are substantial program changes, you will be notified in writing by letter.    Attached is a letter from UNOS that describes the services and information offered to patients by UNOS and the Organ Procurement and Transplantation Network (OPTN).    We appreciate having had the opportunity to participate in your care.  If you have questions, please feel free to call the Transplant Office at [phone] or [phone].      Sincerely,  Dolly    Solid Organ Transplant  [facility], [facility]      Enclosures: OS Letter  cc: Dr. Abhilash Cobb, Dr. Faviola Jang, [facility]                  The Organ Procurement and Transplantation Network Toll-free patient services line:  1-295.835.5254  Your resource for organ transplant information    Staffed 8:30 am - 5:00 pm ET Monday - Friday Leave a message 24/7 to receive a call back    The Organ Procurement and Transplantation Network (OPTN) is the national transplant system. It makes the policies that decide how donated organs are matched to patients waiting for a transplant. The OPTN:    Makes sure donated organs get matched to people on the transplant waiting list  Tells people about the donation and transplant processes  Makes sure that the public knows about the need for more organ and tissue donations    The OPTN has a free patient services line that you can call to:  Get more information about:  Organ donation and organ transplants  Donation and transplant policies  Get an information kit with:  A list of transplant hospitals  Waiting list information  Talk about any questions you may have about your transplant hospital or organ procurement organization. The staff will do their best to help you or point you to others who may help.  Find  out how you can volunteer with the OPTN and help shape transplant policy     The patient services line number is: 7-511-436-9128    Patient services line staff CANNOT answer questions about your own medical care, including:  Waiting list status  Test results  Medical records  You will need to call your transplant hospital for this information.    The following websites have more information about transplantation and donation:    OPTN: https://optn.transplant.hrsa.gov/    For potential living donors and transplant recipients:    Living with transplant: https://www.transplantliving.org/    Living donation process: https://optn.transplant.hrsa.gov/living-donation/    Financial assistance: https://www.livingdonorassistance.org/    Transplantation data: https://www.srtr.org/    Organ donation: https://www.organdonor.gov/    Volunteer with the OPTN: https://optn.transplant.hrsa.gov/get-involved/

## 2025-03-27 ENCOUNTER — TELEPHONE (OUTPATIENT)
Dept: TRANSPLANT | Facility: CLINIC | Age: 60
End: 2025-03-27
Payer: COMMERCIAL

## 2025-03-27 NOTE — TELEPHONE ENCOUNTER
Returned a call to Getachew today and spoke to her at length.     Called patient to inform them of eligibility of listing to active status.  Getachew expressed very good   -Verified contact information as documented in EPIC  -Informed patient that a  donor offer can happen at any time (24 hours/day, 7 days/week)  -Instructed patient about importance of having PRAs drawn every 3 months via: (Mailers/[facility])  -Reviewed organ offer process including request to accept or deny offer, informing coordinator of any recent infections  -Reviewed timeframe after call with organ offer (process for admission to hospital and pre-op testing and work and timeframe)  -Discussed expected length of surgical procedure, expected inpatient length of stay post transplant, and potential to stay locally post transplant.    Provided name and contact information for additional questions or concerns.  Getachew expressed excellent understanding of all and was in good agreement.

## 2025-03-27 NOTE — TELEPHONE ENCOUNTER
Please daughter - Getachew # [phone].  Getachew was returning missed call from care coordinator.

## 2025-03-27 NOTE — TELEPHONE ENCOUNTER
Listed pt today on the kidney alone waitlist on ACTIVE status due to she meets criteria for this now. Pt qualifies for wait time with a dialysis start date of 11/28/2021 per 2728 form. Pt listed for the option of hepatitis B core AB positive offers as she is hepatitis B surface AB positive. Pt listed for option of KDPI > 85% offers, consent signed. Generated listing letter today in EPIC - electronically sent to pt/ providers.     Called Getachew, reached her VM. LM I am calling to let her know I listed her mom on the kidney waitlist on ACTIVE status.  Asked that she returns my call to review the standard education at this time.

## 2025-04-07 ENCOUNTER — TELEPHONE (OUTPATIENT)
Dept: TRANSPLANT | Facility: CLINIC | Age: 60
End: 2025-04-07
Payer: COMMERCIAL

## 2025-04-07 NOTE — PROGRESS NOTES
Assessment & Plan     LPRD tx  not helping, agree pain largely musculoskeletal, Take the famotidine once daily until the bottle is empty, then stop. F/up spine and p/t.      Problem List Items Addressed This Visit          Digestive    Dysphagia, unspecified type    Relevant Medications    pantoprazole (PROTONIX) 20 MG EC tablet       Other    Dysphonia - Primary     Other Visit Diagnoses       Cervical pain        Relevant Orders    Physical Therapy  Referral    Spine  Referral             Review of external notes as documented elsewhere in note    18 minutes spent on the date of the encounter doing chart review, history and exam, documentation and further activities per the note  {     NATALIA Christine  [facility]    Subjective     HPI-    Last Visit w/ me:  Assessment & Plan  Symptoms persist.  Incr pantoprazole to BID. F/up SLP and GI.  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     # Several years history of neck pain with swallowing which is same quality as her baseline neck pain as well as infrequent episodes of dysphagia to solids at the level of neck with negative standard esophagram with a barium tablet, negative fluoroscopy swallow evaluation with speech and upper endoscopy on 11/14/2024 that was reported to be normal however proximal esophageal biopsies were not obtained overall and her     Overall in her case it seems like the majority of her problems are musculoskeletal related to the spinal abnormalities reported on her imaging.  This is also evidenced by her normal swallow studies and upper endoscopy.  To complete her swallow evaluation I will  proceed with high-resolution esophageal motility.  Meanwhile I will refer her to spine surgery for further evaluation management of her cervical spine pain.  I will see the patient back in clinic and if manometry is nonrevealing and patient continues to have swallowing difficulties I may proceed with repeating an upper endoscopy to obtain proximal and distal esophageal biopsies and may consider empiric dilation for dysphagia although this would not be beneficial for her pain.  With regards to pain with eating this could very well be due to her sacral spine abnormality however we could consider muscle tension dysphagia and consider therapy to target muscle tension dysphagia as well.    SLP 12/18  Assessment & Plan  Medical Diagnosis: Dysphonia (R49.0)  - Primary. Dysphagia, unspecified type (R13.10) (Dysphagia, Dysphonia), Treatment Diagnosis: Pharyngeal Dysphagia   Impression/Assessment: Pt is a 59 year old female with neck pain complaints. The following significant findings have been identified: impaired swallowing, characterized by neck pain, slow pharyngeal transit and need for multiple swallows across all textures trialed (did not trial solid textures d/t Pt pain level). Identified deficits interfere with their ability to consume an oral diet and maintain nutrition as compared to previous level of function.     PLAN OF CARE  Treatment Interventions:  None, recommend GI consultation. No overt s/x of aspiration/penetration noted. Evaluation was severely limited by level of Pt discomfort/pain.     Motility study and spine f/up not done yet. GI appt next month.      Today, feeling about the same- symptoms fluctuate, can be ok, can have significant pain.  Having right sided head  face pain, tension and numbness.      No longer taking PPI - taking famotidine BID     service used over the phone.      Review of Systems   ENT as above      Objective    LMP  (LMP Unknown)     Physical Exam     TMJ - no  crepitus    No facial TTP    Neck- + lateral right neck TTP

## 2025-04-07 NOTE — TELEPHONE ENCOUNTER
Called pt to introduce myself as WL coordinator and encouraged pt to stay up on health maintenance and to let us know if insurance changes, contact info updates. RWL due 9/2025. Explained WL protocol for pt's status and when follow up is needed. Gave pt direct information and encouraged to reach out with any questions/concerns.

## 2025-04-08 ENCOUNTER — ORGAN (OUTPATIENT)
Dept: TRANSPLANT | Facility: CLINIC | Age: 60
End: 2025-04-08
Payer: COMMERCIAL

## 2025-04-08 DIAGNOSIS — Z76.82 AWAITING ORGAN TRANSPLANT: Primary | ICD-10-CM

## 2025-04-09 NOTE — TELEPHONE ENCOUNTER
"\"Donors get scored 1-100 based on their age, sex, size, past medical history, cause of death and organ function.  This score is a very general predictor of future kidney performance. On average, kidneys with a higher score may not function as long as kidneys with a lower score.  This score does not necessarily predict how this specific kidney will perform.  We use this score plus other information like the creatinine and urine output to help us match kidneys with a higher score with people like you who are predicted to have a survival benefit by getting transplanted earlier rather than staying on dialysis.  Your surgeon has reviewed this offer and feels that it is appropriate for you to consider.\"    "

## 2025-04-09 NOTE — TELEPHONE ENCOUNTER
Organ Offer Encounter Information    Organ Offer Information  Organ offer date & time: 4/8/2025  9:36 PM  Coordinator/Fellow/Attending name: Karla Cox RN   Organ(s):  Organ UNOS ID Match Run ID Comment Organ Laterality   Kidney [id number]          Recent infections?: No      New medications?: No Recent pregnancy?: No     Angicoagulation medications?: No Recent vaccinations?: No     Recent blood transfusions?: No Recent hospitalizations?: No   Has your insurance changed in the last 6-12 months?: Neg    Patient last dialyzed: 4/7/2025  9:45 AM  Dialysis type: Hemo  Discussed organ offer with: Caregiver  Patient/Caregiver name: Daughter- Getachew  Discussed risk category with Patient/Other: DCD, KDPI > 85  Did not understand donor criteria, questions answered, verbalized understanding  Patient/Other asked to speak to a surgeon?: No  Discussed program-specific outcomes: Did not have questions regarding SRTR  Right to decline organ offer without penalty, Patient/Other: Aware of option to decline without penalty  Organ offer decision status Patient/Other: Accepted Offer  Organ disposition: Case Cancelled - Donor quality - Other (specify)  Additional Comments:     4/8/2025 9:37 PM  Kidney: Backup  MD: Mable/Juan  OPO Contact: LS [employer]ports  VXM Results: Compatible- no DSA per Dr. Torres- serum from 3/17  XM Plan (FXM must be done with serum no older than 10 days from transplant): Plan to be drawn on admission if organ accepted  Donor/Recip HCV Status: Donor Negative  Is this an ABO A2 donor to ABO B Recipient: No  Plan (Admission, NPO, Donor OR): Called patient's daughterGetachew regarding high KDPI, DCD kidney offer. She is going to call her other sister and speak to mother regarding offer and call me back. Provided contact information.  - - -   COVID Screening  In the past month, have you:  Or anyone close to you had a positive COVID test or suspected to have COVID: No  Had any COVID symptoms (Fever,  Cough, Short of Breath, Loss of Taste/Smell, Rash): No     Karla Cox  Transplant Coordinator    4/8/2025 11:02 PM:  Patient's daughterGetachew called and wants to move ahead with offer. Plan to have pt be NPO at midnight. Will call with updates overnight. Pt is due to go to dialysis at 0600.  Karla Cox  Transplant Coordinator    4/9/2025 4:14 AM:  Kidneys declined upon anatomy/biopsy. Called patient's daughterGetachew to inform- she understood. Had no questions at this time. Mom will go to dialysis this morning as planned.   Karla Cox  Transplant Coordinator        Attestation I have discussed all of the above with the Patient/Legal Guardian/Caregiver regarding this organ offer.: Yes  Coordinator/Fellow/Attending name: Karla Cox RN

## 2025-04-11 ENCOUNTER — TRANSFERRED RECORDS (OUTPATIENT)
Dept: MULTI SPECIALTY CLINIC | Facility: CLINIC | Age: 60
End: 2025-04-11

## 2025-04-11 LAB — RETINOPATHY: NORMAL

## 2025-04-15 ENCOUNTER — OFFICE VISIT (OUTPATIENT)
Dept: OTOLARYNGOLOGY | Facility: CLINIC | Age: 60
End: 2025-04-15
Payer: COMMERCIAL

## 2025-04-15 DIAGNOSIS — R13.10 DYSPHAGIA, UNSPECIFIED TYPE: ICD-10-CM

## 2025-04-15 DIAGNOSIS — R49.0 DYSPHONIA: Primary | ICD-10-CM

## 2025-04-15 DIAGNOSIS — M54.2 CERVICAL PAIN: ICD-10-CM

## 2025-04-15 PROCEDURE — 99213 OFFICE O/P EST LOW 20 MIN: CPT | Performed by: PHYSICIAN ASSISTANT

## 2025-04-15 RX ORDER — PANTOPRAZOLE SODIUM 20 MG/1
20 TABLET, DELAYED RELEASE ORAL DAILY
Qty: 90 TABLET | Refills: 0 | Status: SHIPPED | OUTPATIENT
Start: 2025-04-15 | End: 2025-04-15

## 2025-04-15 NOTE — LETTER
4/15/2025      [facility]  [address] [place] [address]      Dear Colleague,    Thank you for referring your patient, [facility], to the [facility]. Please see a copy of my visit note below.    Assessment & Plan    LPRD tx  not helping, agree pain largely musculoskeletal, Take the famotidine once daily until the bottle is empty, then stop. F/up spine and p/t.      Problem List Items Addressed This Visit          Digestive    Dysphagia, unspecified type    Relevant Medications    pantoprazole (PROTONIX) 20 MG EC tablet       Other    Dysphonia - Primary     Other Visit Diagnoses       Cervical pain        Relevant Orders    Physical Therapy  Referral    Spine  Referral             Review of external notes as documented elsewhere in note    18 minutes spent on the date of the encounter doing chart review, history and exam, documentation and further activities per the note  {     NATALIA Christine  [facility]    Subjective     HPI-    Last Visit w/ me:  Assessment & Plan  Symptoms persist.  Incr pantoprazole to BID. F/up SLP and GI.  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     # Several years history of neck pain with swallowing which is same quality as her baseline neck pain as well as infrequent episodes of dysphagia to solids at the level of neck with negative standard esophagram with a barium tablet, negative fluoroscopy swallow evaluation with speech and upper endoscopy on 11/14/2024 that was reported to be normal however proximal esophageal biopsies were not obtained overall and her     Overall in her case it seems like the majority  of her problems are musculoskeletal related to the spinal abnormalities reported on her imaging.  This is also evidenced by her normal swallow studies and upper endoscopy.  To complete her swallow evaluation I will proceed with high-resolution esophageal motility.  Meanwhile I will refer her to spine surgery for further evaluation management of her cervical spine pain.  I will see the patient back in clinic and if manometry is nonrevealing and patient continues to have swallowing difficulties I may proceed with repeating an upper endoscopy to obtain proximal and distal esophageal biopsies and may consider empiric dilation for dysphagia although this would not be beneficial for her pain.  With regards to pain with eating this could very well be due to her sacral spine abnormality however we could consider muscle tension dysphagia and consider therapy to target muscle tension dysphagia as well.    SLP 12/18  Assessment & Plan  Medical Diagnosis: Dysphonia (R49.0)  - Primary. Dysphagia, unspecified type (R13.10) (Dysphagia, Dysphonia), Treatment Diagnosis: Pharyngeal Dysphagia   Impression/Assessment: Pt is a 59 year old female with neck pain complaints. The following significant findings have been identified: impaired swallowing, characterized by neck pain, slow pharyngeal transit and need for multiple swallows across all textures trialed (did not trial solid textures d/t Pt pain level). Identified deficits interfere with their ability to consume an oral diet and maintain nutrition as compared to previous level of function.     PLAN OF CARE  Treatment Interventions:  None, recommend GI consultation. No overt s/x of aspiration/penetration noted. Evaluation was severely limited by level of Pt discomfort/pain.     Motility study and spine f/up not done yet. GI appt next month.      Today, feeling about the same- symptoms fluctuate, can be ok, can have significant pain.  Having right sided head  face pain, tension and  numbness.      No longer taking PPI - taking famotidine BID     service used over the phone.      Review of Systems   ENT as above      Objective    LMP  (LMP Unknown)     Physical Exam     TMJ - no crepitus    No facial TTP    Neck- + lateral right neck TTP         Again, thank you for allowing me to participate in the care of your patient.        Sincerely,        NATALIA Christine    Electronically signed

## 2025-04-16 ENCOUNTER — PATIENT OUTREACH (OUTPATIENT)
Dept: CARE COORDINATION | Facility: CLINIC | Age: 60
End: 2025-04-16
Payer: COMMERCIAL

## 2025-04-16 ENCOUNTER — ORGAN (OUTPATIENT)
Dept: TRANSPLANT | Facility: CLINIC | Age: 60
End: 2025-04-16
Payer: COMMERCIAL

## 2025-04-16 DIAGNOSIS — Z76.82 AWAITING ORGAN TRANSPLANT: Primary | ICD-10-CM

## 2025-04-17 ENCOUNTER — ORGAN (OUTPATIENT)
Dept: TRANSPLANT | Facility: CLINIC | Age: 60
End: 2025-04-17
Payer: COMMERCIAL

## 2025-04-17 DIAGNOSIS — Z76.82 AWAITING ORGAN TRANSPLANT: Primary | ICD-10-CM

## 2025-04-17 NOTE — TELEPHONE ENCOUNTER
Organ Offer Encounter Information    Organ Offer Information  Organ offer date & time: 4/17/2025  6:33 AM  Coordinator/Fellow/Attending name: Karla Cox RN   Organ(s):  Organ UNOS ID Match Run ID Comment Organ Laterality   Kidney [id number] MWOB         Recent infections?: No      New medications?: No Recent pregnancy?: No     Angicoagulation medications?: No Recent vaccinations?: No     Recent blood transfusions?: No Recent hospitalizations?: No   Has your insurance changed in the last 6-12 months?: Neg    Patient last dialyzed: 4/16/2025  9:00 AM  Dialysis type: Hemo  Discussed organ offer with: Caregiver  Patient/Caregiver name: Daughter, Getachew  Discussed risk category with Patient/Other: DCD  Patient/Other asked to speak to a surgeon?: No  Discussed program-specific outcomes: Did not have questions regarding SRTR  Right to decline organ offer without penalty, Patient/Other: Aware of option to decline without penalty  Organ offer decision status Patient/Other: Accepted Offer  Organ disposition: Case Cancelled - Other (specify) (Comment: kidney got placed ahead)  Additional Comments:     4/17/2025 6:34 AM  Kidney: Backup  MD: Jessica/Rashad  OPO Contact: [phone]  VXM Results: Pending  XM Plan (FXM must be done with serum no older than 10 days from transplant): Would be drawn on admit  Donor/Recip HCV Status: Donor negative  (HCV+ Donors - Send Epic in basket message to [facility] HCV POOL - Include Donor UNOS ID)  Is this an ABO A2 donor to ABO B Recipient: No  (In the event of A2 to B transplant, Anti-A titers need to be collected at the time of the crossmatch or admission - whichever is first.  Use LEYIOe .A2toB for instructions.)  Plan (Admission, NPO, Donor OR): Called patient's daughter Getachew to discuss backup post clamp DCD kidney offer. She is discussing with sisters an will call me back. Likely accept though. Plan to be NPO after 0800.  - - -   COVID Screening  In the past  month, have you:  Or anyone close to you had a positive COVID test or suspected to have COVID: No  Had any COVID symptoms (Fever, Cough, Short of Breath, Loss of Taste/Smell, Rash): No    Karla Cox  Transplant Coordinator      4/17/2025 8:37 AM:  Spoke with Getachew (patient's daughter) to give her my contact information. I let her know that we will hopefully know between [phone] if the kidney is going to the intended recipient or not and that I will call her as soon as I know.   Tess Olivas RN  Donor      4/17/2025 4:36 PM:  Dr. Lopez called to say that the kidney is going to the intended recipient and Bu no longer needs to be backup. Called Getachew (patient's daughter) to relay this information.   Tess Olivas RN  Donor    Attestation I have discussed all of the above with the Patient/Legal Guardian/Caregiver regarding this organ offer.: Yes  Coordinator/Fellow/Attending name: Karla Cox RN

## 2025-04-17 NOTE — TELEPHONE ENCOUNTER
Organ Offer Encounter Information    Organ Offer Information  Organ offer date & time: 4/16/2025  8:12 PM  Coordinator/Fellow/Attending name: Karla Cox RN   Organ(s):  Organ UNOS ID Match Run ID Comment Organ Laterality   Kidney [id number] IAOP         Recent infections?: No      New medications?: No Recent pregnancy?: No     Angicoagulation medications?: No Recent vaccinations?: No     Recent blood transfusions?: No Recent hospitalizations?: No   Has your insurance changed in the last 6-12 months?: Neg    Patient last dialyzed: 4/16/2025  9:40 AM  Discussed organ offer with: Caregiver  Patient/Caregiver name: Getachew- Daughter  Discussed risk category with Patient/Other: DCD, KDPI > 85  Understood donor criteria, verbalized understanding  Patient/Other asked to speak to a surgeon?: No  Discussed program-specific outcomes: Did not have questions regarding SRTR  Right to decline organ offer without penalty, Patient/Other: Aware of option to decline without penalty  Organ offer decision status Patient/Other: Declined Offer  UNOS decline reason: 801 - Candidate ill, unavailable, refused, or temporarily unsuitable  Additional Comments: 4/16/2025 8:18 PM  Kidney: Primary with choice  MD: Jessica/Rashad  OPO Contact: Denise [phone]  VXM Results: Compatible- no DSA  XM Plan (FXM must be done with serum no older than 10 days from transplant): Will be drawn on admit  Donor/Recip HCV Status: Donor Neg  Is this an ABO A2 donor to ABO B Recipient: No  Plan (Admission, NPO, Donor OR):  Called patient's daughter, Getachew regarding kidney offer. Discussed DCD and high KDPI-she would like to call her sisters to discuss. Provided call back information.  - - -   COVID Screening  In the past month, have you:  Or anyone close to you had a positive COVID test or suspected to have COVID:  No  Had any COVID symptoms (Fever, Cough, Short of Breath, Loss of Taste/Smell, Rash): No    Karla Cox  Transplant  Coordinator    4/16/2025 8:56 PM:  Patient declined offer- wants to wait for better offer. Informed Dr. Jessica Cox  Transplant Coordinator          Attestation I have discussed all of the above with the Patient/Legal Guardian/Caregiver regarding this organ offer.: Yes  Coordinator/Fellow/Attending name: Karla Cox, TYLER

## 2025-04-18 PROBLEM — M54.2 CERVICAL PAIN: Status: ACTIVE | Noted: 2024-10-07

## 2025-04-22 DIAGNOSIS — I10 BENIGN ESSENTIAL HYPERTENSION: ICD-10-CM

## 2025-04-22 RX ORDER — AMLODIPINE BESYLATE 10 MG/1
10 TABLET ORAL
Qty: 30 TABLET | Refills: 3 | Status: SHIPPED | OUTPATIENT
Start: 2025-04-22

## 2025-04-25 ENCOUNTER — LAB REQUISITION (OUTPATIENT)
Dept: LAB | Facility: CLINIC | Age: 60
End: 2025-04-25

## 2025-04-25 PROCEDURE — 84520 ASSAY OF UREA NITROGEN: CPT

## 2025-04-26 LAB — BUN SERPL-MCNC: 9.4 MG/DL (ref 6–20)

## 2025-04-29 ENCOUNTER — OFFICE VISIT (OUTPATIENT)
Dept: FAMILY MEDICINE | Facility: CLINIC | Age: 60
End: 2025-04-29
Payer: COMMERCIAL

## 2025-04-29 VITALS
TEMPERATURE: 98.6 F | HEIGHT: 59 IN | WEIGHT: 104 LBS | BODY MASS INDEX: 20.96 KG/M2 | SYSTOLIC BLOOD PRESSURE: 112 MMHG | RESPIRATION RATE: 18 BRPM | HEART RATE: 80 BPM | OXYGEN SATURATION: 98 % | DIASTOLIC BLOOD PRESSURE: 71 MMHG

## 2025-04-29 DIAGNOSIS — N18.6 ESRD (END STAGE RENAL DISEASE) ON DIALYSIS (H): ICD-10-CM

## 2025-04-29 DIAGNOSIS — L29.9 PRURITIC DISORDER: ICD-10-CM

## 2025-04-29 DIAGNOSIS — R21 RASH: ICD-10-CM

## 2025-04-29 DIAGNOSIS — R10.13 EPIGASTRIC PAIN: ICD-10-CM

## 2025-04-29 DIAGNOSIS — L85.3 DRY SKIN: Primary | ICD-10-CM

## 2025-04-29 DIAGNOSIS — I10 BENIGN ESSENTIAL HYPERTENSION: ICD-10-CM

## 2025-04-29 DIAGNOSIS — Z99.2 ESRD (END STAGE RENAL DISEASE) ON DIALYSIS (H): ICD-10-CM

## 2025-04-29 DIAGNOSIS — K21.9 GASTROESOPHAGEAL REFLUX DISEASE WITHOUT ESOPHAGITIS: ICD-10-CM

## 2025-04-29 DIAGNOSIS — Z60.3 LANGUAGE BARRIER: ICD-10-CM

## 2025-04-29 DIAGNOSIS — G47.00 INSOMNIA, UNSPECIFIED TYPE: ICD-10-CM

## 2025-04-29 DIAGNOSIS — Z75.8 LANGUAGE BARRIER: ICD-10-CM

## 2025-04-29 DIAGNOSIS — R13.10 DYSPHAGIA, UNSPECIFIED TYPE: ICD-10-CM

## 2025-04-29 PROCEDURE — T1013 SIGN LANG/ORAL INTERPRETER: HCPCS | Mod: U4

## 2025-04-29 PROCEDURE — G2211 COMPLEX E/M VISIT ADD ON: HCPCS | Performed by: FAMILY MEDICINE

## 2025-04-29 PROCEDURE — 3074F SYST BP LT 130 MM HG: CPT | Performed by: FAMILY MEDICINE

## 2025-04-29 PROCEDURE — 99214 OFFICE O/P EST MOD 30 MIN: CPT | Performed by: FAMILY MEDICINE

## 2025-04-29 PROCEDURE — 3078F DIAST BP <80 MM HG: CPT | Performed by: FAMILY MEDICINE

## 2025-04-29 RX ORDER — LISINOPRIL 10 MG/1
10 TABLET ORAL DAILY
Qty: 90 TABLET | Refills: 3 | Status: SHIPPED | OUTPATIENT
Start: 2025-04-29

## 2025-04-29 RX ORDER — EMOLLIENT BASE
CREAM (GRAM) TOPICAL 3 TIMES DAILY PRN
Qty: 453 G | Refills: 3 | Status: SHIPPED | OUTPATIENT
Start: 2025-04-29

## 2025-04-29 RX ORDER — FERROUS SULFATE 325(65) MG
TABLET ORAL
Qty: 30 TABLET | Refills: 4 | Status: SHIPPED | OUTPATIENT
Start: 2025-04-29

## 2025-04-29 RX ORDER — FAMOTIDINE 20 MG/1
20 TABLET, FILM COATED ORAL 2 TIMES DAILY PRN
Qty: 60 TABLET | Refills: 5 | Status: SHIPPED | OUTPATIENT
Start: 2025-04-29

## 2025-04-29 RX ORDER — TRIAMCINOLONE ACETONIDE 0.25 MG/G
OINTMENT TOPICAL 2 TIMES DAILY PRN
Qty: 80 G | Refills: 2 | Status: SHIPPED | OUTPATIENT
Start: 2025-04-29

## 2025-04-29 SDOH — SOCIAL STABILITY - SOCIAL INSECURITY: ACCULTURATION DIFFICULTY: Z60.3

## 2025-04-29 NOTE — PATIENT INSTRUCTIONS
-Thank you for choosing the [facility].  -It was a pleasure to see you today.  -Please take a look at the information below for more specific details regarding the treatment plan and recommendations.  -In this after visit summary is a list of your medications and specific instructions.  Please review this carefully as there may be changes made to your medication list.  -If there are any particular questions or concerns, please feel free to reach out to Dr. Oconnor.  -If any labs have been completed, we will reach out to you about results.  If the results are normal or not concerning, a letter or MyChart message will be sent to you.  If any follow-up is needed, either Dr. Oconnor or the nurse will give you a call.  If you have not heard regarding results after 2 weeks, please reach out to the clinic.    -Dr. Oconnor will be leaving the [facility] in May 2025. It has been a pleasure to be a part of your care team.  Thank you for trusting me with your health care. Dr. Oconnor recommends that you schedule an appointment to establish care with a new doctor at the clinic.     Patient Instructions:    -Continue the stomach medication as prescribed.   -Monitor the foods you eat and avoid the foods that causes symptoms.   - See the stomach specialist and speech specialist as scheduled.    -Use the sleep medication as prescribed.     Recommendations to help control reflux/GERD:     Lifestyle changes  Limit or avoid fatty, fried, and spicy foods, as well as coffee, chocolate, mint, and foods with high acid content such as tomatoes and citrus fruit and juices (orange, grapefruit, lemon).  Don t eat large meals, especially at night. Frequent, smaller meals are best. Don't lie down right after eating.  Try to sit up for at least 2 hours after eating.  And don t eat anything 3 hours before going to bed.  Don't drink alcohol or smoke. As much as possible, stay away from second hand smoke.  If you are overweight,  "losing weight will reduce symptoms.   Don't wear tight clothing around your stomach area.  If your symptoms occur during sleep, use a foam wedge to elevate your upper body (not just your head.) Or, place 4\" blocks under the head of your bed. Or use 2 bed risers under your bedframe.      When to seek medical advice  Call your healthcare provider if any of the following occur:  Stomach pain gets worse or moves to the lower right abdomen (appendix area)  Chest pain appears or gets worse, or spreads to the back, neck, shoulder, or arm  An over-the-counter trial of medicine doesn't relieve your symptoms  Weight loss that can't be explained  Trouble or pain swallowing  Frequent vomiting (can t keep down liquids)  Blood in the stool or vomit (red or black in color)  Feeling weak or dizzy  Fever of 100.4 F (38 C) or higher, or as directed by your healthcare provider    Please seek immediate medical attention (go to the emergency room or urgent care) for the following reasons: worsening symptoms, or any concerning changes.      --------------------------------------------------------------------------------------------------------------------    -We are always looking for ways to improve.  You may be selected to receive a survey regarding your visit today.  We encourage you to complete the survey and provide specific, constructive feedback to help us improve our processes.  Thank you for your time!  -Please review the contact information listed on the after visit summary and in the electronic chart.  Below is the phone number that we have on file.  If there are any changes that are needed to be made, please reach out to the clinic.  [phone] (home)     "

## 2025-04-29 NOTE — PROGRESS NOTES
"OFFICE VISIT    Assessment/Plan:     Patient Instructions:    -Continue the stomach medication as prescribed.   -Monitor the foods you eat and avoid the foods that causes symptoms.   - See the stomach specialist and speech specialist as scheduled.    -Use the sleep medication as prescribed.     Recommendations to help control reflux/GERD:     Lifestyle changes  Limit or avoid fatty, fried, and spicy foods, as well as coffee, chocolate, mint, and foods with high acid content such as tomatoes and citrus fruit and juices (orange, grapefruit, lemon).  Don t eat large meals, especially at night. Frequent, smaller meals are best. Don't lie down right after eating.  Try to sit up for at least 2 hours after eating.  And don t eat anything 3 hours before going to bed.  Don't drink alcohol or smoke. As much as possible, stay away from second hand smoke.  If you are overweight, losing weight will reduce symptoms.   Don't wear tight clothing around your stomach area.  If your symptoms occur during sleep, use a foam wedge to elevate your upper body (not just your head.) Or, place 4\" blocks under the head of your bed. Or use 2 bed risers under your bedframe.      When to seek medical advice  Call your healthcare provider if any of the following occur:  Stomach pain gets worse or moves to the lower right abdomen (appendix area)  Chest pain appears or gets worse, or spreads to the back, neck, shoulder, or arm  An over-the-counter trial of medicine doesn't relieve your symptoms  Weight loss that can't be explained  Trouble or pain swallowing  Frequent vomiting (can t keep down liquids)  Blood in the stool or vomit (red or black in color)  Feeling weak or dizzy  Fever of 100.4 F (38 C) or higher, or as directed by your healthcare provider    Please seek immediate medical attention (go to the emergency room or urgent care) for the following reasons: worsening symptoms, or any concerning changes.      Duarte was seen today for recheck, " derm problem and insomnia.  Diagnoses and all orders for this visit:    Dry skin  Rash  Pruritic disorder  Language barrier: dry appearing skin noted diffusely. There are also spots of erythema on the wrist today, though reported to also show up at various parts of the body/extremities off and on. Plan as below. Reviewed conservative management. No signs of infection. Parasitic/Pest? Rash not suggestive at this point. Continue to monitor.   -     triamcinolone (KENALOG) 0.025 % external ointment; Apply topically 2 times daily as needed for irritation (itching).  -     emollient (VANICREAM) external cream; Apply topically 3 times daily as needed for other (dry skin, itching.).    Gastroesophageal reflux disease without esophagitis  Dysphagia, unspecified type  Epigastric pain: stable. Refill as below.   -     famotidine (PEPCID) 20 MG tablet; Take 1 tablet (20 mg) by mouth 2 times daily as needed (heartburn).    Insomnia, unspecified type: trial with medication a below.   -     melatonin 3 MG tablet; Take 1 tablet (3 mg) by mouth nightly as needed for sleep.    Benign essential hypertension: stable. Refill as below.   -     lisinopril (ZESTRIL) 10 MG tablet; Take 1 tablet (10 mg) by mouth daily.        Return in about 6 months (around 10/29/2025).    The diagnoses, treatment options, risk, benefits, and recommendations were reviewed with patient/guardian.  Questions were answered to patient's/guardian satisfaction.  Red flag signs were reviewed.  Patient/guardian is in agreement with above plan.      Subjective: 60 year old female with history of back pain, hypertension, GERD, prediabetes who presents to clinic for the following complaints:   Patient presents with:  RECHECK  Derm Problem  Insomnia    Answers submitted by the patient for this visit:  General Questionnaire (Submitted on 4/29/2025)  Chief Complaint: Chronic problems general questions HPI Form  What is the reason for your visit today? : med check  How  many servings of fruits and vegetables do you eat daily?: 2-3  On average, how many sweetened beverages do you drink each day (Examples: soda, juice, sweet tea, etc.  Do NOT count diet or artificially sweetened beverages)?: 0  How many minutes a day do you exercise enough to make your heart beat faster?: 30 to 60  How many days a week do you exercise enough to make your heart beat faster?: 4  How many days per week do you miss taking your medication?: 0  Questionnaire about: Chronic problems general questions HPI Form (Submitted on 4/29/2025)  Chief Complaint: Chronic problems general questions HPI Form    Since the last visit, she feels better with the medications. In particular, the throat/swallowing/reflux symptoms have improved. The throat irritation is better, and she is only taking the medication as needed.     Skin problem: she notices that the skin is itchy. She would notice the itching and scratch at the area. Then the area becomes red because of the scratching. The itching is located on the thigh, often in the winter. It is not really there in the winter.       Insomnia: when the throat is irritating for patient, she has difficulty falling asleep. Since getting the medication above, she feels that she doesn't need the sleep aid medications as much, though still needs it sometimes. She sleeps on top of pillows currently. She limits spicy food intake. She avoid sour foods. She eats more fruits now (such as apples, lychee). Famotidine is helpful. Overall, the GERD symptoms have improved.     HTN: stable on lisinopril 10mg once daily. Refill sent.     A professional [employer] telephone  was utilized for the office visit.     The 10 point review of system is negative except as stated in the HPI.    Allergies were reviewed and updated.    Narrative & Impression   EXAM: MR CERVICAL SPINE W/O CONTRAST  LOCATION: [facility]  DATE: 12/11/2024     INDICATION:  Neck  pain.  COMPARISON: None.  TECHNIQUE: MRI Cervical Spine without IV contrast.     FINDINGS:   Normal alignment of the cervical vertebrae. Nonspecific straightening of the normal cervical lordosis. Normal vertebral body heights and marrow signal. No fracture, destructive bone lesion or infection. Normal cervical spinal cord signal. Diffuse   congenital spinal canal narrowing due to short pedicles. The paraspinous soft tissues are unremarkable.     FINDINGS ON A LEVEL-BY-LEVEL BASIS ARE AS FOLLOWS:     Craniovertebral junction and C1-C2: Normal.     C2-C3: Normal disc height. No herniation. Normal facets. No significant neural foraminal stenosis. Mild spinal canal stenosis.     C3-C4: Normal disc height. Tiny 1-2 mm AP dimension central disc protrusion. Normal facets. No significant neural foraminal stenosis. Mild to moderate spinal canal stenosis.     C4-C5: Normal disc height. 2 mm AP dimension central disc protrusion slightly indents the ventral margin of the cord. Right facet arthrosis. Mild right neural foraminal stenosis. The left neural foramen is widely patent. Mild to moderate spinal canal   stenosis.      C5-C6: Normal disc height. 2 mm AP dimension central disc protrusion slightly indents the ventral margin of the cord. Normal facets. No significant neural foraminal stenosis. Mild to moderate spinal canal stenosis.     C6-C7: Normal disc height. 2 mm AP dimension central disc protrusion. Normal facets. No significant neural foraminal stenosis. Mild spinal canal stenosis.     C7-T1: Normal disc height. No herniation. Normal facets. No spinal canal or neural foraminal stenosis.                                                                      IMPRESSION:  1.  Mild multilevel cervical spondylosis superimposed on congenital spinal canal narrowing.  2.  Mild to moderate spinal canal stenosis C3-C6. Mild spinal canal stenosis at C2-C3 and C6-C7.  3.  Mild right neural foraminal stenosis at C4-C5.  4.  Normal  cervical spinal cord signal.          Narrative & Impression   EXAM: MR LUMBAR SPINE W/O CONTRAST  LOCATION: [facility]  DATE: 12/11/2024     INDICATION: Low back pain; Neurologic deficit, nontraumatic; LE numbness paresthesia; No increasing persistent LE numbness paresthesia  COMPARISON: None.  TECHNIQUE: Routine Lumbar Spine MRI without IV contrast.     FINDINGS:   5 lumbar type vertebrae counting down from the presumed 12th ribs. The tip of the conus medullaris is at the inferior endplate of L2. The cauda equina nerve roots and visualized lower thoracic spinal cord are within normal limits. 1 mm maximal axial   diameter filum terminale lipoma.     Normal alignment of the lumbar vertebrae. Normal lumbar lordosis. Mild degenerative subchondral marrow edema along the left aspect of the L4 superior endplate. Normal vertebral body heights. No fracture, destructive bone lesion or infection. The   paraspinous soft tissues and visualized bony pelvis are unremarkable.     FINDINGS ON A LEVEL-BY-LEVEL BASIS ARE AS FOLLOWS:  T12-L1: Normal disc height and signal. No herniation. Normal facets. No spinal canal or neural foraminal stenosis.      L1-L2: Normal disc height and signal. No herniation. Normal facets. No spinal canal or neural foraminal stenosis.     L2-L3: Normal disc height and signal. No herniation. Normal facets. No spinal canal or neural foraminal stenosis.      L3-L4: Minimal disc height loss. Normal disc signal. Shallow disc bulge. No disc herniation. Bilateral facet arthrosis and ligamentum flavum thickening. Mild bilateral neural foraminal stenosis. Mild spinal canal stenosis.     L4-L5: Mild disc height loss. Loss of disc signal. Disc bulge with superimposed 6 mm AP dimension central disc protrusion with associated annular fissuring. Bilateral lateral recess stenosis with disc material contacting the descending L5 nerve roots.   Facet arthrosis and ligamentum flavum thickening.  "Moderate bilateral neural foraminal stenosis. Moderate to severe spinal canal stenosis.     L5-S1: Mild disc height loss. Loss of disc signal. Disc bulge with superimposed 3 mm AP dimension central disc protrusion. Bilateral facet arthrosis. Mild to moderate bilateral neural foraminal stenosis. Mild spinal canal stenosis.                                                                      IMPRESSION:  1.  Multilevel lower lumbar spondylosis.  2.  Moderate-sized central disc protrusion at L4-L5 contacts the bilateral descending L5 nerve roots.  3.  Moderate to severe spinal canal stenosis at L4-L5. Mild spinal canal stenosis at L3-L4 and L5-S1.  4.  Moderate bilateral neural foraminal stenosis at L4-L5. Mild to moderate bilateral neural foraminal stenosis at L5-S1.         Objective:   /71 (BP Location: Right arm)   Pulse 80   Temp 98.6  F (37  C) (Oral)   Resp 18   Ht 1.499 m (4' 11\")   Wt 47.2 kg (104 lb)   LMP  (LMP Unknown)   SpO2 98%   BMI 21.01 kg/m    General: Active, alert, nontoxic-appearing.  No acute distress.  HEENT: Normocephalic, atraumatic.  Pupils are equal and round.  Sclera is clear.  Normal external ears. Nares patent.  Moist mucous membranes.    Cardiac: RRR.  S1, S2 present.  No murmurs, rubs, or gallops.  Respiratory/chest: Clear to auscultation bilaterally.  No wheezes, rales, rhonchi.  Breathing is not labored.  No accessory muscle usage.  Abdomen: Soft, nondistended, nontender.  No masses or organomegaly noted.  No guarding or rebound tenderness appreciated.  Extremities: Voluntary movements intact.  Integumentary: dry skin noted diffusely. One erythematous patch with single papular lesion on the right wrist. Evidence of excoriation noted. Otherwise, no concerning rash or skin changes appreciated.        Jerman Oconnor MD  [facility] [place] [address]  Phone: [phone]  Fax: [phone]    5/1/2025  7:56 AM            Current Outpatient " Medications   Medication Sig Dispense Refill    emollient (VANICREAM) external cream Apply topically 3 times daily as needed for other (dry skin, itching.). 453 g 3    famotidine (PEPCID) 20 MG tablet Take 1 tablet (20 mg) by mouth 2 times daily as needed (heartburn). 60 tablet 5    lisinopril (ZESTRIL) 10 MG tablet Take 1 tablet (10 mg) by mouth daily. 90 tablet 3    melatonin 3 MG tablet Take 1 tablet (3 mg) by mouth nightly as needed for sleep. 90 tablet 1    triamcinolone (KENALOG) 0.025 % external ointment Apply topically 2 times daily as needed for irritation (itching). 80 g 2    acetaminophen (TYLENOL) 500 MG tablet Take 1-2 tablets (500-1,000 mg) by mouth every 6 hours as needed for fever or pain. (Max of 3,000 mg/day) 100 tablet 3    meclizine (ANTIVERT) 25 MG tablet Take 1 tablet (25 mg) by mouth 3 times daily as needed (motion sickness.). 30 tablet 2    pantoprazole (PROTONIX) 40 MG EC tablet Take 1 tablet (40 mg) by mouth daily. 30 minutes before breakfast (Patient not taking: Reported on 4/15/2025) 90 tablet 3     No current facility-administered medications for this visit.       No Known Allergies    Patient Active Problem List    Diagnosis Date Noted    Language barrier 02/04/2025     Priority: Medium    Dysphagia, unspecified type 01/30/2025     Priority: Medium    Esophageal dysmotility 01/30/2025     Priority: Medium    Dysphonia 01/30/2025     Priority: Medium    Weakness of both lower extremities 01/30/2025     Priority: Medium    Impaired gait and mobility 01/30/2025     Priority: Medium    Difficulty balancing 01/30/2025     Priority: Medium    Prediabetes 11/14/2024     Priority: Medium    Cervical pain 10/07/2024     Priority: Medium    Benign essential hypertension      Priority: Medium    Gastroesophageal reflux disease without esophagitis      Priority: Medium       Family History   Problem Relation Age of Onset    Breast Cancer No family hx of     Ovarian Cancer No family hx of      Coronary Artery Disease No family hx of     Diabetes No family hx of     Hypertension No family hx of        Past Surgical History:   Procedure Laterality Date    ESOPHAGOSCOPY, GASTROSCOPY, DUODENOSCOPY (EGD), COMBINED N/A 11/14/2024    Procedure: ESOPHAGOGASTRODUODENOSCOPY, WITH BIOPSY;  Surgeon: Jatin Ramirez DO;  Location: [facility]    TUBAL LIGATION          Social History     Socioeconomic History    Marital status: Single     Spouse name: Not on file    Number of children: Not on file    Years of education: Not on file    Highest education level: Not on file   Occupational History    Not on file   Tobacco Use    Smoking status: Never     Passive exposure: Never    Smokeless tobacco: Current     Types: Chew    Tobacco comments:     Chewing betel nut.    Vaping Use    Vaping status: Never Used   Substance and Sexual Activity    Alcohol use: Not Currently     Comment: patient stopped in 2023.    Drug use: Never     Comment: Patient quit beetle-nut 01/01/24    Sexual activity: Not on file   Other Topics Concern    Not on file   Social History Narrative    Not on file     Social Drivers of Health     Financial Resource Strain: Low Risk  (1/30/2025)    Financial Resource Strain     Within the past 12 months, have you or your family members you live with been unable to get utilities (heat, electricity) when it was really needed?: No   Food Insecurity: Low Risk  (1/30/2025)    Food Insecurity     Within the past 12 months, did you worry that your food would run out before you got money to buy more?: No     Within the past 12 months, did the food you bought just not last and you didn t have money to get more?: No   Transportation Needs: Low Risk  (1/30/2025)    Transportation Needs     Within the past 12 months, has lack of transportation kept you from medical appointments, getting your medicines, non-medical meetings or appointments, work, or from getting things that you need?: No   Physical Activity:  Insufficiently Active (7/26/2024)    Exercise Vital Sign     Days of Exercise per Week: 7 days     Minutes of Exercise per Session: 20 min   Stress: Stress Concern Present (7/26/2024)    [employer] - Occupational Stress Questionnaire     Feeling of Stress : Rather much   Social Connections: Unknown (7/26/2024)    Social Connection and Isolation Panel [NHANES]     Frequency of Communication with Friends and Family: Not on file     Frequency of Social Gatherings with Friends and Family: More than three times a week     Attends Judaism Services: Not on file     Active Member of Clubs or Organizations: Not on file     Attends Club or Organization Meetings: Not on file     Marital Status: Not on file   Interpersonal Safety: Low Risk  (11/22/2024)    Interpersonal Safety     Do you feel physically and emotionally safe where you currently live?: Yes     Within the past 12 months, have you been hit, slapped, kicked or otherwise physically hurt by someone?: Patient unable to answer     Within the past 12 months, have you been humiliated or emotionally abused in other ways by your partner or ex-partner?: Patient unable to answer   Housing Stability: Low Risk  (1/30/2025)    Housing Stability     Do you have housing? : Yes     Are you worried about losing your housing?: No

## 2025-05-06 ENCOUNTER — OFFICE VISIT (OUTPATIENT)
Dept: DERMATOLOGY | Facility: CLINIC | Age: 60
End: 2025-05-06
Attending: FAMILY MEDICINE
Payer: COMMERCIAL

## 2025-05-06 DIAGNOSIS — L65.9 HAIR LOSS: ICD-10-CM

## 2025-05-06 ASSESSMENT — PAIN SCALES - GENERAL: PAINLEVEL_OUTOF10: NO PAIN (0)

## 2025-05-06 NOTE — PROGRESS NOTES
Lyndsey Alejo is a pleasant 60 year old year old female patient here today for hair loss. She notes hair loss for the past at least 8 months. She has ESRD, awaiting a transplant. She is on ferritin, vit D. She reports that her hair has been thinning, no increased shedding. She started minoxidil last months, she noticed it has been helping, she has been consistently lately.  Patient has no other skin complaints today.  Remainder of the HPI, Meds, PMH, Allergies, FH, and SH was reviewed in chart.    Past Medical History:   Diagnosis Date    Anemia in chronic kidney disease     Chewing tobacco use     ESRD (end stage renal disease) on dialysis (H)     dialysis start date 11/28/2021 per 2728 form    Hypertension     Poor dentition     Secondary renal hyperparathyroidism     Type 2 diabetes mellitus (H)        Past Surgical History:   Procedure Laterality Date    BACK SURGERY      COLONOSCOPY N/A 5/9/2024    Procedure: Colonoscopy;  Surgeon: Jahaira Shields MD;  Location: [facility]    IR DIALYSIS FISTULOGRAM RIGHT  4/4/2023        Family History   Problem Relation Age of Onset    Breast Cancer Cousin     Coronary Artery Disease No family hx of     Diabetes No family hx of     Hypertension No family hx of        Social History     Socioeconomic History    Marital status: Single     Spouse name: Not on file    Number of children: Not on file    Years of education: Not on file    Highest education level: Not on file   Occupational History    Not on file   Tobacco Use    Smoking status: Never     Passive exposure: Never    Smokeless tobacco: Current     Types: Chew     Last attempt to quit: 04/2023    Tobacco comments:     Chewing tobacco    Vaping Use    Vaping status: Never Used   Substance and Sexual Activity    Alcohol use: Never    Drug use: Never    Sexual activity: Not on file   Other Topics Concern    Not on file   Social History Narrative    Not on file     Social Drivers of Health     Financial Resource Strain:  Low Risk  (9/24/2024)    Financial Resource Strain     Within the past 12 months, have you or your family members you live with been unable to get utilities (heat, electricity) when it was really needed?: No   Food Insecurity: Low Risk  (9/24/2024)    Food Insecurity     Within the past 12 months, did you worry that your food would run out before you got money to buy more?: No     Within the past 12 months, did the food you bought just not last and you didn t have money to get more?: No   Transportation Needs: Low Risk  (9/24/2024)    Transportation Needs     Within the past 12 months, has lack of transportation kept you from medical appointments, getting your medicines, non-medical meetings or appointments, work, or from getting things that you need?: No   Physical Activity: Unknown (9/24/2024)    Exercise Vital Sign     Days of Exercise per Week: 3 days     Minutes of Exercise per Session: Not on file   Stress: No Stress Concern Present (9/24/2024)    [employer] - Occupational Stress Questionnaire     Feeling of Stress : Not at all   Social Connections: Unknown (9/24/2024)    Social Connection and Isolation Panel [NHANES]     Frequency of Communication with Friends and Family: Not on file     Frequency of Social Gatherings with Friends and Family: Once a week     Attends Sikh Services: Not on file     Active Member of Clubs or Organizations: Not on file     Attends Club or Organization Meetings: Not on file     Marital Status: Not on file   Interpersonal Safety: Low Risk  (12/13/2024)    Interpersonal Safety     Do you feel physically and emotionally safe where you currently live?: Yes     Within the past 12 months, have you been hit, slapped, kicked or otherwise physically hurt by someone?: No     Within the past 12 months, have you been humiliated or emotionally abused in other ways by your partner or ex-partner?: No   Housing Stability: Low Risk  (9/24/2024)    Housing  Stability     Do you have housing? : Yes     Are you worried about losing your housing?: No       Outpatient Encounter Medications as of 5/6/2025   Medication Sig Dispense Refill    acetaminophen (TYLENOL) 325 MG tablet Take 650 mg by mouth every 6 hours as needed for mild pain      amLODIPine (NORVASC) 10 MG tablet TAKE 1 TABLET (10 MG) BY MOUTH DAILY AT 2 PM 30 tablet 3    atorvastatin (LIPITOR) 40 MG tablet Take 1 tablet (40 mg) by mouth daily. 30 tablet 3    calcium acetate (PHOSLO) 667 MG CAPS capsule TAKE 1 CAPSULE (667 MG) BY MOUTH 3 TIMES DAILY (WITH MEALS) 90 capsule 3    famotidine (PEPCID) 20 MG tablet TAKE 1 TABLET BY MOUTH TWICE DAILY 60 tablet 11    FEROSUL 325 (65 Fe) MG tablet TAKE 1 TABLET BY MOUTH ONE TIME EACH DAY WITH BREAKFAST 30 tablet 4    lisinopril (ZESTRIL) 10 MG tablet Take 1 tablet by mouth daily.      losartan (COZAAR) 25 MG tablet Take 1 tablet by mouth daily.      meclizine (ANTIVERT) 25 MG tablet       metoprolol tartrate (LOPRESSOR) 50 MG tablet Take 1 tablet (50 mg) by mouth 2 times daily 180 tablet 3    minoxidil (ROGAINE) 5 % external solution Apply topically daily. (Patient not taking: Reported on 5/6/2025) 120 mL 3    naproxen (NAPROSYN) 500 MG tablet       pantoprazole (PROTONIX) 40 MG EC tablet Take 1 tablet by mouth daily.      vitamin D3 (CHOLECALCIFEROL) 50 mcg (2000 units) tablet        No facility-administered encounter medications on file as of 5/6/2025.             O:   NAD, WDWN, Alert & Oriented, Mood & Affect wnl, Vitals stable   Here today her daughter    LMP  (LMP Unknown)    General appearance normal   Vitals stable   Alert, oriented and in no acute distress      Thinning throughout, mostly noticed on frontal scalp, no scarring       Eyes: Conjunctivae/lids:Normal     ENT: Lips: normal    MSK:Normal    Pulm: Breathing Normal    Neuro/Psych: Orientation:Alert and Orientedx3 ; Mood/Affect:normal   A/P:  1. Non scarring alopecia  Chronic telogen effluvium vs  Androgenetic alopecia  Patient has seen improvement with minoxidil after one month of use. I would continue minoxidil 5% once daily to scalp.   Would avoid orals at time, she is already on a few blood pressure medications.   Could consider finasteride in the future.   It was a pleasure speaking to Lyndsey Alejo today.

## 2025-05-06 NOTE — NURSING NOTE
Chief Complaint   Patient presents with    Hair/Scalp Problem     Hair thinning, no burning or itching        There were no vitals filed for this visit.  Wt Readings from Last 1 Encounters:   03/25/25 43.2 kg (95 lb 3 oz)       Sharmin Ceron LPN .................5/6/2025

## 2025-05-06 NOTE — LETTER
5/6/2025      Lyndsey Alejo  [address]  [facility] [address]      Dear Colleague,    Thank you for referring your patient, Lyndsey Alejo, to the [facility]. Please see a copy of my visit note below.    Lyndsey Alejo is a pleasant 60 year old year old female patient here today for hair loss. She notes hair loss for the past at least 8 months. She has ESRD, awaiting a transplant. She is on ferritin, vit D. She reports that her hair has been thinning, no increased shedding. She started minoxidil last months, she noticed it has been helping, she has been consistently lately.  Patient has no other skin complaints today.  Remainder of the HPI, Meds, PMH, Allergies, FH, and SH was reviewed in chart.    Past Medical History:   Diagnosis Date     Anemia in chronic kidney disease      Chewing tobacco use      ESRD (end stage renal disease) on dialysis (H)     dialysis start date 11/28/2021 per 2728 form     Hypertension      Poor dentition      Secondary renal hyperparathyroidism      Type 2 diabetes mellitus (H)        Past Surgical History:   Procedure Laterality Date     BACK SURGERY       COLONOSCOPY N/A 5/9/2024    Procedure: Colonoscopy;  Surgeon: Jahaira Shields MD;  Location: [facility]     IR DIALYSIS FISTULOGRAM RIGHT  4/4/2023        Family History   Problem Relation Age of Onset     Breast Cancer Cousin      Coronary Artery Disease No family hx of      Diabetes No family hx of      Hypertension No family hx of        Social History     Socioeconomic History     Marital status: Single     Spouse name: Not on file     Number of children: Not on file     Years of education: Not on file     Highest education level: Not on file   Occupational History     Not on file   Tobacco Use     Smoking status: Never     Passive exposure: Never     Smokeless tobacco: Current     Types: Chew     Last attempt to quit: 04/2023     Tobacco comments:     Chewing tobacco    Vaping Use     Vaping status: Never Used    Substance and Sexual Activity     Alcohol use: Never     Drug use: Never     Sexual activity: Not on file   Other Topics Concern     Not on file   Social History Narrative     Not on file     Social Drivers of Health     Financial Resource Strain: Low Risk  (9/24/2024)    Financial Resource Strain      Within the past 12 months, have you or your family members you live with been unable to get utilities (heat, electricity) when it was really needed?: No   Food Insecurity: Low Risk  (9/24/2024)    Food Insecurity      Within the past 12 months, did you worry that your food would run out before you got money to buy more?: No      Within the past 12 months, did the food you bought just not last and you didn t have money to get more?: No   Transportation Needs: Low Risk  (9/24/2024)    Transportation Needs      Within the past 12 months, has lack of transportation kept you from medical appointments, getting your medicines, non-medical meetings or appointments, work, or from getting things that you need?: No   Physical Activity: Unknown (9/24/2024)    Exercise Vital Sign      Days of Exercise per Week: 3 days      Minutes of Exercise per Session: Not on file   Stress: No Stress Concern Present (9/24/2024)    [place] citizen [place] [employer] - Occupational Stress Questionnaire      Feeling of Stress : Not at all   Social Connections: Unknown (9/24/2024)    Social Connection and Isolation Panel [NHANES]      Frequency of Communication with Friends and Family: Not on file      Frequency of Social Gatherings with Friends and Family: Once a week      Attends [employer] Services: Not on file      Active Member of Clubs or Organizations: Not on file      Attends Club or Organization Meetings: Not on file      Marital Status: Not on file   Interpersonal Safety: Low Risk  (12/13/2024)    Interpersonal Safety      Do you feel physically and emotionally safe where you currently live?: Yes      Within the past 12 months,  have you been hit, slapped, kicked or otherwise physically hurt by someone?: No      Within the past 12 months, have you been humiliated or emotionally abused in other ways by your partner or ex-partner?: No   Housing Stability: Low Risk  (9/24/2024)    Housing Stability      Do you have housing? : Yes      Are you worried about losing your housing?: No       Outpatient Encounter Medications as of 5/6/2025   Medication Sig Dispense Refill     acetaminophen (TYLENOL) 325 MG tablet Take 650 mg by mouth every 6 hours as needed for mild pain       amLODIPine (NORVASC) 10 MG tablet TAKE 1 TABLET (10 MG) BY MOUTH DAILY AT 2 PM 30 tablet 3     atorvastatin (LIPITOR) 40 MG tablet Take 1 tablet (40 mg) by mouth daily. 30 tablet 3     calcium acetate (PHOSLO) 667 MG CAPS capsule TAKE 1 CAPSULE (667 MG) BY MOUTH 3 TIMES DAILY (WITH MEALS) 90 capsule 3     famotidine (PEPCID) 20 MG tablet TAKE 1 TABLET BY MOUTH TWICE DAILY 60 tablet 11     FEROSUL 325 (65 Fe) MG tablet TAKE 1 TABLET BY MOUTH ONE TIME EACH DAY WITH BREAKFAST 30 tablet 4     lisinopril (ZESTRIL) 10 MG tablet Take 1 tablet by mouth daily.       losartan (COZAAR) 25 MG tablet Take 1 tablet by mouth daily.       meclizine (ANTIVERT) 25 MG tablet        metoprolol tartrate (LOPRESSOR) 50 MG tablet Take 1 tablet (50 mg) by mouth 2 times daily 180 tablet 3     minoxidil (ROGAINE) 5 % external solution Apply topically daily. (Patient not taking: Reported on 5/6/2025) 120 mL 3     naproxen (NAPROSYN) 500 MG tablet        pantoprazole (PROTONIX) 40 MG EC tablet Take 1 tablet by mouth daily.       vitamin D3 (CHOLECALCIFEROL) 50 mcg (2000 units) tablet        No facility-administered encounter medications on file as of 5/6/2025.             O:   NAD, WDWN, Alert & Oriented, Mood & Affect wnl, Vitals stable   Here today her daughter    LMP  (LMP Unknown)    General appearance normal   Vitals stable   Alert, oriented and in no acute distress      Thinning throughout, mostly  noticed on frontal scalp, no scarring       Eyes: Conjunctivae/lids:Normal     ENT: Lips: normal    MSK:Normal    Pulm: Breathing Normal    Neuro/Psych: Orientation:Alert and Orientedx3 ; Mood/Affect:normal   A/P:  1. Non scarring alopecia  Chronic telogen effluvium vs Androgenetic alopecia  Patient has seen improvement with minoxidil after one month of use. I would continue minoxidil 5% once daily to scalp.   Would avoid orals at time, she is already on a few blood pressure medications.   Could consider finasteride in the future.   It was a pleasure speaking to [facility] today.      Again, thank you for allowing me to participate in the care of your patient.        Sincerely,        Argenis Ross PA-C    Electronically signed

## 2025-05-07 ENCOUNTER — THERAPY VISIT (OUTPATIENT)
Dept: SPEECH THERAPY | Facility: REHABILITATION | Age: 60
End: 2025-05-07
Payer: COMMERCIAL

## 2025-05-07 DIAGNOSIS — R49.0 DYSPHONIA: Primary | ICD-10-CM

## 2025-05-07 PROCEDURE — 92524 BEHAVRAL QUALIT ANALYS VOICE: CPT | Mod: GN | Performed by: SPEECH-LANGUAGE PATHOLOGIST

## 2025-05-07 PROCEDURE — T1013 SIGN LANG/ORAL INTERPRETER: HCPCS

## 2025-05-07 NOTE — PROGRESS NOTES
"SPEECH LANGUAGE PATHOLOGY EVALUATION       Fall Risk Screen:  Have you fallen 2 or more times in the past year?: Yes  Have you fallen and had an injury in the past year?: No  Is patient receiving Physical Therapy Services?: No  Fall screen comments: Patient was evaluated by PT on 4/18/25.    Subjective        Presenting condition or subjective complaint: neck/throat pain  Patient is a 60 year old female who presents to evaluation with throat/neck pain concerns that impact her at rest and with speech.    Patient participated in OP VFSS on 5/10/25 noting, \"Patient presents with no significant oral pharyngeal dysphagia. Overall function and structure is WNL.She demonstrates oral holding and piecemeal swallows that appear to be intentional as she anticipates pain with the swallow. She also winces during some swallows and touches the right side of her neck just lateral to her larynx. Once swallow is triggered there is good tongue back retraction and hyolaryngeal movement. Epiglottis inverts to protect the airway. There is no residual pharyngeal stasis with any consistency. No laryngeal penetration or aspiration with any consistency. Upon AP view liquid boluses move symmetrically through the pyriform sinuses. Boluses move through cricopharyngeus without difficulty. There is trace amounts of barium coating the cervical esophagus with each swallow. \"    She was then seen by speech therapy as an outpatient on 12/18/24 for training regarding intake strategies, with a recommendation to see GI.    Patient was seen by GI on 2/24/25 with \"Overall in her case it seems like the majority of her problems are musculoskeletal related to the spinal abnormalities reported on her imaging. This is also evidenced by her normal swallow studies and upper endoscopy. To complete her swallow evaluation I will proceed with high-resolution esophageal motility. Meanwhile I will refer her to spine surgery for further evaluation management of her " "cervical spine pain. I will see the patient back in clinic and if manometry is nonrevealing and patient continues to have swallowing difficulties I may proceed with repeating an upper endoscopy to obtain proximal and distal esophageal biopsies and may consider empiric dilation for dysphagia although this would not be beneficial for her pain. With regards to pain with eating this could very well be due to her sacral spine abnormality however we could consider muscle tension dysphagia and consider therapy to target muscle tension dysphagia as well.\" Noted.      She has a spine referral in place but this does not appear to have been scheduled yet.    She also participated in an esophagram on 9/6/24 noting, \"Normal esophageal morphology without evidence of stricture, mass, or diverticulum. There is mild esophageal dysmotility in the lower esophagus with tertiary contractions/spasm (cine S2) that result in intraesophageal reflux. No hiatal hernia.   No spontaneous gastroesophageal reflux visualized.\"    On today's date, the patient reports the following:Onset of pain was 2-3 years ago.  She reported that when it first happened, she felt like there was a  little piece of metal  that she swallowed in her coffee.  Since onset, the pain has progressively gotten worse.  Patient reported she previously had severe pain with swallowing, but now  I eat everything and swallow everything normal- it doesn't bother me .      Patient expressed within scope of speech therapy, her priority was relating to her voice.  Patient reported instances with  severe pain  where she  can't talk or voice can't come out .  This occurs 1-2x per week.  She feels   a lump that moves up and down on my throat  with speech.  She also reported that  I can hear a bone cracking sound in there .        Date of onset: 12/18/25 (order date)    Relevant medical history: Pain at night or rest   Dates & types of surgery: January, stomach biopsy    Prior diagnostic " "imaging/testing results: MRI; Video fluoroscopic swallow study     Prior therapy history for the same diagnosis, illness or injury: Yes January, general phal therapy like walking and leg movements    Living Environment  Social support: With family members   Help at home: Self C[employer] (home health aide/personal care attendant, family, etc); Home management tasks (cooking, cleaning); Medication and/or finances; Assist for driving and community activities  Equipment owned: [employer]     Employment: No    Hobbies/Interests: bible study,go for walk    Patient goals for therapy: eat and swallow food well    Pain assessment: Patient reported 8/10 pain at rest.   She expressed that she no longer experiences pain with swallowing.  She feels that her pain in in her \"throat and nerve\".  Pain never fully goes away, but at times can be reduced or exacerbated.  When she has severe pain, she has difficulty producing speech.     Objective     PERSONAL RATING/VOICE USE  Avocational Voice Use: Standard vocal use  Patient description of current voice quality: painful, tight, at times unable to produce voice/get speech out  Describe the amount of typical non-work voice use: moderate    Describe the intensity of typical non-work voice use: moderate   Voice Handicap Index Short Form (VHI-10): 24/40     05/07/25 0700   VHI-10   My voice makes it difficult for people to hear me 0   People have difficulty understanding me in a noisy room 4   My voice difficulties restrict my personal and social life.  0   I feel left out of conversations because of my voice 0   My voice problem causes me to lose income 0   I feel as though I have to strain to produce voice 4   The clarity of my voice is unpredictable 4   My voice problem upsets me 4   My voice makes me feel handicapped 4   People ask, \"What's wrong with your voice?\" 4   VHI-10 24       COUGH/THROAT CLEAR  Cough/throat clear characteristics: not observed during evaluation   Cough/throat " "clear triggers in evaluation: NA     HYPERFUNCTION  Visible tension: neck      VOICE PROFILE DURING CONVERSATION (1 min monologue & paragraph reading)  Voice quality: raspy, phonation gap, strained   Voice quality severity rating continuum: 6 - mild  during conversation, moderate to severe during sustained phonation tasks  Breath control: tight   Voice Use/Effort: pinched/squeezed larynx, contraction of neck muscles, chin jut, throat push, observed during sustained phonation tasks   Pitch/Frequency Description: WFL during conversation, aphonic to high pitch during sustained phonation tasks   Volume: WFL   Volume severity rating continuum: 7 - WNL  Neuromuscular Control: voice arrests, during sustained phonation tasks   Neuromuscular Control severity rating continuum: 3 - moderate-severe during sustained phonation tasks  Resonance: WNL   Resonance severity rating continuum: 7 - WNL    VIBRATORY FUNCTION OF VOCAL FOLDS  Prolonged  ah  at mid pitch (sec): Patient attempted x2.  On initial attempt, patient unable to produce voice due to significant tension- able to expel air for 13 seconds with pauses.  On second attempt, patient able to sustain phonation for x8 seconds, with high pitch and phonation gaps every ~1 second.    MUCOSAL FUNCTION OF VOCAL FOLDS  S/Z ratio (___ sec/ ___sec): 5  (>1.4 is suggestive of VF pathology)   Vocal fold stiffness/swelling test: positive for stiffness/swelling     RESPIRATORY FUNCTION SCREENING  Longest prolonged  S  from S/Z ratio (# of sec): 7-18 seconds with significant tension noted.  Longest prolonged  S  characteristics: tension   Longest prolonged \"Z\": 3 seconds with significant tension    Assessment & Plan   CLINICAL IMPRESSIONS   Medical Diagnosis: Dysphonia (R49.0) Dysphagia, unspecified type (R13.10)    Treatment Diagnosis: dysphonia   Impression/Assessment: Pt is a 60 year old female with throat pain and voice complaints. The following significant findings have been " identified: impaired voicing, characterized by muscle tension during speech tasks, difficulty producing and sustaining phonation during structured tasks, and hoarse vocal quality.  Patient endorses 1-2x a week her pain is so significant that she cannot get her voice out.   Identified deficits interfere with their ability to communicate wants and needs and communicate within the home or community as compared to previous level of function.    Patient has seen many disciplines regarding throat pain- reports this is always present but fluctuates in severity.  Patient reported she no longer experiences pain with swallowing.  Per chart review, GI suspecting symptoms are musculoskeletal related- has referral for spine center but this has not yet been scheduled.  Follow-up with GI scheduled for 5/19/25.    Patient would benefit from ongoing care with voice specific speech therapist- SLP contacted therapist/department to coordinate transition of care.    PLAN OF CARE  Treatment Interventions: Voice    Prognosis to achieve stated therapy goals is good   Rehab potential is impacted by: current level of function, patient awareness of deficits, patient motivation, pending medical intervention, prior level of function    Long Term Goals:   SLP Goal 1  Goal Identifier: VHI-10  Goal Description: Patient will decrease VHI-10 score from a baseline of 24 by end of POC.  Rationale: To maximize functional communication within the home or community;To maximize the ability to communicate wants and needs within the home or community  Target Date: 08/04/25  SLP Goal 2  Goal Identifier: Laryngeal Tension  SLP Goal 3  Goal Identifier: Sustained 'ah'      Frequency of Treatment: weekly, fading to every other week  Duration of Treatment: 90 days     Recommended Referrals to Other Professionals: Recommend patient continue to address neck/throat pain and underlying cause per medical team recommendations (I.e., GI, ENT, musculoskeletal  referral)  Education Assessment:   Learner/Method: Patient;Family;Listening  Education Comments: Speech therapy scope of practice, vocal concerns r/t medical condition, respiration/phonation/articulation    Risks and benefits of evaluation/treatment have been explained.   Patient/Family/caregiver agrees with Plan of Care.     Evaluation Time:    Voice Minutes (84482): 45     Present: Yes: Language: Fernando, ID Number/Identifier: Herminia girard [facility]     Signing Clinician: Krysten Chan, SLP      [facility]                                                                                   OUTPATIENT SPEECH LANGUAGE PATHOLOGY      PLAN OF TREATMENT FOR OUTPATIENT REHABILITATION   Patient's Last Name, First Name, M.I.  Mio,    YOB: 1965   Provider's Name   [facility]   Medical Record No.  [id number]     Onset Date: 12/18/25 (order date) Start of Care Date: 05/07/25     Medical Diagnosis:  Dysphonia (R49.0) Dysphagia, unspecified type (R13.10)      SLP Treatment Diagnosis: dysphonia  Plan of Treatment  Frequency/Duration: weekly, fading to every other week  / 90 days     Certification date from 05/07/25   To 08/04/25          See note for plan of treatment details and functional goals     Krystne Chan, SLP                         I CERTIFY THE NEED FOR THESE SERVICES FURNISHED UNDER        THIS PLAN OF TREATMENT AND WHILE UNDER MY CARE     (Physician attestation of this document indicates review and certification of the therapy plan).              Referring Provider:  Narinder Jackson    Initial Assessment  See Epic Evaluation- 05/07/25

## 2025-05-28 ENCOUNTER — APPOINTMENT (OUTPATIENT)
Dept: CT IMAGING | Facility: HOSPITAL | Age: 60
End: 2025-05-28
Attending: EMERGENCY MEDICINE
Payer: COMMERCIAL

## 2025-05-28 ENCOUNTER — HOSPITAL ENCOUNTER (INPATIENT)
Facility: HOSPITAL | Age: 60
End: 2025-05-28
Attending: EMERGENCY MEDICINE | Admitting: INTERNAL MEDICINE
Payer: COMMERCIAL

## 2025-05-28 DIAGNOSIS — K52.9 COLITIS: ICD-10-CM

## 2025-05-28 DIAGNOSIS — K21.9 GASTROESOPHAGEAL REFLUX DISEASE WITHOUT ESOPHAGITIS: ICD-10-CM

## 2025-05-28 LAB
ALBUMIN SERPL BCG-MCNC: 4.1 G/DL (ref 3.5–5.2)
ALP SERPL-CCNC: 106 U/L (ref 40–150)
ALT SERPL W P-5'-P-CCNC: 19 U/L (ref 0–50)
ANION GAP SERPL CALCULATED.3IONS-SCNC: 19 MMOL/L (ref 7–15)
AST SERPL W P-5'-P-CCNC: 18 U/L (ref 0–45)
BASOPHILS # BLD AUTO: 0 10E3/UL (ref 0–0.2)
BASOPHILS NFR BLD AUTO: 0 %
BILIRUB DIRECT SERPL-MCNC: 0.13 MG/DL (ref 0–0.3)
BILIRUB SERPL-MCNC: 0.5 MG/DL
BUN SERPL-MCNC: 58.6 MG/DL (ref 6–20)
CALCIUM SERPL-MCNC: 9 MG/DL (ref 8.8–10.4)
CHLORIDE SERPL-SCNC: 95 MMOL/L (ref 98–107)
CREAT SERPL-MCNC: 10.81 MG/DL (ref 0.51–0.95)
CRP SERPL-MCNC: 23.8 MG/L
EGFRCR SERPLBLD CKD-EPI 2021: 4 ML/MIN/1.73M2
EOSINOPHIL # BLD AUTO: 0 10E3/UL (ref 0–0.7)
EOSINOPHIL NFR BLD AUTO: 1 %
ERYTHROCYTE [DISTWIDTH] IN BLOOD BY AUTOMATED COUNT: 14.9 % (ref 10–15)
GGT SERPL-CCNC: 29 U/L (ref 5–36)
GLUCOSE SERPL-MCNC: 114 MG/DL (ref 70–99)
HCO3 SERPL-SCNC: 19 MMOL/L (ref 22–29)
HCT VFR BLD AUTO: 35.4 % (ref 35–47)
HGB BLD-MCNC: 11.5 G/DL (ref 11.7–15.7)
IMM GRANULOCYTES # BLD: 0 10E3/UL
IMM GRANULOCYTES NFR BLD: 0 %
LACTATE SERPL-SCNC: 1.1 MMOL/L (ref 0.7–2)
LIPASE SERPL-CCNC: 41 U/L (ref 13–60)
LYMPHOCYTES # BLD AUTO: 0.7 10E3/UL (ref 0.8–5.3)
LYMPHOCYTES NFR BLD AUTO: 12 %
MCH RBC QN AUTO: 28.2 PG (ref 26.5–33)
MCHC RBC AUTO-ENTMCNC: 32.5 G/DL (ref 31.5–36.5)
MCV RBC AUTO: 87 FL (ref 78–100)
MONOCYTES # BLD AUTO: 0.7 10E3/UL (ref 0–1.3)
MONOCYTES NFR BLD AUTO: 12 %
NEUTROPHILS # BLD AUTO: 4.3 10E3/UL (ref 1.6–8.3)
NEUTROPHILS NFR BLD AUTO: 74 %
NRBC # BLD AUTO: 0 10E3/UL
NRBC BLD AUTO-RTO: 0 /100
PLATELET # BLD AUTO: 137 10E3/UL (ref 150–450)
POTASSIUM SERPL-SCNC: 4.6 MMOL/L (ref 3.4–5.3)
PROCALCITONIN SERPL IA-MCNC: 0.61 NG/ML
PROT SERPL-MCNC: 7 G/DL (ref 6.4–8.3)
RBC # BLD AUTO: 4.08 10E6/UL (ref 3.8–5.2)
SODIUM SERPL-SCNC: 133 MMOL/L (ref 135–145)
WBC # BLD AUTO: 5.7 10E3/UL (ref 4–11)

## 2025-05-28 PROCEDURE — 99223 1ST HOSP IP/OBS HIGH 75: CPT | Performed by: INTERNAL MEDICINE

## 2025-05-28 PROCEDURE — 258N000003 HC RX IP 258 OP 636: Performed by: EMERGENCY MEDICINE

## 2025-05-28 PROCEDURE — 99254 IP/OBS CNSLTJ NEW/EST MOD 60: CPT

## 2025-05-28 PROCEDURE — 99285 EMERGENCY DEPT VISIT HI MDM: CPT | Mod: 25

## 2025-05-28 PROCEDURE — 84145 PROCALCITONIN (PCT): CPT | Performed by: EMERGENCY MEDICINE

## 2025-05-28 PROCEDURE — 250N000011 HC RX IP 250 OP 636: Performed by: EMERGENCY MEDICINE

## 2025-05-28 PROCEDURE — 36415 COLL VENOUS BLD VENIPUNCTURE: CPT | Performed by: EMERGENCY MEDICINE

## 2025-05-28 PROCEDURE — 250N000013 HC RX MED GY IP 250 OP 250 PS 637: Performed by: EMERGENCY MEDICINE

## 2025-05-28 PROCEDURE — 74177 CT ABD & PELVIS W/CONTRAST: CPT

## 2025-05-28 PROCEDURE — 86140 C-REACTIVE PROTEIN: CPT | Performed by: EMERGENCY MEDICINE

## 2025-05-28 PROCEDURE — 250N000011 HC RX IP 250 OP 636: Performed by: INTERNAL MEDICINE

## 2025-05-28 PROCEDURE — 83605 ASSAY OF LACTIC ACID: CPT | Performed by: EMERGENCY MEDICINE

## 2025-05-28 PROCEDURE — 96360 HYDRATION IV INFUSION INIT: CPT

## 2025-05-28 PROCEDURE — 83690 ASSAY OF LIPASE: CPT | Performed by: EMERGENCY MEDICINE

## 2025-05-28 PROCEDURE — 120N000001 HC R&B MED SURG/OB

## 2025-05-28 PROCEDURE — 87040 BLOOD CULTURE FOR BACTERIA: CPT | Performed by: EMERGENCY MEDICINE

## 2025-05-28 PROCEDURE — 82977 ASSAY OF GGT: CPT | Performed by: EMERGENCY MEDICINE

## 2025-05-28 PROCEDURE — 250N000013 HC RX MED GY IP 250 OP 250 PS 637: Performed by: INTERNAL MEDICINE

## 2025-05-28 PROCEDURE — 82248 BILIRUBIN DIRECT: CPT | Performed by: EMERGENCY MEDICINE

## 2025-05-28 PROCEDURE — 80053 COMPREHEN METABOLIC PANEL: CPT | Performed by: EMERGENCY MEDICINE

## 2025-05-28 PROCEDURE — 85025 COMPLETE CBC W/AUTO DIFF WBC: CPT | Performed by: EMERGENCY MEDICINE

## 2025-05-28 RX ORDER — AMOXICILLIN 250 MG
2 CAPSULE ORAL 2 TIMES DAILY PRN
Status: DISCONTINUED | OUTPATIENT
Start: 2025-05-28 | End: 2025-05-31 | Stop reason: HOSPADM

## 2025-05-28 RX ORDER — METOPROLOL TARTRATE 100 MG/1
100 TABLET ORAL 2 TIMES DAILY
COMMUNITY

## 2025-05-28 RX ORDER — PIPERACILLIN SODIUM, TAZOBACTAM SODIUM 2; .25 G/10ML; G/10ML
2.25 INJECTION, POWDER, LYOPHILIZED, FOR SOLUTION INTRAVENOUS EVERY 8 HOURS
Status: DISCONTINUED | OUTPATIENT
Start: 2025-05-28 | End: 2025-05-29

## 2025-05-28 RX ORDER — AMLODIPINE BESYLATE 5 MG/1
10 TABLET ORAL ONCE
Status: COMPLETED | OUTPATIENT
Start: 2025-05-28 | End: 2025-05-28

## 2025-05-28 RX ORDER — METOPROLOL TARTRATE 25 MG/1
100 TABLET, FILM COATED ORAL 2 TIMES DAILY
Status: DISCONTINUED | OUTPATIENT
Start: 2025-05-28 | End: 2025-05-31 | Stop reason: HOSPADM

## 2025-05-28 RX ORDER — ACETAMINOPHEN 325 MG/1
650 TABLET ORAL EVERY 6 HOURS PRN
Status: DISCONTINUED | OUTPATIENT
Start: 2025-05-28 | End: 2025-05-31 | Stop reason: HOSPADM

## 2025-05-28 RX ORDER — VITAMIN B COMPLEX
25 TABLET ORAL DAILY
Status: DISCONTINUED | OUTPATIENT
Start: 2025-05-29 | End: 2025-05-31 | Stop reason: HOSPADM

## 2025-05-28 RX ORDER — PIPERACILLIN SODIUM, TAZOBACTAM SODIUM 3; .375 G/15ML; G/15ML
3.38 INJECTION, POWDER, LYOPHILIZED, FOR SOLUTION INTRAVENOUS ONCE
Status: COMPLETED | OUTPATIENT
Start: 2025-05-28 | End: 2025-05-28

## 2025-05-28 RX ORDER — MECLIZINE HYDROCHLORIDE 25 MG/1
25 TABLET ORAL 3 TIMES DAILY PRN
Status: DISCONTINUED | OUTPATIENT
Start: 2025-05-28 | End: 2025-05-31 | Stop reason: HOSPADM

## 2025-05-28 RX ORDER — FERROUS SULFATE 325(65) MG
325 TABLET ORAL DAILY
Status: DISCONTINUED | OUTPATIENT
Start: 2025-05-29 | End: 2025-05-31 | Stop reason: HOSPADM

## 2025-05-28 RX ORDER — AMLODIPINE BESYLATE 5 MG/1
10 TABLET ORAL DAILY
Status: DISCONTINUED | OUTPATIENT
Start: 2025-05-29 | End: 2025-05-31 | Stop reason: HOSPADM

## 2025-05-28 RX ORDER — HYDRALAZINE HYDROCHLORIDE 20 MG/ML
10 INJECTION INTRAMUSCULAR; INTRAVENOUS EVERY 6 HOURS PRN
Status: DISCONTINUED | OUTPATIENT
Start: 2025-05-28 | End: 2025-05-31 | Stop reason: HOSPADM

## 2025-05-28 RX ORDER — LOSARTAN POTASSIUM 25 MG/1
25 TABLET ORAL ONCE
Status: COMPLETED | OUTPATIENT
Start: 2025-05-28 | End: 2025-05-28

## 2025-05-28 RX ORDER — ATORVASTATIN CALCIUM 40 MG/1
40 TABLET, FILM COATED ORAL EVERY EVENING
Status: DISCONTINUED | OUTPATIENT
Start: 2025-05-28 | End: 2025-05-31 | Stop reason: HOSPADM

## 2025-05-28 RX ORDER — FAMOTIDINE 20 MG/1
20 TABLET, FILM COATED ORAL EVERY OTHER DAY
Status: DISCONTINUED | OUTPATIENT
Start: 2025-05-29 | End: 2025-05-31 | Stop reason: HOSPADM

## 2025-05-28 RX ORDER — LIDOCAINE 40 MG/G
CREAM TOPICAL
Status: DISCONTINUED | OUTPATIENT
Start: 2025-05-28 | End: 2025-05-31 | Stop reason: HOSPADM

## 2025-05-28 RX ORDER — LISINOPRIL 5 MG/1
10 TABLET ORAL DAILY
Status: DISCONTINUED | OUTPATIENT
Start: 2025-05-28 | End: 2025-05-31

## 2025-05-28 RX ORDER — PANTOPRAZOLE SODIUM 40 MG/1
40 TABLET, DELAYED RELEASE ORAL DAILY
Status: DISCONTINUED | OUTPATIENT
Start: 2025-05-29 | End: 2025-05-31 | Stop reason: HOSPADM

## 2025-05-28 RX ORDER — AMOXICILLIN 250 MG
1 CAPSULE ORAL 2 TIMES DAILY PRN
Status: DISCONTINUED | OUTPATIENT
Start: 2025-05-28 | End: 2025-05-31 | Stop reason: HOSPADM

## 2025-05-28 RX ORDER — LOSARTAN POTASSIUM 25 MG/1
25 TABLET ORAL DAILY
Status: DISCONTINUED | OUTPATIENT
Start: 2025-05-29 | End: 2025-05-31 | Stop reason: HOSPADM

## 2025-05-28 RX ORDER — ACETAMINOPHEN 325 MG/1
650 TABLET ORAL ONCE
Status: COMPLETED | OUTPATIENT
Start: 2025-05-28 | End: 2025-05-28

## 2025-05-28 RX ORDER — CALCIUM CARBONATE 500 MG/1
1000 TABLET, CHEWABLE ORAL 4 TIMES DAILY PRN
Status: DISCONTINUED | OUTPATIENT
Start: 2025-05-28 | End: 2025-05-31 | Stop reason: HOSPADM

## 2025-05-28 RX ORDER — CALCIUM ACETATE 667 MG/1
667 CAPSULE ORAL
Status: DISCONTINUED | OUTPATIENT
Start: 2025-05-28 | End: 2025-05-31 | Stop reason: HOSPADM

## 2025-05-28 RX ORDER — HYDRALAZINE HYDROCHLORIDE 20 MG/ML
10 INJECTION INTRAMUSCULAR; INTRAVENOUS EVERY 6 HOURS PRN
Status: DISCONTINUED | OUTPATIENT
Start: 2025-05-28 | End: 2025-05-28

## 2025-05-28 RX ORDER — IOPAMIDOL 755 MG/ML
46 INJECTION, SOLUTION INTRAVASCULAR ONCE
Status: COMPLETED | OUTPATIENT
Start: 2025-05-28 | End: 2025-05-28

## 2025-05-28 RX ADMIN — HYDRALAZINE HYDROCHLORIDE 10 MG: 20 INJECTION INTRAMUSCULAR; INTRAVENOUS at 14:34

## 2025-05-28 RX ADMIN — ACETAMINOPHEN 650 MG: 325 TABLET ORAL at 17:28

## 2025-05-28 RX ADMIN — SODIUM CHLORIDE 500 ML: 0.9 INJECTION, SOLUTION INTRAVENOUS at 08:34

## 2025-05-28 RX ADMIN — IOPAMIDOL 46 ML: 755 INJECTION, SOLUTION INTRAVENOUS at 09:49

## 2025-05-28 RX ADMIN — PIPERACILLIN AND TAZOBACTAM 2.25 G: 2; .25 INJECTION, POWDER, FOR SOLUTION INTRAVENOUS at 21:21

## 2025-05-28 RX ADMIN — ATORVASTATIN CALCIUM 40 MG: 40 TABLET, FILM COATED ORAL at 21:21

## 2025-05-28 RX ADMIN — PIPERACILLIN AND TAZOBACTAM 3.38 G: 3; .375 INJECTION, POWDER, FOR SOLUTION INTRAVENOUS at 11:42

## 2025-05-28 RX ADMIN — CALCIUM ACETATE 667 MG: 667 CAPSULE ORAL at 17:33

## 2025-05-28 RX ADMIN — METOPROLOL TARTRATE 100 MG: 25 TABLET, FILM COATED ORAL at 21:21

## 2025-05-28 RX ADMIN — ACETAMINOPHEN 650 MG: 325 TABLET ORAL at 08:26

## 2025-05-28 RX ADMIN — AMLODIPINE BESYLATE 10 MG: 5 TABLET ORAL at 09:20

## 2025-05-28 RX ADMIN — LOSARTAN POTASSIUM 25 MG: 25 TABLET, FILM COATED ORAL at 09:20

## 2025-05-28 RX ADMIN — LISINOPRIL 10 MG: 5 TABLET ORAL at 15:46

## 2025-05-28 ASSESSMENT — ACTIVITIES OF DAILY LIVING (ADL)
ADLS_ACUITY_SCORE: 44
ADLS_ACUITY_SCORE: 44
ADLS_ACUITY_SCORE: 23
ADLS_ACUITY_SCORE: 50
ADLS_ACUITY_SCORE: 50
ADLS_ACUITY_SCORE: 23
ADLS_ACUITY_SCORE: 23
ADLS_ACUITY_SCORE: 44
ADLS_ACUITY_SCORE: 23
ADLS_ACUITY_SCORE: 24
ADLS_ACUITY_SCORE: 23
ADLS_ACUITY_SCORE: 23
ADLS_ACUITY_SCORE: 50
ADLS_ACUITY_SCORE: 23
ADLS_ACUITY_SCORE: 44
ADLS_ACUITY_SCORE: 50

## 2025-05-28 ASSESSMENT — COLUMBIA-SUICIDE SEVERITY RATING SCALE - C-SSRS
1. IN THE PAST MONTH, HAVE YOU WISHED YOU WERE DEAD OR WISHED YOU COULD GO TO SLEEP AND NOT WAKE UP?: NO
2. HAVE YOU ACTUALLY HAD ANY THOUGHTS OF KILLING YOURSELF IN THE PAST MONTH?: NO
6. HAVE YOU EVER DONE ANYTHING, STARTED TO DO ANYTHING, OR PREPARED TO DO ANYTHING TO END YOUR LIFE?: NO

## 2025-05-28 NOTE — CONSULTS
RENAL CONSULT NOTE    REQUESTING PHYSICIAN:Reece Martinez MBBS     REASON FOR CONSULT:ESRD on HD    ASSESSMENT/PLAN:    ESRD on HD:  Q MWF at [facility] under the care of Dr. Jang. Will plan for HD tomorrow, and continue TTS HD schedule while here.     MILD acidosis: Co2 19, follow with HD     Mild Hyponatremia:  likely hypervolemic with possible some excess GI losses    HTN:  on Amlodipine and Metoprolol. Resume PTA Meds. Control pain. Hydralazine PRNs.     Anemia: 2/2 ACD. HG 11.5, follow. on NELLIE as OP, follow need while here.     CKD-MBD: +Secondary hyperpara, on phoslo as binder    HTN: resume PTA meds. PRNS. Control pain    HLD: statin, ASA    ABD pain/FEver/Diarrhea: CT ABD/Pelvis showed severe colitis, in prox colon. Hx of colitis frm campylobacter, ?infectious colitis suspected. On IV Zosyn, anti-emetics, and antipyretics Management per [facility].     HPI: 59-YOF w/PMH ESRD on MWF HD, DM,  HTN, HLD and anemia presented to the ER s/p Dialysis for c/O ABD pain, fever, diarrhea. Renal consulted for ESRD on HD status.     PT resting in bed, appears comfortable  Daughter at bedside, interpreting Karenni  Pain controlled when laying still  BP sub optimal, utilize PRNS  Will plan for HD TTS while Here, HD in AM  Missed HD today  Last HD was Monday  Lytes, acid/base, MBD labs, volume status stable  Discussed labs/plan and answered all questions   Discussed with HD RN    REVIEW OF SYSTEMS:  Complete 12 point review of systems was negative other than those noted in the HPI      Past Medical History:   Diagnosis Date    Anemia in chronic kidney disease     Chewing tobacco use     ESRD (end stage renal disease) on dialysis (H)     dialysis start date 11/28/2021 per 2728 form    Hypertension     Poor dentition     Secondary renal hyperparathyroidism     Type 2 diabetes mellitus (H)        Current Facility-Administered Medications   Medication Dose Route Frequency Provider Last Rate Last Admin    acetaminophen  (TYLENOL) tablet 650 mg  650 mg Oral Q6H PRN LesleyiReece B, MBBS        [START ON 5/29/2025] amLODIPine (NORVASC) tablet 10 mg  10 mg Oral Daily LesleyiReece B, MBBS        atorvastatin (LIPITOR) tablet 40 mg  40 mg Oral QPM LesleyiReece B, MBBS        calcium acetate (PHOSLO) capsule 667 mg  667 mg Oral TID w/meals Reece Martinez B, MBBS        calcium carbonate (TUMS) chewable tablet 1,000 mg  1,000 mg Oral 4x Daily PRN Reece Martinez B, MBBS        [START ON 5/29/2025] famotidine (PEPCID) tablet 20 mg  20 mg Oral Every Other Day Reece Martinez B, MBBS        [START ON 5/29/2025] ferrous sulfate (FEROSUL) tablet 325 mg  325 mg Oral Daily Reece Martinez B, MBBS        hydrALAZINE (APRESOLINE) injection 10 mg  10 mg Intravenous Q6H PRN Reece Martinez B, MBBS   10 mg at 05/28/25 1434    lidocaine (LMX4) cream   Topical Q1H PRN Reece Martinez B, MBBS        lidocaine 1 % 0.1-1 mL  0.1-1 mL Other Q1H PRN Reece Martinez B, MBBS        lisinopril (ZESTRIL) tablet 10 mg  10 mg Oral Daily LesleyiReece B, MBBS        [START ON 5/29/2025] losartan (COZAAR) tablet 25 mg  25 mg Oral Daily Reece Martinez B, MBBS        meclizine (ANTIVERT) tablet 25 mg  25 mg Oral TID PRN Reece Martinez B, MBBS        metoprolol tartrate (LOPRESSOR) tablet 100 mg  100 mg Oral BID Reece Martinez B, MBBS        [START ON 5/29/2025] pantoprazole (PROTONIX) EC tablet 40 mg  40 mg Oral Daily LesleyiReece B, MBBS        piperacillin-tazobactam (ZOSYN) 2.25 g vial to attach to  ml bag  2.25 g Intravenous Q8H Reece Martinez B, MBBS        senna-docusate (SENOKOT-S/PERICOLACE) 8.6-50 MG per tablet 1 tablet  1 tablet Oral BID PRReece Wright MBBS        Or    senna-docusate (SENOKOT-S/PERICOLACE) 8.6-50 MG per tablet 2 tablet  2 tablet Oral BID PRReece Wright MBBS        sodium chloride (PF) 0.9% PF flush 3 mL  3 mL Intracatheter Q8H Reece Small MBBS   3 mL at 05/28/25 1433    sodium chloride  (PF) 0.9% PF flush 3 mL  3 mL Intracatheter q1 min prn Reece Martinez MBBS        [START ON 5/29/2025] Vitamin D3 (CHOLECALCIFEROL) tablet 25 mcg  25 mcg Oral Daily Reece Martinez MBBS           No current outpatient medications on file.      ALLERGIES/SENSITIVITIES:  No Known Allergies  Social History     Tobacco Use    Smoking status: Never     Passive exposure: Never    Smokeless tobacco: Current     Types: Chew     Last attempt to quit: 04/2023    Tobacco comments:     Chewing tobacco    Vaping Use    Vaping status: Never Used   Substance Use Topics    Alcohol use: Never    Drug use: Never     I have reviewed this patient's family history and updated it with pertinent information if needed.  Family History   Problem Relation Age of Onset    Breast Cancer Cousin     Coronary Artery Disease No family hx of     Diabetes No family hx of     Hypertension No family hx of          PHYSICAL EXAM:  Physical Exam   Temp: 98.8  F (37.1  C) Temp src: Oral BP: (!) 193/88 Pulse: 82   Resp: 14 SpO2: 98 % O2 Device: None (Room air)    Vitals:    05/28/25 0749   Weight: 43.1 kg (95 lb)     Vital Signs with Ranges  Temp:  [98.6  F (37  C)-99.8  F (37.7  C)] 98.8  F (37.1  C)  Pulse:  [64-83] 82  Resp:  [14-16] 14  BP: (171-225)/(78-95) 193/88  SpO2:  [96 %-99 %] 98 %  No intake/output data recorded.    Patient Vitals for the past 72 hrs:   Weight   05/28/25 0749 43.1 kg (95 lb)       GEN: NAD, thin/frail, Karenni speaking  CV: RRR , no JVD  Lung: clear and equal, on RA  Ab: tender to palpation  Ext: + edema and well perfused  Skin: no rash  Psych: cooperative  Neuro: No asterixis, NFD  Access: c/d/i      Laboratory:     Recent Labs   Lab 05/28/25  0833   WBC 5.7   RBC 4.08   HGB 11.5*   HCT 35.4   *       Basic Metabolic Panel:  Recent Labs   Lab 05/28/25  0833   *   POTASSIUM 4.6   CHLORIDE 95*   CO2 19*   BUN 58.6*   CR 10.81*   *   CAMRON 9.0       INRNo lab results found in last 7 days.    Recent Labs    Lab Test 05/28/25  0833 04/25/25  0921 04/25/25  0609 12/13/24  1440   POTASSIUM 4.6  --   --  4.2   CHLORIDE 95*  --   --  96*   BUN 58.6* 9.4   < > 13.4    < > = values in this interval not displayed.      Recent Labs   Lab Test 05/28/25  0833 12/13/24  1440   ALBUMIN 4.1 4.5   BILITOTAL 0.5 0.4   ALT 19 7   AST 18 17       Personally reviewed today's laboratory studies      Thank you for involving us in the care of this patient. We will continue to follow along with you.      Janet Rivera [facility]  [facility]  [phone]

## 2025-05-28 NOTE — PLAN OF CARE
Problem: Adult Inpatient Plan of Care  Goal: Plan of Care Review  Outcome: Progressing  Flowsheets (Taken 5/28/2025 1840)  Plan of Care Reviewed With: patient  Overall Patient Progress: improving  Goal: Patient-Specific Goal (Individualized)  Outcome: Progressing  Goal: Absence of Hospital-Acquired Illness or Injury  Outcome: Progressing  Intervention: Identify and Manage Fall Risk  Recent Flowsheet Documentation  Taken 5/28/2025 1552 by Adegun, Oluwadamilola, RN  Safety Promotion/Fall Prevention:   activity supervised   assistive device/personal items within reach  Intervention: Prevent Skin Injury  Recent Flowsheet Documentation  Taken 5/28/2025 1552 by Adegun, Oluwadamilola, RN  Body Position: position changed independently  Goal: Optimal Comfort and Wellbeing  Outcome: Progressing  Goal: Readiness for Transition of Care  Outcome: Progressing  Intervention: Mutually Develop Transition Plan  Recent Flowsheet Documentation  Taken 5/28/2025 1500 by Adegun, Oluwadamilola, RN  Equipment Currently Used at Home: cane, straight     Problem: Chronic Kidney Disease  Goal: Optimal Coping with Chronic Illness  Outcome: Progressing  Goal: Electrolyte Balance  Outcome: Progressing  Goal: Fluid Balance  Outcome: Progressing  Goal: Optimal Functional Ability  Outcome: Progressing  Intervention: Optimize Functional Ability  Recent Flowsheet Documentation  Taken 5/28/2025 1552 by Adegun, Oluwadamilola, RN  Activity Management: activity adjusted per tolerance  Goal: Absence of Anemia Signs and Symptoms  Outcome: Progressing  Goal: Optimal Oral Intake  Outcome: Progressing  Goal: Acceptable Pain Control  Outcome: Progressing  Goal: Minimize Renal Failure Effects  Outcome: Progressing     Problem: Comorbidity Management  Goal: Blood Glucose Levels Within Targeted Range  Outcome: Progressing  Goal: Blood Pressure in Desired Range  Outcome: Progressing   Goal Outcome Evaluation: Pt alert and oriented, Scott speaking, daughter at  bedside. Pt on enteric precautions to rule out c-diff, stool sample pending. BP still elevated, scheduled meds given, MD notified, continue to monitor.      Plan of Care Reviewed With: patient    Overall Patient Progress: improvingOverall Patient Progress: improving

## 2025-05-28 NOTE — ED TRIAGE NOTES
Patient brought in by her daughter.  Patient stated feeling body aches and fatigue on Monday--yesterday she developed some diarrhea and stomach pains.  Diarrhea is yellow, not bloody--went to dialysis this am--they checked her temp and advised her ot go to the ED.

## 2025-05-28 NOTE — ED NOTES
"[facility] ED Handoff Report    ED Chief Complaint: Fever, abd pain     ED Diagnosis:  (K52.9) Colitis         PMH:    Past Medical History:   Diagnosis Date    Anemia in chronic kidney disease     Chewing tobacco use     ESRD (end stage renal disease) on dialysis (H)     dialysis start date 11/28/2021 per 2728 form    Hypertension     Poor dentition     Secondary renal hyperparathyroidism     Type 2 diabetes mellitus (H)         Code Status:  Prior     Falls Risk: No Band: Not applicable    Current Living Situation/Residence: lives in a house     Elimination Status: Continent: Yes     Activity Level: SBA    Patients Preferred Language:  [facility]     Needed: Yes Where is the patient located?    Vital Signs:  BP (!) 171/78   Pulse 66   Temp 98.6  F (37  C)   Resp 14   Ht 1.448 m (4' 9\")   Wt 43.1 kg (95 lb)   LMP  (LMP Unknown)   SpO2 97%   BMI 20.56 kg/m       Cardiac Rhythm: NA     Pain Score: 0/10    Is the Patient Confused:  No    Last Food or Drink: 05/28/25 at 0700    Focused Assessment:  Alert and oriented.  Currently denies abd pain. Pain meds helped earlier.  Diarrhea episodes x4 prior to coming in but nothing since being here.  Had similar infection about a year ago per family.     Tests Performed: Done: Labs and Imaging    Treatments Provided:  IV abx    Family Dynamics/Concerns: No    Family Updated On Visitor Policy: No    Plan of Care Communicated to Family: Yes    Who Was Updated about Plan of Care: family at bedside    Belongings Checklist Done and Signed by Patient: Yes    Medications sent with patient: NA    Covid: asymptomatic , NA     Additional Information: Dialysis patient.  Went to her appt today and was found to have a fever so did NOT complete her dialysis run today. On MWF schedule.     Tara Weems RN 5/28/2025 1:14 PM       "

## 2025-05-28 NOTE — PHARMACY-ADMISSION MEDICATION HISTORY
Pharmacist Admission Medication History    Admission medication history is complete. The information provided in this note is only as accurate as the sources available at the time of the update.    Information Source(s): Family member, Caregiver, Clinic records, and CareEverywhere/SureScripts via in-person    Pertinent Information:   Both lisinopril and losartan are listed on the F medication list. Family member stated that she thinks she takes both but when questioned she said she is unsure, she did recognize losartan as definitely taking. Both medications are being regularly filled. Please assess if both an ACE-I and ARB are indicated.   Naproxen was prescribed PRN - recommend discontinuing on discharge as it is not recommended in HD.  Recommend renally dosing famotidine if it needs to be continued on discharge. Since she is also on a PPI, is it necessary to take both?    Changes made to PTA medication list:  Added: None  Deleted: None  Changed: metoprolol from 50mg to 100mg (has 100mg filled and F list provided showed the 50mg as an inactive medication). Added directions to meclizine, vitamin D and naproxen    Allergies reviewed with patient caregiver and updates made in EHR: yes    Medication History Completed By: Sherie Santo RPH 5/28/2025 12:26 PM    PTA Med List   Medication Sig Note Last Dose/Taking    acetaminophen (TYLENOL) 325 MG tablet Take 650 mg by mouth every 6 hours as needed for mild pain  Unknown    amLODIPine (NORVASC) 10 MG tablet TAKE 1 TABLET (10 MG) BY MOUTH DAILY AT 2 PM  5/27/2025 at  2:00 PM    atorvastatin (LIPITOR) 40 MG tablet Take 1 tablet (40 mg) by mouth daily.  5/27/2025 Morning    calcium acetate (PHOSLO) 667 MG CAPS capsule Take 1 capsule (667 mg) by mouth 3 times daily (with meals).  5/27/2025 Evening    famotidine (PEPCID) 20 MG tablet TAKE 1 TABLET BY MOUTH TWICE DAILY 5/28/2025: Recommend renally reducing the dose to 10mg daily or 20mg every other day if continued on  discharge 5/27/2025 Evening    FEROSUL 325 (65 Fe) MG tablet TAKE 1 TABLET BY MOUTH ONE TIME EACH DAY WITH BREAKFAST  5/27/2025 Morning    lisinopril (ZESTRIL) 10 MG tablet Take 1 tablet by mouth daily. 5/28/2025: Both lisinopril and losartan are listed on the [facility] medication list. Family member stated that she thinks she takes both but is unsure, she did recognize losartan as definitely taking. Both medications are being regularly filled. Please assess 5/27/2025 Morning    losartan (COZAAR) 25 MG tablet Take 1 tablet by mouth daily.  5/27/2025 Morning    meclizine (ANTIVERT) 25 MG tablet Take 25 mg by mouth 3 times daily as needed.  Unknown    metoprolol tartrate (LOPRESSOR) 100 MG tablet Take 100 mg by mouth 2 times daily.  5/27/2025 Evening    minoxidil (ROGAINE) 5 % external solution Apply topically daily as needed.  Unknown    naproxen (NAPROSYN) 500 MG tablet Take 500 mg by mouth 2 times daily as needed. 5/28/2025: Not recommended due to HD. Consider discontinuing on discharge Unknown    pantoprazole (PROTONIX) 40 MG EC tablet Take 1 tablet by mouth daily.  5/27/2025 Morning    vitamin D3 (CHOLECALCIFEROL) 50 mcg (2000 units) tablet   5/27/2025 Morning

## 2025-05-28 NOTE — H&P
ADMISSION HISTORY & PHYSICAL    CODE STATUS:  Prior    Abhilash Cobb, [phone]    Patient YOB: 1965  Admit date: 5/28/2025  Date of Service: 5/28/2025    ASSESSMENT AND PLAN:  Patient is a 60 year old female with PMH significant for ESRD on HD, hypertension, hyperlipidemia who was sent to our ED from dialysis center for evaluation of abdominal pain, fever and diarrhea.    Abdominal pain, diarrhea, fever  -CT abdomen/pelvis showed mod to severe colitis, predominantly in the proximal colon.  -Given fever, and prior history of colitis from campylobacter, suspect this is infectious colitis  -Send stool for enteric pathogens and cdiff  -IV zosyn for now  -Anti-emetics and anti-pyretics prn    ESRD on HD  -Unable to do dialysis in the dialysis center today. Normally gets HD M/W/F.   -Doesn't look overtly vol up, K is normal. No urgent need for HD.   -Consult nephrology    Hypertension  Hyperlipidemia  -Cont home meds    Disposition:  -Anticipated Length of Stay in midnights and medical necessity (including a midnight in the Emergency Department after triage if applicable): >2      CHIEF COMPLAINT:  Abdominal pain, diarrhea, fever    HISTORY OF PRESENTING ILLNESS:  Patient is a 60 year old female with PMH significant for ESRD on HD, hypertension, DM-II who was sent to our ED from dialysis center for evaluation of abdominal pain, fever and diarrhea.    History obtained from the patient with the help of her daughter, who speaks fluent english.   Patient reports doing well up until 2-3 days ago when she started feeling unwell. Yesterday, she developed some abdominal pain. Its diffuse, intermittent, crampy in nature. Also noted few episodes of diarrhea yesterday. Patient went to the dialysis center for here scheduled HD today where she was noted to have a temp of 101F. They didn't start dialysis; was advised to go to ED for further evaluation. Patient reports she already had 4 bouts of diarrhea today. Denies  "any hematochezia or melena. Stools are \"yellowish\" in color. No nausea or vomiting. Endorses poor appetite. Doesn't recall eating anything unusual. Denies any sick contacts. No one in the family has diarrhea.    Patient reports having some cold symptoms for the last few days.     PMH/PSH:  Patient Active Problem List   Diagnosis    ESRD (end stage renal disease) on dialysis (H)    Primary hypertension    Type 2 diabetes mellitus with chronic kidney disease on chronic dialysis, without long-term current use of insulin (H)    Poor dentition    Chewing tobacco use    Anemia in chronic kidney disease    Secondary renal hyperparathyroidism    Cervical cancer screening    Neuropathy    Elevated erythrocyte sedimentation rate    Thyroid function test abnormal    Physical deconditioning    Fever, unspecified fever cause    Colitis       Past Medical History:   Diagnosis Date    Anemia in chronic kidney disease     Chewing tobacco use     ESRD (end stage renal disease) on dialysis (H)     dialysis start date 11/28/2021 per 2728 form    Hypertension     Poor dentition     Secondary renal hyperparathyroidism     Type 2 diabetes mellitus (H)         Past Surgical History:   Procedure Laterality Date    BACK SURGERY      COLONOSCOPY N/A 5/9/2024    Procedure: Colonoscopy;  Surgeon: Jahaira Shields MD;  Location:  GI    IR DIALYSIS FISTULOGRAM RIGHT  4/4/2023          ALLERGIES:  No Known Allergies    MEDICATIONS:  Reviewed.  No current facility-administered medications for this encounter.     Current Outpatient Medications   Medication Sig Dispense Refill    acetaminophen (TYLENOL) 325 MG tablet Take 650 mg by mouth every 6 hours as needed for mild pain      amLODIPine (NORVASC) 10 MG tablet TAKE 1 TABLET (10 MG) BY MOUTH DAILY AT 2 PM 30 tablet 3    atorvastatin (LIPITOR) 40 MG tablet Take 1 tablet (40 mg) by mouth daily. 30 tablet 3    calcium acetate (PHOSLO) 667 MG CAPS capsule Take 1 capsule (667 mg) by mouth " 3 times daily (with meals). 90 capsule 3    famotidine (PEPCID) 20 MG tablet TAKE 1 TABLET BY MOUTH TWICE DAILY 60 tablet 11    FEROSUL 325 (65 Fe) MG tablet TAKE 1 TABLET BY MOUTH ONE TIME EACH DAY WITH BREAKFAST 30 tablet 4    lisinopril (ZESTRIL) 10 MG tablet Take 1 tablet by mouth daily.      losartan (COZAAR) 25 MG tablet Take 1 tablet by mouth daily.      meclizine (ANTIVERT) 25 MG tablet Take 25 mg by mouth 3 times daily as needed.      metoprolol tartrate (LOPRESSOR) 100 MG tablet Take 100 mg by mouth 2 times daily.      minoxidil (ROGAINE) 5 % external solution Apply topically daily as needed.      naproxen (NAPROSYN) 500 MG tablet       pantoprazole (PROTONIX) 40 MG EC tablet Take 1 tablet by mouth daily.      vitamin D3 (CHOLECALCIFEROL) 50 mcg (2000 units) tablet        No current facility-administered medications for this encounter.    Current Outpatient Medications:     acetaminophen (TYLENOL) 325 MG tablet, Take 650 mg by mouth every 6 hours as needed for mild pain, Disp: , Rfl:     amLODIPine (NORVASC) 10 MG tablet, TAKE 1 TABLET (10 MG) BY MOUTH DAILY AT 2 PM, Disp: 30 tablet, Rfl: 3    atorvastatin (LIPITOR) 40 MG tablet, Take 1 tablet (40 mg) by mouth daily., Disp: 30 tablet, Rfl: 3    calcium acetate (PHOSLO) 667 MG CAPS capsule, Take 1 capsule (667 mg) by mouth 3 times daily (with meals)., Disp: 90 capsule, Rfl: 3    famotidine (PEPCID) 20 MG tablet, TAKE 1 TABLET BY MOUTH TWICE DAILY, Disp: 60 tablet, Rfl: 11    FEROSUL 325 (65 Fe) MG tablet, TAKE 1 TABLET BY MOUTH ONE TIME EACH DAY WITH BREAKFAST, Disp: 30 tablet, Rfl: 4    lisinopril (ZESTRIL) 10 MG tablet, Take 1 tablet by mouth daily., Disp: , Rfl:     losartan (COZAAR) 25 MG tablet, Take 1 tablet by mouth daily., Disp: , Rfl:     meclizine (ANTIVERT) 25 MG tablet, Take 25 mg by mouth 3 times daily as needed., Disp: , Rfl:     metoprolol tartrate (LOPRESSOR) 100 MG tablet, Take 100 mg by mouth 2 times daily., Disp: , Rfl:     minoxidil  (ROGAINE) 5 % external solution, Apply topically daily as needed., Disp: , Rfl:     naproxen (NAPROSYN) 500 MG tablet, , Disp: , Rfl:     pantoprazole (PROTONIX) 40 MG EC tablet, Take 1 tablet by mouth daily., Disp: , Rfl:     vitamin D3 (CHOLECALCIFEROL) 50 mcg (2000 units) tablet, , Disp: , Rfl:    Scheduled Meds:  No current facility-administered medications for this encounter.     Continuous Infusions:  No current facility-administered medications for this encounter.     PRN Meds:.  No current facility-administered medications for this encounter.       SOCIAL HISTORY:  Social History     Socioeconomic History    Marital status: Single     Spouse name: Not on file    Number of children: Not on file    Years of education: Not on file    Highest education level: Not on file   Occupational History    Not on file   Tobacco Use    Smoking status: Never     Passive exposure: Never    Smokeless tobacco: Current     Types: Chew     Last attempt to quit: 04/2023    Tobacco comments:     Chewing tobacco    Vaping Use    Vaping status: Never Used   Substance and Sexual Activity    Alcohol use: Never    Drug use: Never    Sexual activity: Not on file   Other Topics Concern    Not on file   Social History Narrative    Not on file     Social Drivers of Health     Financial Resource Strain: Low Risk  (9/24/2024)    Financial Resource Strain     Within the past 12 months, have you or your family members you live with been unable to get utilities (heat, electricity) when it was really needed?: No   Food Insecurity: Low Risk  (9/24/2024)    Food Insecurity     Within the past 12 months, did you worry that your food would run out before you got money to buy more?: No     Within the past 12 months, did the food you bought just not last and you didn t have money to get more?: No   Transportation Needs: Low Risk  (9/24/2024)    Transportation Needs     Within the past 12 months, has lack of transportation kept you from medical  "appointments, getting your medicines, non-medical meetings or appointments, work, or from getting things that you need?: No   Physical Activity: Unknown (9/24/2024)    Exercise Vital Sign     Days of Exercise per Week: 3 days     Minutes of Exercise per Session: Not on file   Stress: No Stress Concern Present (9/24/2024)    [employer] [place] [employer] - Occupational Stress Questionnaire     Feeling of Stress : Not at all   Social Connections: Unknown (9/24/2024)    Social Connection and Isolation Panel [NHANES]     Frequency of Communication with Friends and Family: Not on file     Frequency of Social Gatherings with Friends and Family: Once a week     Attends [employer] Services: Not on file     Active Member of Clubs or Organizations: Not on file     Attends Club or Organization Meetings: Not on file     Marital Status: Not on file   Interpersonal Safety: Low Risk  (12/13/2024)    Interpersonal Safety     Do you feel physically and emotionally safe where you currently live?: Yes     Within the past 12 months, have you been hit, slapped, kicked or otherwise physically hurt by someone?: No     Within the past 12 months, have you been humiliated or emotionally abused in other ways by your partner or ex-partner?: No   Housing Stability: Low Risk  (9/24/2024)    Housing Stability     Do you have housing? : Yes     Are you worried about losing your housing?: No       FAMILY HISTORY:  Family History   Problem Relation Age of Onset    Breast Cancer Cousin     Coronary Artery Disease No family hx of     Diabetes No family hx of     Hypertension No family hx of         ROS:  12 point review of systems reviewed and is negative except for what has already been mentioned in HPI.       PHYSICAL EXAM:  GENRL:  Not in acute distress   BP (!) 171/78   Pulse 66   Temp 98.6  F (37  C)   Resp 14   Ht 1.448 m (4' 9\")   Wt 43.1 kg (95 lb)   LMP  (LMP Unknown)   SpO2 97%   BMI 20.56 kg/m    No intake/output data " "recorded.  No intake/output data recorded.  HEENT: NC/AT      Eyes-  Pupil round and reactive to light bilaterally       Neck- supple, no JVP elevation,       Sclera- anicteric      Oropharynx- moist and pink  CHEST: Clear to auscultation bilateral anteriorly, no ronchi or wheezing  HEART: S1S2 normal, regular.   ABDMN: Soft. Non-distended. Mild generalized tenderness. No guarding or rigidity. Bowel sounds- active  EXTRM: No pedal edema   INTGM: No skin rash, no cyanosis or clubbing  MUSCULOSKELETAL: No joint tenderness or swelling on upper and lower extremities  NEURO: Alert and awake. Cranial nerves II-XII grossly intact. No focal neurological deficit.      DIAGNOSTIC DATA:    Recent Labs   Lab 05/28/25  0833   WBC 5.7   HGB 11.5*   HCT 35.4   *       Recent Labs   Lab 05/28/25  0833   *   CO2 19*   BUN 58.6*       No results for input(s): \"INR\" in the last 168 hours.    Recent Labs   Lab 05/28/25  0833   *   CO2 19*   BUN 58.6*   ALBUMIN 4.1   ALKPHOS 106   ALT 19   AST 18       [unfilled]    CT Abdomen Pelvis w Contrast  Result Date: 5/28/2025  EXAM: CT ABDOMEN PELVIS W CONTRAST LOCATION: [facility] DATE: 5/28/2025 INDICATION: abdominal pain, diarrhea, fever COMPARISON: CT exams 5/3/2024 and 11/28/2023 TECHNIQUE: CT scan of the abdomen and pelvis was performed following injection of IV contrast. Multiplanar reformats were obtained. Dose reduction techniques were used. CONTRAST: 46ml isovue 370 FINDINGS: LOWER CHEST: Mild cardiomegaly. Basilar atelectasis. HEPATOBILIARY: Normal. PANCREAS: Normal. SPLEEN: Normal. ADRENAL GLANDS: Normal. KIDNEYS/BLADDER: Stable moderate to severe bilateral native renal atrophy. Small left renal cortical cyst. No follow-up is indicated. BOWEL: Normal stomach, small bowel and appendix. Moderate to severe colitis predominantly involving the cecum, ascending and proximal transverse colon. No bowel obstruction. No free air, free fluid or " abscess. LYMPH NODES: Normal. VASCULATURE: Mild aortoiliac atherosclerosis. Patent central SMA and SMV. PELVIC ORGANS: Normal. MUSCULOSKELETAL: Spinal degenerative changes.     IMPRESSION: 1.  Nonspecific moderate to severe colitis predominantly involving the proximal colon. This may reflect an infectious or inflammatory process. No abscess.      Patient's new lab studies reviewed personally.  Patient's new radiology reports reviewed personally.  I personally viewed and personally interpreted patient's EKG:     Note created using dragon voice recognition software.  Errors in spelling or words which seems out of context are unintentional.  Sounds alike errors may have escaped editing.     05/28/2025  Reece Martinez MD  HOSPITALIST, [facility]  PAGER NO. [phone]

## 2025-05-28 NOTE — ED PROVIDER NOTES
EMERGENCY DEPARTMENT ENCOUNTER      NAME: Lyndsey Alejo  AGE: 60 year old female  YOB: 1965  MRN: [id number]  EVALUATION DATE & TIME: 2025  7:54 AM    PCP: Abhilash Cobb    ED PROVIDER: Angel Blanc M.D.      Chief Complaint   Patient presents with    Abdominal Pain    Fever         FINAL IMPRESSION:  1. Colitis          ED COURSE & MEDICAL DECISION MAKIN:58 AM I met with the patient, obtained history, performed an initial exam, and discussed options and plan for diagnostics and treatment here in the ED.   11:22 AM I spoke Dr. Martinez, the hospitalist. Admitted patient.     60 year old female presents to the Emergency Department for evaluation of fever abdominal discomfort and diarrhea.  Patient has a history of end-stage renal disease.  She was hospitalized last year for colitis.  She presents with a few days of chills and fever as well as some diarrhea.  She indicates some abdominal pain but has a reassuring exam.  Lab and imaging evaluation was completed as below.  This looks reassuring against systemic illness with a normal lactic acid level and normal white blood cell count.  She has a minimally elevated procalcitonin in the setting of end-stage renal disease, again nonspecific and not really indicative of severe sepsis.  She was given some acetaminophen and 500 cc fluid bolus.  Abdominal CT does show moderate to severe colitis.  Would favor an infectious etiology given the fever.  Stool studies still pending collection.  Case discussed with hospitalist for admission.  After review of last year she seemed to improve on Zosyn so this will be started pending culture follow up.  Patient otherwise stable throughout her ED course.    At the conclusion of the encounter I discussed the results of all of the tests and the disposition. The questions were answered. The patient or family acknowledged understanding and was agreeable with the care plan.       Medical Decision Making    Admit.    MIPS  (CTPE, Dental pain, [place], Sinusitis, Asthma/COPD, Head Trauma): Not Applicable    SEPSIS: None            MEDICATIONS GIVEN IN THE EMERGENCY:  Medications   piperacillin-tazobactam (ZOSYN) 3.375 g vial to attach to  mL bag (3.375 g Intravenous $New Bag 5/28/25 1142)   sodium chloride 0.9% BOLUS 500 mL (0 mLs Intravenous Stopped 5/28/25 0920)   acetaminophen (TYLENOL) tablet 650 mg (650 mg Oral $Given 5/28/25 0826)   losartan (COZAAR) tablet 25 mg (25 mg Oral $Given 5/28/25 0920)   amLODIPine (NORVASC) tablet 10 mg (10 mg Oral $Given 5/28/25 0920)   iopamidol (ISOVUE-370) solution 46 mL (46 mLs Intravenous $Given 5/28/25 0949)       NEW PRESCRIPTIONS STARTED AT TODAY'S ER VISIT  New Prescriptions    No medications on file          =================================================================    HPI    Patient information was obtained from: patient & daughter    Use of : N/A, Declined          Lyndsey Melo is a 60 year old female with a pertinent history of hypertension, type II diabetes, ESRD on dialysis, chewing tobacco use, who presents to this ED by walk in for evaluation of fever, diarrhea, abdominal pain.     The patient has felt unwell for the past three days, with chills and upper abdominal cramping. This morning she has had four episodes of diarrhea. When she went to dialysis, she was febrile with a temperature of 101.2, they canceled her run and sent her here. Of note, they mentioned that she was hospitalized last year with similar symptoms and she was found to have colitis. She has taken no medications for her fever and she makes little to no urine.     Patient denies sick contacts, vomiting, cough or upper respiratory symptoms. No recent travel or antibiotic use.     5/3-5/6/2024- [facility] for acute colitis. Pt presented to ED with fever, diarrhea and abdominal pain x 3 days. Imaging CT abdomen pelvis showed evidence of colitis. Pt with positive stool culture for  campylobacter, negative for C. difficile. Treated with zosyn with improvement. Discharged in stable condition with transition to oral antibiotic zithromax.     REVIEW OF SYSTEMS   All systems reviewed and negative except as noted in HPI.    PAST MEDICAL HISTORY:  Past Medical History:   Diagnosis Date    Anemia in chronic kidney disease     Chewing tobacco use     ESRD (end stage renal disease) on dialysis (H)     dialysis start date 11/28/2021 per 2728 form    Hypertension     Poor dentition     Secondary renal hyperparathyroidism     Type 2 diabetes mellitus (H)        PAST SURGICAL HISTORY:  Past Surgical History:   Procedure Laterality Date    BACK SURGERY      COLONOSCOPY N/A 5/9/2024    Procedure: Colonoscopy;  Surgeon: Jahaira Shields MD;  Location: [facility]    IR DIALYSIS FISTULOGRAM RIGHT  4/4/2023           CURRENT MEDICATIONS:    Current Facility-Administered Medications   Medication Dose Route Frequency Provider Last Rate Last Admin    piperacillin-tazobactam (ZOSYN) 3.375 g vial to attach to  mL bag  3.375 g Intravenous Once Angel Blanc MD   3.375 g at 05/28/25 1142     Current Outpatient Medications   Medication Sig Dispense Refill    acetaminophen (TYLENOL) 325 MG tablet Take 650 mg by mouth every 6 hours as needed for mild pain      amLODIPine (NORVASC) 10 MG tablet TAKE 1 TABLET (10 MG) BY MOUTH DAILY AT 2 PM 30 tablet 3    atorvastatin (LIPITOR) 40 MG tablet Take 1 tablet (40 mg) by mouth daily. 30 tablet 3    calcium acetate (PHOSLO) 667 MG CAPS capsule Take 1 capsule (667 mg) by mouth 3 times daily (with meals). 90 capsule 3    famotidine (PEPCID) 20 MG tablet TAKE 1 TABLET BY MOUTH TWICE DAILY 60 tablet 11    FEROSUL 325 (65 Fe) MG tablet TAKE 1 TABLET BY MOUTH ONE TIME EACH DAY WITH BREAKFAST 30 tablet 4    lisinopril (ZESTRIL) 10 MG tablet Take 1 tablet by mouth daily.      losartan (COZAAR) 25 MG tablet Take 1 tablet by mouth daily.      meclizine (ANTIVERT) 25 MG tablet        metoprolol tartrate (LOPRESSOR) 50 MG tablet Take 1 tablet (50 mg) by mouth 2 times daily 180 tablet 3    minoxidil (ROGAINE) 5 % external solution Apply topically daily. (Patient not taking: Reported on 5/6/2025) 120 mL 3    naproxen (NAPROSYN) 500 MG tablet       pantoprazole (PROTONIX) 40 MG EC tablet Take 1 tablet by mouth daily.      vitamin D3 (CHOLECALCIFEROL) 50 mcg (2000 units) tablet            ALLERGIES:  No Known Allergies    FAMILY HISTORY:  Family History   Problem Relation Age of Onset    Breast Cancer Cousin     Coronary Artery Disease No family hx of     Diabetes No family hx of     Hypertension No family hx of        SOCIAL HISTORY:   Social History     Socioeconomic History    Marital status: Single   Tobacco Use    Smoking status: Never     Passive exposure: Never    Smokeless tobacco: Current     Types: Chew     Last attempt to quit: 04/2023    Tobacco comments:     Chewing tobacco    Vaping Use    Vaping status: Never Used   Substance and Sexual Activity    Alcohol use: Never    Drug use: Never     Social Drivers of Health     Financial Resource Strain: Low Risk  (9/24/2024)    Financial Resource Strain     Within the past 12 months, have you or your family members you live with been unable to get utilities (heat, electricity) when it was really needed?: No   Food Insecurity: Low Risk  (9/24/2024)    Food Insecurity     Within the past 12 months, did you worry that your food would run out before you got money to buy more?: No     Within the past 12 months, did the food you bought just not last and you didn t have money to get more?: No   Transportation Needs: Low Risk  (9/24/2024)    Transportation Needs     Within the past 12 months, has lack of transportation kept you from medical appointments, getting your medicines, non-medical meetings or appointments, work, or from getting things that you need?: No   Physical Activity: Unknown (9/24/2024)    Exercise Vital Sign     Days of Exercise  "per Week: 3 days   Stress: No Stress Concern Present (9/24/2024)    [employer] - Occupational Stress Questionnaire     Feeling of Stress : Not at all   Social Connections: Unknown (9/24/2024)    Social Connection and Isolation Panel [NHANES]     Frequency of Social Gatherings with Friends and Family: Once a week   Interpersonal Safety: Low Risk  (12/13/2024)    Interpersonal Safety     Do you feel physically and emotionally safe where you currently live?: Yes     Within the past 12 months, have you been hit, slapped, kicked or otherwise physically hurt by someone?: No     Within the past 12 months, have you been humiliated or emotionally abused in other ways by your partner or ex-partner?: No   Housing Stability: Low Risk  (9/24/2024)    Housing Stability     Do you have housing? : Yes     Are you worried about losing your housing?: No       VITALS:  BP (!) 171/78   Pulse 66   Temp 98.6  F (37  C)   Resp 14   Ht 1.448 m (4' 9\")   Wt 43.1 kg (95 lb)   LMP  (LMP Unknown)   SpO2 97%   BMI 20.56 kg/m      PHYSICAL EXAM    Constitutional: Chronically ill-appearing elderly female patient, laying in bed, no acute distress  HENT: Normocephalic, Atraumatic. Neck Supple.  Eyes: EOMI, Conjunctiva normal.  Respiratory: Breathing comfortably on room air. Speaks full sentences easily. Lungs clear to ascultation.  Cardiovascular: Normal heart rate, Regular rhythm. No peripheral edema. LUE AVF with thrill.  Abdomen: Soft, nontender  Musculoskeletal: Good range of motion in all major joints. No major deformities noted.  Integument: Warm, Dry.  Neurologic: Alert & awake, Normal motor function, Normal sensory function, No focal deficits noted.   Psychiatric: Cooperative. Affect appropriate.     LAB:  All pertinent labs reviewed and interpreted.  Labs Ordered and Resulted from Time of ED Arrival to Time of ED Departure   BASIC METABOLIC PANEL - Abnormal       Result Value    Sodium 133 (*)     " Potassium 4.6      Chloride 95 (*)     Carbon Dioxide (CO2) 19 (*)     Anion Gap 19 (*)     Urea Nitrogen 58.6 (*)     Creatinine 10.81 (*)     GFR Estimate 4 (*)     Calcium 9.0      Glucose 114 (*)    PROCALCITONIN - Abnormal    Procalcitonin 0.61 (*)    CRP INFLAMMATION - Abnormal    CRP Inflammation 23.80 (*)    CBC WITH PLATELETS AND DIFFERENTIAL - Abnormal    WBC Count 5.7      RBC Count 4.08      Hemoglobin 11.5 (*)     Hematocrit 35.4      MCV 87      MCH 28.2      MCHC 32.5      RDW 14.9      Platelet Count 137 (*)     % Neutrophils 74      % Lymphocytes 12      % Monocytes 12      % Eosinophils 1      % Basophils 0      % Immature Granulocytes 0      NRBCs per 100 WBC 0      Absolute Neutrophils 4.3      Absolute Lymphocytes 0.7 (*)     Absolute Monocytes 0.7      Absolute Eosinophils 0.0      Absolute Basophils 0.0      Absolute Immature Granulocytes 0.0      Absolute NRBCs 0.0     LACTIC ACID WHOLE BLOOD WITH 1X REPEAT IN 2 HR WHEN >2 - Normal    Lactic Acid, Initial 1.1     HEPATIC FUNCTION PANEL - Normal    Protein Total 7.0      Albumin 4.1      Bilirubin Total 0.5      Alkaline Phosphatase 106      AST 18      ALT 19      Bilirubin Direct 0.13     LIPASE - Normal    Lipase 41     GGT   BLOOD CULTURE   BLOOD CULTURE   C. DIFFICILE TOXIN B PCR WITH REFLEX TO C. DIFFICILE EIA   ENTERIC BACTERIA AND VIRUS PANEL BY PCR       RADIOLOGY:  Reviewed all pertinent imaging. Please see official radiology report.  CT Abdomen Pelvis w Contrast   Final Result   IMPRESSION:    1.  Nonspecific moderate to severe colitis predominantly involving the proximal colon. This may reflect an infectious or inflammatory process. No abscess.                I, Roma Rodas am serving as a scribe to document services personally performed by Dr. Angel Blanc, based on my observation and the provider's statements to me. I, Angel Blanc MD attest that Roma Rodas is acting in a scribe capacity, has observed my  performance of the services and has documented them in accordance with my direction.    Angel Blanc M.D.  Emergency Medicine  [facility] EMERGENCY DEPARTMENT  [address] [place] [id number] [phone]  Dept: [phone]       Angel Blanc MD  05/28/25 4226

## 2025-05-29 VITALS
DIASTOLIC BLOOD PRESSURE: 74 MMHG | HEIGHT: 57 IN | RESPIRATION RATE: 20 BRPM | SYSTOLIC BLOOD PRESSURE: 153 MMHG | WEIGHT: 101.5 LBS | BODY MASS INDEX: 21.9 KG/M2 | TEMPERATURE: 98.4 F | OXYGEN SATURATION: 96 % | HEART RATE: 78 BPM

## 2025-05-29 LAB
ADV 40+41 DNA STL QL NAA+NON-PROBE: NEGATIVE
ANION GAP SERPL CALCULATED.3IONS-SCNC: 16 MMOL/L (ref 7–15)
ASTRO TYP 1-8 RNA STL QL NAA+NON-PROBE: NEGATIVE
BACTERIA SPEC CULT: NORMAL
BACTERIA SPEC CULT: NORMAL
BUN SERPL-MCNC: 68.7 MG/DL (ref 6–20)
C CAYETANENSIS DNA STL QL NAA+NON-PROBE: NEGATIVE
C DIFF GDH STL QL IA: POSITIVE
C DIFF TOX A+B STL QL IA: NEGATIVE
C DIFF TOX B STL QL: POSITIVE
CALCIUM SERPL-MCNC: 8.3 MG/DL (ref 8.8–10.4)
CAMPYLOBACTER DNA SPEC NAA+PROBE: NEGATIVE
CHLORIDE SERPL-SCNC: 96 MMOL/L (ref 98–107)
CREAT SERPL-MCNC: 13.14 MG/DL (ref 0.51–0.95)
CRYPTOSP DNA STL QL NAA+NON-PROBE: NEGATIVE
E COLI O157 DNA STL QL NAA+NON-PROBE: ABNORMAL
E HISTOLYT DNA STL QL NAA+NON-PROBE: NEGATIVE
EAEC ASTA GENE ISLT QL NAA+PROBE: NEGATIVE
EC STX1+STX2 GENES STL QL NAA+NON-PROBE: NEGATIVE
EGFRCR SERPLBLD CKD-EPI 2021: 3 ML/MIN/1.73M2
EPEC EAE GENE STL QL NAA+NON-PROBE: NEGATIVE
ERYTHROCYTE [DISTWIDTH] IN BLOOD BY AUTOMATED COUNT: 15 % (ref 10–15)
ETEC LTA+ST1A+ST1B TOX ST NAA+NON-PROBE: NEGATIVE
G LAMBLIA DNA STL QL NAA+NON-PROBE: NEGATIVE
GLUCOSE SERPL-MCNC: 96 MG/DL (ref 70–99)
HBV SURFACE AB SERPL IA-ACNC: >1000 M[IU]/ML
HBV SURFACE AB SERPL IA-ACNC: REACTIVE M[IU]/ML
HBV SURFACE AG SERPL QL IA: NONREACTIVE
HCO3 SERPL-SCNC: 19 MMOL/L (ref 22–29)
HCT VFR BLD AUTO: 31.2 % (ref 35–47)
HGB BLD-MCNC: 10.6 G/DL (ref 11.7–15.7)
LABORATORY COMMENT REPORT: ABNORMAL
MCH RBC QN AUTO: 29.1 PG (ref 26.5–33)
MCHC RBC AUTO-ENTMCNC: 34 G/DL (ref 31.5–36.5)
MCV RBC AUTO: 86 FL (ref 78–100)
NOROVIRUS GI+II RNA STL QL NAA+NON-PROBE: POSITIVE
P SHIGELLOIDES DNA STL QL NAA+NON-PROBE: NEGATIVE
PLATELET # BLD AUTO: 132 10E3/UL (ref 150–450)
POTASSIUM SERPL-SCNC: 5.2 MMOL/L (ref 3.4–5.3)
RBC # BLD AUTO: 3.64 10E6/UL (ref 3.8–5.2)
RVA RNA STL QL NAA+NON-PROBE: NEGATIVE
SALMONELLA SP RPOD STL QL NAA+PROBE: POSITIVE
SAPO I+II+IV+V RNA STL QL NAA+NON-PROBE: NEGATIVE
SHIGELLA SP+EIEC IPAH ST NAA+NON-PROBE: NEGATIVE
SODIUM SERPL-SCNC: 131 MMOL/L (ref 135–145)
V CHOLERAE DNA SPEC QL NAA+PROBE: NEGATIVE
VIBRIO DNA SPEC NAA+PROBE: NEGATIVE
WBC # BLD AUTO: 6 10E3/UL (ref 4–11)
Y ENTEROCOL DNA STL QL NAA+PROBE: NEGATIVE

## 2025-05-29 PROCEDURE — 84132 ASSAY OF SERUM POTASSIUM: CPT | Performed by: INTERNAL MEDICINE

## 2025-05-29 PROCEDURE — 250N000011 HC RX IP 250 OP 636: Performed by: INTERNAL MEDICINE

## 2025-05-29 PROCEDURE — 99233 SBSQ HOSP IP/OBS HIGH 50: CPT | Performed by: INTERNAL MEDICINE

## 2025-05-29 PROCEDURE — 120N000001 HC R&B MED SURG/OB

## 2025-05-29 PROCEDURE — 250N000013 HC RX MED GY IP 250 OP 250 PS 637: Performed by: INTERNAL MEDICINE

## 2025-05-29 PROCEDURE — 82374 ASSAY BLOOD CARBON DIOXIDE: CPT | Performed by: INTERNAL MEDICINE

## 2025-05-29 PROCEDURE — 250N000011 HC RX IP 250 OP 636: Mod: JZ | Performed by: INTERNAL MEDICINE

## 2025-05-29 PROCEDURE — 87493 C DIFF AMPLIFIED PROBE: CPT | Performed by: EMERGENCY MEDICINE

## 2025-05-29 PROCEDURE — 86706 HEP B SURFACE ANTIBODY: CPT

## 2025-05-29 PROCEDURE — 87324 CLOSTRIDIUM AG IA: CPT | Performed by: EMERGENCY MEDICINE

## 2025-05-29 PROCEDURE — 87046 STOOL CULTR AEROBIC BACT EA: CPT | Performed by: INTERNAL MEDICINE

## 2025-05-29 PROCEDURE — 90935 HEMODIALYSIS ONE EVALUATION: CPT

## 2025-05-29 PROCEDURE — 85048 AUTOMATED LEUKOCYTE COUNT: CPT | Performed by: INTERNAL MEDICINE

## 2025-05-29 PROCEDURE — 87507 IADNA-DNA/RNA PROBE TQ 12-25: CPT | Performed by: EMERGENCY MEDICINE

## 2025-05-29 PROCEDURE — 99222 1ST HOSP IP/OBS MODERATE 55: CPT | Performed by: INTERNAL MEDICINE

## 2025-05-29 PROCEDURE — 36415 COLL VENOUS BLD VENIPUNCTURE: CPT | Performed by: INTERNAL MEDICINE

## 2025-05-29 PROCEDURE — 87340 HEPATITIS B SURFACE AG IA: CPT | Performed by: STUDENT IN AN ORGANIZED HEALTH CARE EDUCATION/TRAINING PROGRAM

## 2025-05-29 PROCEDURE — 99232 SBSQ HOSP IP/OBS MODERATE 35: CPT

## 2025-05-29 PROCEDURE — 5A1D70Z PERFORMANCE OF URINARY FILTRATION, INTERMITTENT, LESS THAN 6 HOURS PER DAY: ICD-10-PCS

## 2025-05-29 PROCEDURE — 85041 AUTOMATED RBC COUNT: CPT | Performed by: INTERNAL MEDICINE

## 2025-05-29 RX ORDER — CEFTRIAXONE 2 G/1
2 INJECTION, POWDER, FOR SOLUTION INTRAMUSCULAR; INTRAVENOUS EVERY 24 HOURS
Status: DISCONTINUED | OUTPATIENT
Start: 2025-05-29 | End: 2025-05-31 | Stop reason: HOSPADM

## 2025-05-29 RX ORDER — ALBUMIN (HUMAN) 12.5 G/50ML
50 SOLUTION INTRAVENOUS
Status: DISCONTINUED | OUTPATIENT
Start: 2025-05-29 | End: 2025-05-31 | Stop reason: HOSPADM

## 2025-05-29 RX ORDER — VANCOMYCIN HYDROCHLORIDE 50 MG/ML
125 KIT ORAL 4 TIMES DAILY
Status: DISCONTINUED | OUTPATIENT
Start: 2025-05-29 | End: 2025-05-31 | Stop reason: HOSPADM

## 2025-05-29 RX ADMIN — HYDRALAZINE HYDROCHLORIDE 10 MG: 20 INJECTION INTRAMUSCULAR; INTRAVENOUS at 00:49

## 2025-05-29 RX ADMIN — CALCIUM ACETATE 667 MG: 667 CAPSULE ORAL at 08:16

## 2025-05-29 RX ADMIN — CEFTRIAXONE SODIUM 2 G: 2 INJECTION, POWDER, FOR SOLUTION INTRAMUSCULAR; INTRAVENOUS at 16:16

## 2025-05-29 RX ADMIN — FAMOTIDINE 20 MG: 20 TABLET, FILM COATED ORAL at 20:37

## 2025-05-29 RX ADMIN — PANTOPRAZOLE SODIUM 40 MG: 40 TABLET, DELAYED RELEASE ORAL at 08:16

## 2025-05-29 RX ADMIN — ACETAMINOPHEN 650 MG: 325 TABLET ORAL at 08:16

## 2025-05-29 RX ADMIN — CALCIUM ACETATE 667 MG: 667 CAPSULE ORAL at 18:18

## 2025-05-29 RX ADMIN — LISINOPRIL 10 MG: 5 TABLET ORAL at 08:16

## 2025-05-29 RX ADMIN — VANCOMYCIN HYDROCHLORIDE 125 MG: KIT at 21:51

## 2025-05-29 RX ADMIN — METOPROLOL TARTRATE 100 MG: 25 TABLET, FILM COATED ORAL at 08:16

## 2025-05-29 RX ADMIN — LOSARTAN POTASSIUM 25 MG: 25 TABLET, FILM COATED ORAL at 08:16

## 2025-05-29 RX ADMIN — ACETAMINOPHEN 650 MG: 325 TABLET ORAL at 16:54

## 2025-05-29 RX ADMIN — AMLODIPINE BESYLATE 10 MG: 5 TABLET ORAL at 08:16

## 2025-05-29 RX ADMIN — Medication 25 MCG: at 08:15

## 2025-05-29 RX ADMIN — FERROUS SULFATE TAB 325 MG (65 MG ELEMENTAL FE) 325 MG: 325 (65 FE) TAB at 08:16

## 2025-05-29 RX ADMIN — HYDRALAZINE HYDROCHLORIDE 10 MG: 20 INJECTION INTRAMUSCULAR; INTRAVENOUS at 14:10

## 2025-05-29 RX ADMIN — PIPERACILLIN AND TAZOBACTAM 2.25 G: 2; .25 INJECTION, POWDER, FOR SOLUTION INTRAVENOUS at 05:32

## 2025-05-29 RX ADMIN — ATORVASTATIN CALCIUM 40 MG: 40 TABLET, FILM COATED ORAL at 20:36

## 2025-05-29 RX ADMIN — METOPROLOL TARTRATE 100 MG: 25 TABLET, FILM COATED ORAL at 20:37

## 2025-05-29 RX ADMIN — VANCOMYCIN HYDROCHLORIDE 125 MG: KIT at 18:42

## 2025-05-29 ASSESSMENT — ACTIVITIES OF DAILY LIVING (ADL)
ADLS_ACUITY_SCORE: 38
ADLS_ACUITY_SCORE: 38
ADLS_ACUITY_SCORE: 23
ADLS_ACUITY_SCORE: 38
ADLS_ACUITY_SCORE: 23
ADLS_ACUITY_SCORE: 38
ADLS_ACUITY_SCORE: 23
ADLS_ACUITY_SCORE: 23
ADLS_ACUITY_SCORE: 38
ADLS_ACUITY_SCORE: 23
ADLS_ACUITY_SCORE: 38
ADLS_ACUITY_SCORE: 23
ADLS_ACUITY_SCORE: 38
ADLS_ACUITY_SCORE: 23
ADLS_ACUITY_SCORE: 38
ADLS_ACUITY_SCORE: 38
ADLS_ACUITY_SCORE: 23
ADLS_ACUITY_SCORE: 38
ADLS_ACUITY_SCORE: 23

## 2025-05-29 NOTE — PROGRESS NOTES
RENAL PROGRESS NOTE    HPI: 59-YOF w/PMH ESRD on MWF HD, DM,  HTN, HLD and anemia presented to the ER s/p Dialysis for c/O ABD pain, fever, diarrhea. Renal consulted for ESRD on HD status.     ASSESSMENT & PLAN:     ESRD on HD:  Q MWF at [facility] under the care of Dr. Jang.  TTS HD schedule while here.      MILD acidosis: Co2 19, follow with HD      Mild Hyponatremia:  likely hypervolemic with possible some excess GI losses.     HTN:  on Amlodipine and Metoprolol. Resume PTA Meds. Control pain. Hydralazine PRNs.      Anemia: 2/2 ACD. HG 11.5, follow. on NELLIE as OP, follow need while here.      CKD-MBD: +Secondary hyperpara, on phoslo as binder     HTN: resume PTA meds. PRNS. Control pain     HLD: statin, ASA     ABD pain/FEver/Diarrhea/Colitis: CT ABD/Pelvis showed severe colitis, in prox colon. Hx of colitis frm campylobacter, ?infectious colitis suspected. On IV Zosyn, anti-emetics, and antipyretics Management per HS.        SUBJECTIVE:    Pt resting in bed, family at bedside  Pts daughter at bedside interpreting [place]  Report no BM since this AM  Pain controlled  No edema, on RA  Plan for HD this afternoon  Slight HA reported  Pt resting in bed  Discussed labs/plan and answered all questions    OBJECTIVE:  Physical Exam   Temp: 99.4  F (37.4  C) Temp src: Oral BP: (!) 184/86 Pulse: 82   Resp: 20 SpO2: 95 % O2 Device: None (Room air)    Vitals:    05/28/25 0749   Weight: 43.1 kg (95 lb)     Vital Signs with Ranges  Temp:  [98.5  F (36.9  C)-101.1  F (38.4  C)] 99.4  F (37.4  C)  Pulse:  [64-82] 82  Resp:  [14-20] 20  BP: (153-225)/(78-96) 184/86  SpO2:  [95 %-98 %] 95 %  No intake/output data recorded.      Patient Vitals for the past 72 hrs:   Weight   05/28/25 0749 43.1 kg (95 lb)   No intake or output data in the 24 hours ending 05/29/25 0931    PHYSICAL EXAM:  GEN: NAD, thin/frail, Karenni speaking  CV: RRR , no JVD  Lung: clear and equal, on RA  Ab: tender to palpation  Ext: + edema and well  perfused  Skin: no rash  Psych: cooperative  Neuro: No asterixis, NFD  Access: c/d/i    LABORATORY STUDIES:     Recent Labs   Lab 05/29/25  0523 05/28/25  0833   WBC 6.0 5.7   RBC 3.64* 4.08   HGB 10.6* 11.5*   HCT 31.2* 35.4   * 137*       Basic Metabolic Panel:  Recent Labs   Lab 05/29/25  0523 05/28/25  0833   * 133*   POTASSIUM 5.2 4.6   CHLORIDE 96* 95*   CO2 19* 19*   BUN 68.7* 58.6*   CR 13.14* 10.81*   GLC 96 114*   CAMRON 8.3* 9.0       INRNo lab results found in last 7 days.     Recent Labs   Lab Test 05/29/25  0523 05/28/25  0833 05/03/24  1423 09/14/23  1225   INR  --   --   --  1.00   WBC 6.0 5.7   < > 7.2   HGB 10.6* 11.5*   < > 10.8*   * 137*   < > 266    < > = values in this interval not displayed.       Personally reviewed current labs    Janet LOWE-BC  [facility]  [phone]

## 2025-05-29 NOTE — PROGRESS NOTES
"CLINICAL NUTRITION SERVICES - ASSESSMENT NOTE    RECOMMENDATIONS FOR MDs/PROVIDERS TO ORDER:  None    Registered Dietitian Interventions:  None    Future/Additional Recommendations:  -Perform NFPA next visit  -add supplement if intake is inadequate  -Recommend low fiber, low fat, bland foods    - monitor electrolytes for need for electrolyte drink if excessive gi losses       REASON FOR ASSESSMENT  Positive admission nutrition risk screen with \"unsure\" of weight loss and eating poorly d/t decreased appetite    INFORMATION OBTAINED  Patient not available for interview due to out of room, in HD. Attempted to call dtrs but went to . Did not leave message d/t generic outgoing message    NUTRITION HISTORY  Abd pain, fever and diarrhea x 1 day PTA  Feeling unwell x 2-3 days PTA      CURRENT NUTRITION ORDERS  Diet: Orders Placed This Encounter      Combination Diet Regular Diet Adult; Renal Diet (dialysis)      CURRENT INTAKE/TOLERANCE  Ate 100% of breakfast this am at 474 kcal, 25 g protein. In HD during lunch today  Supper ordered last night and breakfast this am    LABS  Nutrition-relevant labs:   Na 131 (L), improved  BUN/CR 68/13 (H), increased  CRP 23 (H)    MEDICATIONS  Nutrition-relevant medications:   Lipitor, phoslo, pepcid, feso4, protonix, iv abx, vit D    ANTHROPOMETRICS  Height: 144.8 cm (4' 9\")  IBW: 42 kg  BMI: Body mass index is 20.56 kg/m .   Weight History: Wt may be up in fluid d/t missed HD   Date/Time Weight Weight Method   05/29/25 1115 46 kg (101 lb 8 oz) Standing scale   05/28/25 0749 43.1 kg (95 lb) --     Wt Readings from Last 10 Encounters:   03/25/25 43.2 kg (95 lb 3 oz)   12/13/24 43.5 kg (95 lb 12.8 oz)   09/24/24 44.6 kg (98 lb 6.4 oz)   06/24/24 43.6 kg (96 lb 0.6 oz)   05/15/24 43.3 kg (95 lb 8 oz)   05/03/24 43.1 kg (95 lb)   01/09/24 43 kg (94 lb 12.8 oz)   12/07/23 43.1 kg (95 lb)   11/28/23 43.3 kg (95 lb 6.4 oz)       Dosing Weight: 43.2 kg, based on actual wt    ASSESSED NUTRITION " NEEDS  Estimated Energy Needs: 9501-1458 kcals/day (25 - 30 kcals/kg)  Justification: Maintenance  Estimated Protein Needs: 43-52 grams protein/day (1 - 1.2 grams of pro/kg)  Justification: Hypercatabolism with acute illness and Increased needs  Estimated Fluid Needs: 6348-0573 mL/day (25 - 30 mL/kg)  Justification: Maintenance    SYSTEM AND PHYSICAL FINDINGS    Missing teeth  GI symptoms:   + cdiff , norovirus 5/29  Loose BM x 1 today and yesterday  Skin/wounds:   No concerns    MALNUTRITION  % Intake: Decreased intake does not meet criteria- feeling unwell x 2-3 days PTA  % Weight Loss: None noted  Subcutaneous Fat Loss: Unable to assess  Muscle Loss: Unable to assess  Fluid Accumulation/Edema: Mild, 1+ bilateral feet  Malnutrition Diagnosis: Unable to determine due to need diet hx, NFPA  Malnutrition Present on Admission: Unable to assess    NUTRITION DIAGNOSIS  Altered gastrointestinal (GI) function related to cdiff, norovirus as evidenced by loose stools, abd pain    GOALS  Meet > 75% of estimated nutrition needs  < 3 loose BM/day  Electrolytes WNL     MONITORING/EVALUATION  Progress toward goals will be monitored and evaluated per policy.

## 2025-05-29 NOTE — CONSULTS
"[facility]  General ID Service Consult      Patient: Lyndsey Alejo  YOB: 1965, MRN: [id number]  Date of Admission:  5/28/2025  Date of Consult: 05/29/2025  Consult Requested by: Reece Martinez, CARLIE  Admission Diagnosis: Colitis [K52.9]  Consult Question:     ID Assessment & Plan   Salmonella gastroenteritis, by multiplex  C difficile positive, likely carrier  Norovirus positive, not etiology of syx  ESRD on HD  DM    PLAN  IV ceftriaxone  Po vanco  BC  Stool culture  Will follow    Irving Grewal MD  [facility]  ______________________________________________________________________        History of Present Illness   Per dr Martinez  60 year old female with PMH significant for ESRD on HD, hypertension, DM-II who was sent to our ED from dialysis center for evaluation of abdominal pain, fever and diarrhea.     History obtained from the patient with the help of her daughter, who speaks fluent english.   Patient reports doing well up until 2-3 days ago when she started feeling unwell. Yesterday, she developed some abdominal pain. Its diffuse, intermittent, crampy in nature. Also noted few episodes of diarrhea yesterday. Patient went to the dialysis center for here scheduled HD today where she was noted to have a temp of 101F. They didn't start dialysis; was advised to go to ED for further evaluation. Patient reports she already had 4 bouts of diarrhea today. Denies any hematochezia or melena. Stools are \"yellowish\" in color. No nausea or vomiting. Endorses poor appetite. Doesn't recall eating anything unusual. Denies any sick contacts. No one in the family has diarrhea.\"      Daughter conforms abdominal pain, chils, diarrhea non bloody and no vomiting  No travel  Eats beef medium rare  No one else with diarrhea      Radiology personally reviewed  CT  MPRESSION:   1.  Nonspecific moderate to severe colitis predominantly involving the proximal colon. This may " reflect an infectious or inflammatory process. No abscess.    Review of Systems   The 10 point Review of Systems is negative other than noted in the HPI or here.     Past Medical History    Past Medical History:   Diagnosis Date    Anemia in chronic kidney disease     Chewing tobacco use     ESRD (end stage renal disease) on dialysis (H)     dialysis start date 11/28/2021 per 2728 form    Hypertension     Poor dentition     Secondary renal hyperparathyroidism     Type 2 diabetes mellitus (H)        Past Surgical History   Past Surgical History:   Procedure Laterality Date    BACK SURGERY      COLONOSCOPY N/A 5/9/2024    Procedure: Colonoscopy;  Surgeon: Jahaira Shields MD;  Location: [facility]    IR DIALYSIS FISTULOGRAM RIGHT  4/4/2023       Social History   Social History     Tobacco Use    Smoking status: Never     Passive exposure: Never    Smokeless tobacco: Current     Types: Chew     Last attempt to quit: 04/2023    Tobacco comments:     Chewing tobacco    Vaping Use    Vaping status: Never Used   Substance Use Topics    Alcohol use: Never    Drug use: Never       Family History   I have reviewed this patient's family history and updated it with pertinent information if needed.  Family History   Problem Relation Age of Onset    Breast Cancer Cousin     Coronary Artery Disease No family hx of     Diabetes No family hx of     Hypertension No family hx of        Medications   I have reviewed this patient's current medications    Allergies   No Known Allergies    Physical Exam   Vital Signs: Temp: 98.2  F (36.8  C) Temp src: Oral BP: (!) 154/67 Pulse: 78   Resp: (!) 41 SpO2: 97 % O2 Device: None (Room air)    Weight: 101 lbs 8 oz    Gen. appearance nontoxic  Eyes no conjunctivitis or icterus  Neck no stiffness or neck vein distention, no LN    Lungs clear no wheeze  Abdomen soft not tender  Extremities no synovitis, trace edema  Skin  no rash or emboli  Neurologic alert oriented no focal  "deficits        Data   Inflammatory Markers   Recent Labs   Lab Test 05/28/25 0833 05/06/24  0748 05/05/24  0716 05/04/24  0555   CRPI 23.80* 53.90* 104.40* 148.70*        Hematology Studies   Recent Labs   Lab Test 05/29/25 0523 05/28/25 0833 12/13/24  1440 09/24/24  1032 05/06/24  0748 05/05/24  0716 05/04/24  0555 05/03/24  1423 09/14/23  1225 05/09/23  1107   WBC 6.0 5.7 4.2 4.0  --  6.7 8.9 10.6 7.2 6.8   ANEU  --  4.3  --   --   --   --   --  9.2* 4.7 4.2   AEOS  --  0.0  --   --   --   --   --  0.0 0.2 0.2   HGB 10.6* 11.5* 10.7* 10.7*  --  10.5* 10.8* 11.3* 10.8* 12.1   MCV 86 87 91 88  --  87 86 85 86 89   * 137* 179 172   < > 149* 137* 159 266 311    < > = values in this interval not displayed.       Metabolic Studies   Recent Labs   Lab Test 05/29/25 0523 05/28/25 0833 04/25/25  0921 04/25/25  0609 12/13/24  1440 09/24/24  1032 05/06/24  0748   * 133*  --   --  136 136 134*   POTASSIUM 5.2 4.6  --   --  4.2 4.0 4.1   CHLORIDE 96* 95*  --   --  96* 96* 98   CO2 19* 19*  --   --  30* 26 19*   BUN 68.7* 58.6* 9.4 52.3* 13.4 29.4* 46.0*   CR 13.14* 10.81*  --   --  4.89* 8.14* 13.82*   GFRESTIMATED 3* 4*  --   --  10* 5* 3*       Hepatic Studies    Recent Labs   Lab Test 05/28/25 0833 12/13/24 1440 09/24/24  1032 05/05/24  0716 05/03/24  1423 09/14/23  1225   BILITOTAL 0.5 0.4 0.5 0.5 0.4 0.6   ALKPHOS 106 86 109 106 137 160*   ALBUMIN 4.1 4.5 4.6 3.7 4.1 4.9   AST 18 17 21 43 63* 14   ALT 19 7 9 20 26 7       Most Recent 6 Bacteria Isolates From Any Culture (See EPIC Reports for Culture Details):No lab results found.    Urine Studies  No lab results found.    Vancomycin Levels  No lab results found.    Invalid input(s): \"VANCO\"    Hepatitis B Testing   Recent Labs   Lab Test 05/29/25  0523 09/14/23  1225   HBCAB  --  Reactive*   HEPBANG Nonreactive Nonreactive     Hepatitis C Testing     Hepatitis C Antibody   Date Value Ref Range Status   09/14/2023 Nonreactive Nonreactive Final " "  05/09/2023 Nonreactive Nonreactive Final     HIVTesting   Recent Labs   Lab Test 05/09/23  1107   HIAGAB Nonreactive       Respiratory Virus Testing    No results found for: \"RS\", \"IRSV\", \"FLUAG\"  COVID-19 Antibody Results, Testing for Immunity           No data to display              COVID-19 PCR Results          1/9/2024    13:37 5/3/2024    12:26   COVID-19 PCR Results   SARS CoV2 PCR Positive  Negative        "

## 2025-05-29 NOTE — PLAN OF CARE
Problem: Adult Inpatient Plan of Care  Goal: Plan of Care Review  Description: The Plan of Care Review/Shift note should be completed every shift.  The Outcome Evaluation is a brief statement about your assessment that the patient is improving, declining, or no change.  This information will be displayed automatically on your shift  note.  5/29/2025 1814 by Saira Lopez RN  Outcome: Progressing  5/29/2025 1359 by Saira Lopez RN  Outcome: Progressing     Problem: Diarrhea  Goal: Effective Diarrhea Management  5/29/2025 1814 by Saira Lopez RN  Outcome: Progressing  5/29/2025 1359 by Saira Lopez RN  Outcome: Progressing  Intervention: Manage Diarrhea  Recent Flowsheet Documentation  Taken 5/29/2025 1603 by Saira Lopez RN  Isolation Precautions: enteric precautions maintained  Medication Review/Management: medications reviewed  Taken 5/29/2025 0800 by Saira Lopez RN  Isolation Precautions: enteric precautions maintained  Medication Review/Management: medications reviewed     Problem: Pain Acute  Goal: Optimal Pain Control and Function  5/29/2025 1814 by Saira Lopez RN  Outcome: Progressing  5/29/2025 1359 by Saira Lopez RN  Outcome: Progressing  Intervention: Develop Pain Management Plan  Recent Flowsheet Documentation  Taken 5/29/2025 0816 by Saira Lopez RN  Pain Management Interventions:   medication (see MAR)   care clustered   food  Intervention: Prevent or Manage Pain  Recent Flowsheet Documentation  Taken 5/29/2025 1603 by Saira Lopez RN  Medication Review/Management: medications reviewed  Taken 5/29/2025 0800 by Saira Lopez RN  Medication Review/Management: medications reviewed     Problem: Infection  Goal: Absence of Infection Signs and Symptoms  Outcome: Progressing  Intervention: Prevent or Manage Infection  Recent Flowsheet Documentation  Taken 5/29/2025 1603 by Saira Lopez RN  Isolation Precautions: enteric precautions  maintained  Taken 5/29/2025 0800 by Saira Lopez, RN  Isolation Precautions: enteric precautions maintained   Goal Outcome Evaluation:       Denies pain. Temp of 99.5, pt requested tylenol. BP elevated, but too soon to give hydralazine - MD notified. Sleepy after dialysis.

## 2025-05-29 NOTE — PLAN OF CARE
Problem: Adult Inpatient Plan of Care  Goal: Plan of Care Review  Description: The Plan of Care Review/Shift note should be completed every shift.  The Outcome Evaluation is a brief statement about your assessment that the patient is improving, declining, or no change.  This information will be displayed automatically on your shift  note.  Outcome: Progressing  Flowsheets (Taken 5/29/2025 0312)  Plan of Care Reviewed With: patient  Overall Patient Progress: improving   Goal Outcome Evaluation:      Plan of Care Reviewed With: patient    Overall Patient Progress: improvingOverall Patient Progress: improving           Admitted with colitis. On IV antibiotics every 8 hours.  Stool sample collected and sent to lab, results pending for enteric bacteria and virus panel.  Dialysis today. TTS schedule.  Denies pain this shift. High blood pressure controlled with IV hydralazine.  Patient is karenni speaking. Enteric precautions maintained.

## 2025-05-29 NOTE — PROGRESS NOTES
[facility]    Medicine Progress Note - Hospitalist Service    Date of Admission:  5/28/2025    Assessment & Plan   Patient is a 60 year old female with PMH significant for ESRD on HD, hypertension, hyperlipidemia who was sent to our ED from dialysis center for evaluation of abdominal pain, fever and diarrhea.     Abdominal pain, diarrhea, fever  Infectious colitis  -CT abdomen/pelvis showed mod to severe colitis, predominantly in the proximal colon.  -Given fever, and prior history of colitis from campylobacter, suspect this is infectious colitis  -Stool studies came back positive for salmonella species, norovirus and cdiff.  -Cotn IV zosyn for now. Will consult ID   -Anti-emetics and anti-pyretics prn     ESRD on HD  -Unable to do dialysis in the dialysis center today. Normally gets HD M/W/F.   -Doesn't look overtly vol up, K is normal. No urgent need for HD.   -Consult nephrology     Hypertension  Hyperlipidemia  -Cont home meds          Diet: Combination Diet Regular Diet Adult; Renal Diet (dialysis)    DVT Prophylaxis: Pneumatic Compression Devices  Moffett Catheter: Not present  Lines: None     Cardiac Monitoring: None  Code Status: Full Code      Clinically Significant Risk Factors         # Hyponatremia: Lowest Na = 131 mmol/L in last 2 days, will monitor as appropriate  # Hypochloremia: Lowest Cl = 95 mmol/L in last 2 days, will monitor as appropriate  # Hypocalcemia: Lowest Ca = 8.3 mg/dL in last 2 days, will monitor and replace as appropriate    # Anion Gap Metabolic Acidosis: Highest Anion Gap = 19 mmol/L in last 2 days, will monitor and treat as appropriate      # Hypertension: Noted on problem list                       Social Drivers of Health    Tobacco Use: High Risk (5/28/2025)    Patient History     Smoking Tobacco Use: Never     Smokeless Tobacco Use: Current     Passive Exposure: Never   Physical Activity: Unknown (9/24/2024)    Exercise Vital Sign     Days of Exercise per  Week: 3 days   Social Connections: Unknown (9/24/2024)    Social Connection and Isolation Panel [NHANES]     Frequency of Social Gatherings with Friends and Family: Once a week          Disposition Plan     Medically Ready for Discharge: Anticipated in 2-4 Days             CARLIE Tom  Hospitalist Service  [facility]  Securely message with [employer] (more info)  Text page via [employer] Paging/Directory   ______________________________________________________________________    Interval History   Patient seen and examined this morning. Feeling somewhat better. Eating better. No fevers overnight.     Physical Exam   Vital Signs: Temp: 98.2  F (36.8  C) Temp src: Oral BP: (!) 154/67 Pulse: 78   Resp: (!) 41 SpO2: 97 % O2 Device: None (Room air)    Weight: 101 lbs 8 oz      General: Not in obvious distress.  HEENT: NC, AT   Chest: Clear to auscultation bilaterally  Heart: S1S2 normal, regular. No M/R/G  Abdomen: Soft. Bowel sounds- active. Mild generalized tenderness  Extremities: No legs swelling  Neuro: Alert and awake, grossly non-focal      Medical Decision Making             Data     I have personally reviewed the following data over the past 24 hrs:    6.0  \   10.6 (L)   / 132 (L)     131 (L) 96 (L) 68.7 (H) /  96   5.2 19 (L) 13.14 (H) \       Imaging results reviewed over the past 24 hrs:   No results found for this or any previous visit (from the past 24 hours).

## 2025-05-29 NOTE — PLAN OF CARE
Problem: Adult Inpatient Plan of Care  Goal: Plan of Care Review  Description: The Plan of Care Review/Shift note should be completed every shift.  The Outcome Evaluation is a brief statement about your assessment that the patient is improving, declining, or no change.  This information will be displayed automatically on your shift  note.  Outcome: Progressing     Problem: Diarrhea  Goal: Effective Diarrhea Management  Outcome: Progressing  Intervention: Manage Diarrhea  Recent Flowsheet Documentation  Taken 5/29/2025 0800 by Saira Lopez RN  Isolation Precautions: enteric precautions maintained  Medication Review/Management: medications reviewed     Problem: Pain Acute  Goal: Optimal Pain Control and Function  Outcome: Progressing  Intervention: Develop Pain Management Plan  Recent Flowsheet Documentation  Taken 5/29/2025 0816 by Saira Lopez RN  Pain Management Interventions:   medication (see MAR)   care clustered   food  Intervention: Prevent or Manage Pain  Recent Flowsheet Documentation  Taken 5/29/2025 0800 by Saira Lopez RN  Medication Review/Management: medications reviewed   Goal Outcome Evaluation:       Highest temp 99.4. C/o headache, PRN tylenol given with good effect. IV abx. Does not make much urine. BP elevated, came down after scheduled BP meds given. Pt to dialysis.

## 2025-05-29 NOTE — PROCEDURES
Potassium   Date Value Ref Range Status   05/29/2025 5.2 3.4 - 5.3 mmol/L Final     Hemoglobin   Date Value Ref Range Status   05/29/2025 10.6 (L) 11.7 - 15.7 g/dL Final     Creatinine   Date Value Ref Range Status   05/29/2025 13.14 (H) 0.51 - 0.95 mg/dL Final     Urea Nitrogen   Date Value Ref Range Status   05/29/2025 68.7 (H) 6.0 - 20.0 mg/dL Final     Sodium   Date Value Ref Range Status   05/29/2025 131 (L) 135 - 145 mmol/L Final     INR   Date Value Ref Range Status   09/14/2023 1.00 0.85 - 1.15 Final       DIALYSIS PROCEDURE NOTE  Hepatitis status of previous patient on machine log was checked and verified ok to use with this patients hepatitis status.  Patient dialyzed for 3 hrs. on a K2 bath with a net fluid removal of  3L.  A BFR of 450 ml/min was obtained via a AVF using 15 gauge needles.      The treatment plan was discussed with BENJAMIN Rivera during the treatment.      Needle cannulation sites held x 10 min.       Meds  given: none   Complications: pt tolerated tx well. Primary RN came bedside to administer hydralazine      Person educated: patient and daughter Knowledge base ERI. Barriers to learning: karenni speaking. Educated on procedure via oral mode.      ICEBOAT? Timeout performed pre-treatment  I: Patient was identified using 2 identifiers  C:  Consent Signed Yes  E: Equipment preventative maintenance is current and dialysis delivery system OK to use  B: Hepatitis B Surface Antigen: Negative; Draw Date: 5/29/25    O: Dialysis orders present and complete prior to treatment  A: Vascular access verified and assessed prior to treatment  T: Treatment was performed at a clinically appropriate time  ?: Patient was allowed to ask questions and address concerns prior to treatment  See Adult Hemodialysis flowsheet in [employer] for further details and post assessment.  Machine water alarm in place and functioning. Transducer pods intact and checked every 15min.   Pt returned via bed.  Chlorine/Chloramine water  system checked every 4 hours.      Patient repositioned every 2 hours during the treatment.  Post treatment report given to DOTTY Lopez RN regarding 3L of fluid removed, last BP of 146/66, and patient pain rating of 0/10.

## 2025-05-30 PROCEDURE — 99232 SBSQ HOSP IP/OBS MODERATE 35: CPT | Performed by: INTERNAL MEDICINE

## 2025-05-30 PROCEDURE — 250N000013 HC RX MED GY IP 250 OP 250 PS 637: Performed by: INTERNAL MEDICINE

## 2025-05-30 PROCEDURE — 99232 SBSQ HOSP IP/OBS MODERATE 35: CPT

## 2025-05-30 PROCEDURE — 120N000001 HC R&B MED SURG/OB

## 2025-05-30 PROCEDURE — 250N000011 HC RX IP 250 OP 636: Performed by: INTERNAL MEDICINE

## 2025-05-30 RX ADMIN — PANTOPRAZOLE SODIUM 40 MG: 40 TABLET, DELAYED RELEASE ORAL at 09:04

## 2025-05-30 RX ADMIN — LISINOPRIL 10 MG: 5 TABLET ORAL at 09:04

## 2025-05-30 RX ADMIN — VANCOMYCIN HYDROCHLORIDE 125 MG: KIT at 16:31

## 2025-05-30 RX ADMIN — CALCIUM ACETATE 667 MG: 667 CAPSULE ORAL at 17:36

## 2025-05-30 RX ADMIN — VANCOMYCIN HYDROCHLORIDE 125 MG: KIT at 12:48

## 2025-05-30 RX ADMIN — Medication 25 MCG: at 09:04

## 2025-05-30 RX ADMIN — VANCOMYCIN HYDROCHLORIDE 125 MG: KIT at 09:09

## 2025-05-30 RX ADMIN — AMLODIPINE BESYLATE 10 MG: 5 TABLET ORAL at 09:04

## 2025-05-30 RX ADMIN — METOPROLOL TARTRATE 100 MG: 25 TABLET, FILM COATED ORAL at 21:30

## 2025-05-30 RX ADMIN — VANCOMYCIN HYDROCHLORIDE 125 MG: KIT at 21:31

## 2025-05-30 RX ADMIN — METOPROLOL TARTRATE 100 MG: 25 TABLET, FILM COATED ORAL at 09:04

## 2025-05-30 RX ADMIN — CEFTRIAXONE SODIUM 2 G: 2 INJECTION, POWDER, FOR SOLUTION INTRAMUSCULAR; INTRAVENOUS at 16:31

## 2025-05-30 RX ADMIN — CALCIUM ACETATE 667 MG: 667 CAPSULE ORAL at 12:46

## 2025-05-30 RX ADMIN — LOSARTAN POTASSIUM 25 MG: 25 TABLET, FILM COATED ORAL at 09:05

## 2025-05-30 RX ADMIN — ATORVASTATIN CALCIUM 40 MG: 40 TABLET, FILM COATED ORAL at 21:31

## 2025-05-30 RX ADMIN — FERROUS SULFATE TAB 325 MG (65 MG ELEMENTAL FE) 325 MG: 325 (65 FE) TAB at 09:04

## 2025-05-30 ASSESSMENT — ACTIVITIES OF DAILY LIVING (ADL)
ADLS_ACUITY_SCORE: 38

## 2025-05-30 NOTE — PROGRESS NOTES
[facility]    Medicine Progress Note - Hospitalist Service    Date of Admission:  5/28/2025    Assessment & Plan   Patient is a 60 year old female with PMH significant for ESRD on HD, hypertension, hyperlipidemia who was sent to our ED from dialysis center for evaluation of abdominal pain, fever and diarrhea.     Abdominal pain, diarrhea, fever  Infectious colitis  -CT abdomen/pelvis showed mod to severe colitis, predominantly in the proximal colon.  -Given fever, and prior history of colitis from campylobacter, suspect this is infectious colitis  -Stool studies came back positive for salmonella species, norovirus and cdiff.  -Empiric IV zosyn on admission. ID consulted. ID switched Zosyn to Rocephin. Added PO vancomycin for cdiff  -Anti-emetics and anti-pyretics prn     ESRD on HD  -Unable to do dialysis in the dialysis center today. Normally gets HD M/W/F.   -Doesn't look overtly vol up, K is normal. No urgent need for HD.   -Consult nephrology     Acute thrombocytopenia  -Likely due to infection as above  -Monitor    Hypertension  Hyperlipidemia  -Cont home meds          Diet: Combination Diet Regular Diet Adult; Renal Diet (dialysis)    DVT Prophylaxis: Pneumatic Compression Devices  Moffett Catheter: Not present  Lines: None     Cardiac Monitoring: None  Code Status: Full Code      Clinically Significant Risk Factors         # Hyponatremia: Lowest Na = 131 mmol/L in last 2 days, will monitor as appropriate  # Hypochloremia: Lowest Cl = 96 mmol/L in last 2 days, will monitor as appropriate          # Hypertension: Noted on problem list                       Social Drivers of Health    Tobacco Use: High Risk (5/28/2025)    Patient History     Smoking Tobacco Use: Never     Smokeless Tobacco Use: Current     Passive Exposure: Never   Physical Activity: Unknown (9/24/2024)    Exercise Vital Sign     Days of Exercise per Week: 3 days   Social Connections: Unknown (9/24/2024)    Social  Connection and Isolation Panel [NHANES]     Frequency of Social Gatherings with Friends and Family: Once a week          Disposition Plan     Medically Ready for Discharge: Likely home tomorrow             CARLIE Tom  Hospitalist Service  [facility]  Securely message with [employer] (more info)  Text page via [employer] Paging/Directory   ______________________________________________________________________    Interval History   Patient seen and examined this morning. Daughter was at bedside. Patient reported feeling better with no abdominal pain. Has had couple of soft BM overnight. No fever or chills. Appetite is improving.     Physical Exam   Vital Signs: Temp: 98.2  F (36.8  C) Temp src: Oral BP: (!) 180/86 Pulse: 75   Resp: 18 SpO2: 96 % O2 Device: None (Room air)    Weight: 101 lbs 8 oz      General: Not in obvious distress.  HEENT: NC, AT   Chest: Clear to auscultation bilaterally  Heart: S1S2 normal, regular. No M/R/G  Abdomen: Soft. Bowel sounds- active. Mild generalized tenderness  Extremities: No legs swelling  Neuro: Alert and awake, grossly non-focal      Medical Decision Making             Data         Imaging results reviewed over the past 24 hrs:   No results found for this or any previous visit (from the past 24 hours).

## 2025-05-30 NOTE — PLAN OF CARE
Problem: Adult Inpatient Plan of Care  Goal: Absence of Hospital-Acquired Illness or Injury  Intervention: Prevent Infection  Recent Flowsheet Documentation  Taken 5/30/2025 0130 by Trish Gallardo, RN  Infection Prevention:   hand hygiene promoted   rest/sleep promoted   personal protective equipment utilized     Problem: Chronic Kidney Disease  Goal: Acceptable Pain Control  Outcome: Progressing     Problem: Infection  Goal: Absence of Infection Signs and Symptoms  Outcome: Progressing  Intervention: Prevent or Manage Infection  Recent Flowsheet Documentation  Taken 5/30/2025 0130 by Trish Gallardo, RN  Isolation Precautions: enteric precautions maintained   Goal Outcome Evaluation:    Patient alert, oriented x 4. Denied chest pain and shortness of breath. Denied pain. Daughter stayed in the room with the patient during the night. Fistula site intact, no bleeding noted. Continue to have loose stools x 2 overnight. Will continue to monitor the patient.

## 2025-05-30 NOTE — PROGRESS NOTES
[facility]  Infectious Disease   Progress Note     Date of Admission:  5/28/2025    Assessment & Plan   Salmonella gastroenteritis, by multiplex>>resolving  Low grade fever resolved  C difficile positive, likely carrier  Norovirus positive, not etiology of syx  ESRD on HD  DM     PLAN  IV ceftriaxone  Po vanco  BC  Stool culture being processed  Since much improved ok to discontinue on PO cipro 250 BID , and po vanco,  x10 days    Discussed fod safety, meat should be well done, precautions in kitchen etc    Will sign off, please call with questions      Irving Grewal M.D.    ______________________________________________________________________    Interval History   Had BM no diarrhea  No more abd pains  Daughter intrepreted    Past medical, family, and social history reviewed, unchanged from before.      Physical Exam   Vital Signs: Temp: 98.2  F (36.8  C) Temp src: Oral BP: (!) 163/80 Pulse: 70   Resp: 18 SpO2: 95 % O2 Device: None (Room air)    Weight: 101 lbs 8 oz  Gen. appearance nontoxic  Eyes no conjunctivitis or icterus  Neck no stiffness or neck vein distention, no LN    Abdomen soft not tender  Extremities no synovitis, trace edema  Skin  no rash or emboli  Neurologic alert oriented no focal deficits      Data   Results for orders placed or performed during the hospital encounter of 05/28/25 (from the past 24 hours)   Stool culture for speciation    Specimen: Per Rectum; Stool   Result Value Ref Range    Culture Culture negative, monitoring continues

## 2025-05-30 NOTE — PLAN OF CARE
Problem: Adult Inpatient Plan of Care  Goal: Plan of Care Review  Description: The Plan of Care Review/Shift note should be completed every shift.  The Outcome Evaluation is a brief statement about your assessment that the patient is improving, declining, or no change.  This information will be displayed automatically on your shift  note.  Outcome: Progressing  Flowsheets (Taken 5/30/2025 1034)  Plan of Care Reviewed With:   patient   child   Goal Outcome Evaluation:plan to go home when ready  Problem: Adult Inpatient Plan of Care  Goal: Optimal Comfort and Wellbeing  Outcome: Progressing     Problem: Chronic Kidney Disease  Goal: Optimal Functional Ability  Outcome: Progressing-dialysis MWF - TTS while in hospital  Problem: Comorbidity Management  Goal: Blood Pressure in Desired Range  Intervention: Maintain Blood Pressure Management  Recent Flowsheet Documentation  Taken 5/30/2025 0900 by Meena Che RN  Medication Review/Management: medications reviewed-monitoring  Problem: Pain Acute  Goal: Optimal Pain Control and Function  Intervention: Prevent or Manage Pain  Recent Flowsheet Documentation  Taken 5/30/2025 0900 by Meena Che RN  Medication Review/Management: medications reviewed-understands pain scale and medicate as per mar    Plan of Care Reviewed With: patient, child=patient alert and oriented -speaks Karenni - interpreted with family.  Renal diet.  Assist of one.  Dialysis on Thursday. Oral antibiotics.  Plan to go home when able

## 2025-05-30 NOTE — PROGRESS NOTES
CLINICAL NUTRITION SERVICES - BRIEF Follow-up     INFORMATION OBTAINED  Daughter at bedside assist w/translation.  Appetite improving.  They were told pt is here because of something she ate, and they were wondering what it was.    CURRENT NUTRITION ORDERS  Diet: Reg    CURRENT INTAKE/TOLERANCE  Appears adequate intake:   When trays from here, doc'd 100%, meals ~500 kcal and 24+g protein   Also supported by food from home /dgtr    MALNUTRITION  % Intake: Decreased intake does not meet criteria- feeling unwell x 2-3 days PTA   % Weight Loss: None noted  Subcutaneous Fat Loss: None observed  Muscle Loss: None observed  Fluid Accumulation/Edema: Mild, 1+  Malnutrition Diagnosis: Patient does not meet two of the established criteria necessary for diagnosing malnutrition  Malnutrition Present on Admission: No    INTERVENTIONS  D/w pt and dgtr cannot know for sure what caused food borne illness, but I did go over safe food handling recommendations.    MONITORING/EVALUATION  Progress toward goals will be monitored and evaluated per policy.

## 2025-05-30 NOTE — PROGRESS NOTES
RENAL PROGRESS NOTE    HPI: 59-YOF w/PMH ESRD on MWF HD, DM,  HTN, HLD and anemia presented to the ER s/p Dialysis for c/O ABD pain, fever, diarrhea. Renal consulted for ESRD on HD status.     ASSESSMENT & PLAN:     ESRD on HD:  Q MWF at [facility] under the care of Dr. Jang.  TTS HD schedule while here.      MILD acidosis: Co2 19, follow with HD      Mild Hyponatremia:  likely hypervolemic with possible some excess GI losses.     HTN:  on Amlodipine and Metoprolol. Resume PTA Meds. Control pain. Hydralazine PRNs.      Anemia: 2/2 ACD. HG 11.5, follow. on NELLIE as OP, follow need while here.      CKD-MBD: +Secondary hyperpara, on phoslo as binder     HTN: resume PTA meds. PRNS. Control pain     HLD: statin, ASA     ABD pain/FEver/Diarrhea/Colitis: CT ABD/Pelvis showed severe colitis, in prox colon. Hx of colitis frm campylobacter, ?infectious colitis suspected. On IV Zosyn, anti-emetics, and antipyretics Management per [facility].        SUBJECTIVE:    Pt resting in bed, appears comfortable  Denies pain, loose stools overnight  No edema, on RA  Continue TT HD while here  Discussed labs/plan and answered all questions    OBJECTIVE:  Physical Exam   Temp: 98.2  F (36.8  C) Temp src: Oral BP: (!) 180/86 Pulse: 75   Resp: 18 SpO2: 96 % O2 Device: None (Room air)    Vitals:    05/28/25 0749 05/29/25 1115   Weight: 43.1 kg (95 lb) 46 kg (101 lb 8 oz)     Vital Signs with Ranges  Temp:  [97.8  F (36.6  C)-99.5  F (37.5  C)] 98.2  F (36.8  C)  Pulse:  [68-84] 75  Resp:  [11-41] 18  BP: (136-193)/(66-88) 180/86  SpO2:  [96 %-99 %] 96 %  I/O last 3 completed shifts:  In: 460 [P.O.:460]  Out: 3000 [Other:3000]      Patient Vitals for the past 72 hrs:   Weight   05/29/25 1115 46 kg (101 lb 8 oz)   05/28/25 0749 43.1 kg (95 lb)   No intake or output data in the 24 hours ending 05/29/25 0931    PHYSICAL EXAM:  GEN: NAD, thin/frail, Karenni speaking  CV: RRR , no JVD  Lung: clear and equal, on RA  Ab: tender to palpation  Ext: +  edema and well perfused  Skin: no rash  Psych: cooperative  Neuro: No asterixis, NFD  Access: c/d/i    LABORATORY STUDIES:     Recent Labs   Lab 05/29/25  0523 05/28/25  0833   WBC 6.0 5.7   RBC 3.64* 4.08   HGB 10.6* 11.5*   HCT 31.2* 35.4   * 137*       Basic Metabolic Panel:  Recent Labs   Lab 05/29/25  0523 05/28/25  0833   * 133*   POTASSIUM 5.2 4.6   CHLORIDE 96* 95*   CO2 19* 19*   BUN 68.7* 58.6*   CR 13.14* 10.81*   GLC 96 114*   CAMRON 8.3* 9.0       INRNo lab results found in last 7 days.     Recent Labs   Lab Test 05/29/25  0523 05/28/25  0833 05/03/24  1423 09/14/23  1225   INR  --   --   --  1.00   WBC 6.0 5.7   < > 7.2   HGB 10.6* 11.5*   < > 10.8*   * 137*   < > 266    < > = values in this interval not displayed.       Personally reviewed current labs    Janet LOWE-BC  [facility]  [phone]

## 2025-05-31 VITALS
WEIGHT: 101.5 LBS | OXYGEN SATURATION: 98 % | HEART RATE: 68 BPM | DIASTOLIC BLOOD PRESSURE: 71 MMHG | HEIGHT: 57 IN | SYSTOLIC BLOOD PRESSURE: 135 MMHG | TEMPERATURE: 97.9 F | RESPIRATION RATE: 18 BRPM | BODY MASS INDEX: 21.9 KG/M2

## 2025-05-31 PROCEDURE — 90935 HEMODIALYSIS ONE EVALUATION: CPT

## 2025-05-31 PROCEDURE — 250N000013 HC RX MED GY IP 250 OP 250 PS 637: Performed by: INTERNAL MEDICINE

## 2025-05-31 PROCEDURE — 99239 HOSP IP/OBS DSCHRG MGMT >30: CPT | Performed by: INTERNAL MEDICINE

## 2025-05-31 RX ORDER — VANCOMYCIN HYDROCHLORIDE 125 MG/1
125 CAPSULE ORAL 4 TIMES DAILY
Qty: 40 CAPSULE | Refills: 0 | Status: SHIPPED | OUTPATIENT
Start: 2025-05-31 | End: 2025-06-10

## 2025-05-31 RX ORDER — CIPROFLOXACIN 250 MG/1
250 TABLET, FILM COATED ORAL 2 TIMES DAILY
Qty: 20 TABLET | Refills: 0 | Status: SHIPPED | OUTPATIENT
Start: 2025-05-31 | End: 2025-06-10

## 2025-05-31 RX ORDER — FAMOTIDINE 20 MG/1
20 TABLET, FILM COATED ORAL EVERY OTHER DAY
Status: SHIPPED
Start: 2025-05-31

## 2025-05-31 RX ADMIN — PANTOPRAZOLE SODIUM 40 MG: 40 TABLET, DELAYED RELEASE ORAL at 12:29

## 2025-05-31 RX ADMIN — AMLODIPINE BESYLATE 10 MG: 5 TABLET ORAL at 12:29

## 2025-05-31 RX ADMIN — VANCOMYCIN HYDROCHLORIDE 125 MG: KIT at 12:39

## 2025-05-31 RX ADMIN — METOPROLOL TARTRATE 100 MG: 25 TABLET, FILM COATED ORAL at 12:30

## 2025-05-31 RX ADMIN — LOSARTAN POTASSIUM 25 MG: 25 TABLET, FILM COATED ORAL at 12:30

## 2025-05-31 RX ADMIN — Medication 25 MCG: at 12:30

## 2025-05-31 RX ADMIN — Medication: at 12:32

## 2025-05-31 RX ADMIN — FERROUS SULFATE TAB 325 MG (65 MG ELEMENTAL FE) 325 MG: 325 (65 FE) TAB at 12:29

## 2025-05-31 RX ADMIN — CALCIUM ACETATE 667 MG: 667 CAPSULE ORAL at 12:30

## 2025-05-31 ASSESSMENT — ACTIVITIES OF DAILY LIVING (ADL)
ADLS_ACUITY_SCORE: 38

## 2025-05-31 NOTE — PLAN OF CARE
Problem: Adult Inpatient Plan of Care  Goal: Plan of Care Review  Outcome: Progressing  Flowsheets (Taken 5/30/2025 2145)  Plan of Care Reviewed With: patient  Overall Patient Progress: improving  Goal: Patient-Specific Goal (Individualized)  Outcome: Progressing  Goal: Absence of Hospital-Acquired Illness or Injury  Outcome: Progressing  Intervention: Identify and Manage Fall Risk  Recent Flowsheet Documentation  Taken 5/30/2025 1627 by Adegun, Oluwadamilola, RN  Safety Promotion/Fall Prevention:   assistive device/personal items within reach   activity supervised   nonskid shoes/slippers when out of bed   clutter free environment maintained  Intervention: Prevent Skin Injury  Recent Flowsheet Documentation  Taken 5/30/2025 1627 by Adegun, Oluwadamilola, RN  Body Position:   position changed independently   position maintained   foot of bed elevated  Intervention: Prevent Infection  Recent Flowsheet Documentation  Taken 5/30/2025 1627 by Adegun, Oluwadamilola, RN  Infection Prevention: hand hygiene promoted  Goal: Optimal Comfort and Wellbeing  Outcome: Progressing  Goal: Readiness for Transition of Care  Outcome: Progressing     Problem: Chronic Kidney Disease  Goal: Optimal Coping with Chronic Illness  Outcome: Progressing  Goal: Electrolyte Balance  Outcome: Progressing  Goal: Fluid Balance  Outcome: Progressing  Goal: Optimal Functional Ability  Outcome: Progressing  Intervention: Optimize Functional Ability  Recent Flowsheet Documentation  Taken 5/30/2025 1627 by Adegun, Oluwadamilola, RN  Activity Management: activity adjusted per tolerance  Goal: Absence of Anemia Signs and Symptoms  Outcome: Progressing  Goal: Optimal Oral Intake  Outcome: Progressing  Goal: Acceptable Pain Control  Outcome: Progressing  Goal: Minimize Renal Failure Effects  Outcome: Progressing     Problem: Comorbidity Management  Goal: Blood Glucose Levels Within Targeted Range  Outcome: Progressing  Goal: Blood Pressure in Desired  Range  Outcome: Progressing     Problem: Hemodialysis  Goal: Safe, Effective Therapy Delivery  Outcome: Progressing  Goal: Effective Tissue Perfusion  Outcome: Progressing  Goal: Absence of Infection Signs and Symptoms  Outcome: Progressing  Intervention: Prevent or Manage Infection  Recent Flowsheet Documentation  Taken 5/30/2025 1627 by Adegun, Oluwadamilola, RN  Infection Prevention: hand hygiene promoted     Problem: Diarrhea  Goal: Effective Diarrhea Management  Outcome: Progressing  Intervention: Manage Diarrhea  Recent Flowsheet Documentation  Taken 5/30/2025 1627 by Adegun, Oluwadamilola, RN  Isolation Precautions: enteric precautions maintained     Problem: Pain Acute  Goal: Optimal Pain Control and Function  Outcome: Progressing     Problem: Fever  Goal: Body Temperature in Desired Range  Outcome: Progressing     Problem: Infection  Goal: Absence of Infection Signs and Symptoms  Outcome: Progressing  Intervention: Prevent or Manage Infection  Recent Flowsheet Documentation  Taken 5/30/2025 1627 by Adegun, Oluwadamilola, RN  Isolation Precautions: enteric precautions maintained   Goal Outcome Evaluation: Pt alert and oriented, able to make needs known. Pt denies pain, diarrhea improved, no BM this shift.      Plan of Care Reviewed With: patient    Overall Patient Progress: improvingOverall Patient Progress: improving

## 2025-05-31 NOTE — PLAN OF CARE
Goal Outcome Evaluation:       Pt has not had any stools tonight. Afebrile. Denies pain. Daughter is by the bedside. Continues to be on enteric precautions.          Problem: Diarrhea  Goal: Effective Diarrhea Management  Outcome: Progressing  Intervention: Manage Diarrhea  Recent Flowsheet Documentation  Taken 5/31/2025 0253 by Jason Dc RN  Isolation Precautions: enteric precautions maintained     Problem: Infection  Goal: Absence of Infection Signs and Symptoms  Outcome: Progressing  Intervention: Prevent or Manage Infection  Recent Flowsheet Documentation  Taken 5/31/2025 0253 by Jaosn Dc RN  Isolation Precautions: enteric precautions maintained

## 2025-05-31 NOTE — PROCEDURES
Lyndsey [facility] dialyzed for 3 hours on a Nipro Elisio dialyzer with a net fluid removal of 2L. A BFR of 450ml/min was obtained via a LUE AVF and 15G needles.  Pt on K2 bath. Complications: pt requested to end 30 minutes early d/t feeling well on dialysis; no hypotension seen at that time, hypotension noted 3 minutes later. VSS post run. Verbal report given to JIGNA Villanueva RN. Pt education provided concerning dialysis procedure and all questions answered. Consent on file. See flowsheet in epic for further details. Water alarm in place during treatment. Hepatitis B Antigen negative. Date drawn 5/29/25. Hepatitis B Antibody immune . Date drawn: 5/29/25.      Sarah Lopez RN [facility]

## 2025-05-31 NOTE — PLAN OF CARE
Goal Outcome Evaluation:    A/O x4. VSS. Denied pain. Independent with ADLs. Had dialysis this morning. Education provided on meds and appointments with daughter at bedside. Both verbalized understanding. Discharged to home with daughter at 1345. Took all personal belongings with her.

## 2025-05-31 NOTE — DISCHARGE SUMMARY
[facility] MEDICINE  DISCHARGE SUMMARY     Primary Care Physician: Abhilash Cobb  Admission Date: 5/28/2025   Discharge Provider: CARLIE Tom Discharge Date: 5/31/2025   Diet: as below   Code Status: Full Code   Activity: DCACTIVITY: Activity as tolerated        Condition at Discharge: Stable     REASON FOR PRESENTATION(See Admission Note for Details)   Abdominal pain, diarrhea and fever    PRINCIPAL & ACTIVE DISCHARGE DIAGNOSES     Active Problems:    Colitis      PENDING LABS     Unresulted Labs Ordered in the Past 30 Days of this Admission       Date and Time Order Name Status Description    5/29/2025  3:55 PM Stool culture for speciation Preliminary     5/28/2025  8:05 AM Blood Culture Peripheral blood (BC) Wrist, Right Preliminary     5/28/2025  8:05 AM Blood Culture Peripheral blood (BC) Arm, Right Preliminary               PROCEDURES ( this hospitalization only)          RECOMMENDATIONS TO OUTPATIENT PROVIDER FOR F/U VISIT     Follow-up Appointments       Follow Up      Follow up with Nephrology service as planned.        Hospital Follow-up with Existing Primary Care Provider (PCP)          Schedule Primary Care visit within: 30 Days                   DISPOSITION     Home    SUMMARY OF HOSPITAL COURSE:      Patient is a 60 year old female with PMH significant for ESRD on HD, hypertension, hyperlipidemia who was sent to our ED from dialysis center for evaluation of abdominal pain, fever and diarrhea.     Abdominal pain, diarrhea, fever  Infectious colitis due to salmonella, norovirus and C.diff   -CT abdomen/pelvis showed mod to severe colitis, predominantly in the proximal colon.  -Given fever, and prior history of colitis from campylobacter, suspect this is infectious colitis  -Stool studies came back positive for salmonella species, norovirus and cdiff.  -Empiric IV zosyn on admission. ID consulted. ID switched Zosyn to Rocephin. Added PO vancomycin for  cdiff. Clinically better. Ok to discharge on PO cirpo 250mg BID and PO vancomycin 125mg QID x 10 days  -Anti-emetics and anti-pyretics prn     ESRD on HD  -Unable to do dialysis in the dialysis center on the DOA. Normally gets HD M/W/F.   -Doesn't look overtly vol up, K is normal. No urgent need for HD.   -Nephrology consulted. Dialyzed this morning     Acute thrombocytopenia  -Likely due to infection as above  -Monitor     Hypertension  Hyperlipidemia  -Cont home meds. Discussed with nephrology service about the patient being on both ACEI and ARB. ACEI stopped.     Discharge Medications with Med changes:     Discharge Medication List as of 5/31/2025  1:01 PM        START taking these medications    Details   ciprofloxacin (CIPRO) 250 MG tablet Take 1 tablet (250 mg) by mouth 2 times daily for 10 days., Disp-20 tablet, R-0, E-Prescribe      vancomycin (VANCOCIN) 125 MG capsule Take 1 capsule (125 mg) by mouth 4 times daily for 10 days., Disp-40 capsule, R-0, E-Prescribe           CONTINUE these medications which have CHANGED    Details   famotidine (PEPCID) 20 MG tablet Take 1 tablet (20 mg) by mouth every other day., No Print Out           CONTINUE these medications which have NOT CHANGED    Details   acetaminophen (TYLENOL) 325 MG tablet Take 650 mg by mouth every 6 hours as needed for mild pain, Historical      amLODIPine (NORVASC) 10 MG tablet TAKE 1 TABLET (10 MG) BY MOUTH DAILY AT 2 PM, Disp-30 tablet, R-3, E-Prescribe      atorvastatin (LIPITOR) 40 MG tablet Take 1 tablet (40 mg) by mouth daily., Disp-30 tablet, R-3, E-Prescribe      calcium acetate (PHOSLO) 667 MG CAPS capsule Take 1 capsule (667 mg) by mouth 3 times daily (with meals)., Disp-90 capsule, R-3, E-Prescribe      FEROSUL 325 (65 Fe) MG tablet TAKE 1 TABLET BY MOUTH ONE TIME EACH DAY WITH BREAKFAST, Disp-30 tablet, R-4, BLOSSOM, E-Prescribe      losartan (COZAAR) 25 MG tablet Take 1 tablet by mouth daily., Historical      meclizine (ANTIVERT) 25 MG  tablet Take 25 mg by mouth 3 times daily as needed., Historical      metoprolol tartrate (LOPRESSOR) 100 MG tablet Take 100 mg by mouth 2 times daily., Historical      minoxidil (ROGAINE) 5 % external solution Apply topically daily as needed.Historical      pantoprazole (PROTONIX) 40 MG EC tablet Take 1 tablet by mouth daily., Historical      vitamin D3 (CHOLECALCIFEROL) 50 mcg (2000 units) tablet Historical           STOP taking these medications       lisinopril (ZESTRIL) 10 MG tablet Comments:   Reason for Stopping:         naproxen (NAPROSYN) 500 MG tablet Comments:   Reason for Stopping:                     Rationale for medication changes:      Please see above        Consults   ID and nephrology      Immunizations given this encounter     Most Recent Immunizations   Administered Date(s) Administered    COVID-19 12+ (Pfizer) 09/24/2024    COVID-19 Bivalent 12+ (Pfizer) 01/16/2023    COVID-19 MONOVALENT 12+ (Pfizer) 10/01/2021    COVID-19 Monovalent 12+ (Pfizer 2022) 02/04/2022    COVID-19 Vaccine (Embue) 04/03/2021    Flu, Unspecified 03/26/2018    HEPA 07/24/2009    HepB, Unspecified 02/04/2020    Hepatitis B Immunity: Titer 09/14/2023    Hepatitis B, Adult (Energix-B/Recombivax HB) 08/12/2024    Influenza (H1N1) 01/12/2010    Influenza Vaccine 18-64 (Flublok) 09/13/2023    Influenza Vaccine >6 months,quad, PF 09/11/2023    Influenza Vaccine, 6+MO IM (QUADRIVALENT W/PRESERVATIVES) 11/09/2023    Influenza, Intradermal, Quad, Pf 09/09/2020    Influenza, Split Virus, Trivalent, Pf (Fluzone\Fluarix) 09/24/2024    MMR (MMRII) 07/22/2019    Meningococcal Mcv4 Conjugate,unspecified  07/24/2009    Pneumo Conj 13-V (2010&after) 12/06/2021    Pneumococcal 20 valent Conjugate (Prevnar 20) 09/24/2024    Pneumococcal 23 valent 12/06/2021    TD,PF 7+ (Tenivac) 11/26/2009    TDAP (Adacel,Boostrix) 08/12/2024    Td (Adult), Adsorbed 07/22/2019    Td,adult,historic,unspecified 12/16/2010    Tdap (Adult) Unspecified  "Formulation 08/12/2009    Varicella (Varivax) 06/16/2009    Varicella Immunity: Titer/MD Dx 08/12/2009    Zoster recombinant adjuvanted (Shingrix) 04/11/2023           Anticoagulation Information      Recent INR results: No results for input(s): \"INR\" in the last 168 hours.  Warfarin doses (if applicable) or name of other anticoagulant: NA      SIGNIFICANT IMAGING FINDINGS     Results for orders placed or performed during the hospital encounter of 05/28/25   CT Abdomen Pelvis w Contrast    Impression    IMPRESSION:   1.  Nonspecific moderate to severe colitis predominantly involving the proximal colon. This may reflect an infectious or inflammatory process. No abscess.       SIGNIFICANT LABORATORY FINDINGS     Most Recent 3 CBC's:  Recent Labs   Lab Test 05/29/25  0523 05/28/25  0833 12/13/24  1440   WBC 6.0 5.7 4.2   HGB 10.6* 11.5* 10.7*   MCV 86 87 91   * 137* 179     Most Recent 3 BMP's:  Recent Labs   Lab Test 05/29/25  0523 05/28/25  0833 04/25/25  0921 04/25/25  0609 12/13/24  1440   * 133*  --   --  136   POTASSIUM 5.2 4.6  --   --  4.2   CHLORIDE 96* 95*  --   --  96*   CO2 19* 19*  --   --  30*   BUN 68.7* 58.6* 9.4   < > 13.4   CR 13.14* 10.81*  --   --  4.89*   ANIONGAP 16* 19*  --   --  10   CAMRON 8.3* 9.0  --   --  9.2   GLC 96 114*  --   --  114*    < > = values in this interval not displayed.     Most Recent 2 LFT's:  Recent Labs   Lab Test 05/28/25  0833 12/13/24  1440   AST 18 17   ALT 19 7   ALKPHOS 106 86   BILITOTAL 0.5 0.4     Most Recent 3 INR's:  Recent Labs   Lab Test 09/14/23  1225   INR 1.00         Discharge Orders        Reason for your hospital stay    Colitis     Activity    Your activity upon discharge: activity as tolerated     Follow Up    Follow up with Nephrology service as planned.     Diet    Follow this diet upon discharge: Current Diet:Orders Placed This Encounter      Combination Diet Regular Diet Adult; Renal Diet (dialysis)     Hospital Follow-up with Existing " "Primary Care Provider (PCP)            Examination   /71 (BP Location: Right arm)   Pulse 68   Temp 97.9  F (36.6  C) (Oral)   Resp 18   Ht 1.448 m (4' 9\")   Wt 46 kg (101 lb 8 oz)   LMP  (LMP Unknown)   SpO2 98%   BMI 21.96 kg/m        General: Not in obvious distress.  HEENT: NC, AT   Chest: Clear to auscultation bilaterally  Heart: S1S2 normal, regular. No M/R/G  Abdomen: Soft. NT, ND. Bowel sounds- active.  Extremities: No legs swelling  Neuro: Alert and awake, grossly non-focal      Please see EMR for more detailed significant labs, imaging, consultant notes etc.    I, CARLIE Tom, personally saw the patient today and spent greater than 30 minutes discharging this patient.    CARLIE Tom  [facility]    CC:Abhilash Cobb    "

## 2025-06-02 LAB
BACTERIA SPEC CULT: NO GROWTH
BACTERIA SPEC CULT: NO GROWTH

## 2025-06-04 ENCOUNTER — DOCUMENTATION ONLY (OUTPATIENT)
Dept: TRANSPLANT | Facility: CLINIC | Age: 60
End: 2025-06-04
Payer: COMMERCIAL

## 2025-06-04 ENCOUNTER — TELEPHONE (OUTPATIENT)
Dept: TRANSPLANT | Facility: CLINIC | Age: 60
End: 2025-06-04
Payer: COMMERCIAL

## 2025-06-04 NOTE — TELEPHONE ENCOUNTER
Called patient to inform them of status change on Kidney Waitlist. Pt changed to inactive on 25 due to temporarily too sick, was hospitalized for infectious colitis. Pt will need to finish course of antibiotics, and then also get GI clearance. Pt's daughter will reach out to PCP to get GI referral.  Pt was informed that they are still accruing time on the waitlist, they just cannot receive  donor offers at this time. Status change letter will be sent to patient. Pt verbalized understanding of information and has no further questions. Encouraged to reach out if questions arise.

## 2025-06-06 ENCOUNTER — LAB (OUTPATIENT)
Dept: LAB | Facility: CLINIC | Age: 60
End: 2025-06-06
Payer: COMMERCIAL

## 2025-06-06 DIAGNOSIS — Z76.82 AWAITING ORGAN TRANSPLANT: ICD-10-CM

## 2025-06-07 LAB
ADV 40+41 DNA STL QL NAA+NON-PROBE: NEGATIVE
ASTRO TYP 1-8 RNA STL QL NAA+NON-PROBE: NEGATIVE
C CAYETANENSIS DNA STL QL NAA+NON-PROBE: NEGATIVE
CAMPYLOBACTER DNA SPEC NAA+PROBE: NEGATIVE
CRYPTOSP DNA STL QL NAA+NON-PROBE: NEGATIVE
E COLI O157 DNA STL QL NAA+NON-PROBE: ABNORMAL
E HISTOLYT DNA STL QL NAA+NON-PROBE: NEGATIVE
EAEC ASTA GENE ISLT QL NAA+PROBE: NEGATIVE
EC STX1+STX2 GENES STL QL NAA+NON-PROBE: NEGATIVE
EPEC EAE GENE STL QL NAA+NON-PROBE: NEGATIVE
ETEC LTA+ST1A+ST1B TOX ST NAA+NON-PROBE: NEGATIVE
G LAMBLIA DNA STL QL NAA+NON-PROBE: NEGATIVE
NOROVIRUS GI+II RNA STL QL NAA+NON-PROBE: POSITIVE
P SHIGELLOIDES DNA STL QL NAA+NON-PROBE: NEGATIVE
RVA RNA STL QL NAA+NON-PROBE: NEGATIVE
SALMONELLA SP RPOD STL QL NAA+PROBE: POSITIVE
SAPO I+II+IV+V RNA STL QL NAA+NON-PROBE: NEGATIVE
SHIGELLA SP+EIEC IPAH ST NAA+NON-PROBE: NEGATIVE
V CHOLERAE DNA SPEC QL NAA+PROBE: NEGATIVE
VIBRIO DNA SPEC NAA+PROBE: NEGATIVE
Y ENTEROCOL DNA STL QL NAA+PROBE: NEGATIVE

## 2025-06-09 ENCOUNTER — RESULTS FOLLOW-UP (OUTPATIENT)
Dept: FAMILY MEDICINE | Facility: CLINIC | Age: 60
End: 2025-06-09

## 2025-06-18 ENCOUNTER — OFFICE VISIT (OUTPATIENT)
Dept: FAMILY MEDICINE | Facility: CLINIC | Age: 60
End: 2025-06-18
Attending: INTERNAL MEDICINE
Payer: COMMERCIAL

## 2025-06-18 VITALS
TEMPERATURE: 97.8 F | DIASTOLIC BLOOD PRESSURE: 58 MMHG | RESPIRATION RATE: 16 BRPM | WEIGHT: 96.06 LBS | HEIGHT: 57 IN | BODY MASS INDEX: 20.72 KG/M2 | HEART RATE: 67 BPM | OXYGEN SATURATION: 98 % | SYSTOLIC BLOOD PRESSURE: 124 MMHG

## 2025-06-18 DIAGNOSIS — N18.6 TYPE 2 DIABETES MELLITUS WITH CHRONIC KIDNEY DISEASE ON CHRONIC DIALYSIS, WITHOUT LONG-TERM CURRENT USE OF INSULIN (H): ICD-10-CM

## 2025-06-18 DIAGNOSIS — K52.9 COLITIS: ICD-10-CM

## 2025-06-18 DIAGNOSIS — E11.22 TYPE 2 DIABETES MELLITUS WITH CHRONIC KIDNEY DISEASE ON CHRONIC DIALYSIS, WITHOUT LONG-TERM CURRENT USE OF INSULIN (H): ICD-10-CM

## 2025-06-18 DIAGNOSIS — N18.6 ESRD (END STAGE RENAL DISEASE) ON DIALYSIS (H): ICD-10-CM

## 2025-06-18 DIAGNOSIS — Z99.2 TYPE 2 DIABETES MELLITUS WITH CHRONIC KIDNEY DISEASE ON CHRONIC DIALYSIS, WITHOUT LONG-TERM CURRENT USE OF INSULIN (H): ICD-10-CM

## 2025-06-18 DIAGNOSIS — Z09 HOSPITAL DISCHARGE FOLLOW-UP: Primary | ICD-10-CM

## 2025-06-18 DIAGNOSIS — Z99.2 ESRD (END STAGE RENAL DISEASE) ON DIALYSIS (H): ICD-10-CM

## 2025-06-18 DIAGNOSIS — I10 BENIGN ESSENTIAL HYPERTENSION: ICD-10-CM

## 2025-06-18 PROCEDURE — 3078F DIAST BP <80 MM HG: CPT | Performed by: FAMILY MEDICINE

## 2025-06-18 PROCEDURE — 3074F SYST BP LT 130 MM HG: CPT | Performed by: FAMILY MEDICINE

## 2025-06-18 PROCEDURE — G2211 COMPLEX E/M VISIT ADD ON: HCPCS | Performed by: FAMILY MEDICINE

## 2025-06-18 PROCEDURE — 1111F DSCHRG MED/CURRENT MED MERGE: CPT | Performed by: FAMILY MEDICINE

## 2025-06-18 PROCEDURE — 99214 OFFICE O/P EST MOD 30 MIN: CPT | Performed by: FAMILY MEDICINE

## 2025-06-18 RX ORDER — LOSARTAN POTASSIUM 50 MG/1
50 TABLET ORAL DAILY
Qty: 90 TABLET | Refills: 3 | Status: SHIPPED | OUTPATIENT
Start: 2025-06-18

## 2025-06-18 NOTE — PROGRESS NOTES
Assessment & Plan     Hospital discharge follow-up  Doing well, fully recovered from colitis. >14 days since discharge    Benign essential hypertension  Well controlled. Increased losartan to 50mg as directed by nephrology - patient says the pharmacy keeps giving her 25 mg tablets.     Type 2 diabetes mellitus with chronic kidney disease on chronic dialysis, without long-term current use of insulin (H)  Lab Results   Component Value Date    A1C 5.2 12/13/2024    A1C 5.2 09/24/2024    A1C 5.0 05/04/2024    A1C 5.4 09/14/2023   Well controlled,     Colitis  Was on transplant list but needs to be cleared by GI to be placed back on. Colonoscopy done in 2024.   - Adult GI  Referral - Consult Only    ESRD (end stage renal disease) on dialysis (H)  Nephrology changed to 50mg but pharmacy did not receive order. Will send.   - losartan (COZAAR) 50 MG tablet  Dispense: 90 tablet; Refill: 3          MED REC REQUIRED  Post Medication Reconciliation Status: discharge medications reconciled and changed, per note/orders    Follow-up  No follow-ups on file.    Subjective   Lyndsey is a 60 year old, presenting for the following health issues:  Hospital F/U and Referral (For GI )    HPI          Hospital Follow-up Visit:    Hospital/Nursing Home/IP Rehab Facility: [facility]  Most Recent Admission Date: 5/28/2025   Most Recent Admission Diagnosis: Colitis - K52.9     Most Recent Discharge Date: 5/31/2025   Most Recent Discharge Diagnosis: Colitis - K52.9  Gastroesophageal reflux disease without esophagitis - K21.9   Do you have any other stressors you would like to discuss with your provider? No    Problems taking medications regularly:  None  Medication changes since discharge: None  Problems adhering to non-medication therapy:  None    Summary of hospitalization:  [facility] discharge summary reviewed  Diagnostic Tests/Treatments reviewed.  Follow up needed: none  Other Healthcare  "Providers Involved in Patient s Care:         Specialist appointment - continue with nephrology  Update since discharge: stable.         Plan of care communicated with patient and family           Overall better without any diarrhea.   Ok to discharge on PO cirpo 250mg BID and PO vancomycin 125mg QID x 10 days  Needs referral for GI to clear her to get back on the transplant list.         Objective    /58   Pulse 67   Temp 97.8  F (36.6  C) (Oral)   Resp 16   Ht 1.448 m (4' 9\")   Wt 43.6 kg (96 lb 1 oz)   LMP  (LMP Unknown)   SpO2 98%   BMI 20.79 kg/m    Body mass index is 20.79 kg/m .  Physical Exam   GENERAL: alert and no distress  NECK: no adenopathy, no asymmetry, masses, or scars  RESP: lungs clear to auscultation - no rales, rhonchi or wheezes  CV: regular rate and rhythm, normal S1 S2, no S3 or S4, no murmur, click or rub, no peripheral edema  ABDOMEN: soft, nontender, no hepatosplenomegaly, no masses and bowel sounds normal  MS: no gross musculoskeletal defects noted, no edema. Fistula in left upper arm with palpable thrill.     Results for orders placed or performed in visit on 06/06/25   HLA Nicky Class I, Single Antigen     Status: None   Result Value Ref Range    SA 1 TEST METHOD SA EDTA FCS     SA 1 CELL Class I     SA1 HI RISK NICKY None     SA1 MOD RISK NICKY None     SA 1  COMMENTS        HLA PRA Test performed by modified testing procedure that may also include pretreatment of serum. Pretreatment may be the addition of fetal calf serum, EDTA, and/or adsorption.  High-risk, MFI > 3,000.  Mod-risk, -3,000.   HLA Nicky Class II, Single Antigen     Status: None   Result Value Ref Range    SA 2 TEST METHOD SA EDTA FCS     SA 2 CELL Class II     SA2 HI RISK NICKY None     SA2 MOD RISK NICKY None     SA 2 COMMENTS        HLA PRA Test performed by modified testing procedure that may also include pretreatment of serum. Pretreatment may be the addition of fetal calf serum, EDTA, and/or adsorption.  " High-risk, MFI > 3,000.  Mod-risk, -3,000.   HLA Maria R, CPRA     Status: None   Result Value Ref Range    PROTOCOL CUTOFF Plan A, 500 mfi cumulative     UNOS CPRA 11     UNACCEPTABLE ANTIGENS A:68 B:75    PRA Single Antigen IgG Antibody     Status: None    Narrative    The following orders were created for panel order PRA Single Antigen IgG Antibody.  Procedure                               Abnormality         Status                     ---------                               -----------         ------                     PRA Single Antigen IgG ...[[id number]]                      Final result               HLA Maria R Class I, Single...[[id number]]                      Final result               HLA Maria R Class II, Singl...[[id number]]                      Final result               HLA Maria R, CPRA[[id number]]                                   Final result                 Please view results for these tests on the individual orders.     No results found for this or any previous visit (from the past 24 hours).        Signed Electronically by: Abhilash Cobb MD

## 2025-06-19 ENCOUNTER — DOCUMENTATION ONLY (OUTPATIENT)
Dept: GASTROENTEROLOGY | Facility: CLINIC | Age: 60
End: 2025-06-19
Payer: COMMERCIAL

## 2025-06-19 NOTE — PROGRESS NOTES
Records Requested  06/19/25    Facility  [facility]  Fax: [phone]   Outcome Request sent to [facility] for records .    [facility]   [address] [place], [facility]  Phone: [phone]  Fax: [phone]

## 2025-07-07 DIAGNOSIS — K21.9 GASTROESOPHAGEAL REFLUX DISEASE WITHOUT ESOPHAGITIS: ICD-10-CM

## 2025-07-07 NOTE — TELEPHONE ENCOUNTER
Clinic RN: Please investigate patient's chart or contact patient if the information cannot be found because the medication is listed as historical or discontinued. Confirm patient is taking this medication. Document findings and route refill encounter to provider for approval or denial.    Thank you!    Alycia Sal RN on 7/7/2025 at 2:05 PM

## 2025-07-08 RX ORDER — FAMOTIDINE 20 MG/1
20 TABLET, FILM COATED ORAL 2 TIMES DAILY
Qty: 60 TABLET | Refills: 11 | Status: SHIPPED | OUTPATIENT
Start: 2025-07-08

## 2025-07-08 NOTE — TELEPHONE ENCOUNTER
"Writer attempt # 1 to call patient with a [employer] # [id number] regarding Refill RN's message below. Refill RN's message reviewed with patient.    Per patient, \"I need a refill this medication. The last time I took this medication, I don't remember. I take 1 pill every other day. Dr. Cobb referred us to be seen by GI doctor, has not called us yet. Please help schedule an appointment for us to see GI doctor.\"    Routing refill request to Dr. Cobb to review and advise. Writer is also routing message to [facility] Scheduling team to assist with scheduling GI appointment.      Vickie Burleson, PHILN, RN, PHN  Refill RN Team  [facility]  "

## 2025-07-24 ENCOUNTER — TELEPHONE (OUTPATIENT)
Dept: GASTROENTEROLOGY | Facility: CLINIC | Age: 60
End: 2025-07-24
Payer: COMMERCIAL

## 2025-07-24 NOTE — TELEPHONE ENCOUNTER
[facility] Call Center    Phone Message    May a detailed message be left on voicemail: Yes    Reason for Call: Other: Patient is currently scheduled on NA, as visit type New GI Urgent. This is outside the expected timeline for this referral. Patient has been added to the waitlist. No available appointments within given time. Patient has dialysis M, W, F can only do T and TH. Please assist in finding an appointment.      Action Taken: Message routed to:  Other: GI REFERRAL TRIAGE POOL     Travel Screening: Not Applicable

## 2025-07-28 DIAGNOSIS — E78.5 HYPERLIPIDEMIA LDL GOAL <100: ICD-10-CM

## 2025-07-28 RX ORDER — ATORVASTATIN CALCIUM 40 MG/1
40 TABLET, FILM COATED ORAL DAILY
Qty: 30 TABLET | Refills: 8 | Status: SHIPPED | OUTPATIENT
Start: 2025-07-28

## 2025-07-31 ENCOUNTER — TELEPHONE (OUTPATIENT)
Dept: TRANSPLANT | Facility: CLINIC | Age: 60
End: 2025-07-31
Payer: COMMERCIAL

## 2025-07-31 NOTE — TELEPHONE ENCOUNTER
Called pt for update- pt is going to see GI on 9/2/25 after infectious colitis admission. Will check in after that appt. Pt verbalized understanding of information and has no further questions. Encouraged to reach out if questions arise.

## 2025-08-11 ENCOUNTER — TELEPHONE (OUTPATIENT)
Dept: GASTROENTEROLOGY | Facility: CLINIC | Age: 60
End: 2025-08-11

## 2025-08-30 DIAGNOSIS — I10 BENIGN ESSENTIAL HYPERTENSION: ICD-10-CM

## 2025-08-30 RX ORDER — AMLODIPINE BESYLATE 10 MG/1
10 TABLET ORAL
Qty: 30 TABLET | Refills: 8 | Status: SHIPPED | OUTPATIENT
Start: 2025-08-30

## 2025-08-31 ENCOUNTER — HEALTH MAINTENANCE LETTER (OUTPATIENT)
Age: 60
End: 2025-08-31

## 2025-09-02 ENCOUNTER — LAB (OUTPATIENT)
Dept: LAB | Facility: CLINIC | Age: 60
End: 2025-09-02
Payer: COMMERCIAL

## 2025-09-02 ENCOUNTER — OFFICE VISIT (OUTPATIENT)
Dept: GASTROENTEROLOGY | Facility: CLINIC | Age: 60
End: 2025-09-02
Attending: FAMILY MEDICINE
Payer: COMMERCIAL

## 2025-09-02 VITALS
OXYGEN SATURATION: 98 % | SYSTOLIC BLOOD PRESSURE: 184 MMHG | WEIGHT: 100 LBS | BODY MASS INDEX: 21.57 KG/M2 | DIASTOLIC BLOOD PRESSURE: 86 MMHG | HEART RATE: 82 BPM | HEIGHT: 57 IN

## 2025-09-02 DIAGNOSIS — K52.9 COLITIS: ICD-10-CM

## 2025-09-02 DIAGNOSIS — A09 INFECTIOUS COLITIS: Primary | ICD-10-CM

## 2025-09-02 PROCEDURE — 99204 OFFICE O/P NEW MOD 45 MIN: CPT | Mod: GC

## 2025-09-02 PROCEDURE — 3077F SYST BP >= 140 MM HG: CPT

## 2025-09-02 PROCEDURE — 3079F DIAST BP 80-89 MM HG: CPT

## 2025-09-02 PROCEDURE — 1126F AMNT PAIN NOTED NONE PRSNT: CPT

## 2025-09-02 ASSESSMENT — PAIN SCALES - GENERAL: PAINLEVEL_OUTOF10: NO PAIN (0)

## (undated) RX ORDER — DOBUTAMINE HYDROCHLORIDE 200 MG/100ML
INJECTION INTRAVENOUS
Status: DISPENSED
Start: 2023-12-07

## (undated) RX ORDER — ATROPINE SULFATE 0.4 MG/ML
AMPUL (ML) INJECTION
Status: DISPENSED
Start: 2023-12-07

## (undated) RX ORDER — SIMETHICONE 40MG/0.6ML
SUSPENSION, DROPS(FINAL DOSAGE FORM)(ML) ORAL
Status: DISPENSED
Start: 2024-05-09

## (undated) RX ORDER — METOPROLOL TARTRATE 1 MG/ML
INJECTION, SOLUTION INTRAVENOUS
Status: DISPENSED
Start: 2023-12-07